# Patient Record
Sex: FEMALE | Race: WHITE | NOT HISPANIC OR LATINO | Employment: FULL TIME | URBAN - METROPOLITAN AREA
[De-identification: names, ages, dates, MRNs, and addresses within clinical notes are randomized per-mention and may not be internally consistent; named-entity substitution may affect disease eponyms.]

---

## 2017-01-02 ENCOUNTER — OFFICE VISIT (OUTPATIENT)
Dept: PSYCHOLOGY | Facility: CLINIC | Age: 37
End: 2017-01-02
Attending: NURSE PRACTITIONER
Payer: COMMERCIAL

## 2017-01-02 DIAGNOSIS — F41.1 GAD (GENERALIZED ANXIETY DISORDER): ICD-10-CM

## 2017-01-02 DIAGNOSIS — F33.1 MODERATE EPISODE OF RECURRENT MAJOR DEPRESSIVE DISORDER (H): Primary | ICD-10-CM

## 2017-01-02 PROCEDURE — 90791 PSYCH DIAGNOSTIC EVALUATION: CPT | Performed by: SOCIAL WORKER

## 2017-01-02 ASSESSMENT — ANXIETY QUESTIONNAIRES
5. BEING SO RESTLESS THAT IT IS HARD TO SIT STILL: SEVERAL DAYS
6. BECOMING EASILY ANNOYED OR IRRITABLE: NEARLY EVERY DAY
GAD7 TOTAL SCORE: 16
3. WORRYING TOO MUCH ABOUT DIFFERENT THINGS: NEARLY EVERY DAY
2. NOT BEING ABLE TO STOP OR CONTROL WORRYING: NEARLY EVERY DAY
1. FEELING NERVOUS, ANXIOUS, OR ON EDGE: NEARLY EVERY DAY
7. FEELING AFRAID AS IF SOMETHING AWFUL MIGHT HAPPEN: NOT AT ALL

## 2017-01-02 ASSESSMENT — PATIENT HEALTH QUESTIONNAIRE - PHQ9: 5. POOR APPETITE OR OVEREATING: NEARLY EVERY DAY

## 2017-01-02 NOTE — Clinical Note
PINKY Reye[address] Dr[address]I met with Ms. Waller and completed a Diagnostic Eval and scheduled her for follow up sessions. Thanks Calvin Ramon, [facility]

## 2017-01-02 NOTE — PROGRESS NOTES
Adult Intake Structured Interview  Standard Diagnostic Assessment      CLIENT'S NAME: Heath Waller  MRN:   [id number]  :   1980  ACCT. NUMBER: [id number]  DATE OF SERVICE: 17      Identifying Information:  Client is a 36 year old, ,  female. Client was referred for counseling by Alex Interiano MD at [facility]. Client is currently employed full time. Client attended the session alone.       Client's Statement of Presenting Concern:  Client reports the reason for seeking therapy at this time for CBT to help with helping with pain control.  Client stated that her symptoms have resulted in the following functional impairments: management of the household and or completion of tasks, relationship(s), social interactions and work / vocational responsibilities      History of Presenting Concern:  Client reports that these problem(s) began 15 yrs ago with pain.  Client has attempted to resolve these concerns in the past through creams, TINS unit, antidepressants, other medications for pain, Pt. Client reports that other professional(s) are not involved in providing support / services.     Social History:  Client reported she grew up in [place], [place]. They were the first born of 2 children.  Client's brother is 35. This is an intact family and parents remain . Client reported that her childhood was happy. Client described her current relationships with family of origin as .    Client reported a history of a committed relationship with her marriage. Client has been  for 17 years and together for 21 yrs.  Client reported having 3 children.  Client's children's ages 18, 15, and 8. Client identified few stable and meaningful social connections. Client reported that she has not been involved  with the legal system.  Client's highest education level was associate degree / vocational certificate. Client did identify the following learning problems: attention and concentration. There are no ethnic, cultural or Episcopal factors that may be relevant for therapy. Client identified her preferred language to be English. Client reported she does not need the assistance of an  or other support involved in therapy. Modifications will not be used to assist communication in therapy. Client did serve in the  10 yrs in the [employer].     Client reports family history includes CANCER in her paternal grandfather; CEREBROVASCULAR DISEASE in her father and maternal grandmother; Hypertension in her father; Thyroid Disease in her brother.    Mental Health History:  Client reported the following biological family members or relatives with mental health issues: Father experienced Depression, Mother experienced Depression, client's 2 Children are experiencing ADHD, Maternal Grandmother experienced Schizophrenia and Maternal  Uncle experienced Depression and committed suicide when client's mother was 10 yrs old and her uncle was college age.  Mother also had a brother she never knew die from an accidental hanging and her father had a brother who  in a car accident.   Client has not been previously diagnosed with a mental health diagnosis.  Client has not received mental health services in the past.  Hospitalizations: None.  Client is not currently receiving any mental health services.  Client is participating in PT with the Pain Clinic.      Chemical Health History:  Client reported the following biological family members or relatives with chemical health issues: Father reportedly used alcohol , Uncle reportedly uses alcohol . Client has not received chemical dependency treatment in the past. Client is not currently receiving any chemical dependency treatment. Client reports no problems as a result  of their drinking / drug use.      Client Reports:  Client reports using alcohol 2 times per week and has 2 beers at a time. Client first started drinking at age 14.  Client reports using tobacco 1 1/2 packs per day. Client started using tobacco at age 14.  Client noted she is attempting to cut down on her smoking.  Client reports using marijuana 3 times per week and smokes 2 hits at a time. Client started using marijuana 2 yrs ago.  Client reports using caffeine 6 times per day and drinks 1 at a time. Client started using caffeine at age no response..  Client denies using street drugs.  Client denies the non-medical use of prescription or over the counter drugs.    CAGE: None of the patient's responses to the CAGE screening were positive / Negative CAGE score   Based on the negative Cage-Aid score and clinical interview there  are not indications of drug or alcohol abuse.    Discussed the general effects of drugs and alcohol on health and well-being. Therapist gave client printed information about the effects of chemical use on her health and well being.      Significant Losses / Trauma / Abuse / Neglect Issues:  There are no indications or report of: significant losses, trauma, abuse or neglect.  Client described how she lost her closest cousin to a drug related death when she was 14.  Client also noted she lost 3 of her grandparents at ages her 10, 12, and 28.  Client noted her paternal grandmother is currently living.    Issues of possible neglect are not present.      Medical Issues:  Client has had a physical exam to rule out medical causes for current symptoms. Date of last physical exam was within the past year. Client was encouraged to follow up with PCP if symptoms were to develop. The client has a [facility] Provider, who is named in the [facility], Dr. Interiano.. The client reports not having a psychiatrist. Client reports the following current medical concerns: chronic pain syndrome,  ADD, Fibromyalgia, TMJ,and Myofascial pain.. The client reports the presence of chronic or episodic pain in the form of lower back, shoulders, and neck. . The pain level is severe and has a frequency of 6.. There are significant nutritional concern with her increase wt  of 40#.     Client reports current meds as:   Current Outpatient Prescriptions   Medication Sig     desipramine (NORPRAMIN) 10 MG tablet Take 2 tablets (20 mg) by mouth At Bedtime     pregabalin (LYRICA) 150 MG capsule Take 1 capsule (150 mg) by mouth 2 times daily Patient  Will contact you when ready to fill     amphetamine-dextroamphetamine (ADDERALL XR) 30 MG per 24 hr capsule Take 1 capsule (30 mg) by mouth daily     [START ON 1/20/2017] amphetamine-dextroamphetamine (ADDERALL XR) 30 MG per 24 hr capsule Take 1 capsule (30 mg) by mouth daily     [START ON 2/20/2017] amphetamine-dextroamphetamine (ADDERALL XR) 30 MG per 24 hr capsule Take 1 capsule (30 mg) by mouth daily     spironolactone (ALDACTONE) 50 MG tablet Take 1 tablet (50 mg) by mouth daily     pseudoePHEDrine (SUDAFED 12 HOUR) 120 MG 12 hr tablet Take 1 tablet (120 mg) by mouth 2 times daily     diazepam (VALIUM) 5 MG tablet Take 1 tablet (5 mg) by mouth every 12 hours as needed for anxiety, sleep, muscle spasms or pain     lisinopril (PRINIVIL/ZESTRIL) 10 MG tablet Take 1 tablet (10 mg) by mouth daily     order for DME Equipment being ordered: TENS with electrodes and pads     pregabalin (LYRICA) 75 MG capsule Take 1 capsule (75 mg) by mouth 3 times daily     B Complex TABS      ibuprofen (ADVIL,MOTRIN) 200 MG tablet      DULoxetine (CYMBALTA) 60 MG capsule Take 120 mg by mouth every morning     omeprazole (PRILOSEC) 20 MG capsule Take 20 mg by mouth     nicotine (NICODERM CQ) 21 MG/24HR patch 2h hr Place 1 patch onto the skin every 24 hours     No current facility-administered medications for this visit.       Client Allergies:  Allergies   Allergen Reactions     Codeine      Sulfa  Drugs      Rash       Toradol [Ketorolac] Itching and Rash       Medical History:  Past Medical History   Diagnosis Date     Myofacial muscle pain      ADD (attention deficit disorder)      Depressive disorder      Thyroid disease      Gastro-oesophageal reflux disease      Noninfectious ileitis      IBS with constipation     Other chronic pain      myofacial pain syndrome     Sleep apnea      wears CPAP at night         Medication Adherence:  Client reports taking prescribed medications as prescribed.    Client was provided recommendation to follow-up with prescribing physician.    Mental Status Assessment:  Appearance:   Appropriate   Eye Contact:   Good   Psychomotor Behavior: Normal   Attitude:   Cooperative   Orientation:   All  Speech   Rate / Production: Normal    Volume:  Normal   Mood:    Normal  Affect:    Appropriate   Thought Content:  Clear   Thought Form:  Coherent  Logical   Insight:    Good       Review of Symptoms:  Depression: Sleep Interest Guilt Energy Concentration Appetite Worthless Ruminations Irritability  Maribel:  No symptoms  Psychosis: No symptoms  Anxiety: Worries Nervousness Describe: as pain increases client's anxiety increases.   Panic:  Shortness of Breath Tingling  Post Traumatic Stress Disorder: No symptoms  Obsessive Compulsive Disorder: No symptoms  Eating Disorder: No symptoms  Oppositional Defiant Disorder: No symptoms  ADD / ADHD: Attention Task Completion Distractiblity Forgetful  Conduct Disorder: No symptoms        Safety Issues and Plan for Safety and Risk Management:  Client denies a history of suicidal ideation, suicide attempts, self-injurious behavior, homicidal ideation, homicidal behavior and and other safety concerns  Client denies current fears or concerns for personal safety.  Client denies current or recent suicidal ideation or behaviors.  Client denies current or recent homicidal ideation or behaviors.  Client denies current or recent self injurious behavior or  ideation.  Client denies other safety concerns.  Client reports there are firearms in the house. The firearms are secured in a locked space.  A safety and risk management plan has not been developed at this time, however client was given the after-hours number / 911 should there be a change in any of these risk factors.    Client's Strengths and Limitations:  Client identified the following strengths or resources that will help her succeed in counseling: commitment to health and well being, becca / spirituality, family support and intelligence. Client identified the following supports: family. Things that may interfere with the clients success in counseling include:work, medical appointments and financial part. .        Diagnostic Criteria:  A. Excessive anxiety and worry about a number of events or activities (such as work or school performance).   B. The person finds it difficult to control the worry.  C. Select 3 or more symptoms (required for diagnosis). Only one item is required in children.   - Restlessness or feeling keyed up or on edge.    - Being easily fatigued.    - Difficulty concentrating or mind going blank.    - Irritability.    - Muscle tension.    - Sleep disturbance (difficulty falling or staying asleep, or restless unsatisfying sleep).   D. The focus of the anxiety and worry is not confined to features of an Axis I disorder.  E. The anxiety, worry, or physical symptoms cause clinically significant distress or impairment in social, occupational, or other important areas of functioning.   F. The disturbance is not due to the direct physiological effects of a substance (e.g., a drug of abuse, a medication) or a general medical condition (e.g., hyperthyroidism) and does not occur exclusively during a Mood Disorder, a Psychotic Disorder, or a Pervasive Developmental Disorder.  CRITERIA (A-C) REPRESENT A MAJOR DEPRESSIVE EPISODE - SELECT THESE CRITERIA  A) Recurrent episode(s) - symptoms have been  present during the same 2-week period and represent a change from previous functioning 5 or more symptoms (required for diagnosis)   - Depressed mood. Note: In children and adolescents, can be irritable mood.     - Diminished interest or pleasure in all, or almost all, activities.    - Significant weight gainincrease in appetite.    - Decreased sleep.    - Psychomotor activity agitation.    - Fatigue or loss of energy.    - Feelings of worthlessness or inappropriate guilt.    - Diminished ability to think or concentrate, or indecisiveness.   B) The symptoms cause clinically significant distress or impairment in social, occupational, or other important areas of functioning  C) The episode is not attributable to the physiological effects of a substance or to another medical condition  D) The occurence of major depressive episode is not better explained by other thought / psychotic disorders  E) There has never been a manic episode or hypomanic episode      Functional Status:  Client's symptoms have caused reduced functional status in the following areas: Activities of Daily Living - difficulty completing tasks due to pain levels  Occupational / Vocational - challenges due to pain  Social / Relational - no energy and isolating from social situations.      DSM5 Diagnoses: (Sustained by DSM5 Criteria Listed Above)  Diagnoses: 296.32 Major Depressive Disorder, Recurrent Episode, Moderate _ and With anxious distress  300.02 (F41.1) Generalized Anxiety Disorder  Psychosocial & Contextual Factors: pain triggers client's anxiety and client's depression stems from feeling hopeless about her resolving her pain issues.  WHODAS 2.0 (12 item)            This questionnaire asks about difficulties due to health conditions. Health conditions  include  disease or illnesses, other health problems that may be short or long lasting,  injuries, mental health or emotional problems, and problems with alcohol or drugs.                      Think back over the past 30 days and answer these questions, thinking about how much  difficulty you had doing the following activities. For each question, please [place] only  one response.    S1 Standing for long periods such as 30 minutes? Severe =       4   S2 Taking care of household responsibilities? Severe =       4   S3 Learning a new task, for example, learning how to get to a new place? Moderate =   3   S4 How much of a problem do you have joining community activities (for example, festivals, Adventism or other activities) in the same way as anyone else can? Severe =       4   S5 How much have you been emotionally affected by your health problems? Severe =       4     In the past 30 days, how much difficulty did you have in:   S6 Concentrating on doing something for ten minutes? Moderate =   3   S7 Walking a long distance such as a kilometer (or equivalent)? Severe =       4   S8 Washing your whole body? Mild =           2   S9 Getting dressed? Mild =           2   S10 Dealing with people you do not know? None =         1   S11 Maintaining a friendship? None =         1   S12 Your day to day work? Severe =       4     H1 Overall, in the past 30 days, how many days were these difficulties present? Record number of days 30   H2 In the past 30 days, for how many days were you totally unable to carry out your usual activities or work because of any health condition? Record number of days  10   H3 In the past 30 days, not counting the days that you were totally unable, for how many days did you cut back or reduce your usual activities or work because of any health condition? Record number of days  20     Attendance Agreement:  Client has signed Attendance Agreement:Yes      Preliminary Treatment Plan:  The client reports no currently identified Adventism, ethnic or cultural issues relevant to therapy.     services are not indicated.    Modifications to assist communication are not indicated.    The  concerns identified by the client will be addressed in therapy.    Initial Treatment will focus on: Depressed Mood - difficulty falling asleep and staying asleeep, feeling down, little interest in doing things, low energy, trouble concentating, and overeating.   Anxiety - feeling anxious and nervous, worrying, trouble relaxing, and irritablity.  .    As a preliminary treatment goal, client will develop more effective coping skills to manage depressive symptoms, will develop healthy cognitive patterns and beliefs and will increase ability to function adaptively and will experience a reduction in anxiety, will develop more effective coping skills to manage anxiety symptoms and will develop healthy cognitive patterns and beliefs.    The focus of initial interventions will be to alleviate anxiety, alleviate depressed mood, provide psychoeduction regarding pain management, teach CBT skills, teach relaxation strategies and teach sleep hygiene.    Collaboration with other professionals is not indicated at this time.  Client is active in tx with Pain Management and participating in PT.  Client noted she has a counseling appointment scheduled in Feb with Pain Management and may end up transferring her counseling to that therapist.  We discussed that client will not need to see 2 mental health therapists at the same time.       Referral to another professional/service is not indicated at this time..    A Release of Information is not needed at this time.    Report to child / adult protection services was NA.    Client will have access to their [facility]' medical record.    VALDO Calderon  January 2, 2017

## 2017-01-03 ASSESSMENT — ANXIETY QUESTIONNAIRES: GAD7 TOTAL SCORE: 16

## 2017-01-03 ASSESSMENT — PATIENT HEALTH QUESTIONNAIRE - PHQ9: SUM OF ALL RESPONSES TO PHQ QUESTIONS 1-9: 18

## 2017-01-09 ENCOUNTER — MYC MEDICAL ADVICE (OUTPATIENT)
Dept: PALLIATIVE MEDICINE | Facility: CLINIC | Age: 37
End: 2017-01-09

## 2017-01-10 NOTE — TELEPHONE ENCOUNTER
"Pt LM at 1513:    Pt states that was returning a call. States that her pain is out of control. States that she is doing \"everything\" and can't even function. Not sure what to do. Please call back. [phone].  ----  Will route to nurse [place].     SANDEE LemusN, RN-BC  Patient Care Supervisor/Care Coordinator  [facility]    "

## 2017-01-10 NOTE — TELEPHONE ENCOUNTER
I have earlier opening this week.  She can come in tomorrow or Thurs if this will help and we can do TPI's. Entered my chart message to patient.   Shazia Larkin CNP    [facility]

## 2017-01-10 NOTE — TELEPHONE ENCOUNTER
My chart message from 1/9/17. I called Heath and left a message. I encouraged her to do some self care and incorporate some flare management between now and her appointment on Friday. I left her the nurse number incase she wanted a call back.   I am having an increase in pain again, especially in my low back and neck. I am trying to use everything I can to get it under control, but it is not decreasing at all. It hurts to sit, stand, lay down, everything. I know that I have an appointment scheduled for Friday, but I am in so much pain right now that I'm struggling.    SANDEE WilloughbyN, RN  Care Coordinator  [facility]

## 2017-01-11 ENCOUNTER — OFFICE VISIT (OUTPATIENT)
Dept: PALLIATIVE MEDICINE | Facility: CLINIC | Age: 37
End: 2017-01-11
Payer: COMMERCIAL

## 2017-01-11 ENCOUNTER — RADIANT APPOINTMENT (OUTPATIENT)
Dept: GENERAL RADIOLOGY | Facility: CLINIC | Age: 37
End: 2017-01-11
Attending: NURSE PRACTITIONER
Payer: COMMERCIAL

## 2017-01-11 VITALS
OXYGEN SATURATION: 99 % | SYSTOLIC BLOOD PRESSURE: 122 MMHG | WEIGHT: 180 LBS | DIASTOLIC BLOOD PRESSURE: 81 MMHG | HEART RATE: 76 BPM | BODY MASS INDEX: 26.57 KG/M2

## 2017-01-11 DIAGNOSIS — M26.609 TMJ (TEMPOROMANDIBULAR JOINT SYNDROME): ICD-10-CM

## 2017-01-11 DIAGNOSIS — M79.10 MYALGIA: ICD-10-CM

## 2017-01-11 DIAGNOSIS — F51.04 PSYCHOPHYSIOLOGICAL INSOMNIA: ICD-10-CM

## 2017-01-11 DIAGNOSIS — M79.18 MYOFASCIAL PAIN: ICD-10-CM

## 2017-01-11 DIAGNOSIS — M54.2 CERVICALGIA: ICD-10-CM

## 2017-01-11 DIAGNOSIS — M79.7 FIBROMYALGIA: ICD-10-CM

## 2017-01-11 DIAGNOSIS — M50.30 DDD (DEGENERATIVE DISC DISEASE), CERVICAL: ICD-10-CM

## 2017-01-11 DIAGNOSIS — G89.4 CHRONIC PAIN SYNDROME: Primary | ICD-10-CM

## 2017-01-11 PROCEDURE — 72040 X-RAY EXAM NECK SPINE 2-3 VW: CPT

## 2017-01-11 PROCEDURE — 99214 OFFICE O/P EST MOD 30 MIN: CPT | Mod: 25 | Performed by: NURSE PRACTITIONER

## 2017-01-11 PROCEDURE — 20553 NJX 1/MLT TRIGGER POINTS 3/>: CPT | Performed by: NURSE PRACTITIONER

## 2017-01-11 RX ORDER — DESIPRAMINE HYDROCHLORIDE 10 MG/1
30 TABLET ORAL AT BEDTIME
Qty: 60 TABLET | Refills: 1 | Status: SHIPPED | OUTPATIENT
Start: 2017-01-11 | End: 2017-09-29

## 2017-01-11 RX ORDER — PREGABALIN 150 MG/1
150 CAPSULE ORAL 3 TIMES DAILY
Qty: 60 CAPSULE | Refills: 1 | Status: SHIPPED | OUTPATIENT
Start: 2017-01-11 | End: 2019-09-25

## 2017-01-11 ASSESSMENT — PAIN SCALES - GENERAL: PAINLEVEL: EXTREME PAIN (9)

## 2017-01-11 NOTE — PROGRESS NOTES
"Chief Complaint   Patient presents with     Pain     follow up       Initial There were no vitals taken for this visit. Estimated body mass index is 26.57 kg/(m^2) as calculated from the following:    Height as of 12/20/16: 1.753 m (5' 9\").    Weight as of 12/20/16: 81.647 kg (180 lb).  BP completed using cuff size: hugo French [facility]        "

## 2017-01-11 NOTE — TELEPHONE ENCOUNTER
Sent to patient 1/11/17 at 0943:    Kodi Joe,     Thanks for getting back to me so quickly. I have you scheduled for 3 p.m today and cancelled your Friday appointment. See you later today,    Danielle Virgen, MSN, RN-BC  Care Coordinator  [facility] [place]

## 2017-01-11 NOTE — TELEPHONE ENCOUNTER
Sent to patient 1/11/17 at 0820:    Good morning Heath,    Here is what Shazia would have open the remainder of this week - this is current at the time I am sending it:    Today, Wednesday 1/11/17: 2:30 or 3 p.m.    Thursday, 1/12/17: 10:30, 11:00, 1 p.m., 1:30, 2:30    Friday, 1/13/17: 8 a.m., 8:30, 9:30    I hope one of these appointments will be a fit for you so we can help you out. Message me back as soon as possible and I can check to see what's still available. I will be watching for your return message.    Thank you,    Danielle Virgen, MSN, RN-BC  Care Coordinator  [facility] [place]

## 2017-01-11 NOTE — PATIENT INSTRUCTIONS
Increase Lyrica to 150 mg 3 x per day Rx given to you today   Increase Norpramin to 30 mg at bedtime. RX sent to pharmacy    Recommend that you limit the Valium 5 mg  to 2 x per day of 5 mg or 2.5 mg 3-4 x per day> Not more than 10 mg as prescribed by Dr. Interiano     I will check to see if we can get you into see MIKE POE, PHD., sooner for the initial pain behavioral health. If this is not possible I appreciate your willingness to see a provider at [facility] for this    As your are taking valium, I am not able to prescribe stronger pain killers Percocet, even temporarily.    Cervical ( neck ) x-rays today. I will contact your is my chart with results and plan of care.    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 I am willing to write letter of medical necessity for you.       Follow up:   Shazia Larkin CNP  1/27/17  LEX HARKINS 1/13/17    [facility] Injection instructions  You had trigger point injections/to both sides of neck upper back and left upper buttocks  Meds used:  Lidocaine/bupivicaine   Nurse line:  [phone]  Appointment line:  [phone]      Go to the emergency room if you develop any shortness of breath    Monitor the injection sites for signs and symptoms of infection-fever, chills, redness, swelling, warmth, or drainage to areas.    Okay to use ice to the areas.    Do not apply heat to sites for at least 12 hours.    You may have soreness at injection sites for up to 24 hours.    If you are able to use anti-inflammatory medications or Tylenol for pain control if necessary, you can take these as directed.  After hours doctor line:  [phone]      Nurse Triage line:  [phone]   Call this number with any questions or concerns. You may leave a detailed message anytime. Calls are typically returned Monday through Friday between 8 AM and 4:30 PM. We usually get back to you within 2 business days  depending on the issue/request.       Medication refills:    For non-narcotic medications, call your pharmacy directly to request a refill. The pharmacy will contact the [facility] for authorization. Please allow 3-4 days for these refills to be processed.     For narcotic refills, call the nurse triage line or send a [employer] message. Please contact us 7-10 days before your refill is due. The message MUST include the name of the specific medication(s) requested and how you would like to receive the prescription(s). The options are as follows:    Pain Clinic staff can mail the prescription to your pharmacy. Please tell us the name of the pharmacy.    You may pick the prescription up at the Pain Clinic (tell us the location) or during a clinic visit with your pain provider    Pain Clinic staff can deliver the prescription to the [facility] pharmacy in the clinic building. Please tell us the location.      Scheduling number: [phone].  Call this number to schedule or change appointments.    We believe regular attendance is key to your success in our program.    Any time you are unable to keep your appointment we ask that you call us at least 24 hours in advance to let us know. This will allow us to offer the appointment time to another patient.

## 2017-01-11 NOTE — MR AVS SNAPSHOT
After Visit Summary   1/11/2017    Heath Waller    MRN: [id number]           Patient Information     Date Of Birth          1980        Visit Information        Provider Department      1/11/2017 3:00 PM Shazia Larkin APRN CNP [facility]        Today's Diagnoses     Chronic pain syndrome    -  1     Fibromyalgia         TMJ (temporomandibular joint syndrome)         Cervicalgia         DDD (degenerative disc disease), cervical         Psychophysiological insomnia           Care Instructions    Increase Lyrica to 150 mg 3 x per day Rx given to you today   Increase Norpramin to 30 mg at bedtime. RX sent to pharmacy    Recommend that you limit the Valium 5 mg  to 2 x per day of 5 mg or 2.5 mg 3-4 x per day> Not more than 10 mg as prescribed by Dr. Interiano     I will check to see if we can get you into see MIKE POE, PHD., sooner for the initial pain behavioral health. If this is not possible I appreciate your willingness to see a provider at [facility] for this    As your are taking valium, I am not able to prescribe stronger pain killers Percocet, even temporarily.    Cervical ( neck ) x-rays today. I will contact your is my chart with results and plan of care.    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 I am willing to write letter of medical necessity for you.       Follow up:   Shazia Larkin CNP  1/27/17  LEX HARKINS 1/13/17    [facility] Injection instructions  You had trigger point injections/to both sides of neck upper back and left upper buttocks  Meds used:  Lidocaine/bupivicaine   Nurse line:  [phone]  Appointment line:  [phone]      Go to the emergency room if you develop any shortness of breath    Monitor the injection sites for signs and symptoms of infection-fever, chills, redness, swelling, warmth, or drainage to areas.    Okay to use ice to the  areas.    Do not apply heat to sites for at least 12 hours.    You may have soreness at injection sites for up to 24 hours.    If you are able to use anti-inflammatory medications or Tylenol for pain control if necessary, you can take these as directed.  After hours doctor line:  [phone]      Nurse Triage line:  [phone]   Call this number with any questions or concerns. You may leave a detailed message anytime. Calls are typically returned Monday through Friday between 8 AM and 4:30 PM. We usually get back to you within 2 business days depending on the issue/request.       Medication refills:    For non-narcotic medications, call your pharmacy directly to request a refill. The pharmacy will contact the [facility] for authorization. Please allow 3-4 days for these refills to be processed.     For narcotic refills, call the nurse triage line or send a [employer] message. Please contact us 7-10 days before your refill is due. The message MUST include the name of the specific medication(s) requested and how you would like to receive the prescription(s). The options are as follows:    Pain Clinic staff can mail the prescription to your pharmacy. Please tell us the name of the pharmacy.    You may pick the prescription up at the Pain Clinic (tell us the location) or during a clinic visit with your pain provider    Pain Clinic staff can deliver the prescription to the [facility] pharmacy in the clinic building. Please tell us the location.      Scheduling number: [phone].  Call this number to schedule or change appointments.    We believe regular attendance is key to your success in our program.    Any time you are unable to keep your appointment we ask that you call us at least 24 hours in advance to let us know. This will allow us to offer the appointment time to another patient.             Follow-ups after your visit        Your next 10 appointments already scheduled     Jan 12, 2017  6:00 PM    [facility]   [facility] ([facility])    [address] [facility] [address] [phone]            Jan 13, 2017  8:00 AM   Return Visit with Angela Anders PT   [facility] ([facility])    [address] [place] [address]   [phone]            Jan 27, 2017  8:00 AM   Return Visit with Angela Anders PT   [facility] ([facility])    [address] [place] [address]   [phone]            Jan 27, 2017  9:00 AM   Return Visit with GREGORIO Kwon CNP   [facility] ([facility])    [address] [place] [address]   [phone]            Feb 03, 2017  8:00 AM   Return Visit with Angela Anders PT   [facility] ([facility])    [address] [place] [address]   [phone]            Feb 07, 2017  9:00 AM   New Visit with Radha Keller, PhD [facility] ([facility])    [address] [place] [address]   [phone]            Feb 17, 2017  8:00 AM   Return Visit with Angela Anders PT   [facility] ([facility])    [address] [place] [address]   [phone]              Who to contact     If you have questions or need follow up information about today's clinic visit or your schedule please contact [facility] directly at [phone].  Normal or non-critical lab and imaging results will be communicated to you by MyChart, letter or phone within 4 business days after the clinic has received the results. If you do not hear from us within 7 days, please contact the clinic through MyChart or phone. If you have a  critical or abnormal lab result, we will notify you by phone as soon as possible.  Submit refill requests through [employer] or call your pharmacy and they will forward the refill request to us. Please allow 3 business days for your refill to be completed.          Additional Information About Your Visit        [employer] Information     [employer] gives you secure access to your electronic health record. If you see a primary care provider, you can also send messages to your care team and make appointments. If you have questions, please call your primary care clinic.  If you do not have a primary care provider, please call [phone] and they will assist you.        Care EveryWhere ID     This is your Care EveryWhere ID. This could be used by other organizations to access your [facility] medical records  [id number]        Your Vitals Were     Pulse Pulse Oximetry                76 99%           Blood Pressure from Last 3 Encounters:   01/11/17 122/81   12/20/16 152/92   12/20/16 122/70    Weight from Last 3 Encounters:   01/11/17 81.647 kg (180 lb)   12/20/16 81.647 kg (180 lb)   12/20/16 81.647 kg (180 lb)                 Today's Medication Changes          These changes are accurate as of: 1/11/17  4:40 PM.  If you have any questions, ask your nurse or doctor.               These medicines have changed or have updated prescriptions.        Dose/Directions    desipramine 10 MG tablet   Commonly known as:  NORPRAMIN   This may have changed:  how much to take   Used for:  Chronic pain syndrome, Fibromyalgia, Psychophysiological insomnia   Changed by:  Shazia Larkin APRN CNP        Dose:  30 mg   Take 3 tablets (30 mg) by mouth At Bedtime   Quantity:  60 tablet   Refills:  1       pregabalin 150 MG capsule   Commonly known as:  LYRICA   This may have changed:    - when to take this  - Another medication with the same name was removed. Continue taking this medication, and follow the directions you see  here.   Used for:  Chronic pain syndrome, Fibromyalgia, TMJ (temporomandibular joint syndrome)   Changed by:  Shazia Larkin APRN CNP        Dose:  150 mg   Take 1 capsule (150 mg) by mouth 3 times daily Patient  Will contact you when ready to fill   Quantity:  60 capsule   Refills:  1            Where to get your medicines      These medications were sent to [facility]/pharmacy #[id number] - [place], [place] - [address]  [address]  [address] [facility] [address]     Phone:  [phone]    - desipramine 10 MG tablet      Some of these will need a paper prescription and others can be bought over the counter.  Ask your nurse if you have questions.     Bring a paper prescription for each of these medications    - pregabalin 150 MG capsule             Primary Care Provider Office Phone # Fax #    [facility] [phone] [address] [facility] [address]        Thank you!     Thank you for choosing [facility]  for your care. Our goal is always to provide you with excellent care. Hearing back from our patients is one way we can continue to improve our services. Please take a few minutes to complete the written survey that you may receive in the mail after your visit with us. Thank you!             Your Updated Medication List - Protect others around you: Learn how to safely use, store and throw away your medicines at www.disposemymeds.org.          This list is accurate as of: 1/11/17  4:40 PM.  Always use your most recent med list.                   Brand Name Dispense Instructions for use    * amphetamine-dextroamphetamine 30 MG per 24 hr capsule    ADDERALL XR    30 capsule    Take 1 capsule (30 mg) by mouth daily       * amphetamine-dextroamphetamine 30 MG per 24 hr capsule   Start taking on:  1/20/2017    ADDERALL XR    30 capsule    Take 1 capsule (30 mg) by mouth daily       * amphetamine-dextroamphetamine 30 MG per 24 hr capsule   Start  taking on:  2/20/2017    ADDERALL XR    30 capsule    Take 1 capsule (30 mg) by mouth daily       B Complex Tabs          desipramine 10 MG tablet    NORPRAMIN    60 tablet    Take 3 tablets (30 mg) by mouth At Bedtime       diazepam 5 MG tablet    VALIUM    30 tablet    Take 1 tablet (5 mg) by mouth every 12 hours as needed for anxiety, sleep, muscle spasms or pain       DULoxetine 60 MG EC capsule    CYMBALTA     Take 120 mg by mouth every morning       ibuprofen 200 MG tablet    ADVIL/MOTRIN         lisinopril 10 MG tablet    PRINIVIL/ZESTRIL    30 tablet    Take 1 tablet (10 mg) by mouth daily       nicotine 21 MG/24HR 24 hr patch    NICODERM CQ    30 patch    Place 1 patch onto the skin every 24 hours       omeprazole 20 MG CR capsule    priLOSEC     Take 20 mg by mouth       order for DME     1 each    Equipment being ordered: TENS with electrodes and pads       pregabalin 150 MG capsule    LYRICA    60 capsule    Take 1 capsule (150 mg) by mouth 3 times daily Patient  Will contact you when ready to fill       pseudoePHEDrine 120 MG 12 hr tablet    SUDAFED 12 HOUR    90 tablet    Take 1 tablet (120 mg) by mouth 2 times daily       spironolactone 50 MG tablet    ALDACTONE    90 tablet    Take 1 tablet (50 mg) by mouth daily       * Notice:  This list has 3 medication(s) that are the same as other medications prescribed for you. Read the directions carefully, and ask your doctor or other care provider to review them with you.

## 2017-01-11 NOTE — TELEPHONE ENCOUNTER
Received from patient 1/10/17 at 1840:    What time do you have open? I need help now but I work in [place] which is not easy to make work. I start therapy at work tomorrow afternoon for TMJ.

## 2017-01-12 ENCOUNTER — TELEPHONE (OUTPATIENT)
Dept: PALLIATIVE MEDICINE | Facility: CLINIC | Age: 37
End: 2017-01-12

## 2017-01-12 ENCOUNTER — OFFICE VISIT (OUTPATIENT)
Dept: PSYCHOLOGY | Facility: CLINIC | Age: 37
End: 2017-01-12
Payer: COMMERCIAL

## 2017-01-12 DIAGNOSIS — F33.9 MAJOR DEPRESSION, RECURRENT (H): Primary | ICD-10-CM

## 2017-01-12 DIAGNOSIS — M79.18 MYOFASCIAL PAIN SYNDROME, CERVICAL: ICD-10-CM

## 2017-01-12 DIAGNOSIS — G89.4 CHRONIC PAIN SYNDROME: Primary | ICD-10-CM

## 2017-01-12 DIAGNOSIS — F41.1 GAD (GENERALIZED ANXIETY DISORDER): ICD-10-CM

## 2017-01-12 PROCEDURE — 90834 PSYTX W PT 45 MINUTES: CPT | Performed by: SOCIAL WORKER

## 2017-01-12 NOTE — TELEPHONE ENCOUNTER
I reviewed the patient's cervical x-rays and sent a my chart to patient.  Please contact the patient to see if she would like to try tizanidine ( Zanaflex)  ( failed Flexeril, Skelaxin and Robaxin). Is she interested in a medrol dose pack.  I am not quite sure what else to offer her at this point.   She has an appointment  Tomorrow with LEX HARKINS. I would like consent to talk with her mental health therapist.    Shazia Larkin, CNP    [facility]

## 2017-01-12 NOTE — Clinical Note
Jenna Mccray, I paged you with my direct # [phone].  Please call so we can coordinate and discuss strategies for me to use to help Heath cope with her pain. Thank you VALDO Abel

## 2017-01-13 ENCOUNTER — OFFICE VISIT (OUTPATIENT)
Dept: PALLIATIVE MEDICINE | Facility: CLINIC | Age: 37
End: 2017-01-13
Payer: COMMERCIAL

## 2017-01-13 ENCOUNTER — MYC MEDICAL ADVICE (OUTPATIENT)
Dept: PALLIATIVE MEDICINE | Facility: CLINIC | Age: 37
End: 2017-01-13

## 2017-01-13 ENCOUNTER — HOSPITAL ENCOUNTER (EMERGENCY)
Facility: CLINIC | Age: 37
Discharge: HOME | End: 2017-01-13
Attending: EMERGENCY MEDICINE | Admitting: EMERGENCY MEDICINE
Payer: COMMERCIAL

## 2017-01-13 VITALS
SYSTOLIC BLOOD PRESSURE: 140 MMHG | HEART RATE: 103 BPM | OXYGEN SATURATION: 98 % | TEMPERATURE: 97.9 F | RESPIRATION RATE: 16 BRPM | DIASTOLIC BLOOD PRESSURE: 113 MMHG

## 2017-01-13 DIAGNOSIS — M62.838 CERVICAL PARASPINAL MUSCLE SPASM: ICD-10-CM

## 2017-01-13 DIAGNOSIS — M79.7 FIBROMYALGIA: ICD-10-CM

## 2017-01-13 DIAGNOSIS — G89.4 CHRONIC PAIN SYNDROME: Primary | ICD-10-CM

## 2017-01-13 DIAGNOSIS — M62.830 SPASM OF BACK MUSCLES: ICD-10-CM

## 2017-01-13 DIAGNOSIS — G89.4 CHRONIC PAIN SYNDROME: ICD-10-CM

## 2017-01-13 PROCEDURE — 97530 THERAPEUTIC ACTIVITIES: CPT | Mod: GP | Performed by: PHYSICAL THERAPIST

## 2017-01-13 PROCEDURE — 99283 EMERGENCY DEPT VISIT LOW MDM: CPT

## 2017-01-13 PROCEDURE — 96152 HC HEALTH AND BEHAVIOR INTERVENTION, INDIVIDUAL, EACH 15 MINUTES: CPT | Performed by: PSYCHOLOGIST

## 2017-01-13 PROCEDURE — 25000132 ZZH RX MED GY IP 250 OP 250 PS 637: Performed by: EMERGENCY MEDICINE

## 2017-01-13 RX ORDER — METHYLPREDNISOLONE 4 MG
TABLET, DOSE PACK ORAL
Qty: 21 TABLET | Refills: 0 | Status: SHIPPED | OUTPATIENT
Start: 2017-01-13 | End: 2017-01-24

## 2017-01-13 RX ORDER — TIZANIDINE 2 MG/1
4-6 TABLET ORAL 3 TIMES DAILY
Qty: 90 TABLET | Refills: 1 | Status: SHIPPED | OUTPATIENT
Start: 2017-01-13 | End: 2017-09-29

## 2017-01-13 RX ORDER — OXYCODONE AND ACETAMINOPHEN 5; 325 MG/1; MG/1
2 TABLET ORAL ONCE
Status: COMPLETED | OUTPATIENT
Start: 2017-01-13 | End: 2017-01-13

## 2017-01-13 RX ADMIN — OXYCODONE HYDROCHLORIDE AND ACETAMINOPHEN 2 TABLET: 5; 325 TABLET ORAL at 12:14

## 2017-01-13 ASSESSMENT — ENCOUNTER SYMPTOMS
BACK PAIN: 1
FEVER: 0
CHILLS: 0
NUMBNESS: 0
WEAKNESS: 0

## 2017-01-13 NOTE — ED NOTES
Pt has chronic lower back pain. Increased in the last week. Pain mostly in the right lower back down hip and leg. Also from the shoulders up through the head. Pt has taken ibuprofen and tylenol with no relief.

## 2017-01-13 NOTE — PROGRESS NOTES
"                            Health and Behavioral Intervention    SUBJECTIVE:       Health and Behavioral Invention: Ms. Waller was seen by physical therapy today and was described as hopeless and angry with her pain center provider and had thoughts about seeing a .  Ms. Waller was put on my schedule as she was \"in crisis\" and there was concern about suicidal ideation due to her stating that she was \"hopelessness\" and was \"very tearful\".      Ms. Waller reported that she had a 20+ year history of chronic pain that was managed well with flares 3-4 times a year. She felt that a flare started 4-5 months ago that she cannot control.  She was frustrated and felt that she was being labeled as a \"drug seeker\".  She stated that she was in severe pain and came into our clinic on Wednesday hoping for medication.  She was so anxious related to her pain that she took 4 valium pills across the day (2 a day was prescribed by her primary care provider).   Instead, trigger point injections were conducted at this visit.  She stated that she sat in her car in the parking lot and had a panic attack, afterward because she felt that no one was helping her. She reported severe pain and couldn't go to work today, and could barely talk.      She stated that previously she coped with pain by using the following: \"too much ibuprofen and tylenol\", a tens unit, ice, heat, and stretching.   She was interested in percocet for this flare.  She stated that she was told by her provider that she \"can't get anything stronger, because she was on valium\".   She reported severe life impairment, feeling like a burden to her family, and afraid she will lose her job as she was taking time off.  She also stated that she was using marijuana to try to manage her pain, which was only serving to help with anxiety.    Emotionally, she reported panic attacks, crying, not being able to eat, she can't sleep, was in severe pain, and hopelessness.  She " "denied suicidal ideation and stated that she would never do that and that this was not an option as a family member had done this in the past.  She felt that her depression and anxiety was solely related to severe pain.  She had just started seeing a therapist, Calvin Padron, but did not have a psychiatrist.      Ms. Waller stated that she had already discussed her concerns with Shazia Reyna CNP but was not satisfied.  She felt that her pain was \"different\" and was concerned that something was wrong.  Thus, she was told that if her teddy was severe and she felt it was different, she had the option of going to the Emergency Room.  She was focused on percocet for short term flare management and stated that she was open to long term coping skills strategies but wanted a medication now for her flare.  We discussed that she could talk again with her provider, have a second opinion at another pain center, or see a psychiatrist to change her anxiety medications to something more long term if her pain care provider felt that this would benefit her care or provide other pain care options.  From a psychological standpoint, we discussed psychological and relaxation strategies for her pain, but she was not currently interested.      OBJECTIVE:    Session #:  1 Length of Visit: 45 minutes      Mental Status: Affect was tearful and angry.  She was alert and grossly oriented.  Her speech was normal in rate and prosody.  She did not evidence a disorder or form or flow of thought.  She was dressed and groomed appropriately.  She reported severe pain, frustration, anxiety, and depression.  Suicidal ideation was denied.  A full psychological evaluation was unable to be preformed today due to her level of distress.      ASSESSMENT:            Chronic pain    PLAN: Patient chose to go to the ER to evaluate her pain as she felt that this pain felt \"different\" and she was concerned that \"something was wrong\".    1. This " provider is leaving the clinic and unable to follow her care.  There is currently not a replacement pain psychologist  2. She was encouraged to continue to see Calvin Padron  3. Consider psychiatric medication management for depression and anxiety, to maximize long acting medication vs short acting  4. Concerns included her history of chemical coping, focus on percocet, and current marijuana use.      Radha Keller, Ph.D.,   Licensed Clinical Psychologist  [facility]  [phone]

## 2017-01-13 NOTE — TELEPHONE ENCOUNTER
Sent to patient via [employer] 1/13/17 at 1122:    Good morning Shazia Joe sent a few follow up notes she wanted me to relay to you:    1. Last visit on 1/11/17 we increased the Lyrica as well as the Norpramin.  I also added steroid to the trigger point injections. These increases may take a few days to note a difference.     2. Did you have a chance to talk to your employer to see if you can reduce your driving?    3. I will place a call to your mental health provider and see if I can provide some assistance.      4. You will also need to contact the  at [phone] to change your appointment with Radha Keller, PhD since she leaving our practice at the end of January. If you are not able to see Radha before the end of the month, I strongly encourage you to schedule at [place] for a one time consult with either Jude Diallo, PhD., or Reuben Padron. This will help me to structure our visits with some components of behavioral health.         Shazia Larkin, CNP     [facility]     Will keep encounter open for further documentation by provider for return call for mental health. Routing to Shazia.    Danielle Virgen, MSN, RN-BC  Care Coordinator  [facility]

## 2017-01-13 NOTE — PROGRESS NOTES
"PAIN PHYSICAL THERAPY PROGRESS NOTE  Patient Name: Heath Waller      YOB: 1980     Medical Record Number: [id number]  Diagnosis:    Chronic pain syndrome  Fibromyalgia    Visit: 4/13    Subjective: Patient reports she is having severe \"pain everywhere.\"  Worst areas of pain include left side neck, upper back and lower back. She is unable to work today due to pain. Rates pain level 9/10, fatigue level 9/10 and stress level 10/10.  She does not feel she is getting adequate support from the Pain Clinic to help her \"control\" the pain.  Patient reports feeling exhausted, not able to eat or sleep, worried about her family and job and overall feeling \"hopeless.\"  Ongoing severe pain escalates feelings anxiety and panic.  She is also concerned about being seen as a \"drug seeker\" especially when having pain flares.  States she would like to learn mind-body techniques, but not able to learn these things when her pain is elevated.      Self Care  HEP: next  Walking/Aerobic Activity: next  Posture: next  Breathing/Relaxation: issued CD, instructed to begin 3 x 3 breathing during breaks at work  Heat/Ice: issued rice sock  Mini Breaks: issued handout  Pacing: issued handout      Objective Findings:  OBSERVATION: Pt appears very distressed, tearful and crying throughout session. Reviewed concept of the function of the sympathetic nervous system and how it is impacted by persistent input/pain, as well as the importance of self-care techniques useful in calming the sympathetic nervous system.  Pt verbalizes understanding, but does not feel benefit from this approach when pain is severe.  Pt verbalizes difficulty with learning new strategies when pain is not \"controlled.\"  Instructed patient in supine kinesthetic body scan, breathing awareness and gentle shoulder glides coordinated with breath to released left upper trap and levator muscle tension.  Pt reported no change in pain, fatigue or distress level at end " of session.  Recommended patient meet with Radha Keller, PhD LP for evaluation of feelings of hopelessness and pain coping strategies.  Pt is in agreement and will meet with Dr. Keller today.    Treatment Interventions:  Therapeutic Activity:  For 45 minutes including instruction in body awareness, breathing and shoulder glides.  __________________________________________________________________  Instruction on home program: none    Assessment:  Ongoing Functional Limitations Include:  Patient did not tolerate/responde well to treatment    Recommendations: follow up with Radha Keller, PhD LP    Intensity Level: 2 (1=low intensity; 5=high intensity)  Demonstrates/Verbalizes Technique: 2 (1= poor technique-difficulty performing exercises,significant cues required; 5= good technique-performs exercises without cues)  Body Awareness: 1 (1=low awareness; 5=high awareness)  Posture/Stability: 1 (1= poor posture, stability; 5= good posture, stability)  Motivational Level: Cooperative, Distracted, anxious, Distracted, in pain and Seems uninterested  Response to Teaching: cooperative and distracted  Factors that affect learning: None    _______________________________________________________________________  Plan of Care  Continue PT to support reactivation and integration of self regulation pain management skills;  Continue with prescribed plan of care - progress as tolerated.  Focus next session will be on: self cares, cardio     Present:  NA     Total Visit Time:  45 minutes    Therapist: Angela Anders PT             Date: 1/13/2017

## 2017-01-13 NOTE — TELEPHONE ENCOUNTER
I authorized tiZANidine (ZANAFLEX) 2 MG tablet take 2-3 tablet(s) tid prn and   methylPREDNISolone (MEDROL DOSEPAK) 4 MG tablet take per package directions.      Last visit on 1/11 we increased the Lyrica as well as the Norpramin.    I also added steroid to the trigger point injections.   These increases may take a few days to note a difference. Did she tale to her employer to see if she can reduce her driving.   I will place a call to her Mental health provider and see if I can provide some assistance.    The patient also need to contact the  to change her appointment with MIKE POE, PHD.,. If she is not able to see MIKE PEO, PHD., before the end of the month ( provider leaving ) I strongly encourage Heath to schedule at [place] for a one time consult with either Jude Diallo, PhD., or Reuben Padron. This will help me to structure our visits with some components of behavioral health.    Shazia Larkin, CNP    [facility]

## 2017-01-13 NOTE — TELEPHONE ENCOUNTER
Call placed to Calvin Ramon LCSW; mental health provider for this patient.  I left message to have her contact me on my pager .  CC to this provider.   Shazia Larkin CNP    [facility]

## 2017-01-13 NOTE — ED AVS SNAPSHOT
[facility] Emergency Department    [address]    [facility] [address]    Phone:  [phone]    Fax:  [phone]                                       Heath Waller   MRN: [id number]    Department:  [facility] Emergency Department   Date of Visit:  1/13/2017           Patient Information     Date Of Birth          1980        Your diagnoses for this visit were:     Chronic pain syndrome     Spasm of back muscles     Cervical paraspinal muscle spasm        You were seen by John Richter MD.      Follow-up Information     Follow up with Clinic, [facility].    Contact information:    [phone]          Schedule an appointment as soon as possible for a visit with Radha Keller, PhD LP.    Specialty:  Psychology    Why:  early next week    Contact information:     [facility]  [address] [address] [phone]          Follow up with [facility] Emergency Department.    Specialty:  EMERGENCY MEDICINE    Why:  for any changes in your pain or new symptoms including fever, nausea/vomiting, new numbness/tingling or weakness.     Contact information:    [address]  [facility] [address] [phone]      Discharge References/Attachments     NECK SPASM, NO TRAUMA (ENGLISH)    BACK SPASM, NO TRAUMA (ENGLISH)      Future Appointments        Provider Department Dept Phone Center    1/19/2017 6:00 PM Calvin Ramon [facility] [phone] [facility]    1/27/2017 8:00 AM Angela Anders PT [facility] [phone]  PAIN [facility]    1/27/2017 9:00 AM GREGORIO Kwon CNP [facility] [phone]  PAIN [facility]    2/2/2017 6:00 PM Calvin Ramon [facility] [phone] [facility]    2/3/2017 8:00 AM Angela Anders PT [facility] [phone]   PAIN [facility]    2/7/2017 9:00 AM Radha Keller, PhD LP [facility] [phone]  PAIN [facility]    2/17/2017 8:00 AM Angela Anders, PT [facility] [phone] [facility]      24 Hour Appointment Hotline       To make an appointment at any [facility], call [phone] ([phone]). If you don't have a family doctor or clinic, we will help you find one. [facility] are conveniently located to serve the needs of you and your family.             Review of your medicines      Our records show that you are taking the medicines listed below. If these are incorrect, please call your family doctor or clinic.        Dose / Directions Last dose taken    * amphetamine-dextroamphetamine 30 MG per 24 hr capsule   Commonly known as:  ADDERALL XR   Dose:  30 mg   Quantity:  30 capsule        Take 1 capsule (30 mg) by mouth daily   Refills:  0        * amphetamine-dextroamphetamine 30 MG per 24 hr capsule   Commonly known as:  ADDERALL XR   Dose:  30 mg   Quantity:  30 capsule   Start taking on:  1/20/2017        Take 1 capsule (30 mg) by mouth daily   Refills:  0        * amphetamine-dextroamphetamine 30 MG per 24 hr capsule   Commonly known as:  ADDERALL XR   Dose:  30 mg   Quantity:  30 capsule   Start taking on:  2/20/2017        Take 1 capsule (30 mg) by mouth daily   Refills:  0        B Complex Tabs        Refills:  0        desipramine 10 MG tablet   Commonly known as:  NORPRAMIN   Dose:  30 mg   Quantity:  60 tablet        Take 3 tablets (30 mg) by mouth At Bedtime   Refills:  1        diazepam 5 MG tablet   Commonly known as:  VALIUM   Dose:  5 mg   Quantity:  30 tablet        Take 1 tablet (5 mg) by mouth every 12 hours as needed for anxiety, sleep, muscle spasms or pain   Refills:  1        DULoxetine 60 MG EC capsule   Commonly known as:  CYMBALTA   Dose:  120 mg        Take 120 mg by mouth every morning   Refills:  0        ibuprofen 200 MG tablet   Commonly known as:   ADVIL/MOTRIN        Refills:  0        lisinopril 10 MG tablet   Commonly known as:  PRINIVIL/ZESTRIL   Dose:  10 mg   Quantity:  30 tablet        Take 1 tablet (10 mg) by mouth daily   Refills:  1        methylPREDNISolone 4 MG tablet   Commonly known as:  MEDROL DOSEPAK   Quantity:  21 tablet        Follow package instructions   Refills:  0        omeprazole 20 MG CR capsule   Commonly known as:  priLOSEC   Dose:  20 mg        Take 20 mg by mouth   Refills:  0        order for DME   Quantity:  1 each        Equipment being ordered: TENS with electrodes and pads   Refills:  0        pregabalin 150 MG capsule   Commonly known as:  LYRICA   Dose:  150 mg   Quantity:  60 capsule        Take 1 capsule (150 mg) by mouth 3 times daily Patient  Will contact you when ready to fill   Refills:  1        pseudoePHEDrine 120 MG 12 hr tablet   Commonly known as:  SUDAFED 12 HOUR   Dose:  120 mg   Quantity:  90 tablet        Take 1 tablet (120 mg) by mouth 2 times daily   Refills:  1        spironolactone 50 MG tablet   Commonly known as:  ALDACTONE   Dose:  50 mg   Quantity:  90 tablet        Take 1 tablet (50 mg) by mouth daily   Refills:  1        tiZANidine 2 MG tablet   Commonly known as:  ZANAFLEX   Dose:  4-6 mg   Quantity:  90 tablet        Take 2-3 tablets (4-6 mg) by mouth 3 times daily   Refills:  1        TYLENOL PO   Dose:  1000 mg        Take 1,000 mg by mouth every 4 hours as needed for mild pain or fever   Refills:  0        * Notice:  This list has 3 medication(s) that are the same as other medications prescribed for you. Read the directions carefully, and ask your doctor or other care provider to review them with you.            Orders Needing Specimen Collection     None      Pending Results     No orders found from 1/12/2017 to 1/14/2017.            Pending Culture Results     No orders found from 1/12/2017 to 1/14/2017.             Test Results from your hospital stay            Clinical Quality Measure:  Blood Pressure Screening     Your blood pressure was checked while you were in the emergency department today. The last reading we obtained was  BP: (!) 140/113 mmHg . Please read the guidelines below about what these numbers mean and what you should do about them.  If your systolic blood pressure (the top number) is less than 120 and your diastolic blood pressure (the bottom number) is less than 80, then your blood pressure is normal. There is nothing more that you need to do about it.  If your systolic blood pressure (the top number) is 120-139 or your diastolic blood pressure (the bottom number) is 80-89, your blood pressure may be higher than it should be. You should have your blood pressure rechecked within a year by a primary care provider.  If your systolic blood pressure (the top number) is 140 or greater or your diastolic blood pressure (the bottom number) is 90 or greater, you may have high blood pressure. High blood pressure is treatable, but if left untreated over time it can put you at risk for heart attack, stroke, or kidney failure. You should have your blood pressure rechecked by a primary care provider within the next 4 weeks.  If your provider in the emergency department today gave you specific instructions to follow-up with your doctor or provider even sooner than that, you should follow that instruction and not wait for up to 4 weeks for your follow-up visit.        Thank you for choosing [facility]       Thank you for choosing [facility] for your care. Our goal is always to provide you with excellent care. Hearing back from our patients is one way we can continue to improve our services. Please take a few minutes to complete the written survey that you may receive in the mail after you visit with us. Thank you!        [facility] Information     [facility] gives you secure access to your electronic health record. If you see a primary care provider, you can also send messages to your care team and make  appointments. If you have questions, please call your primary care clinic.  If you do not have a primary care provider, please call [phone] and they will assist you.        Care EveryWhere ID     This is your Care EveryWhere ID. This could be used by other organizations to access your [facility] medical records  [id number]        After Visit Summary       This is your record. Keep this with you and show to your community pharmacist(s) and doctor(s) at your next visit.

## 2017-01-13 NOTE — Clinical Note
FYI- patient in severe pain, anxiety and depression. Chose to go to the ER.  Denied suicidal ideation but reported hopelessness and frustration

## 2017-01-13 NOTE — ED PROVIDER NOTES
History     Chief Complaint:  Back Pain      HPI   Heath Waller is a 36 year old female who presents with back pain. The patient states she has a 20 year history of lower back pain. She explains that she began to have a flare of her typical low back pain a week ago, so she was seen in the clinic 2 days ago, had trigger point injections, and had physical therapy scheduled for today. She states she was not given pain medication as she is on valium. The patient explains that nothing seems to be helping her pain, and she does not know what to do. She states that she cannot go to work in this much pain. She denies any numbness or weakness, fevers or chills.     Allergies:  Codeine   Sulfa Drugs, rash   Toradol, itching and rash       Medications:    methylPREDNISolone (MEDROL DOSEPAK) 4 MG tablet  pregabalin (LYRICA) 150 MG capsule  desipramine (NORPRAMIN) 10 MG tablet  amphetamine-dextroamphetamine (ADDERALL XR) 30 MG per 24 hr capsule  spironolactone (ALDACTONE) 50 MG tablet  diazepam (VALIUM) 5 MG tablet  lisinopril (PRINIVIL/ZESTRIL) 10 MG tablet  DULoxetine (CYMBALTA) 60 MG capsule  omeprazole (PRILOSEC) 20 MG capsule  tiZANidine (ZANAFLEX) 2 MG tablet     Past Medical History:    ADD  Anxiety   Depression   HTN  Thyroid Disease  GERD  Chronic Low Back Pain   TMJ   Fibromyalgia   Cervical intraepithelial neoplasia grade III with severe dysplasia    Past Surgical History:    Breast Augmentation X3   Appendectomy   LEEPS X2    Cholecystectomy   Thyroid nodule surgery   Right knee arthroscopy   Right foot surgery   Tubal ligation     Family History:    Father: HTN, cerebrovascular disease   Brother: Thyroid Disease      Social History:  Tobacco: Current every day smoker   Alcohol: Positive, daily   Marital Status:   [2]     Review of Systems   Constitutional: Negative for fever and chills.   Musculoskeletal: Positive for back pain.   Neurological: Negative for weakness and numbness.   All other systems  reviewed and are negative.      Physical Exam   First Vitals:  BP: 141/85 mmHg  Pulse: 77  Temp: 97.9  F (36.6  C)  Resp: 16  SpO2: 99 %      Physical Exam  Nursing note and vitals reviewed.  Constitutional: Tearful. Pt appears uncomfortable.   HENT:   Mouth/Throat: Moist mucous membranes.   Eyes: EOMI, nonicteric sclera  Cardiovascular: Normal rate, regular rhythm, no murmurs, rubs, or gallops  Pulmonary/Chest: Effort normal and breath sounds normal. No respiratory distress. No wheezes. No rales.   Abdominal: Soft. Nontender, nondistended, no guarding or rigidity. BS present.   Musculoskeletal: Normal range of motion. Pt has tenderness to palpation of multiple muscle spasms, most notably in bilateral neck and bilateral paraspinal muscles. Negative SLR bilaterally. Normal sensation BLE. Normal 2+ patellar reflexes bilaterally.   Neurological: Alert. Moves all extremities spontaneously.   Skin: Skin is warm and dry. No rash noted.   Psychiatric: Depressed mood and affect. No suicidal ideation.      Emergency Department Course     Interventions:  1214 Percocet 5-325mg PO X2    Emergency Department Course:  Nursing notes and vitals reviewed.  I performed an exam of the patient as documented above.     1220 I rechecked the patient.     1241 The patient left the ED prior to obtaining her discharge packet.     Findings and plan explained to the Patient. Patient discharged home with instructions regarding supportive care, medications, and reasons to return. The importance of close follow-up was reviewed.      Impression & Plan      Medical Decision Making:  Heath Waller is a 36 year old female who presents for evaluation of chronic back pain. Chart reviewed and patient has been seeing pain management who haven't been willing to prescribe narcotics. Pt is here for narcotics today. I explained our pain policy that states that we will not prescribe pain medication for chronic pain. I gave pt one dose of percocet. There  are no signs at this time of any emergent process precipitating this worsened pain. No physical exam evidence of spinal cord impingement. Explained to pt that she needs to follow-up with her pain physicians and/or her PCP. Pt ended up eloping before she was presented with d/c paperwork. She was in stable condition at time of dc.     Diagnosis:    ICD-10-CM    1. Chronic pain syndrome G89.4    2. Spasm of back muscles M62.830    3. Cervical paraspinal muscle spasm M62.838        Disposition:  Discharged home.     ICherelle, am serving as a scribe on 1/13/2017 at 11:20 AM to personally document services performed by Dr. Richter based on my observations and the provider's statements to me.     Cherelle Mcdonald  1/13/2017   [facility] EMERGENCY DEPARTMENT        John Richter MD  01/17/17 1559

## 2017-01-13 NOTE — ED NOTES
Pt with 20 year history of chronic pain. Pt began to have a flair of her pain 1 week ago. Pt went to the pain clinic this past Wednesday. Pt then had physical therapy today. Pt reports pain is different today.  Pt states she has been using all of her normal treatments for pain nothing is helping today. Pt with neck pain headache and low back pain.

## 2017-01-13 NOTE — ED NOTES
Pt to discharge home. Upon going in to give discharge instructions, pt had left. Mailing discharge instructions.

## 2017-01-13 NOTE — PROGRESS NOTES
Progress Note    Client Name: Heath Waller  Date: 1/12/2017         Service Type: Individual      Session Start Time: 6:05 Session End Time: 6:55      Session Length: 45   Session #: 2     Attendees: Client    Treatment Plan Last Reviewed: 1/12/2017  PHQ-9 / TINA-7 : 25/17     DATA      Progress Since Last Session (Related to Symptoms / Goals / Homework):   Symptoms: Worsening    Homework: client's pain levels have been at a 9 and she has had difficulty completing homework.      Episode of Care Goals: No improvement - PREPARATION (Decided to change - considering how); Intervened by negotiating a change plan and determining options / strategies for behavior change, identifying triggers, exploring social supports, and working towards setting a date to begin behavior change     Current / Ongoing Stressors and Concerns:     Client was tearful and upset over her pain and not being able to get any relief from the pain.  We discussed strategies and client noted she has lived with pain for a long time and has been able to use breathing and calming techniques in the past but her current pain is much greater.  She noted her pain is at a 9 and she has not been able to sleep or get rest.  Client noted she is worried about her future and how she will manage if the pain continues.  We discussed breathing approaches and visualizations along with resources for these techniques.  Writer encouraged client to continue with the pain clinic.        Treatment Objective(s) Addressed in This Session:   identify 3 strategies for managing pain       Intervention:   relaxation and breathing techniques        ASSESSMENT: Current Emotional / Mental Status (status of significant symptoms):   Risk status (Self / Other harm or suicidal ideation)   Client denies current fears or concerns for personal safety.   Client denies current or recent suicidal ideation or behaviors.   Client denies current or  recent homicidal ideation or behaviors.   Client denies current or recent self injurious behavior or ideation.   Client denies other safety concerns.   A safety and risk management plan has not been developed at this time, however client was given the after-hours number / 911 should there be a change in any of these risk factors.     Appearance:   Appropriate -clearly in pain and was tearful   Eye Contact:   Good    Psychomotor Behavior: Agitated  Restless  uncomfortable    Attitude:   Cooperative    Orientation:   All   Speech    Rate / Production: Normal     Volume:  Normal    Mood:    Anxious  Sad    Affect:    Appropriate    Thought Content:  Clear    Thought Form:  Coherent  Logical    Insight:    Good      Medication Review:   No changes to current psychiatric medication(s)     Medication Compliance:   Yes     Changes in Health Issues:   Yes: Pain, is at a 9 level and none of her past approaches are helping at this time.     Chemical Use Review:   Substance Use: Chemical use reviewed, no active concerns identified      Tobacco Use: No change in amount of tobacco use since last session.  Patient declined discussion at this time     Collateral Reports Completed:   Routed note to Shazia Larkin CNP     PLAN: (Client Tasks / Therapist Tasks / Other)  Writer will discuss treatment with Pain Clinic and provider, Shazia Larkin CNP to coordinate care.  Writer introduced breathing techniques and visualization.  Client was in too much pain to contemplate goals and plans.           Calvin Ramon, [facility]                                                         ________________________________________________________________________    Treatment Plan    Client's Name: Heath Waller  YOB: 1980    Date:1/12/2017    DSM-V Diagnoses: 296.32 Major Depressive Disorder, Recurrent Episode, Moderate _ and With anxious distress or 300.02 (F41.1) Generalized Anxiety Disorder  Chronic pain  syndrome.  Psychosocial / Contextual Factors: Client's pain has been changing and she is not able to get relief to be able to sleep and cope with her pain.   WHODAS: 40    Referral / Collaboration:  collaborate with Pain Clinic.    Anticipated number of session or this episode of care: 12      MeasurableTreatment Goal(s) related to diagnosis / functional impairment(s)  Goal 1: Client will identify core mood trigger and lean several skills to address these.    I will know I've met my goal when   my pain is manageable.    Objective #A (Client Action)    Client will identify 3 strategies for managing pain.  Status: New - Date: 1/12/2017     Intervention(s)  Therapist will assign homework relaxation and breathing skills..    Objective #B  Client will Decrease frequency and intensity of feeling down, depressed, hopeless.  Status: New - Date: 1/12/2017     Intervention(s)  Therapist will teach CBT skills.    Objective #C  Client will identify 2 fears / thoughts that contribute to feeling anxious.  Status: New - Date: 1/12/2017     Intervention(s)  Therapist will teach ways of reassuring self  .        Client has reviewed and agreed to the above plan.      Calvin Ramon, [facility]  January 12, 2017

## 2017-01-13 NOTE — TELEPHONE ENCOUNTER
Heath is here at the clinic now seeing Angela so was able to speak with her in person.     1. She would like to try Tizanidine  2. She would like to try the Medrol dose pack  3. Consent to communicate signed for mental health provider, Calvin Ramon.    Pharmacy confirmed at [facility].    Shazia Reyna informed of conversation with Heath, routing to her for medication ordering.     Danielle Virgen, MSN, RN-BC  Care Coordinator  [facility]

## 2017-01-13 NOTE — ED AVS SNAPSHOT
[facility] Emergency Department    [address]    [facility] [address]    Phone:  [phone]    Fax:  [phone]                                       Heath Waller   MRN: [id number]    Department:  [facility] Emergency Department   Date of Visit:  1/13/2017           After Visit Summary Signature Page     I have received my discharge instructions, and my questions have been answered. I have discussed any challenges I see with this plan with the nurse or doctor.    ..........................................................................................................................................  Patient/Patient Representative Signature      ..........................................................................................................................................  Patient Representative Print Name and Relationship to Patient    ..................................................               ................................................  Date                                            Time    ..........................................................................................................................................  Reviewed by Signature/Title    ...................................................              ..............................................  Date                                                            Time

## 2017-01-15 NOTE — PROGRESS NOTES
[facility]  INTERVAL VISIT    This a  follow up examination  for Heath Waller is a 36 year old female,  Primary Care provider:  Alex Interiano MD    Today's visit: 01/11/2017  Last visit: 12/20/2017    Chief Complaint   Patient presents with     Pain     follow up   Increase pain neck, low back,  Hard to do self care when hurt so much.   Feels that pain is different, concerned something is wrong.  Contacted clinic two days ago and rescheduled sooner appointment.   Feels like she can't much more.  Seeing Calvin Ramon for independent counseling.  Also TMJ therapist.   Still smoking, sleep problems  Tearful, distraught   Seeing Physical Therapy, behavioral health schedule February.       MEDICAL DECISION MAKING:   Heath Waller is a 36 year old female, who presents in follow up with chronic pain of the neck, upper back and low back.  Her pain is Myofascial with deconditioning, inactivity contributing. It is likely she also has  left hip bursitis.  She would benefit from a multidisciplinary program, specifically coping strategies to adjust to pain challenges and Physical Therapy to address deconditioning, neuromuscular re education. The patient is medication reliant, feeling only percocet at this point will help.    Opioids are not indicated in the treatment plan for chronic myofascial pain. Furthermore, with concurrent use of Valium, unintended respiratory suppression is significantly increase, leading to arrest. The patient was advised to stay with in the prescribed amount of Valium. Given that she admitted to over use of Valium, opioids will not be prescribe even for short term use.    She would likely benefit from mental health care to improve depression and anxiety management and was encouraged to reschedule to a sooner appointment with pain behavioral health as well as continue with her independent mental health provider.    ASSESSMENT:  1. Chronic Pain Disorder  Medication reliant    1. Chronic Myofascial Pain upper back and neck    2. TMD  3. Mild disc bulge C5-6    4. Low back pain    1. Mechanical features >> gluteal deconditioning, possible trochanteric bursitis, left    2. Tobacco Dependence    3. Depression with Anxiety features    4. 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 ROSA with CPAP compliance     PLAN:  Medications:  Increase Lyrica 150 mg tid  Increase Norpramin 30  Mg, will adjust Cymbalta next visit  Limit Valium to prescribed amount.  No opioids    Imaging:  Cervical Xray 2-3 view. Communicate results via [employer]    Patient to check with employer to address limits with driving.  I am willing to write restriction letter.     Follow up   ~ 2 weeks with Shazia Larkin, JOCELYNN HARKINS 1/13/17  Reschedule with MIKE POE, PHD., to sooner appointment. If not possible, patient willing to see provider at [facility] for one time consult to assist in guiding treatment plan.  Mental health with Calvin Ramon LCSW.    PAIN HISTORY:  Patient reports a 15 years history of chronic pain in the mandible, progressing over the right upper back and neck.  She also reports low back pain.  She report initially pain associated with  with Temporal mandibular disorder and progressed with worsening over 2-3 months.  She has been on     Ativan and Oxycodone chronically. She has been out of Ativan for about 1 week and Oxycodone for about one month  Pain Description: Aching, Sharp, Stabbing and Throbbing back  ( upper) right hip and knee foot  Pain ranges in intensity from 6/10 on the zero to ten numerical rating scale  Quality of the pain is : constant exhausting, miserable    Aggravating factors include  Stress, tension.    Relieving factors include  TENS, relaxation, heat, ice Ibuprofen, Amitriptyline, lidocaine stretching     PAST MEDICATION TRAILS:  (TR= THERAPUTIC RESULT, NH =NOT HELPFUL, SE=SIDE EFFECTS, AR=ALLERGIC REACTION)  OPIOIDS:  T# 3 ( rash)  Hydrocodone,Percocet  (TR), Tramadol ( NH)  ANALGESIC: Tylenol (NH)  NSAID'S: Ibuprofen, Toradol  rash) and Naprosyn  MUSCLE RELAXER'S:Flexeril ( fatigue), Skelaxin( NH), Robaxin ( NH)  ANTIMIGRAINE: None    ANTIDEPRESSANTS: Bupropion, nortriptyline ( Pamelor), sertraline (Zoloft) , trazodone ( Desyrel)  HYPNOTICS/ANTIANXIETY: Ativan    ANTICONVULSANTS: Gabapentin, Lyrica    TOPICALS: Lidocaine patch, icy hot, Bengay    PAST TREATMENT TRIALS:             HELPFUL        NOT HELPFUL             WORSEN  PAIN CLINIC: [facility] ( 2015) could not participate in comprehensive care they offered    PHYSICAL THERAPY Yes                                        X  ( 2011)  TENS:Yes                                                       X  EXERCISE: Yes  COUNSELING: No  RELAXATION THERAPY No  ACUPUNCTURE: Yes                                                X  CHIROPRACTOR: Yes                                              X  SPINE INJECTIONS: YES                 X TPI, Botox ( prolong neck relaxation and difficulty keeping head up  SPINAL CORDS STIM: No  SPINAL CORD PUMP: No  OPIOIDS: Yes                                    X  OTHER: N/A    INTERVAL HISTORY:  Pain Description: throbbing, stabbing  Location:  All over neck, mid and low back   Pain rating 9/10, average 7/10, Range  6-9/10  Quality:  Unbearable, exhausting, miserable   Aggravating factors: driving, walking, sitting, standing, bending  Relieving factors:  Nothing     Current Pain Medications:  Lyrica 300 mg /day. Norpramin 20 mg, Ibuprofen, Cymbalta 120 mg, Valium 5 mg bid/qid   Medication side effects:  none    Progress in pain program: compliant,  Impact on function: moderate Working Yes finds commute to [place] difficult  Quality of life: poor  Self care: yes, exercise yes,relaxation trying, body awareness no.    INVESTIGATIONAL:  Xray Cervical 5/6/11  2 views were obtained. Straightening of the cervical spine with loss  of the normal lordotic curvature may be due to  positioning or to  muscle spasm. There is normal alignment of the vertebral bodies.  Disk spaces are normal. No evidence of fracture. Facet joints and  prevertebral soft tissue are normal.    IMPRESSION: Normal 2 view study of the cervical spine.    MRI C 06/22/2012  C 2- 3: No neuroforaminal or spinal canal narrowing.     C3-4: No neuroforaminal or spinal canal narrowing.     C4-5: No neuroforaminal or spinal canal narrowing.     C5-6: Mild disk bulge with no significant neuroforaminal or spinal   canal narrowing.     C6-7: No neuroforaminal or spinal canal narrowing.     C7-T1: No neuroforaminal or spinal canal narrowing.       PMHX:  Past Medical History   Diagnosis Date     Myofacial muscle pain      ADD (attention deficit disorder)      Depressive disorder      Thyroid disease      Gastro-oesophageal reflux disease      Noninfectious ileitis      IBS with constipation     Other chronic pain      myofacial pain syndrome     Sleep apnea      wears CPAP at night     Degenerative disc disease, cervical      PAST SURGICAL HISTORY  Past Surgical History   Procedure Laterality Date     Breast surgery  03/2006     augmentation, revisions 2011 and 2015     Appendectomy  2000     Gyn surgery   2009 and 2014     LEEPS x 2      Laparoscopic cholecystectomy  5/15/2014     Procedure: LAPAROSCOPIC CHOLECYSTECTOMY;  Surgeon: Kd Arthur MD;  Location: [facility] OR     Thyroid surgery  2001     nodule      Arthroscopy knee Right      teenager     Foot surgery Right      semoid bone removed from Fx     Tubal ligation  10/09/2008       CURRENT MEDICATIONS:   Current Outpatient Prescriptions   Medication     pregabalin (LYRICA) 150 MG capsule     desipramine (NORPRAMIN) 10 MG tablet     amphetamine-dextroamphetamine (ADDERALL XR) 30 MG per 24 hr capsule     spironolactone (ALDACTONE) 50 MG tablet     pseudoePHEDrine (SUDAFED 12 HOUR) 120 MG 12 hr tablet     diazepam (VALIUM) 5 MG tablet     lisinopril (PRINIVIL/ZESTRIL) 10 MG  tablet     B Complex TABS     ibuprofen (ADVIL,MOTRIN) 200 MG tablet     DULoxetine (CYMBALTA) 60 MG capsule     omeprazole (PRILOSEC) 20 MG capsule     tiZANidine (ZANAFLEX) 2 MG tablet     methylPREDNISolone (MEDROL DOSEPAK) 4 MG tablet     Acetaminophen (TYLENOL PO)     [START ON 1/20/2017] amphetamine-dextroamphetamine (ADDERALL XR) 30 MG per 24 hr capsule     [START ON 2/20/2017] amphetamine-dextroamphetamine (ADDERALL XR) 30 MG per 24 hr capsule     order for DME     No current facility-administered medications for this visit.     ROS: twelve systems review negative and included in interval except for: nausea, vomiting, fatigue, headache, palpitations, high blood pressure  Since last visit: changes in mood: Yes, suicide thoughts No  Any changes in your medical condition, illness, injury or hospitalizations: Yes increase pain.  Sleep: Restorative: No Current     PHYSICAL EXAM:   Constitutional:Blood pressure 122/81, pulse 76, weight 81.647 kg (180 lb), SpO2 99 %, not currently breastfeeding., Body mass index is 26.57 kg/(m^2).   Psyche:  Fully oriented, Behavioral Observations: Eye contact  poor. Mood  and spirits are anxious, distress.  Musculoskeletal exam:  Gait:  Steady reciprocating,  ROM within normal limits, diffuse wide spread tender points especially bilateral splenius capitus, trapezius,subscapularis, left lateral gluteal at posterior hip insertion, left hip bursa,   No trigger points or jump sign, PERRY x 4,  Neuro exam:  Alert, Speech clear fluent and appropriate.   Skin/Vascular/ Autonomic:  warm, dry and intact    OTHER: Opioid risk for ongoing opioid management for non malignant chronic pain   DIRE Score for ongoing opioid management is calculated as follows:    Diagnosis = 1    Intractability = 2    Risk: Psych = 2  Chem Hlth = 2  Reliability = 2  Social = 2    Efficacy = 2    Total DIRE Score = 13 (14 or higher predicts good candidate for ongoing opioid management; 13 or lower predicts poor  candidate for opioid management)   Brian Greenberg 2006,The Journal of  Pain.,The DIRE Score: Predicting Outcomes of Opioid Prescribing for Chronic Pain Vol.7,No.9, pp 671-681.  MNPMP : Reviewed and as expected without evidence of abuse or misuse? Yes  URINE DRUG SCREEN  No, DATE: ---. OPIOID AGREEMENT: No DATE:  OPOID TOLERANCE: low to none, Morphine  EQ:  none  Analgesia poor, Adverse effects none  Activity poor  Adherence fair     Opioid management will continue with not ordered    PROCEDURES  Pre-Procedure:  01/14/2017  Patient's Name and Date of Birth verified:  YES  Verified By:  [facility]  (initials)  Diagnosis:     Myalgia  Myofascial pain  Interval History:  See above    Complications and/or Adverse Effects of Last Treatment: #3 no Side effects     Site Marking and Verification:  Site selection (based on symptoms and condition:  YES  Verified by: [facility] (initials)  Patient identification verified by provider: YES  Verified by: [facility] (initials)  Pause for the Cause:  YES  Verified by: [facility]  (initials)   Procedure Note:  Discussion of the procedure, risks, benefits and alternatives was held.  Informed consent was given by patient:  YES.    Type of Procedure Performed: Trigger Points, bilateral subscapularis, left trapezius, left gluteus medius   Procedure Description:     Note:  Position sitting   Cleansed bilaterally over splenius capitus, sub scapularis x 2, and par lumbar x 2  with ChloraPrep 2% with 70 % alcohol  Trigger points identified and marked   Needle type 25G 1.5 inch needle used to inject  Medication: 0.25% bupivacaine 5 cc/  1% Lidocaine 5 cc/ DepoMedrol 1 ml/40 mg  Volume: 1-2 cc   Each site 11 cc Total volume injected without complication.   Pre pain score: 7, Post procedure pain score 5/10   Shazia Larkin R.N.,B.C., C.A.N.P.      Post-Procedure:  Complication/Adverse Effects:  None     Management:  See AVS    Signature:  [facility]            Time  spent: 40 minutes 100 % face to face including  25 minutes counseling and coordinating care for the above identified medical problems. 10 minutes procedural care.    Signed: ERIK Dow., C.N.P.,   [facility]

## 2017-01-16 ENCOUNTER — TELEPHONE (OUTPATIENT)
Dept: PSYCHOLOGY | Facility: CLINIC | Age: 37
End: 2017-01-16

## 2017-01-16 NOTE — TELEPHONE ENCOUNTER
I see that she did go to the ED and I reviewed her notes from that visit.  Please contact the patient to see how she is doing.  I have not been able to connect with her mental healthprovider, Calvin Ramon. She did respond to my phone message and I see the patient had an appointment with her too on Friday.  I will contact Calvin and discuss her case when I am back in the office on Tuesday.   Shazia Larkin, CNP    [facility]

## 2017-01-17 ENCOUNTER — MYC MEDICAL ADVICE (OUTPATIENT)
Dept: PALLIATIVE MEDICINE | Facility: CLINIC | Age: 37
End: 2017-01-17

## 2017-01-17 NOTE — TELEPHONE ENCOUNTER
I reviewed the patient's my chart entry this is my response.  It would be helpful to get the formal testing sent to us. I am sorry you are struggling so. I still feel your pain is largely myofascial pain. I am wondering if you started the Medrol dose pack and if this is helping at all?  Many time when people reach such a low point coupled with increased anxiety, depression and insomnia the pain displays it self as you describe ( feeling hopeless, debilitating ).   No one here has said you are drug seeking, but your use of marijuana and alcohol as well as stating Percocet is the only thing that helps, indicates you are moving intact hat direction.  It sounds like you are feeling desperate, and I know your are trying relaxation, mindfulness strategies.   You have had chronic pain for a long time and it takes time for our plan to work.   I am also concerned that you have increased your marijuana use to daily and also have increased your alcohol intake. These two things can play a large part in ramping up your anxiety and increasing insomnia. They only seem to help temporarily, as they wear off sleep problems and anxiety increase.   I am recommending that you consider seeing a psychiatrist for mood and sleep management and wonder if there is any one closer to your area that you would consider seeing?  I did talk with Calvin Ramon today and seems like you have a good relationship with her. I talked with her about pain management behavioral strategies. I also urge you to follow up with MIKE POE, PHD., as her assessment and plan will help me help you better.   Hopefully, the medication changes we made last visit are having a positive effect. I look forward to seeing you on 1/27/17.   Shazia Larkin, CNP   [facility]

## 2017-01-17 NOTE — TELEPHONE ENCOUNTER
My chart below was sent previously. Copied from closed TE.     Good morning Shazia Joe sent a few follow up notes she wanted me to relay to you:    1. Last visit on 1/11/17 we increased the Lyrica as well as the Norpramin. I also added steroid to the trigger point injections. These increases may take a few days to note a difference.     2. Did you have a chance to talk to your employer to see if you can reduce your driving?    3. I will place a call to your mental health provider and see if I can provide some assistance.     4. You will also need to contact the  at [phone] to change your appointment with Radha Keller, PhD since she leaving our practice at the end of January. If you are not able to see Radha before the end of the month, I strongly encourage you to schedule at [facility] for a one time consult with either Jude Diallo, PhD., or Reuben Padron. This will help me to structure our visits with some components of behavioral health.       Shazia Larkin, CNP   [facility]

## 2017-01-17 NOTE — TELEPHONE ENCOUNTER
Copied this documentation and copied into another open TE. closing    Dasha Camarena  BSN-RN Care Coordinator  [facility]

## 2017-01-17 NOTE — TELEPHONE ENCOUNTER
Pt LM at 0943: states that she is returning a call. States that she has a meeting from 1130 - 1230 but can call her cell phone before or after that time. States that she didn't recognize the number.     SANDEE LemusN, RN-BC  Patient Care Supervisor/Care Coordinator  [facility] [place]

## 2017-01-17 NOTE — TELEPHONE ENCOUNTER
Called patient. LM to call triage with good time to reach her at to get updates.     Dasha Camarena  BSN-RN Care Coordinator  [facility]

## 2017-01-17 NOTE — TELEPHONE ENCOUNTER
I talked with patient' mental health provider. Confirmed she has increased Marijuana use to daily. Discussed concerns for psychiatry for medication management of anxiety.  Also would like to know if patient  Was dx with ADHD and if testing was done. I also am concerned about patient  Medication reliance and if there is a need for CD evaluation.  Encouraged to have patient  Schedule initial consultation with pain behavioral health as provider she is scheduled with will not be here after 1/31/17.  Also reviewed medication changes made since last visit (Lyrica and Norpramin  Increased., added medrol dose pack).  Suggested to provider to use the same principles for anxiety management for pain management ( mediation, deep breathing, mindfulness).  Also relayed concern for daily marijuana use as this too can account for increase in anxiety and insomnia. Asked this provider to encourage to keep scheduled follow up with this writer  Sending flare book to this provider interoffice mail  Shazia Larkin CNP    [facility]

## 2017-01-17 NOTE — TELEPHONE ENCOUNTER
Pt LM at 0943: states that she is returning a call. States that she has a meeting from 1130 - 1230 but can call her cell phone before or after that time.     SANDEE LemusN, RN-BC  Patient Care Supervisor/Care Coordinator  [facility] [place]

## 2017-01-17 NOTE — TELEPHONE ENCOUNTER
Called patient. Left message that I would Mychart her. Routing to provider as FYI that patient contact has been made. Will wait for patient to respond.     Mychart below sent to patient.     Jenna Blacki,    I had left you a voicemail earlier today.  Shazia Larkin had asked that you follow up with any updates you may have had as you recently were in the Emergency Room. We hope that things are doing better for you at this point.  Shazia was wondering if you had ever had ever been diagnosed with ADHD and if testing was done to confirm this? It sounds like she was also able to connect with your psychiatrist today.  You have a follow up appointment at our clinic on 01/27/17 at 9:00 with Shazia Larkin.  It would be beneficial for you to reschedule your appointment with Dr. Radha Keller that you currently have on 01/31/17 as soon as possible.  She will not be available this day for your appointment.  Our Pain PhD appointments tend to scheduled out fast as it is an integral and highly beneficial part of our clinic. Please call [phone] to reschedule this appointment with Dr. Diallo at our [place] location as soon as possible. Thank you.     Dahsa Camarena  BSN-RN Care Coordinator  [facility]

## 2017-01-17 NOTE — TELEPHONE ENCOUNTER
Janis Larsen, RN at 1/17/2017 11:07 AM      Status: Signed         Expand All Collapse All    Pt LM at 0943: states that she is returning a call. States that she has a meeting from 1530 - 1650 but can call her cell phone before or after that time. States that she didn't recognize the number.     SANDRA Lemus, RN-BC  Patient Care Supervisor/Care Coordinator  [facility] [place]

## 2017-01-18 NOTE — TELEPHONE ENCOUNTER
Jenna Mccray,  I wanted to clarify that my notes indicate that Heath reported she has used MJ 3 times a week and not daily.  She noted she will take two hits when she uses.  I will talk with her further on this concern when I see her tomorrow.  Thanks  Calvin

## 2017-01-18 NOTE — TELEPHONE ENCOUNTER
[employer] review shows Heath has read both messages sent 1/17/17. No response as yet.    Danielle Virgen, MSN, RN-BC  Care Coordinator  [facility] [place]

## 2017-01-18 NOTE — PROGRESS NOTES
Jenna Mccray,  Thanks for working with me on a care coordination plan with Heath.  I will look for the Pain Clinic's Flare Up book and wiill encourage Heath to look into scheduling with a psychiatrist and to continue with the Pain Clinic.  I appreciate your time and input.  Thanks  Calvin

## 2017-01-18 NOTE — TELEPHONE ENCOUNTER
Spoke with Pain  Management Provider Shazia Larkin at [facility] for coordination of care.  We discussed encouraging client to have a Psychiatric Evaluation to manage client psychiatric medications and to continue to work on specific anxiety reducing strategies.

## 2017-01-18 NOTE — TELEPHONE ENCOUNTER
Ok,  I appreciate the clarification. I did not enter her substance use  into my notes.  Shazia Larkin CNP    [facility]

## 2017-01-18 NOTE — TELEPHONE ENCOUNTER
Sent by provider to Heath 1/17/17 at 1745, also [employer] review shows Heath has read both messages below.     Heath,   I would be helpful to get the formal testing sent to us.  I am sorry you are struggling so.  I still feel your pain is largely myofascial pain.  I am wondering if you started the Medrol dose pack and if this is helping at all?   Many time when people reach such a low point coupled with increased anxiety, depression and insomnia the pain displays it self as you describe ( feeling hopeless, debilitating ).     No one here has said you are drug seeking, but your use of marijuana and alcohol as well as stating Percocet is the only thing that helps, indicates you are moving intact hat direction.   It sounds like you are feeling desperate, and I know your are trying relaxation, mindfulness strategies.   You have had chronic pain for a long time and it takes time for our plan to work.     I am also concerned that you have increased your marijuana use to daily and also have increased your alcohol intake. These two things can play a large part in ramping up your anxiety and increasing insomnia.  They only seem to help temporarily, as they wear off sleep problems and anxiety increase.     I am recommending that you consider seeing a psychiatrist for mood and sleep management and wonder if there is any one closer to your area that you would consider seeing?   I did talk with Calvin Ramon today and seems like you have a good relationship with her. I talked with her about pain management behavioral strategies.  I also urge you to follow up with MIKE POE, PHD., as her assessment and plan will help me help you better.   Hopefully, the medication changes we made last visit are having a positive effect.  I look forward to seeing you on 1/27/17.   Shazia Larkin CNP   [facility]

## 2017-01-19 ENCOUNTER — OFFICE VISIT (OUTPATIENT)
Dept: PSYCHOLOGY | Facility: CLINIC | Age: 37
End: 2017-01-19
Payer: COMMERCIAL

## 2017-01-19 DIAGNOSIS — F41.1 GAD (GENERALIZED ANXIETY DISORDER): ICD-10-CM

## 2017-01-19 DIAGNOSIS — F33.9 MAJOR DEPRESSION, RECURRENT (H): Primary | ICD-10-CM

## 2017-01-19 PROCEDURE — 90834 PSYTX W PT 45 MINUTES: CPT | Performed by: SOCIAL WORKER

## 2017-01-19 NOTE — TELEPHONE ENCOUNTER
My chart below was sent to provider form patient. Routing to provider for review.     Heath Waller    To   Dasha Camarena RN    Sent   1/18/2017  8:17 AM         I am fine with the testing being sent you, but I'm not sure what that will help with and how it is connected to my pain.    I just took the last dose of the steroid pack as well as started the Zanaflex taking 3 tabs TID.  It's hard to tell how much it has helped because I am still in severe pain, sometimes in my usual trigger points but always in my neck, upper middle and low back which still goes into my right hip/buttock.  I am really feeling my upper middle back right now, guessing that there has been some benefit with the injections and med changes.   I have never said that Percocet was the only thing that works for me.  You asked me what has helped before and I said Percocet, merely answering your question.  To clarify that, when I am flared that bad and in that much pain, trigger point injections were somewhat helpful but the only help in certain points, leaving me in pain in many other areas, which is why percocet helps the other areas.  Previously trigger point injections was all the I really needed, but like I said before, something is different and keeping me in severe pain (which is why I started going to a pain clinic).  I have only smoked marijuana once and had a total of 3 beers in the past week because all I can do is go right inside and either lay on the couch with my TENS and ice/heat or go right to bed with my TENS and ice/heat.  Not getting help with my immediate severe pain is making me desperate, so I am trying to get relief where I can.  If my pain was tolerable, I would not need the marijuana, and would prefer   not to smoke it anyway because I don't like how it makes me feel except for the calmer effect.   I knew coming into this that it would take a long time to get my pain consistently under control, but I never would  have thought that I would have to continue to suffer as I work through the process.  With the way things are I am not physically, emotionally, or cognitively able to learn and work on most of the vital parts of the program.  Not to mention the continued impact on my family, the difficulty going to and being successful at work, lost wages for not being able to go to work because of pain (Friday), taking time off for so many appointments that cost me $20 every time, the lost sleep, and what I feel is emotional trauma.  That is why I have lost hope and am afraid to even ask for help because I feel like my pain now doesn't matter and I'm expected to just live with it.

## 2017-01-19 NOTE — Clinical Note
Kodi Mccray, I'm forwarding my recent visit which explains that Heath was tested and dx for ADHD in her early 30's at [facility].  She is planning to keep her appointment with you on next Fri.   Calvin

## 2017-01-19 NOTE — TELEPHONE ENCOUNTER
Copied this documentation into already open TE to avoid duplicate documentation, closing this encounter    Dasha Camarena  BSN-RN Care Coordinator  [facility]

## 2017-01-19 NOTE — TELEPHONE ENCOUNTER
My chart below was sent from patient to provider. Closing separate encounter it created.     Heath Waller    To   Dasha Camarena RN    Sent   1/18/2017  8:17 AM         I am fine with the testing being sent you, but I'm not sure what that will help with and how it is connected to my pain.    I just took the last dose of the steroid pack as well as started the Zanaflex taking 3 tabs TID.  It's hard to tell how much it has helped because I am still in severe pain, sometimes in my usual trigger points but always in my neck, upper middle and low back which still goes into my right hip/buttock.  I am really feeling my upper middle back right now, guessing that there has been some benefit with the injections and med changes.   I have never said that Percocet was the only thing that works for me.  You asked me what has helped before and I said Percocet, merely answering your question.  To clarify that, when I am flared that bad and in that much pain, trigger point injections were somewhat helpful but the only help in certain points, leaving me in pain in many other areas, which is why percocet helps the other areas.  Previously trigger point injections was all the I really needed, but like I said before, something is different and keeping me in severe pain (which is why I started going to a pain clinic).  I have only smoked marijuana once and had a total of 3 beers in the past week because all I can do is go right inside and either lay on the couch with my TENS and ice/heat or go right to bed with my TENS and ice/heat.  Not getting help with my immediate severe pain is making me desperate, so I am trying to get relief where I can.  If my pain was tolerable, I would not need the marijuana, and would prefer   not to smoke it anyway because I don't like how it makes me feel except for the calmer effect.   I knew coming into this that it would take a long time to get my pain consistently under control, but I never  would have thought that I would have to continue to suffer as I work through the process.  With the way things are I am not physically, emotionally, or cognitively able to learn and work on most of the vital parts of the program.  Not to mention the continued impact on my family, the difficulty going to and being successful at work, lost wages for not being able to go to work because of pain (Friday), taking time off for so many appointments that cost me $20 every time, the lost sleep, and what I feel is emotional trauma.  That is why I have lost hope and am afraid to even ask for help because I feel like my pain now doesn't matter and I'm expected to just live with it.

## 2017-01-20 NOTE — TELEPHONE ENCOUNTER
My chart to patient    I am sorry you are still struggling with pain and suffering emotionally. I agree it is hard to move forward with work, family and personal care when you are in such pain and many times it is to the point where it is completely overwhelming. Are you desperate to the point that you would hurt yourself or someone else?  If so, you need to get help for yourself ( 911 or the emergency room) now.     Every one experiences pain and expression differently.  The stress on your mental health and sleep disruption  also plays a large role in magnifiying your pain experience. I hear you when you say your pain is different and this too can occur in the context you rare presently experiencing. I wish I had a magic bullet for you, but I do not.  I am wanting access to your testing because it helps me make treatment decision in regards to your medication. Stimulants used to manage ADHD are just that and could play a role ( while helping you pay attention) ramp you up more.  Have you considered being re assessed with the Adderall?  There are other medications for ADHD that may be better suited for you with your underlying anxiety.  If you are interested I can send a referral to have this looked at.   I do appreciate your honesty and correcting me about your marijuana and alcohol use. However, for now I would refrain from alcohol and marijuana use.  It may make you feel calm temporarily, but both have a rebound effect.  Alcohol tends to be wakeful when it wears off.  Marijuana tends to make anxiety harder to treat and increases sleep dysregulation.   I am not sure how I can help you restore you hope and that there is a light at the end of the tunnel.    You can respond back to this message as I will leave it open for a few days. I look forward to seeing you at your upcoming appointment on 1/27/17.  Shazia Larkin, CNP    [facility]    ===View-only below this line===

## 2017-01-20 NOTE — PROGRESS NOTES
Progress Note    Client Name: Heath Waller  Date: 1/19/2017         Service Type: Individual      Session Start Time: 6:05 Session End Time: 6:55      Session Length: 45   Session #: 2     Attendees: Client    Treatment Plan Last Reviewed: 1/19/2017  PHQ-9 / TINA-7 : 25/17     DATA      Progress Since Last Session (Related to Symptoms / Goals / Homework):   Symptoms: Worsening    Homework: client's pain levels have been at between an 8-9 and she has had difficulty completing homework.      Episode of Care Goals: No improvement - PREPARATION (Decided to change - considering how); Intervened by negotiating a change plan and determining options / strategies for behavior change, identifying triggers, exploring social supports, and working towards setting a date to begin behavior change     Current / Ongoing Stressors and Concerns:     Client discussed that she was dx with ADD at [facility] in her 30's and has tried several medications and found that Adderall XR has helped her the most for her ADHD.  We discussed seeing a psychiatrist to review her medication but client feels this is not necessary at this time.  Client noted she has smoked MJ once over the past week and a half and only uses it to help her pain at night to attempt to sleep. Client reported she is only sleeping 3 hrs a night due to pain.  Client was tearful about how she is unable to participate with her family at night due to the pain.  Client discussed how she goes to sleep at 7:30-8 but is unable to fall asleep or stay asleep.   Client noted she did not use MJ prior to this flare up and doesn't like using it.  Client discussed how she felt embarrassed, angry, and sad with how she was treated at the ER last Friday.  Client noted she was fearful that she would be treated as drug seeking and would not be heard. She believes this flare up is very different than her past pain and that this has lasted since July  while pain has been increasing. We discussed strategies and client noted she has lived with pain for 20 yrs and has been able to use breathing and calming techniques in the past but her current pain is much greater.  Client noted she is worried about her future and how she will manage if the pain continues. Client discussed that she is not wanting to change her contract since her current contract has been flexible with all of her doctors appointments.  Client also noted she finds her current commute to be less time consuming than if she were to drive directly in rush hour traffic.  Client explained that she can commute at this time on a road that doesn't have the commuters traveling on it.    We discussed breathing approaches and visualizations along with resources for these techniques.  Writer encouraged client to continue with the pain clinic and also noted that client can choose to transfer to another pain clinic.        Treatment Objective(s) Addressed in This Session:   identify 3 strategies for managing pain     Intervention:   relaxation and breathing techniques        ASSESSMENT: Current Emotional / Mental Status (status of significant symptoms):   Risk status (Self / Other harm or suicidal ideation)   Client denies current fears or concerns for personal safety.   Client denies current or recent suicidal ideation or behaviors.   Client denies current or recent homicidal ideation or behaviors.   Client denies current or recent self injurious behavior or ideation.   Client denies other safety concerns.   A safety and risk management plan has not been developed at this time, however client was given the after-hours number / 911 should there be a change in any of these risk factors.     Appearance:   Appropriate -clearly in pain and was tearful   Eye Contact:   Good    Psychomotor Behavior: Agitated  Restless  uncomfortable  had to get up to walk around and stretch during our session.   Attitude:   Cooperative     Orientation:   All   Speech    Rate / Production: Normal     Volume:  Normal    Mood:    Anxious  Sad    Affect:    Appropriate    Thought Content:  Clear    Thought Form:  Coherent  Logical    Insight:    Good      Medication Review:   No changes to current psychiatric medication(s)     Medication Compliance:   Yes     Changes in Health Issues:   Yes: Pain, is at a 9 level and none of her past approaches are helping at this time.     Chemical Use Review:   Substance Use: Chemical use reviewed, no active concerns identified      Tobacco Use: No change in amount of tobacco use since last session.  Patient declined discussion at this time     Collateral Reports Completed:   Routed note to Shazia Larkin CNP --Writer informed client that I would forward my note to Shazia Larkin.    PLAN: (Client Tasks / Therapist Tasks / Other)  Writer will with Pain Clinic and provider, Shazia Larkin CNP to coordinate care.  Writer will introduce CBT skills and continue to work with client on breathing techniques and visualization.      Calvin Ramon, [facility]                                                      ________________________________________________________________________    Treatment Plan    Client's Name: Heath Waller  YOB: 1980    Date:1/12/2017    DSM-V Diagnoses: 296.33 Major Depressive Disorder, Recurrent Episode, Severe _ and With anxious distress or 300.02 (F41.1) Generalized Anxiety Disorder  Chronic pain syndrome.  Psychosocial / Contextual Factors: Client's pain has been changing and she is not able to get relief to be able to sleep and cope with her pain.   WHODAS: 40    Referral / Collaboration:  collaborate with Pain Clinic.    Anticipated number of session or this episode of care: 12      MeasurableTreatment Goal(s) related to diagnosis / functional impairment(s)  Goal 1: Client will identify core mood trigger and lean several skills to address these.    I  will know I've met my goal when   my pain is manageable.    Objective #A (Client Action)    Client will identify 3 strategies for managing pain.  Status: Continued - Date(s):1/19/2017     Intervention(s)  Therapist will assign homework relaxation and breathing skills..    Objective #B  Client will Decrease frequency and intensity of feeling down, depressed, hopeless.  Status: Continued - Date(s):1/19/2017     Intervention(s)  Therapist will teach CBT skills.    Objective #C  Client will identify 2 fears / thoughts that contribute to feeling anxious.  Status: Continued - Date(s): and New - Date: 1/19/2017     Intervention(s)  Therapist will teach ways of reassuring self  .        Client has reviewed and agreed to the above plan.      Calvin Ramon, [facility]  January 19, 2017

## 2017-01-20 NOTE — PROGRESS NOTES
Note reviewed. Thank you for participating in this aspect of the patient 's care . Just sent her a my chart.   Shazia Larkin CNP    [facility]

## 2017-01-23 NOTE — TELEPHONE ENCOUNTER
"Called pt. States that she is not doing well. She is going to work but is not being very effective. Having a \"very, very hard time.\" Has to get up and walk frequently and not able to focus. \"Really suffering.\" The medrol dose pack didn't help at all. This is the 2nd time she has tried that and it hasn't helped. Let her know that we appreciated the clarification of the details related to medication use and that we will have Shazia review it when she returns to clinic.     Pt states that her low back pain is severe; feels like her right leg is dragging; not all the time but almost tripped at one point. No new numbness or tingling. Pt states that her fibromyalgia is flaring as well and the usual treatments are not helping to calm things down. Pt states that she doesn't know what to do anymore.      Reviewed schedule and was able to move pt's appointment up to Wednesday 1/25 at 0830. Affirmed that she is doing the right things by continuing to use the TENS machine along with ice/heat, taking lyrica TID and zanaflex routinely.  Let her know that she is a strong person and that she will persevere.  Let her know that she and Shazia can discuss further details at the upcoming visit and determine what's next.      Will route to Shazia for review.     Janis Larsen, SANDEEN, RN-BC  Patient Care Supervisor/Care Coordinator  [facility]    "

## 2017-01-24 ENCOUNTER — TELEPHONE (OUTPATIENT)
Dept: FAMILY MEDICINE | Facility: CLINIC | Age: 37
End: 2017-01-24

## 2017-01-24 ENCOUNTER — RADIANT APPOINTMENT (OUTPATIENT)
Dept: GENERAL RADIOLOGY | Facility: CLINIC | Age: 37
End: 2017-01-24
Attending: FAMILY MEDICINE
Payer: COMMERCIAL

## 2017-01-24 ENCOUNTER — MYC MEDICAL ADVICE (OUTPATIENT)
Dept: PALLIATIVE MEDICINE | Facility: CLINIC | Age: 37
End: 2017-01-24

## 2017-01-24 ENCOUNTER — OFFICE VISIT (OUTPATIENT)
Dept: FAMILY MEDICINE | Facility: CLINIC | Age: 37
End: 2017-01-24
Payer: COMMERCIAL

## 2017-01-24 VITALS
SYSTOLIC BLOOD PRESSURE: 125 MMHG | RESPIRATION RATE: 16 BRPM | WEIGHT: 179.9 LBS | TEMPERATURE: 98.3 F | DIASTOLIC BLOOD PRESSURE: 80 MMHG | HEIGHT: 69 IN | HEART RATE: 96 BPM | BODY MASS INDEX: 26.64 KG/M2

## 2017-01-24 DIAGNOSIS — M25.571 ACUTE BILATERAL ANKLE PAIN: ICD-10-CM

## 2017-01-24 DIAGNOSIS — M25.562 ACUTE PAIN OF LEFT KNEE: ICD-10-CM

## 2017-01-24 DIAGNOSIS — M25.572 ACUTE BILATERAL ANKLE PAIN: ICD-10-CM

## 2017-01-24 DIAGNOSIS — R51.9 ACUTE NONINTRACTABLE HEADACHE, UNSPECIFIED HEADACHE TYPE: ICD-10-CM
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
## 2017-01-24 DIAGNOSIS — M54.2 NECK PAIN: ICD-10-CM

## 2017-01-24 DIAGNOSIS — S09.90XA HEAD INJURY, INITIAL ENCOUNTER: ICD-10-CM

## 2017-01-24 DIAGNOSIS — R29.898 RIGHT LEG WEAKNESS: ICD-10-CM

## 2017-01-24 DIAGNOSIS — I10 BENIGN ESSENTIAL HYPERTENSION: ICD-10-CM

## 2017-01-24 PROCEDURE — 73610 X-RAY EXAM OF ANKLE: CPT | Mod: LT

## 2017-01-24 PROCEDURE — 99215 OFFICE O/P EST HI 40 MIN: CPT | Performed by: FAMILY MEDICINE

## 2017-01-24 PROCEDURE — 70250 X-RAY EXAM OF SKULL: CPT

## 2017-01-24 RX ORDER — LISINOPRIL 10 MG/1
10 TABLET ORAL DAILY
Qty: 90 TABLET | Refills: 1 | Status: SHIPPED | OUTPATIENT
Start: 2017-01-24 | End: 2017-09-26

## 2017-01-24 RX ORDER — OXYCODONE AND ACETAMINOPHEN 5; 325 MG/1; MG/1
1-2 TABLET ORAL EVERY 8 HOURS PRN
Qty: 18 TABLET | Refills: 0 | Status: SHIPPED | OUTPATIENT
Start: 2017-01-24 | End: 2017-09-29

## 2017-01-24 ASSESSMENT — ENCOUNTER SYMPTOMS
BACK PAIN: 1
NECK PAIN: 1
CARDIOVASCULAR NEGATIVE: 1
FOCAL WEAKNESS: 0
DOUBLE VISION: 0
CONSTIPATION: 0
BLURRED VISION: 0
CONSTITUTIONAL NEGATIVE: 1
FALLS: 1
RESPIRATORY NEGATIVE: 1
HEADACHES: 1
DIARRHEA: 0
SENSORY CHANGE: 0

## 2017-01-24 NOTE — TELEPHONE ENCOUNTER
Noted and reviewed. Patient has another call in cue. Will closer this encounter.  Shazia Larkin, CNP    [facility]

## 2017-01-24 NOTE — TELEPHONE ENCOUNTER
Call returned. [employer] message relayed.    She is already at work, states the drive wasn't too bad but is having a hard time at work.  Pain is making it difficult to talk.  Pain level 7-8/10, states she has already taken tylenol 3000mg and ibuprofen 2400mg today and is aware that is the 24 hour max.  She did buy knee brace which helps a little. The left knee and ankle are swollen.     She was advised to be evaluated by PCP or ED since the pain is not managed and she has already taken the daily max of medication.  She will be leaving work soon.   She will keep us updated.   Brenda Severson RN, BA

## 2017-01-24 NOTE — TELEPHONE ENCOUNTER
Has appointment scheduled tomorrow. Closing encounter   Shazia Larkin, CNP    [facility]

## 2017-01-24 NOTE — MR AVS SNAPSHOT
After Visit Summary   1/24/2017    Heath Waller    MRN: [id number]           Patient Information     Date Of Birth          1980        Visit Information        Provider Department      1/24/2017 11:15 AM Alex Interiano MD [facility]        Today's Diagnoses     Fall, initial encounter    -  1     Acute bilateral ankle pain         Acute pain of left knee         Neck pain         Acute nonintractable headache, unspecified headache type         Head injury, initial encounter         Right leg weakness            Follow-ups after your visit        Follow-up notes from your care team     Return in about 2 weeks (around 2/7/2017).      Your next 10 appointments already scheduled     Jan 25, 2017  8:30 AM   Return Visit with GREGORIO Kwon CNP   [facility] ([facility])    [address] [place] [address]   [phone]            Jan 27, 2017  8:00 AM   Return Visit with Angela Anders PT   [facility] ([facility])    [address] [place] [address]   [phone]            Feb 02, 2017  6:00 PM   Return Visit with Calvin Ramon, [facility] ([facility])    [address] [place] [id number] [phone]            Feb 03, 2017  8:00 AM   Return Visit with Angela Anders PT   [facility] ([facility])    [address] [place] [id number] [phone]            Feb 07, 2017  9:00 AM   New Visit with Radha Keller, PhD [facility] ([facility]    [address] [place] [address]   [phone]            Feb 16, 2017  6:00 PM   Return Visit with Calvin Ramon [facility] ([facility]  "[place]) [address] [facility] [address] [phone]            Feb 17, 2017  8:00 AM   Return Visit with Angela Anders PT   [facility] ([facility])    [address] [address]  [address]  [facility] [address]   [phone]              Future tests that were ordered for you today     Open Future Orders        Priority Expected Expires Ordered    MR Lumbar Spine w/o Contrast Routine  1/24/2018 1/24/2017            Who to contact     If you have questions or need follow up information about today's clinic visit or your schedule please contact [facility] directly at [phone].  Normal or non-critical lab and imaging results will be communicated to you by [employer], letter or phone within 4 business days after the clinic has received the results. If you do not hear from us within 7 days, please contact the clinic through [employer] or phone. If you have a critical or abnormal lab result, we will notify you by phone as soon as possible.  Submit refill requests through [employer] or call your pharmacy and they will forward the refill request to us. Please allow 3 business days for your refill to be completed.          Additional Information About Your Visit        [employer] Information     [employer] gives you secure access to your electronic health record. If you see a primary care provider, you can also send messages to your care team and make appointments. If you have questions, please call your primary care clinic.  If you do not have a primary care provider, please call [phone] and they will assist you.        Care EveryWhere ID     This is your Care EveryWhere ID. This could be used by other organizations to access your [place] medical records  [id number]        Your Vitals Were     Pulse Temperature Respirations Height BMI (Body Mass Index) Last Period    96 98.3  F (36.8  C) (Oral) 16 5' 9\" (1.753 m) 26.55 kg/m2 01/05/2017 (Exact Date)    "    Blood Pressure from Last 3 Encounters:   01/24/17 125/80   01/13/17 140/113   01/11/17 122/81    Weight from Last 3 Encounters:   01/24/17 179 lb 14.4 oz (81.602 kg)   01/11/17 180 lb (81.647 kg)   12/20/16 180 lb (81.647 kg)              We Performed the Following     DEPRESSION ACTION PLAN (DAP)          Today's Medication Changes          These changes are accurate as of: 1/24/17 12:40 PM.  If you have any questions, ask your nurse or doctor.               Start taking these medicines.        Dose/Directions    oxyCODONE-acetaminophen 5-325 MG per tablet   Commonly known as:  PERCOCET   Used for:  Fall, initial encounter, Acute bilateral ankle pain, Acute pain of left knee, Neck pain, Acute nonintractable headache, unspecified headache type   Started by:  Alex Interiano MD        Dose:  1-2 tablet   Take 1-2 tablets by mouth every 8 hours as needed for pain maximum 6 tablet(s) per day   Quantity:  18 tablet   Refills:  0         These medicines have changed or have updated prescriptions.        Dose/Directions    * order for DME   This may have changed:  Another medication with the same name was added. Make sure you understand how and when to take each.   Used for:  Myofascial pain   Changed by:  Shazia Larkin APRN CNP        Equipment being ordered: TENS with electrodes and pads   Quantity:  1 each   Refills:  0       * order for DME   This may have changed:  You were already taking a medication with the same name, and this prescription was added. Make sure you understand how and when to take each.   Used for:  Acute bilateral ankle pain   Changed by:  Alex Interiano MD        Equipment being ordered: air stirrup brace   Quantity:  1 Device   Refills:  0       * Notice:  This list has 2 medication(s) that are the same as other medications prescribed for you. Read the directions carefully, and ask your doctor or other care provider to review them with you.         Where to  get your medicines      Some of these will need a paper prescription and others can be bought over the counter.  Ask your nurse if you have questions.     Bring a paper prescription for each of these medications    - order for DME  - oxyCODONE-acetaminophen 5-325 MG per tablet             Primary Care Provider Office Phone #    Mark Hart [facility] [phone]       No address on file        Thank you!     Thank you for choosing [facility]  for your care. Our goal is always to provide you with excellent care. Hearing back from our patients is one way we can continue to improve our services. Please take a few minutes to complete the written survey that you may receive in the mail after your visit with us. Thank you!             Your Updated Medication List - Protect others around you: Learn how to safely use, store and throw away your medicines at www.disposemymeds.org.          This list is accurate as of: 1/24/17 12:40 PM.  Always use your most recent med list.                   Brand Name Dispense Instructions for use    * amphetamine-dextroamphetamine 30 MG per 24 hr capsule    ADDERALL XR    30 capsule    Take 1 capsule (30 mg) by mouth daily       * amphetamine-dextroamphetamine 30 MG per 24 hr capsule   Start taking on:  2/20/2017    ADDERALL XR    30 capsule    Take 1 capsule (30 mg) by mouth daily       B Complex Tabs          desipramine 10 MG tablet    NORPRAMIN    60 tablet    Take 3 tablets (30 mg) by mouth At Bedtime       diazepam 5 MG tablet    VALIUM    30 tablet    Take 1 tablet (5 mg) by mouth every 12 hours as needed for anxiety, sleep, muscle spasms or pain       DULoxetine 60 MG EC capsule    CYMBALTA     Take 120 mg by mouth every morning       ibuprofen 200 MG tablet    ADVIL/MOTRIN         lisinopril 10 MG tablet    PRINIVIL/ZESTRIL    30 tablet    Take 1 tablet (10 mg) by mouth daily       omeprazole 20 MG CR capsule    priLOSEC     Take 20 mg by mouth       * order  for DME     1 each    Equipment being ordered: TENS with electrodes and pads       * order for DME     1 Device    Equipment being ordered: air stirrup brace       oxyCODONE-acetaminophen 5-325 MG per tablet    PERCOCET    18 tablet    Take 1-2 tablets by mouth every 8 hours as needed for pain maximum 6 tablet(s) per day       pregabalin 150 MG capsule    LYRICA    60 capsule    Take 1 capsule (150 mg) by mouth 3 times daily Patient  Will contact you when ready to fill       pseudoePHEDrine 120 MG 12 hr tablet    SUDAFED 12 HOUR    90 tablet    Take 1 tablet (120 mg) by mouth 2 times daily       spironolactone 50 MG tablet    ALDACTONE    90 tablet    Take 1 tablet (50 mg) by mouth daily       tiZANidine 2 MG tablet    ZANAFLEX    90 tablet    Take 2-3 tablets (4-6 mg) by mouth 3 times daily       TYLENOL PO      Take 1,000 mg by mouth every 4 hours as needed for mild pain or fever       * Notice:  This list has 4 medication(s) that are the same as other medications prescribed for you. Read the directions carefully, and ask your doctor or other care provider to review them with you.

## 2017-01-24 NOTE — PROGRESS NOTES
HPI    SUBJECTIVE:                                                    Heath Waller is a 36 year old female who presents to clinic today for the following health issues:      Musculoskeletal problem/pain      Duration: Last night    Description  Location: twisted both ankles, left knee,lower back and right hip, neck is stiff and headache as well.    Intensity:  8/10    Accompanying signs and symptoms: numbness and swelling    History  Previous similar problem: no   Previous evaluation:  none    Precipitating or alleviating factors:  Trauma or overuse: YES  Aggravating factors include: standing, walking and climbing stairs    Therapies tried and outcome: rest/inactivity, ice, support wrap, acetaminophen and tens unit on back.. Nothing worked      Unsure what happened - fainted, fell to garage floor.  Has been on contact with Pain Clinic this am.  Feels that she's twisted both ankles, L>>R (r better with brace).  Also L knee and hip pain.  Difficulty turing head due to neck pain.  Feels she did strike her head, has HA and L frontal/temporal tenderness.  No photophobia, n/v, difficulty concentrating.  Reports that people at work thought he speech was slow.    Also notes some tendency for R leg to suddenly get week.  Has stumbled a couple of times.  Does feel persistently weak over the last few weeks.  No No incident that she associates with change.      Review of Systems   Constitutional: Negative.    Eyes: Negative for blurred vision and double vision.   Respiratory: Negative.    Cardiovascular: Negative.    Gastrointestinal: Negative for diarrhea and constipation.   Genitourinary: Negative for urgency.        No yrinary retention   Musculoskeletal: Positive for back pain, joint pain, falls and neck pain.   Neurological: Positive for headaches. Negative for sensory change and focal weakness.         Physical Exam   Constitutional: She is oriented to person, place, and time and well-developed, well-nourished, and in  no distress.   HENT:   Head: Head is with contusion. Head is without raccoon's eyes.       Right Ear: No hemotympanum.   Left Ear: No hemotympanum.   Eyes: Conjunctivae and EOM are normal.   Cardiovascular: Normal rate, regular rhythm and normal heart sounds.    Pulmonary/Chest: Effort normal and breath sounds normal.   Musculoskeletal: She exhibits no edema.        Left knee: She exhibits swelling, effusion and ecchymosis. She exhibits no deformity and normal patellar mobility. Tenderness found. Lateral joint line tenderness noted.        Right ankle: She exhibits swelling. Tenderness. AITFL and CF ligament tenderness found.        Left ankle: She exhibits swelling. Tenderness. Lateral malleolus and proximal fibula tenderness found. No medial malleolus, no AITFL, no posterior TFL and no head of 5th metatarsal tenderness found.   Neurological: She is alert and oriented to person, place, and time. She has intact cranial nerves. She displays facial symmetry and normal stance. She exhibits normal muscle tone.   Skin: Skin is warm and dry.   Vitals reviewed.      (W19.XXXA) Fall, initial encounter  (primary encounter diagnosis)  Comment:   Plan:     (M25.571,  M25.572) Acute bilateral ankle pain  Comment: normal ankle XR, will arrange for air stirrup  Plan: XR Ankle Left G/E 3 Views, order for DME            (M25.562) Acute pain of left knee  Comment: contusion, bruised  Plan:     (M54.2) Neck pain  Comment:   Plan: pain control    (R51) Acute nonintractable headache, unspecified headache type  Comment:   Plan:     (S09.90XA) Head injury, initial encounter  Comment: no sign of skull fracture or depression  Plan: XR Skull 1/3 Views            (M62.81) Right leg weakness  Comment: will eval for radicular impairment  Plan: MR Lumbar Spine w/o Contrast              RTC in 2w    Alex Interiano MD

## 2017-01-24 NOTE — TELEPHONE ENCOUNTER
The patient can try ice, NSAID's and rest.  She should not go to work today. She can see if her PCP will see her today to be evaluated.  If this does not help then she will need to go to the ED.  I will see her at the scheduled appointment time.   Shazia Larkin, CNP    [facility]

## 2017-01-24 NOTE — TELEPHONE ENCOUNTER
Last night I was standing in my garage when my right leg/hip gave out, and the next thing I knew I woke up laying on the garage floor. I twisted both ankles, but the left is worse than the right, I hurt my left knee, and i hit my head on the left side. My lower back and right hip also hurt more. I have a big bump on the top of my left forehead, but the weird thing is is that it is squishy on the top half and solid on the bottom half. I have a headache and my eyes seem a little blurry, but otherwise I don't think I have any neurological effects from hitting my head. I was able to drive to [place] this morning. I stopped at [facility] this morning and bout 2 ankle braces and a knee brace for my left knee, but it still really hurts to walk. I now can lean on my left side when my right side hurts as well as getting up and moving as much as I need to because of the pain in my low back. I'm really hesitant to go the the ER, especially with what happened last time. Thoughts

## 2017-01-24 NOTE — NURSING NOTE
"Chief Complaint   Patient presents with     Fall       Initial /80 mmHg  Pulse 96  Temp(Src) 98.3  F (36.8  C) (Oral)  Resp 16  Ht 5' 9\" (1.753 m)  Wt 179 lb 14.4 oz (81.602 kg)  BMI 26.55 kg/m2  LMP 01/05/2017 (Exact Date) Estimated body mass index is 26.55 kg/(m^2) as calculated from the following:    Height as of this encounter: 5' 9\" (1.753 m).    Weight as of this encounter: 179 lb 14.4 oz (81.602 kg).  BP completed using cuff size: regular    Agatha Penaloza SMA    "

## 2017-01-25 ENCOUNTER — OFFICE VISIT (OUTPATIENT)
Dept: PALLIATIVE MEDICINE | Facility: CLINIC | Age: 37
End: 2017-01-25
Payer: COMMERCIAL

## 2017-01-25 VITALS
HEART RATE: 63 BPM | OXYGEN SATURATION: 98 % | SYSTOLIC BLOOD PRESSURE: 108 MMHG | WEIGHT: 180 LBS | DIASTOLIC BLOOD PRESSURE: 60 MMHG | BODY MASS INDEX: 26.57 KG/M2

## 2017-01-25 DIAGNOSIS — F32.A DEPRESSIVE DISORDER: ICD-10-CM

## 2017-01-25 DIAGNOSIS — M26.609 TMJ (TEMPOROMANDIBULAR JOINT SYNDROME): ICD-10-CM

## 2017-01-25 DIAGNOSIS — M79.7 FIBROMYALGIA: ICD-10-CM

## 2017-01-25 DIAGNOSIS — F41.0 ANXIETY ATTACK: ICD-10-CM

## 2017-01-25 DIAGNOSIS — F98.8 ATTENTION DEFICIT DISORDER: ICD-10-CM
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
## 2017-01-25 DIAGNOSIS — G89.4 CHRONIC PAIN SYNDROME: Primary | ICD-10-CM

## 2017-01-25 PROCEDURE — 99214 OFFICE O/P EST MOD 30 MIN: CPT | Performed by: NURSE PRACTITIONER

## 2017-01-25 ASSESSMENT — PAIN SCALES - GENERAL: PAINLEVEL: MODERATE PAIN (5)

## 2017-01-25 NOTE — PROGRESS NOTES
"           [facility]  INTERVAL VISIT    This a  follow up examination  for Heath Waller is a 36 year old female.  Primary Care provider:  Alex Interiano MD    Today's visit: 01/26/17  Last visit: 12/20/2017    Chief Complaint   Patient presents with     Pain   advised to come in for interval visit.  Multiple calls and my chart messages  Pain in low back>> right leg weakness with fall, hit head. Did see PCP upon advise in my chart after contacting our clinic.   Wearing Left leg soft brace    MEDICAL DECISION MAKING:   Heath Waller is a 36 year old female, who presents in follow up with chronic pain of the neck, upper back and low back. The patient has had frequent my chart messages significantly distressed unable to focus on pain program and self care.  Her chronic pain is Myofascial with deconditioning, inactivity contributing. The patient has had a series of trigger point injections, left hip bursa injection. Her pain has progressed to more wide spread, diffuse supporting fibromyalgia. Heath has  been seen by Physical Therapy and pain PhD. She is not progressing toward goals, if fact has deteriorated with increase absence from work and less engagement with personal and family life.  At this point, I have nothing more to offer this patient .  I discussed at length other pain treatment options. She states she is going to look into the [facility].  I suggested that she consider this if it is a comprehensive program without emphasis on opioids.  I do feel however, a more intensive program like the 3 day or 2 week pain program at  [facility] would be better. I also provided resource: You are not your Pain\" and encouraged patient to either purchase this book or get it from the Library.   I am discharging the patient from pain services.  I did tell her that in the future, if she was at a better place with her pain then she is welcome to contact the clinic and we can then discuss her readiness to " actively participate.  Opioids are not indicated in the treatment plan for chronic myofascial pain or fibromyalgia. Furthermore, with concurrent use of Valium, unintended respiratory suppression is significantly increase, leading to arrest. The patient was advised recently to stay with in the prescribed amount of Valium.   She would likely benefit from mental health care to improve depression and anxiety management.  I encouraged her to reconsider a psychiatry visit to address ADHD therapy with cross over to a non stimulant medication.  In closing, I also asked the patient to consider taking medical leave so she has the time and energy to work on her pain recovery.    The patient is in agreement with the chronic pain plan we discussed. She will explore these options and was appreciative of our concern and other treatment recommendations.   She did voice concern with her back and right leg following a fall  She states she was examined by her Primary care provider yesterday.  She was advised to follow up with this[provider for this concern.     ASSESSMENT:  1. Chronic Pain Disorder  Medication reliant/ focused  1. Chronic Myofascial Pain upper back and neck    2. TMD  3. Mild disc bulge C5-6    4. Low back pain    1. Mechanical features >> gluteal deconditioning, possible trochanteric bursitis, left    2. Tobacco Dependence    3. Depression with Anxiety features significant limitation in pain recovery     4. 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 ROSA with CPAP compliance   6.  Fall with syncope increase low back, right leg pain weakness bilateral ankle pain     PLAN:  Medications:  No medication changes  Limit Valium to prescribed amount.  No opioids for chronic pain     Imaging: none    Mental health with Calvin Ramon LCSW,   psychiatry to address ADHD therapy with cross over to a non stimulant medication. This referral through PCP if patient  Agreeable    Discharge from Pain Clinic See above in  Medical Decision making for recommendations.  Follow up with PCP 2 weeks per his recommendations post fall.         PAIN HISTORY:  Patient reports a 15 years history of chronic pain in the mandible, progressing over the right upper back and neck.  She also reports low back pain.  She report initially pain associated with  with Temporal mandibular disorder and progressed with worsening over 2-3 months.  She has been on     Ativan and Oxycodone chronically. She has been out of Ativan for about 1 week and Oxycodone for about one month  Pain Description: Aching, Sharp, Stabbing and Throbbing back  ( upper) right hip and knee foot  Pain ranges in intensity from 6/10 on the zero to ten numerical rating scale  Quality of the pain is : constant exhausting, miserable    Aggravating factors include  Stress, tension.    Relieving factors include  TENS, relaxation, heat, ice Ibuprofen, Amitriptyline, lidocaine stretching     PAST MEDICATION TRAILS:  (TR= THERAPUTIC RESULT, NH =NOT HELPFUL, SE=SIDE EFFECTS, AR=ALLERGIC REACTION)  OPIOIDS:  T# 3 ( rash) Hydrocodone,Percocet  (TR), Tramadol ( NH)  ANALGESIC: Tylenol (NH)  NSAID'S: Ibuprofen, Toradol  rash) and Naprosyn  MUSCLE RELAXER'S:Flexeril ( fatigue), Skelaxin( NH), Robaxin ( NH)  ANTIMIGRAINE: None    ANTIDEPRESSANTS: Bupropion, nortriptyline ( Pamelor), sertraline (Zoloft) , trazodone ( Desyrel)  HYPNOTICS/ANTIANXIETY: Ativan    ANTICONVULSANTS: Gabapentin, Lyrica    TOPICALS: Lidocaine patch, icy hot, Bengay    PAST TREATMENT TRIALS:             HELPFUL        NOT HELPFUL             WORSEN  PAIN CLINIC: [facility] ( 2015) could not participate in comprehensive care they offered    PHYSICAL THERAPY Yes                                        X  ( 2011)  TENS:Yes                                                       X  EXERCISE: Yes  COUNSELING: No  RELAXATION THERAPY No  ACUPUNCTURE: Yes                                                X  CHIROPRACTOR:  Yes                                              X  SPINE INJECTIONS: YES                 X TPI, Botox ( prolong neck relaxation and difficulty keeping head up  SPINAL CORDS STIM: No  SPINAL CORD PUMP: No  OPIOIDS: Yes                                    X  OTHER: N/A    INTERVAL HISTORY:  Pain Description: throbbing, stabbing, shooting, tender  Location:  All over neck, mid and low back   Pain rating 5/10, average 8/10, Range  5-9/10  Quality:  Unbearable, exhausting, miserable, unbearable   Aggravating factors: driving, walking, sitting, standing, bending  Relieving factors:  Nothing     Current Pain Medications:  Lyrica 150 max tid . Norpramin 30 mg, Ibuprofen, Cymbalta 120 mg/day Tizanidine  4-6 mg tid, Valium 5 mg bid/qid, Percocet ( limited supply ) through PCP for acute pain  Medication side effects:  none    Progress in pain program: compliant,  Impact on function: moderate Working Yes, taking time away from work due to pain  Quality of life: poor  Self care: yes, exercise no,relaxation trying, body awareness no.    INVESTIGATIONAL:  01/11/17 cervical x-rays no interval changes  Xray Cervical 5/6/11  2 views were obtained. Straightening of the cervical spine with loss  of the normal lordotic curvature may be due to positioning or to  muscle spasm. There is normal alignment of the vertebral bodies.  Disk spaces are normal. No evidence of fracture. Facet joints and  prevertebral soft tissue are normal.    IMPRESSION: Normal 2 view study of the cervical spine.    MRI C 06/22/2012  C 2- 3: No neuroforaminal or spinal canal narrowing.     C3-4: No neuroforaminal or spinal canal narrowing.     C4-5: No neuroforaminal or spinal canal narrowing.     C5-6: Mild disk bulge with no significant neuroforaminal or spinal   canal narrowing.     C6-7: No neuroforaminal or spinal canal narrowing.     C7-T1: No neuroforaminal or spinal canal narrowing.       PMHX:  Past Medical History   Diagnosis Date     Myofacial muscle  pain      ADD (attention deficit disorder)      Depressive disorder      Thyroid disease      Gastro-oesophageal reflux disease      Noninfectious ileitis      IBS with constipation     Other chronic pain      myofacial pain syndrome     Sleep apnea      wears CPAP at night     Degenerative disc disease, cervical      PAST SURGICAL HISTORY  Past Surgical History   Procedure Laterality Date     Breast surgery  03/2006     augmentation, revisions 2011 and 2015     Appendectomy  2000     Gyn surgery   2009 and 2014     LEEPS x 2      Laparoscopic cholecystectomy  5/15/2014     Procedure: LAPAROSCOPIC CHOLECYSTECTOMY;  Surgeon: Kd Arthur MD;  Location: [facility] OR     Thyroid surgery  2001     nodule      Arthroscopy knee Right      teenager     Foot surgery Right      semoid bone removed from Fx     Tubal ligation  10/09/2008       CURRENT MEDICATIONS:   Current Outpatient Prescriptions   Medication     order for DME     oxyCODONE-acetaminophen (PERCOCET) 5-325 MG per tablet     lisinopril (PRINIVIL/ZESTRIL) 10 MG tablet     tiZANidine (ZANAFLEX) 2 MG tablet     Acetaminophen (TYLENOL PO)     pregabalin (LYRICA) 150 MG capsule     amphetamine-dextroamphetamine (ADDERALL XR) 30 MG per 24 hr capsule     [START ON 2/20/2017] amphetamine-dextroamphetamine (ADDERALL XR) 30 MG per 24 hr capsule     spironolactone (ALDACTONE) 50 MG tablet     pseudoePHEDrine (SUDAFED 12 HOUR) 120 MG 12 hr tablet     diazepam (VALIUM) 5 MG tablet     order for DME     B Complex TABS     ibuprofen (ADVIL,MOTRIN) 200 MG tablet     DULoxetine (CYMBALTA) 60 MG capsule     omeprazole (PRILOSEC) 20 MG capsule     desipramine (NORPRAMIN) 10 MG tablet     No current facility-administered medications for this visit.     ROS: twelve systems review negative and included in interval except for: fatigue, fatigue, headache, high blood pressure  Since last visit: changes in mood: Yes, suicide thoughts No  Any changes in your medical condition,  illness, injury or hospitalizations: Yes increase pain, fall  Sleep: Restorative: No     PHYSICAL EXAM:   Constitutional:Blood pressure 108/60, pulse 63, weight 81.647 kg (180 lb), last menstrual period 01/05/2017, SpO2 98 %, not currently breastfeeding., Body mass index is 26.57 kg/(m^2).   Psyche:  Fully oriented, Behavioral Observations: Eye contact poor. Mood  and spirits are anxious, but calmer.  Musculoskeletal exam:  Gait:  Steady reciprocating,  ROM within normal limits PERRY x 4,  Neuro exam:  Alert, Speech clear fluent and appropriate.   Skin/Vascular/ Autonomic:  warm, dry and intact    OTHER: Opioid risk for ongoing opioid management for non malignant chronic pain   DIRE Score for ongoing opioid management is calculated as follows:    Diagnosis = 1    Intractability = 2    Risk: Psych = 2  Chem Hlth = 2  Reliability = 2  Social = 2    Efficacy = 2    Total DIRE Score = 13 (14 or higher predicts good candidate for ongoing opioid management; 13 or lower predicts poor candidate for opioid management)   Brian Greenberg 2006,The Journal of  Pain.,The DIRE Score: Predicting Outcomes of Opioid Prescribing for Chronic Pain Vol.7,No.9, pp 671-681.  MNPMP : Reviewed and as expected without evidence of abuse or misuse? Yes  URINE DRUG SCREEN  No, DATE: ---. OPIOID AGREEMENT: No DATE:---  OPOID TOLERANCE: low to none, Morphine  EQ:  none  Analgesia poor, Adverse effects none  Activity poor  Adherence poor      Opioid management will continue with not ordered, indicated for current chronic pain condition    PROCEDURES: none        Time spent: 25 minutes 100 % face to face including  20 minutes counseling and coordinating care for the above identified medical problems.   Signed: NGOZI Dow, C.N.P.,   [facility]

## 2017-01-25 NOTE — PATIENT INSTRUCTIONS
Nurse Triage line:  [phone]   Call this number with any questions or concerns. You may leave a detailed message anytime. Calls are typically returned Monday through Friday between 8 AM and 4:30 PM. We usually get back to you within 2 business days depending on the issue/request.       Medication refills:    For non-narcotic medications, call your pharmacy directly to request a refill. The pharmacy will contact the [facility] for authorization. Please allow 3-4 days for these refills to be processed.     For narcotic refills, call the nurse triage line or send a [facility] message. Please contact us 7-10 days before your refill is due. The message MUST include the name of the specific medication(s) requested and how you would like to receive the prescription(s). The options are as follows:    Pain Clinic staff can mail the prescription to your pharmacy. Please tell us the name of the pharmacy.    You may pick the prescription up at the Pain Clinic (tell us the location) or during a clinic visit with your pain provider    Pain Clinic staff can deliver the prescription to the [facility] pharmacy in the clinic building. Please tell us the location.      Scheduling number: [phone].  Call this number to schedule or change appointments.    We believe regular attendance is key to your success in our program.    Any time you are unable to keep your appointment we ask that you call us at least 24 hours in advance to let us know. This will allow us to offer the appointment time to another patient.

## 2017-02-01 ENCOUNTER — HOSPITAL ENCOUNTER (OUTPATIENT)
Dept: MRI IMAGING | Facility: CLINIC | Age: 37
Discharge: HOME | End: 2017-02-01
Attending: FAMILY MEDICINE | Admitting: FAMILY MEDICINE
Payer: COMMERCIAL

## 2017-02-01 DIAGNOSIS — R29.898 RIGHT LEG WEAKNESS: ICD-10-CM

## 2017-02-01 PROCEDURE — 72148 MRI LUMBAR SPINE W/O DYE: CPT

## 2017-02-07 ENCOUNTER — TRANSFERRED RECORDS (OUTPATIENT)
Dept: HEALTH INFORMATION MANAGEMENT | Facility: CLINIC | Age: 37
End: 2017-02-07

## 2017-02-16 ENCOUNTER — MYC MEDICAL ADVICE (OUTPATIENT)
Dept: FAMILY MEDICINE | Facility: CLINIC | Age: 37
End: 2017-02-16

## 2017-02-16 ENCOUNTER — HOSPITAL ENCOUNTER (EMERGENCY)
Facility: CLINIC | Age: 37
Discharge: HOME | End: 2017-02-16
Attending: NURSE PRACTITIONER | Admitting: NURSE PRACTITIONER
Payer: COMMERCIAL

## 2017-02-16 VITALS
RESPIRATION RATE: 18 BRPM | BODY MASS INDEX: 26.58 KG/M2 | TEMPERATURE: 97.8 F | HEART RATE: 94 BPM | WEIGHT: 180 LBS | OXYGEN SATURATION: 99 % | DIASTOLIC BLOOD PRESSURE: 94 MMHG | SYSTOLIC BLOOD PRESSURE: 150 MMHG

## 2017-02-16 DIAGNOSIS — M54.6 BILATERAL THORACIC BACK PAIN, UNSPECIFIED CHRONICITY: ICD-10-CM

## 2017-02-16 DIAGNOSIS — M62.830 BACK MUSCLE SPASM: ICD-10-CM

## 2017-02-16 DIAGNOSIS — G89.29 OTHER CHRONIC PAIN: ICD-10-CM

## 2017-02-16 PROCEDURE — 25000125 ZZHC RX 250: Performed by: EMERGENCY MEDICINE

## 2017-02-16 PROCEDURE — 25000132 ZZH RX MED GY IP 250 OP 250 PS 637: Performed by: NURSE PRACTITIONER

## 2017-02-16 PROCEDURE — 93005 ELECTROCARDIOGRAM TRACING: CPT

## 2017-02-16 PROCEDURE — 99284 EMERGENCY DEPT VISIT MOD MDM: CPT

## 2017-02-16 RX ORDER — HYDROXYZINE PAMOATE 50 MG/1
50 CAPSULE ORAL 3 TIMES DAILY PRN
Qty: 18 CAPSULE | Refills: 0 | Status: SHIPPED | OUTPATIENT
Start: 2017-02-16 | End: 2017-02-19

## 2017-02-16 RX ORDER — ONDANSETRON 4 MG/1
4 TABLET, ORALLY DISINTEGRATING ORAL ONCE
Status: COMPLETED | OUTPATIENT
Start: 2017-02-16 | End: 2017-02-16

## 2017-02-16 RX ORDER — METHOCARBAMOL 500 MG/1
1000 TABLET, FILM COATED ORAL ONCE
Status: COMPLETED | OUTPATIENT
Start: 2017-02-16 | End: 2017-02-16

## 2017-02-16 RX ORDER — METHOCARBAMOL 500 MG/1
TABLET, FILM COATED ORAL
Qty: 24 TABLET | Refills: 0 | Status: SHIPPED | OUTPATIENT
Start: 2017-02-16 | End: 2017-09-29

## 2017-02-16 RX ORDER — LIDOCAINE 50 MG/G
2 PATCH TOPICAL ONCE
Status: COMPLETED | OUTPATIENT
Start: 2017-02-16 | End: 2017-02-16

## 2017-02-16 RX ADMIN — LIDOCAINE 2 PATCH: 50 PATCH CUTANEOUS at 18:42

## 2017-02-16 RX ADMIN — METHOCARBAMOL 1000 MG: 500 TABLET ORAL at 18:41

## 2017-02-16 RX ADMIN — ONDANSETRON 4 MG: 4 TABLET, ORALLY DISINTEGRATING ORAL at 16:50

## 2017-02-16 ASSESSMENT — ENCOUNTER SYMPTOMS
BACK PAIN: 1
NECK PAIN: 1
NUMBNESS: 1

## 2017-02-16 NOTE — ED NOTES
Pt here with upper back and neck pain that has been ongoing for months but reports an increase today. Pt reports having fibromyalgia but states this is different and way worse. Pt also reports palpitations due to pain that started this am as well.

## 2017-02-16 NOTE — ED AVS SNAPSHOT
[facility] Emergency Department    [address]    [facility] [address]    Phone:  [phone]    Fax:  [phone]                                       Heath Waller   MRN: [id number]    Department:  [facility] Emergency Department   Date of Visit:  2/16/2017           Patient Information     Date Of Birth          1980        Your diagnoses for this visit were:     Bilateral thoracic back pain, unspecified chronicity     Other chronic pain     Back muscle spasm        You were seen by Jayson Mcqueen, GREGORIO CNP.      Follow-up Information     Follow up with [place], [facility].    Why:  as scheduled or sooner if need further pain management    Contact information:    [phone]          Follow up with your pain specialist.    Why:  your will need to get further pain management from them        Follow up with [facility] Emergency Department.    Specialty:  EMERGENCY MEDICINE    Why:  If symptoms worsen    Contact information:    [address]  [facility] [id number] [phone]        Discharge Instructions         Chronic Pain  Pain serves an important role. It lets you know something is wrong that needs your attention. When the body heals, pain normally goes away.  When pain lasts longer than six months, it is called  chronic  pain. This is pain that is present even after the body has healed. Chronic pain can cause mood problems and get in the way of your relationships and your daily life.  A number of conditions can cause chronic pain. Some of the more common include:    Previous surgery    An old injury    Infection    Diseases such as diabetes    Nerve damage    Back injury    Arthritis    Migraine or other headaches    Fibromyalgia    Cancer  Depression and stress can make chronic pain symptoms worse. In some cases, a cause for the pain cannot be found.   Treatment  Treatment can greatly reduce pain. In many  cases, pain can become less severe, occur less often, and interfere less with your daily life. Chronic pain is often treated with a combination of medicines, therapies, and lifestyle changes. You will work closely with your healthcare provider to find a treatment plan that works best for you.    Ask your healthcare provider for a referral to a pain management specialty center. These can provide the most recent and proven pain management strategies, along with emotional support and comprehensive services.    Several different types of medicines may be prescribed for chronic pain. Work with your healthcare provider to develop a medicine plan that helps manage your pain.    Physical therapy can be very effective in helping reduce certain types of chronic pain.    Occupational therapy teaches you how to do routine tasks of daily living in ways that lessen your discomfort.    Psychological therapy can help you cope better with stress and pain.    Other therapies such as meditation, yoga, biofeedback, massage, and acupuncture can also help manage chronic pain.    Changing certain habits can help reduce chronic pain. They include:    Eating healthy    Developing an exercise routine    Getting enough sleep at night    Stopping smoking and limiting alcohol use    Losing excess weight  Follow-up care  Follow up with your healthcare provider as advised. Let your healthcare provider know if your current treatment plan is working or if changes are needed.  Resources  For more information, contact:    [place] [employer] Society www.achenet.org    American Chronic Pain Association www.theacpa.org 115-080-2833    7255-5647 [employer]. [address] [place] [address], [place] [address]. All rights reserved. This information is not intended as a substitute for professional medical care. Always follow your healthcare professional's instructions.          Shoulder Squeeze Exercise      To start, sit in a chair with  your feet flat on the floor. Shift your weight slightly forward to avoid rounding your back. Relax. Keep your ears, shoulders, and hips aligned:    Raise your arms to shoulder height, elbows bent and palms forward.    Move your arms back, squeezing your shoulder blades together.    Hold for 10 seconds. Return to starting position.     Repeat 5 times.   For your safety, check with your healthcare provider before starting an exercise program.     0978-3970 [employer]. [address] [place] [address]. All rights reserved. This information is not intended as a substitute for professional medical care. Always follow your healthcare professional's instructions.          Neck Exercises: Arm Lift            To start, lie on your back, knees bent and feet flat on the floor. Keep your ears, shoulders, and hips aligned, but don t press your lower back to the floor. Rest your hands on your pelvis. Breathe deeply and relax. Tighten the abdominal muscles to keep the back from arching.    Raise one arm overhead, then lower it. As you lower that arm, raise the other arm.    Continue to move both arms in slow, smooth arcs. Keep your arms straight and your head and neck relaxed.    Repeat 10 times with each arm.  For your safety, check with your healthcare provider before starting an exercise program.     6016-1262 [employer]. [address] [place] [address]. All rights reserved. This information is not intended as a substitute for professional medical care. Always follow your healthcare professional's instructions.          Neck Exercises: Active Neck Rotation    To start, lie on your back, knees bent and feet flat on the floor. Keep your ears, shoulders, and hips aligned, but don t press your lower back to the floor. Rest your hands on your pelvis. Breathe deeply and relax.      Use your neck muscles to turn your head to one side until you feel a stretch in the muscles.    Hold  for 5 seconds. Then turn to the other side.    Repeat 5 times on each side.  Note: Keep your shoulders on the floor. Don t lift or tuck your chin as you turn your head.    4473-5528 [employer]. [address] [place] [address], [place] [address]. All rights reserved. This information is not intended as a substitute for professional medical care. Always follow your healthcare professional's instructions.          Managing Fibromyalgia  Fibromyalgia is a chronic illness that causes pain in specific points on the body, stiffness, and fatigue. But it doesn t have to keep you from doing what you enjoy. You can feel better. You and your health care provider can work together to develop a plan.  Take medications as directed  You may be given medications to help reduce pain and improve sleep.  Several medications are approved to treat fibromyalgia. Two were originally designed to treat depression. They are duloxetine, and milnacipran. A third, called pregaballin, was developed to treat nerve pain. Other medications include pain relievers such as acetaminophen or stronger narcotics. These may be prescribed short term.  NSAIDs (non-steroidal anti-inflammatory drugs) include aspirin, ibuprofen and naproxen are used to relieve pain.  Get exercise    Gentle exercise can help lessen your pain. Try these tips:    Choose activities that are gentle on your joints, such as walking, biking, and swimming or other water exercises.    Don t push yourself too hard. Build up your strength and endurance slowly, over time.    Stick to it. For the most relief, exercise should become part of your daily life.  Get a good night s rest  To help you get more sleep, try the tips below:    Sleep only in a bed, not on a couch or chair.    Don t watch TV, read, or work in bed.    Go to bed and get up at the same time each day.    Try to avoid naps.    Avoid alcohol, caffeine, and tobacco for at least 3 hours before going to bed.    Avoid fluids  in the evening to avoid having to get up to urinate.  Other things you can do  No one knows what causes fibromyalgia. But stress, poor eating habits, and extra weight can make it worse. These tips may help you feel better:    Eat a balanced diet with plenty of fruits and vegetables, whole grains, lean protein, and low or no-fat dairy products.    Maintain a healthy weight.    Learn ways to reduce or manage the stress in your life.    Ask your health care provider for resources to help you make changes. Or check out the resources below.  Resources    Arthritis Foundation  www.arthritis.org    National Fibromyalgia Association  www.fmaware.org    American Fibromyalgia Syndrome Association  www.afsafund.org    [phone] [employer]. [address] [place] [address], [place] [address]. All rights reserved. This information is not intended as a substitute for professional medical care. Always follow your healthcare professional's instructions.          24 Hour Appointment Hotline       To make an appointment at any [facility], call [phone] ([phone]). If you don't have a family doctor or clinic, we will help you find one. [facility] clinics are conveniently located to serve the needs of you and your family.             Review of your medicines      START taking        Dose / Directions Last dose taken    hydrOXYzine 50 MG capsule   Commonly known as:  VISTARIL   Dose:  50 mg   Quantity:  18 capsule        Take 1 capsule (50 mg) by mouth 3 times daily as needed for other (Pain, spasming, anxiety)   Refills:  0        methocarbamol 500 MG tablet   Commonly known as:  ROBAXIN   Quantity:  24 tablet        1-2 tabs q TID PRN for muscle spasm/pain   Refills:  0          Our records show that you are taking the medicines listed below. If these are incorrect, please call your family doctor or clinic.        Dose / Directions Last dose taken    * amphetamine-dextroamphetamine 30 MG per 24 hr capsule    Commonly known as:  ADDERALL XR   Dose:  30 mg   Quantity:  30 capsule        Take 1 capsule (30 mg) by mouth daily   Refills:  0        * amphetamine-dextroamphetamine 30 MG per 24 hr capsule   Commonly known as:  ADDERALL XR   Dose:  30 mg   Quantity:  30 capsule   Start taking on:  2/20/2017        Take 1 capsule (30 mg) by mouth daily   Refills:  0        B Complex Tabs        Refills:  0        desipramine 10 MG tablet   Commonly known as:  NORPRAMIN   Dose:  30 mg   Quantity:  60 tablet        Take 3 tablets (30 mg) by mouth At Bedtime   Refills:  1        diazepam 5 MG tablet   Commonly known as:  VALIUM   Dose:  5 mg   Quantity:  30 tablet        Take 1 tablet (5 mg) by mouth every 12 hours as needed for anxiety, sleep, muscle spasms or pain   Refills:  1        DULoxetine 60 MG EC capsule   Commonly known as:  CYMBALTA   Dose:  120 mg        Take 120 mg by mouth every morning   Refills:  0        ibuprofen 200 MG tablet   Commonly known as:  ADVIL/MOTRIN        Refills:  0        lisinopril 10 MG tablet   Commonly known as:  PRINIVIL/ZESTRIL   Dose:  10 mg   Quantity:  90 tablet        Take 1 tablet (10 mg) by mouth daily   Refills:  1        omeprazole 20 MG CR capsule   Commonly known as:  priLOSEC   Dose:  20 mg        Take 20 mg by mouth   Refills:  0        * order for DME   Quantity:  1 each        Equipment being ordered: TENS with electrodes and pads   Refills:  0        * order for DME   Quantity:  1 Device        Equipment being ordered: air stirrup brace   Refills:  0        oxyCODONE-acetaminophen 5-325 MG per tablet   Commonly known as:  PERCOCET   Dose:  1-2 tablet   Quantity:  18 tablet        Take 1-2 tablets by mouth every 8 hours as needed for pain maximum 6 tablet(s) per day   Refills:  0        pregabalin 150 MG capsule   Commonly known as:  LYRICA   Dose:  150 mg   Quantity:  60 capsule        Take 1 capsule (150 mg) by mouth 3 times daily Patient  Will contact you when ready to fill    Refills:  1        pseudoePHEDrine 120 MG 12 hr tablet   Commonly known as:  SUDAFED 12 HOUR   Dose:  120 mg   Quantity:  90 tablet        Take 1 tablet (120 mg) by mouth 2 times daily   Refills:  1        spironolactone 50 MG tablet   Commonly known as:  ALDACTONE   Dose:  50 mg   Quantity:  90 tablet        Take 1 tablet (50 mg) by mouth daily   Refills:  1        tiZANidine 2 MG tablet   Commonly known as:  ZANAFLEX   Dose:  4-6 mg   Quantity:  90 tablet        Take 2-3 tablets (4-6 mg) by mouth 3 times daily   Refills:  1        TYLENOL PO   Dose:  1000 mg        Take 1,000 mg by mouth every 4 hours as needed for mild pain or fever   Refills:  0        * Notice:  This list has 4 medication(s) that are the same as other medications prescribed for you. Read the directions carefully, and ask your doctor or other care provider to review them with you.            Prescriptions were sent or printed at these locations (2 Prescriptions)                   Other Prescriptions                Printed at Department/Unit printer (2 of 2)         hydrOXYzine (VISTARIL) 50 MG capsule               methocarbamol (ROBAXIN) 500 MG tablet                Procedures and tests performed during your visit     EKG 12 lead      Orders Needing Specimen Collection     None      Pending Results     No orders found from 2/14/2017 to 2/17/2017.            Pending Culture Results     No orders found from 2/14/2017 to 2/17/2017.             Test Results from your hospital stay            Clinical Quality Measure: Blood Pressure Screening     Your blood pressure was checked while you were in the emergency department today. The last reading we obtained was  BP: (!) 150/94 . Please read the guidelines below about what these numbers mean and what you should do about them.  If your systolic blood pressure (the top number) is less than 120 and your diastolic blood pressure (the bottom number) is less than 80, then your blood pressure is normal.  There is nothing more that you need to do about it.  If your systolic blood pressure (the top number) is 120-139 or your diastolic blood pressure (the bottom number) is 80-89, your blood pressure may be higher than it should be. You should have your blood pressure rechecked within a year by a primary care provider.  If your systolic blood pressure (the top number) is 140 or greater or your diastolic blood pressure (the bottom number) is 90 or greater, you may have high blood pressure. High blood pressure is treatable, but if left untreated over time it can put you at risk for heart attack, stroke, or kidney failure. You should have your blood pressure rechecked by a primary care provider within the next 4 weeks.  If your provider in the emergency department today gave you specific instructions to follow-up with your doctor or provider even sooner than that, you should follow that instruction and not wait for up to 4 weeks for your follow-up visit.        Thank you for choosing [facility]       Thank you for choosing [facility] for your care. Our goal is always to provide you with excellent care. Hearing back from our patients is one way we can continue to improve our services. Please take a few minutes to complete the written survey that you may receive in the mail after you visit with us. Thank you!        [employer] Information     [employer] gives you secure access to your electronic health record. If you see a primary care provider, you can also send messages to your care team and make appointments. If you have questions, please call your primary care clinic.  If you do not have a primary care provider, please call [phone] and they will assist you.        Care EveryWhere ID     This is your Care EveryWhere ID. This could be used by other organizations to access your [facility] medical records  [id number]        After Visit Summary       This is your record. Keep this with you and show to your community pharmacist(s)  and doctor(s) at your next visit.

## 2017-02-16 NOTE — ED AVS SNAPSHOT
[facility] Emergency Department    [address]    [facility] [address]    Phone:  [phone]    Fax:  [phone]                                       Heath Waller   MRN: [id number]    Department:  [facility] Emergency Department   Date of Visit:  2/16/2017           After Visit Summary Signature Page     I have received my discharge instructions, and my questions have been answered. I have discussed any challenges I see with this plan with the nurse or doctor.    ..........................................................................................................................................  Patient/Patient Representative Signature      ..........................................................................................................................................  Patient Representative Print Name and Relationship to Patient    ..................................................               ................................................  Date                                            Time    ..........................................................................................................................................  Reviewed by Signature/Title    ...................................................              ..............................................  Date                                                            Time

## 2017-02-17 LAB — INTERPRETATION ECG - MUSE: NORMAL

## 2017-02-17 NOTE — ED PROVIDER NOTES
History     Chief Complaint:  Back pain and neck pain    HPI   Heath Waller is a 36 year old female with chronic pain who presents with back pain and neck pain. For the past 2 weeks, the patient has been experiencing neck pain and lower back pain that she states is different from her fibromyalgia pain. She is also experiencing numbness in her fourth and fifth fingers of her left hand. She does not recall what she was doing when the pain began. The patient states that she has been taking 800 mg of ibuprofen and 1000 mg of tylenol for the pain. She has also been icing, using heat, and stretching. The patient has been unable to see her PCP and cannot get into the pain clinic until the 27th. Two weeks ago, the patient underwent MRI imaging and received a steroid injection in her lower back. Upon chart review, the patient was recently discharged from her pain clinic because she was not cooperating with treatment.     Allergies:  Codeine  Sulfa drugs  Toradol    Medications:    Percocet  Lisinopril  Zanaflex  Tylenol  Lyrica  Norpramin  Adderall  Aldactone  Sudafed  Valium  Advil  Cymbalta  Omeprazole     Past Medical History:    ADD  Degenerative disc disease, cervical  Depressive disorder  GERD  Myofacial muscle pain  Noninfectious ileitis  Chronic pain  Thyroid disease  Hypertension  Fibromyalgia    Past Surgical History:    Breast surgery  Appendectomy  LEEPS x2  Laparoscopic cholecystectomy  Thyroid surgery  Arthroscopy knee  Foot surgery  Tubal ligation    Family History:    Thyroid disease - brother  Cerebrovascular disease - father  Hypertension - father    Social History:  Marital Status:   Presents to the ED alone  Tobacco Use: 1.50 packs/day  Alcohol Use: positive  PCP: [facility]     Review of Systems   Musculoskeletal: Positive for back pain and neck pain.   Neurological: Positive for numbness.   All other systems reviewed and are negative.    Physical Exam   First Vitals:  BP:  142/81  Pulse: 100  Temp: 97.8  F (36.6  C)  Resp: 20  Weight: 81.6 kg (180 lb)  SpO2: 99 %    Physical Exam  Nursing notes reviewed. Vitals reviewed.  General: Alert.  Moderate discomfort . Well kept.  HENT: Normal voice.   Neck: non-tender. Full ROM, no bony deformity, step-off, or crepitus. Mild cervical paraspinal muscle spasm.  Eyes: Sclera and conjunctiva normal  Pulmonary: Normal respiratory effort. No cough.    Musculoskeletal: Normal gross range of motion of all 4 extremities. No spinal tenderness, deformity, step-off, or crepitus. Mild bilateral upper thoracic spasm.   Neurological: Alert. Normal speech. Responds appropriately. Normal sensation and strength distally.  Skin: Warm and dry. Normal appearance of visualized exposed skin.  Psych: Affect normal. Normal personal interaction. Good eye contact.    Emergency Department Course   ECG:  @ 165  Indication: neck pain and back pain  Vent. Rate 89 bpm. NE interval 108 ms. QRS duration 86 ms. QT/QTc 370/450 ms. P-R-T axis 30 70 67.   Sinus rhythm with short NE. Otherwise normal ECG.    Read @ 1701.    Interventions:  () Zofran, 4 mg, IV injection  () Lidoderm 5%, 2 patches, transdermal  () Robaxin, 1,000 mg, PO    Emergency Department Course:  The patient arrived in triage where vitals were measured and recorded.   The patient was then escorted back to the emergency department.   The patient's medical records were reviewed.  Nursing notes and vitals were reviewed.  I performed an exam of the patient as documented above. The patient is in agreement with my plan of care.   EKG was done, interpretation as above.  () I rechecked the patient.  Findings and plan explained to the patient. Patient discharged home with instructions regarding supportive care, medications, and reasons to return. The importance of close follow-up was reviewed. The patient was prescribed methocarbamol and vistaril.    Impression & Plan    Medical Decision Makin  year old female who presented for evaluation of upper back and neck discomfort. She has a long standing history of chronic pain and fibromyalgia. She came in today stating that she had increasing pain and was unable to manage it at home. her examination is consistent with musculoskeletal source of her discomfort which is consistent with her chronic pain. I have reviewed her charts and approximately 2 weeks ago she was discharged from her pain clinic as she was not actively partaking in her care. As such and with recommendations of her provider, she is not eligible for opiate management at this time. She is given the above treatment with minimal relief. We discussed strengthening and stretching exercises as well as I gave her a prescription for methocarbamol and vistaril to help her symptoms. She will continue with ice, heat, and/or ibuprofen. She is advised that she needs to follow up with either primary care or pain specialist for further pain management. She will return to the ER if she develops numbness, weakness, tingling, fevers, or for other concerns. There was no new trauma so imaging was not indicated.    Diagnosis:    ICD-10-CM    1. Bilateral thoracic back pain, unspecified chronicity M54.6    2. Other chronic pain G89.29    3. Back muscle spasm M62.830        Disposition:  Discharge to home     Discharge Medications:  Discharge Medication List as of 2/16/2017  7:49 PM      START taking these medications    Details   hydrOXYzine (VISTARIL) 50 MG capsule Take 1 capsule (50 mg) by mouth 3 times daily as needed for other (Pain, spasming, anxiety), Disp-18 capsule, R-0, Local Print      methocarbamol (ROBAXIN) 500 MG tablet 1-2 tabs q TID PRN for muscle spasm/pain, Disp-24 tablet, R-0, Local Print             Gerardo LOOMIS, am serving as a scribe on 2/16/2017 at 6:25 PM to personally document services performed by GREGORIO Fish, CNP based on my observations and the provider's statements to me.         Jayson Mcqueen, APRN CNP  02/16/17 2023

## 2017-02-17 NOTE — DISCHARGE INSTRUCTIONS
Chronic Pain  Pain serves an important role. It lets you know something is wrong that needs your attention. When the body heals, pain normally goes away.  When pain lasts longer than six months, it is called  chronic  pain. This is pain that is present even after the body has healed. Chronic pain can cause mood problems and get in the way of your relationships and your daily life.  A number of conditions can cause chronic pain. Some of the more common include:    Previous surgery    An old injury    Infection    Diseases such as diabetes    Nerve damage    Back injury    Arthritis    Migraine or other headaches    Fibromyalgia    Cancer  Depression and stress can make chronic pain symptoms worse. In some cases, a cause for the pain cannot be found.   Treatment  Treatment can greatly reduce pain. In many cases, pain can become less severe, occur less often, and interfere less with your daily life. Chronic pain is often treated with a combination of medicines, therapies, and lifestyle changes. You will work closely with your healthcare provider to find a treatment plan that works best for you.    Ask your healthcare provider for a referral to a pain management specialty center. These can provide the most recent and proven pain management strategies, along with emotional support and comprehensive services.    Several different types of medicines may be prescribed for chronic pain. Work with your healthcare provider to develop a medicine plan that helps manage your pain.    Physical therapy can be very effective in helping reduce certain types of chronic pain.    Occupational therapy teaches you how to do routine tasks of daily living in ways that lessen your discomfort.    Psychological therapy can help you cope better with stress and pain.    Other therapies such as meditation, yoga, biofeedback, massage, and acupuncture can also help manage chronic pain.    Changing certain habits can help reduce chronic pain. They  include:    Eating healthy    Developing an exercise routine    Getting enough sleep at night    Stopping smoking and limiting alcohol use    Losing excess weight  Follow-up care  Follow up with your healthcare provider as advised. Let your healthcare provider know if your current treatment plan is working or if changes are needed.  Resources  For more information, contact:    American [place] for Headache Society www.achenet.org    American Chronic Pain Association www.theacpa.org 266-882-2571    2976-6905 [employer]. [address] [place] [address]. All rights reserved. This information is not intended as a substitute for professional medical care. Always follow your healthcare professional's instructions.          Shoulder Squeeze Exercise      To start, sit in a chair with your feet flat on the floor. Shift your weight slightly forward to avoid rounding your back. Relax. Keep your ears, shoulders, and hips aligned:    Raise your arms to shoulder height, elbows bent and palms forward.    Move your arms back, squeezing your shoulder blades together.    Hold for 10 seconds. Return to starting position.     Repeat 5 times.   For your safety, check with your healthcare provider before starting an exercise program.     4869-5081 [employer]. [address] [place] [address]. All rights reserved. This information is not intended as a substitute for professional medical care. Always follow your healthcare professional's instructions.          Neck Exercises: Arm Lift            To start, lie on your back, knees bent and feet flat on the floor. Keep your ears, shoulders, and hips aligned, but don t press your lower back to the floor. Rest your hands on your pelvis. Breathe deeply and relax. Tighten the abdominal muscles to keep the back from arching.    Raise one arm overhead, then lower it. As you lower that arm, raise the other arm.    Continue to move both arms in slow,  smooth arcs. Keep your arms straight and your head and neck relaxed.    Repeat 10 times with each arm.  For your safety, check with your healthcare provider before starting an exercise program.     7388-3535 [employer]. [address] [place] [address]. All rights reserved. This information is not intended as a substitute for professional medical care. Always follow your healthcare professional's instructions.          Neck Exercises: Active Neck Rotation    To start, lie on your back, knees bent and feet flat on the floor. Keep your ears, shoulders, and hips aligned, but don t press your lower back to the floor. Rest your hands on your pelvis. Breathe deeply and relax.      Use your neck muscles to turn your head to one side until you feel a stretch in the muscles.    Hold for 5 seconds. Then turn to the other side.    Repeat 5 times on each side.  Note: Keep your shoulders on the floor. Don t lift or tuck your chin as you turn your head.    9267-6000 [employer]. [address] [place] [address]. All rights reserved. This information is not intended as a substitute for professional medical care. Always follow your healthcare professional's instructions.          Managing Fibromyalgia  Fibromyalgia is a chronic illness that causes pain in specific points on the body, stiffness, and fatigue. But it doesn t have to keep you from doing what you enjoy. You can feel better. You and your health care provider can work together to develop a plan.  Take medications as directed  You may be given medications to help reduce pain and improve sleep.  Several medications are approved to treat fibromyalgia. Two were originally designed to treat depression. They are duloxetine, and milnacipran. A third, called pregaballin, was developed to treat nerve pain. Other medications include pain relievers such as acetaminophen or stronger narcotics. These may be prescribed short term.  NSAIDs  (non-steroidal anti-inflammatory drugs) include aspirin, ibuprofen and naproxen are used to relieve pain.  Get exercise    Gentle exercise can help lessen your pain. Try these tips:    Choose activities that are gentle on your joints, such as walking, biking, and swimming or other water exercises.    Don t push yourself too hard. Build up your strength and endurance slowly, over time.    Stick to it. For the most relief, exercise should become part of your daily life.  Get a good night s rest  To help you get more sleep, try the tips below:    Sleep only in a bed, not on a couch or chair.    Don t watch TV, read, or work in bed.    Go to bed and get up at the same time each day.    Try to avoid naps.    Avoid alcohol, caffeine, and tobacco for at least 3 hours before going to bed.    Avoid fluids in the evening to avoid having to get up to urinate.  Other things you can do  No one knows what causes fibromyalgia. But stress, poor eating habits, and extra weight can make it worse. These tips may help you feel better:    Eat a balanced diet with plenty of fruits and vegetables, whole grains, lean protein, and low or no-fat dairy products.    Maintain a healthy weight.    Learn ways to reduce or manage the stress in your life.    Ask your health care provider for resources to help you make changes. Or check out the resources below.  Resources    Arthritis Foundation  www.arthritis.org    National Fibromyalgia Association  www.fmaware.org    American Fibromyalgia Syndrome Association  www.afsafund.org    4415-8550 [employer]. [address] [place] [address], [place] [address]. All rights reserved. This information is not intended as a substitute for professional medical care. Always follow your healthcare professional's instructions.

## 2017-03-27 ENCOUNTER — FCC EXTENDED DOCUMENTATION (OUTPATIENT)
Dept: PSYCHOLOGY | Facility: CLINIC | Age: 37
End: 2017-03-27

## 2017-03-27 NOTE — LETTER
3/27/2017      Heath Waller  [address] [place] [id number]        Greeting    Your last date of service at [facility] was Jan. 19, 2017.    Since I have not heard from you regarding your desire to continue services with me through [facility], you will be discharged.  In the future, should you desire to seek services again through our clinic, please do not hesitate to call us.      Sincerely,    VALDO Abel

## 2017-03-27 NOTE — PROGRESS NOTES
Discharge Summary  Multiple Sessions    Client Name: Heath Waller MRN#: [id number] YOB: 1980      Intake / Discharge Date: 1/2/2017--3/27/2017      DSM5 Diagnoses: (Sustained by DSM5 Criteria Listed Above)  Diagnoses: 296.32 Major Depressive Disorder, Recurrent Episode, Moderate _ and With anxious distress  300.02 (F41.1) Generalized Anxiety Disorder  Psychosocial & Contextual Factors: pain triggers client's anxiety and client's depression stems from feeling hopeless about her resolving her pain issues.  WHODAS 2.0 (12 item) Score: 40          Presenting Concern:  Client has been able to cope with her pain for a number of years and over the past months she has been having more difficulty in coping and managing the pain.       Reason for Discharge:  Client did not return      Disposition at Time of Last Encounter:   Comments:   Client was struggling with back pain that was increasing and it was preventing her from daily tasks and enjoying her family.     Risk Management:   Client denies a history of suicidal ideation, suicide attempts, self-injurious behavior, homicidal ideation, homicidal behavior and and other safety concerns  A safety and risk management plan has not been developed at this time, however client was given the after-hours number / 911 should there be a change in any of these risk factors.      Referred To:  Client noted on her last visit with writer that she was seeking treatment for pain management and was considering transferring to a different program.  Writer sent letter to client about discharge and to encourage her to return if she would like in the future.        Calvin Ramon, [facility]   3/27/2017

## 2017-09-26 DIAGNOSIS — I10 BENIGN ESSENTIAL HYPERTENSION: ICD-10-CM

## 2017-09-26 NOTE — TELEPHONE ENCOUNTER
Routing refill request to provider for review/approval because:  Labs out of range:  K+, BP not at goal  Labs not current:  BMP.    Please advise.    Patient has scheduled a follow up on Friday.    Jocelyn OLIVERA RN, BSN, PHN  [facility] RN

## 2017-09-26 NOTE — TELEPHONE ENCOUNTER
lisinopril (PRINIVIL/ZESTRIL) 10 MG tablet      Last Written Prescription Date: 1/24/17  Last Fill Quantity: 90, # refills: 1  Last Office Visit with G, [facility] or [facility] prescribing provider: 1/24/2017         Potassium   Date Value Ref Range Status   06/30/2016 3.1 (L) 3.4 - 5.3 mmol/L Final     Creatinine   Date Value Ref Range Status   06/30/2016 0.78 0.52 - 1.04 mg/dL Final     BP Readings from Last 3 Encounters:   02/16/17 (!) 150/94   01/25/17 108/60   01/24/17 125/80         DEREK Green  September 26, 2017  10:34 AM

## 2017-09-27 RX ORDER — LISINOPRIL 10 MG/1
TABLET ORAL
Qty: 90 TABLET | Refills: 1 | Status: SHIPPED | OUTPATIENT
Start: 2017-09-27 | End: 2018-02-07

## 2017-09-29 ENCOUNTER — OFFICE VISIT (OUTPATIENT)
Dept: FAMILY MEDICINE | Facility: CLINIC | Age: 37
End: 2017-09-29
Payer: COMMERCIAL

## 2017-09-29 VITALS
TEMPERATURE: 98.2 F | RESPIRATION RATE: 20 BRPM | WEIGHT: 191.8 LBS | SYSTOLIC BLOOD PRESSURE: 88 MMHG | OXYGEN SATURATION: 98 % | DIASTOLIC BLOOD PRESSURE: 66 MMHG | BODY MASS INDEX: 28.32 KG/M2 | HEART RATE: 70 BPM

## 2017-09-29 DIAGNOSIS — G47.33 OSA (OBSTRUCTIVE SLEEP APNEA): ICD-10-CM

## 2017-09-29 DIAGNOSIS — I10 BENIGN ESSENTIAL HYPERTENSION: ICD-10-CM

## 2017-09-29 DIAGNOSIS — Z23 NEED FOR PROPHYLACTIC VACCINATION AND INOCULATION AGAINST INFLUENZA: ICD-10-CM

## 2017-09-29 DIAGNOSIS — F98.8 ATTENTION DEFICIT DISORDER (ADD) WITHOUT HYPERACTIVITY: Primary | ICD-10-CM

## 2017-09-29 DIAGNOSIS — J30.0 CHRONIC VASOMOTOR RHINITIS: ICD-10-CM

## 2017-09-29 LAB
ANION GAP SERPL CALCULATED.3IONS-SCNC: 5 MMOL/L (ref 3–14)
BUN SERPL-MCNC: 11 MG/DL (ref 7–30)
CALCIUM SERPL-MCNC: 8.8 MG/DL (ref 8.5–10.1)
CHLORIDE SERPL-SCNC: 108 MMOL/L (ref 94–109)
CO2 SERPL-SCNC: 29 MMOL/L (ref 20–32)
CREAT SERPL-MCNC: 0.7 MG/DL (ref 0.52–1.04)
GFR SERPL CREATININE-BSD FRML MDRD: >90 ML/MIN/1.7M2
GLUCOSE SERPL-MCNC: 78 MG/DL (ref 70–99)
POTASSIUM SERPL-SCNC: 4.2 MMOL/L (ref 3.4–5.3)
SODIUM SERPL-SCNC: 142 MMOL/L (ref 133–144)

## 2017-09-29 PROCEDURE — 99214 OFFICE O/P EST MOD 30 MIN: CPT | Mod: 25 | Performed by: FAMILY MEDICINE

## 2017-09-29 PROCEDURE — 90471 IMMUNIZATION ADMIN: CPT | Performed by: FAMILY MEDICINE

## 2017-09-29 PROCEDURE — 90686 IIV4 VACC NO PRSV 0.5 ML IM: CPT | Performed by: FAMILY MEDICINE

## 2017-09-29 PROCEDURE — 80048 BASIC METABOLIC PNL TOTAL CA: CPT | Performed by: FAMILY MEDICINE

## 2017-09-29 PROCEDURE — 36415 COLL VENOUS BLD VENIPUNCTURE: CPT | Performed by: FAMILY MEDICINE

## 2017-09-29 RX ORDER — BACLOFEN 10 MG/1
TABLET ORAL
Refills: 1 | COMMUNITY
Start: 2017-08-21 | End: 2021-02-24

## 2017-09-29 RX ORDER — PSEUDOEPHEDRINE HCL 120 MG/1
120 TABLET, FILM COATED, EXTENDED RELEASE ORAL 2 TIMES DAILY
Qty: 90 TABLET | Refills: 1 | Status: SHIPPED | OUTPATIENT
Start: 2017-09-29 | End: 2018-02-07

## 2017-09-29 RX ORDER — HYDROCODONE BITARTRATE AND ACETAMINOPHEN 10; 325 MG/1; MG/1
1 TABLET ORAL EVERY 6 HOURS PRN
COMMUNITY
End: 2018-11-29

## 2017-09-29 RX ORDER — MORPHINE SULFATE 15 MG/1
15 TABLET ORAL EVERY 4 HOURS PRN
COMMUNITY
End: 2017-11-17

## 2017-09-29 RX ORDER — DEXTROAMPHETAMINE SACCHARATE, AMPHETAMINE ASPARTATE MONOHYDRATE, DEXTROAMPHETAMINE SULFATE AND AMPHETAMINE SULFATE 7.5; 7.5; 7.5; 7.5 MG/1; MG/1; MG/1; MG/1
30 CAPSULE, EXTENDED RELEASE ORAL DAILY
Qty: 30 CAPSULE | Refills: 0 | Status: SHIPPED | OUTPATIENT
Start: 2017-09-29 | End: 2017-11-17

## 2017-09-29 ASSESSMENT — ENCOUNTER SYMPTOMS
DEPRESSION: 1
INSOMNIA: 0
HALLUCINATIONS: 0
CARDIOVASCULAR NEGATIVE: 1
BACK PAIN: 1
RESPIRATORY NEGATIVE: 1

## 2017-09-29 ASSESSMENT — ANXIETY QUESTIONNAIRES
5. BEING SO RESTLESS THAT IT IS HARD TO SIT STILL: SEVERAL DAYS
2. NOT BEING ABLE TO STOP OR CONTROL WORRYING: MORE THAN HALF THE DAYS
6. BECOMING EASILY ANNOYED OR IRRITABLE: MORE THAN HALF THE DAYS
GAD7 TOTAL SCORE: 13
2. NOT BEING ABLE TO STOP OR CONTROL WORRYING: MORE THAN HALF THE DAYS
3. WORRYING TOO MUCH ABOUT DIFFERENT THINGS: NEARLY EVERY DAY
1. FEELING NERVOUS, ANXIOUS, OR ON EDGE: MORE THAN HALF THE DAYS
3. WORRYING TOO MUCH ABOUT DIFFERENT THINGS: NEARLY EVERY DAY
IF YOU CHECKED OFF ANY PROBLEMS ON THIS QUESTIONNAIRE, HOW DIFFICULT HAVE THESE PROBLEMS MADE IT FOR YOU TO DO YOUR WORK, TAKE CARE OF THINGS AT HOME, OR GET ALONG WITH OTHER PEOPLE: VERY DIFFICULT
1. FEELING NERVOUS, ANXIOUS, OR ON EDGE: MORE THAN HALF THE DAYS
7. FEELING AFRAID AS IF SOMETHING AWFUL MIGHT HAPPEN: NOT AT ALL
7. FEELING AFRAID AS IF SOMETHING AWFUL MIGHT HAPPEN: NOT AT ALL
GAD7 TOTAL SCORE: 13
IF YOU CHECKED OFF ANY PROBLEMS ON THIS QUESTIONNAIRE, HOW DIFFICULT HAVE THESE PROBLEMS MADE IT FOR YOU TO DO YOUR WORK, TAKE CARE OF THINGS AT HOME, OR GET ALONG WITH OTHER PEOPLE: VERY DIFFICULT
6. BECOMING EASILY ANNOYED OR IRRITABLE: MORE THAN HALF THE DAYS
5. BEING SO RESTLESS THAT IT IS HARD TO SIT STILL: SEVERAL DAYS

## 2017-09-29 ASSESSMENT — PATIENT HEALTH QUESTIONNAIRE - PHQ9
SUM OF ALL RESPONSES TO PHQ QUESTIONS 1-9: 15
5. POOR APPETITE OR OVEREATING: NEARLY EVERY DAY
5. POOR APPETITE OR OVEREATING: NEARLY EVERY DAY

## 2017-09-29 NOTE — MR AVS SNAPSHOT
After Visit Summary   9/29/2017    Heath Waller    MRN: [id number]           Patient Information     Date Of Birth          1980        Visit Information        Provider Department      9/29/2017 7:00 AM Alex Interiano MD [facility]        Today's Diagnoses     Attention deficit disorder (ADD) without hyperactivity    -  1    Need for prophylactic vaccination and inoculation against influenza        ROSA (obstructive sleep apnea)        Chronic vasomotor rhinitis        Benign essential hypertension           Follow-ups after your visit        Additional Services     SLEEP EVALUATION & MANAGEMENT REFERRAL - ADULT       Please be aware that coverage of these services is subject to the terms and limitations of your health insurance plan.  Call member services at your health plan with any benefit or coverage questions.      Please bring the following to your appointment:    >>   List of current medications   >>   This referral request   >>   Any documents/labs given to you for this referral    [facility] [phone] (Age 18 and up)                  Follow-up notes from your care team     Return in about 1 month (around 10/29/2017).      Future tests that were ordered for you today     Open Future Orders        Priority Expected Expires Ordered    SLEEP EVALUATION & MANAGEMENT REFERRAL - ADULT Routine  9/29/2018 9/29/2017            Who to contact     If you have questions or need follow up information about today's clinic visit or your schedule please contact [facility] directly at [phone].  Normal or non-critical lab and imaging results will be communicated to you by MyChart, letter or phone within 4 business days after the clinic has received the results. If you do not hear from us within 7 days, please contact the clinic through MyChart or phone. If you have a critical or abnormal lab result, we will notify you by  phone as soon as possible.  Submit refill requests through [employer] or call your pharmacy and they will forward the refill request to us. Please allow 3 business days for your refill to be completed.          Additional Information About Your Visit        [employer] Information     [employer] gives you secure access to your electronic health record. If you see a primary care provider, you can also send messages to your care team and make appointments. If you have questions, please call your primary care clinic.  If you do not have a primary care provider, please call [phone] and they will assist you.        Care EveryWhere ID     This is your Care EveryWhere ID. This could be used by other organizations to access your [facility] medical records  [id number]        Your Vitals Were     Pulse Temperature Respirations Last Period Pulse Oximetry Breastfeeding?    70 98.2  F (36.8  C) (Oral) 20 09/28/2017 98% No    BMI (Body Mass Index)                   28.32 kg/m2            Blood Pressure from Last 3 Encounters:   09/29/17 (!) 88/66   02/16/17 (!) 150/94   01/25/17 108/60    Weight from Last 3 Encounters:   09/29/17 191 lb 12.8 oz (87 kg)   02/16/17 180 lb (81.6 kg)   01/25/17 180 lb (81.6 kg)              We Performed the Following     Basic metabolic panel     FLU VAC, SPLIT VIRUS IM > 3 YO (QUADRIVALENT) [89354]     Vaccine Administration, Initial [20725]          Where to get your medicines      Some of these will need a paper prescription and others can be bought over the counter.  Ask your nurse if you have questions.     Bring a paper prescription for each of these medications     amphetamine-dextroamphetamine 30 MG per 24 hr capsule    pseudoePHEDrine 120 MG 12 hr tablet          Primary Care Provider Office Phone # Fax #    [facility] [phone] [address]  [facility] [address]        Equal Access to Services     [facility] [place]: darrin Alexander  brenda cotterjassilillian bustilloharinder anderson ah. So [facility] [phone].    ATENCIÓN: Si justina reynoso, tiene a jean disposición servicios gratuitos de asistencia lingüística. Alexandra al [phone].    We comply with applicable federal civil rights laws and [place] laws. We do not discriminate on the basis of race, color, national origin, age, disability sex, sexual orientation or gender identity.            Thank you!     Thank you for choosing [facility]  for your care. Our goal is always to provide you with excellent care. Hearing back from our patients is one way we can continue to improve our services. Please take a few minutes to complete the written survey that you may receive in the mail after your visit with us. Thank you!             Your Updated Medication List - Protect others around you: Learn how to safely use, store and throw away your medicines at www.disposemymeds.org.          This list is accurate as of: 9/29/17  7:45 AM.  Always use your most recent med list.                   Brand Name Dispense Instructions for use Diagnosis    amphetamine-dextroamphetamine 30 MG per 24 hr capsule    ADDERALL XR    30 capsule    Take 1 capsule (30 mg) by mouth daily    Attention deficit disorder (ADD) without hyperactivity       B Complex Tabs           baclofen 10 MG tablet    LIORESAL     TAKE 1 TABLET BY MOUTH 3 TIMES A DAY AS NEEDED FOR MUSCLE SPASM        cholecalciferol 00712 UNITS capsule    vitamin D3     Take 1 capsule by mouth daily        DULoxetine 60 MG EC capsule    CYMBALTA     Take 120 mg by mouth every morning        HYDROcodone-acetaminophen  MG per tablet    NORCO     Take 1 tablet by mouth every 6 hours as needed for moderate to severe pain        ibuprofen 200 MG tablet    ADVIL/MOTRIN          lisinopril 10 MG tablet    PRINIVIL/ZESTRIL    90 tablet    TAKE 1 TABLET (10 MG) BY MOUTH DAILY    Benign essential hypertension       magnesium  500 MG Tabs      Take 1,000 mg by mouth        morphine 15 MG IR tablet    MSIR     Take 15 mg by mouth every 4 hours as needed for moderate to severe pain        omeprazole 20 MG CR capsule    priLOSEC     Take 20 mg by mouth        pregabalin 150 MG capsule    LYRICA    60 capsule    Take 1 capsule (150 mg) by mouth 3 times daily Patient  Will contact you when ready to fill    Chronic pain syndrome, Fibromyalgia, TMJ (temporomandibular joint syndrome)       pseudoePHEDrine 120 MG 12 hr tablet    SUDAFED 12 HOUR    90 tablet    Take 1 tablet (120 mg) by mouth 2 times daily    Chronic vasomotor rhinitis       spironolactone 50 MG tablet    ALDACTONE    90 tablet    Take 1 tablet (50 mg) by mouth daily    Acne vulgaris       TYLENOL PO      Take 1,000 mg by mouth every 4 hours as needed for mild pain or fever

## 2017-09-29 NOTE — PROGRESS NOTES
HPI    SUBJECTIVE:   Heath Waller is a 36 year old female who presents to clinic today for the following health issues:    Hypertension Follow-up      Outpatient blood pressures are not being checked.    Low Salt Diet: no added salt    Doesn't really check blood pressures, running uncharacteristically low today.  Feels fine.  No CP, palpitations, edema.      Amount of exercise or physical activity: None    Problems taking medications regularly: No    Medication side effects: none  Diet: low salt      Medication Followup of Adderall    Taking Medication as prescribed: yes    Side Effects:  None    Medication Helping Symptoms:  yes     Tried stopping Adderall, didn't refill after last prescription Jan '17.  Is really struggling with focus, completing tasks.  Still struggles with depression, but feels it's improving.  Struggles with sleep.  Last appointment with sleep in 2011.  Is at [facility] for pain care, they're working on modifying pain regimen.      Review of Systems   Respiratory: Negative.    Cardiovascular: Negative.    Musculoskeletal: Positive for back pain.   Psychiatric/Behavioral: Positive for depression. Negative for hallucinations and suicidal ideas. The patient does not have insomnia.          Physical Exam   Constitutional: She is oriented to person, place, and time and well-developed, well-nourished, and in no distress.   Eyes: Conjunctivae and EOM are normal.   Neck: No thyromegaly present.   Cardiovascular: Normal rate, regular rhythm and normal heart sounds.    Pulmonary/Chest: Effort normal and breath sounds normal.   Musculoskeletal: She exhibits no edema.   Neurological: She is alert and oriented to person, place, and time.   Skin: Skin is warm and dry.   Psychiatric: Mood and affect normal.   Vitals reviewed.    (F98.8) Attention deficit disorder (ADD) without hyperactivity  (primary encounter diagnosis)  Comment: will do a 1m restart given long holiday and other pysch meds,  f/u in 1m  Plan: amphetamine-dextroamphetamine (ADDERALL XR) 30         MG per 24 hr capsule            (Z23) Need for prophylactic vaccination and inoculation against influenza  Comment:   Plan: FLU VAC, SPLIT VIRUS IM > 3 YO (QUADRIVALENT)         [67624], Vaccine Administration, Initial         [94411]            (G47.33) ORSA (obstructive sleep apnea)  Comment: follow up - has been 6-7 yars  Plan: SLEEP EVALUATION & MANAGEMENT REFERRAL - ADULT            (J30.0) Chronic vasomotor rhinitis  Comment: refill  Plan: pseudoePHEDrine (SUDAFED 12 HOUR) 120 MG 12 hr         tablet            (I10) Benign essential hypertension  Comment: recently had meds refilled.  To monitor BP, f/u if readings continue to be low  Plan: Basic metabolic panel              RTC in 1m    Alex Interiano MD          Injectable Influenza Immunization Documentation    1.  Is the person to be vaccinated sick today?   No    2. Does the person to be vaccinated have an allergy to a component   of the vaccine?   No    3. Has the person to be vaccinated ever had a serious reaction   to influenza vaccine in the past?   No    4. Has the person to be vaccinated ever had Guillain-Barré syndrome?   No    Form completed by Shila Barnes CMA ([facility])

## 2017-09-29 NOTE — NURSING NOTE
"Chief Complaint   Patient presents with     Hypertension     Recheck Medication     Adderall     Flu Shot       Initial BP (!) 88/66  Pulse 70  Temp 98.2  F (36.8  C) (Oral)  Resp 20  Wt 191 lb 12.8 oz (87 kg)  LMP 09/28/2017  SpO2 98%  Breastfeeding? No  BMI 28.32 kg/m2 Estimated body mass index is 28.32 kg/(m^2) as calculated from the following:    Height as of 1/24/17: 5' 9\" (1.753 m).    Weight as of this encounter: 191 lb 12.8 oz (87 kg).  Medication Reconciliation: complete   Shila Barnes CMA ([facility])      "

## 2017-09-29 NOTE — PROGRESS NOTES
"  SUBJECTIVE:   Heath Waller is a 36 year old female who presents to clinic today for the following health issues:  {Provider please address medication reconciliation discrepancies--rooming staff please delete if no med/rec issues}    {Superlists:824346}    {additional problems for provider to add:306627}    Problem list and histories reviewed & adjusted, as indicated.  Additional history: {NONE - AS DOCUMENTED:840843::\"as documented\"}    {HIST REVIEW/ LINKS 2:936167}    Reviewed and updated as needed this visit by clinical staff       Reviewed and updated as needed this visit by Provider         {PROVIDER CHARTING PREFERENCE:296747}  "

## 2017-09-30 ASSESSMENT — ANXIETY QUESTIONNAIRES: GAD7 TOTAL SCORE: 13

## 2017-11-06 ENCOUNTER — OFFICE VISIT (OUTPATIENT)
Dept: SLEEP MEDICINE | Facility: CLINIC | Age: 37
End: 2017-11-06
Payer: COMMERCIAL

## 2017-11-06 VITALS
HEIGHT: 69 IN | SYSTOLIC BLOOD PRESSURE: 118 MMHG | HEART RATE: 70 BPM | BODY MASS INDEX: 28.44 KG/M2 | WEIGHT: 192 LBS | DIASTOLIC BLOOD PRESSURE: 72 MMHG | RESPIRATION RATE: 12 BRPM | OXYGEN SATURATION: 97 %

## 2017-11-06 DIAGNOSIS — G47.33 OSA (OBSTRUCTIVE SLEEP APNEA): ICD-10-CM

## 2017-11-06 DIAGNOSIS — E66.3 OVERWEIGHT: Primary | ICD-10-CM

## 2017-11-06 DIAGNOSIS — Z72.821 POOR SLEEP HYGIENE: ICD-10-CM

## 2017-11-06 PROCEDURE — 99243 OFF/OP CNSLTJ NEW/EST LOW 30: CPT | Performed by: INTERNAL MEDICINE

## 2017-11-06 NOTE — PROGRESS NOTES
[facility]  Outpatient Sleep Medicine Consultation  November 6, 2017      Name: Heath Waller MRN# [id number]   Age: 37 year old YOB: 1980     Date of Consultation: November 6, 2017  Consultation is requested by: Alex Interiano MD  [address] [address]  Primary care provider: [facility] clinic: [facility]       Reason for Sleep Consult:     Heath Waller is a 37 year old female for re-evaluation of sleep apnea and establish care.         Assessment and Plan:     Summary Sleep Diagnoses/Recommendations:    1. Sleep Disturbance:  1. Obstructive Sleep Apnea:  - Full compliance of PAP use.  - Clinical response: poor due to high residual central apnea caused by auto-CPAP and exacerbated by narcotic. Will change fixed CPAP pressure of 11 cmH2O. If no improvement, will order PAP titration.   She can't stop narcotics due to neck pain at this time.  - Supplies and mask ordered.  - Recommend using CPAP every night for at least 7-8 hours each night  - Daytime naps are not advised, but use CPAP if taking naps  - Follow up in sleep clinic in 2 months    2. Overweight:  Counseled regarding weight loss through diet modification and increased physical activity. Patient was given instuctions of weight loss and advised to follow up her PCP for further weight loss interventions.     3.  Poor sleep hygiene:   - We instructed patient to develop regular sleep-wake schedule and regular sleep habits including regular bedtime and wake time to strengthen circadian rhythm, to sleep as much as needed to feel refreshed, not to spend more time in bed than needed. Bedroom should be dark, cool and quiet.  - We also asked patient to avoid napping during the day, forcing yourself to sleep, taking problems to bed, strenuous activity just before bedtime, use of caffeine, alcohol or tobacco just before bedtime, reading, eating or watching TV in  bed.  Good sleep hygiene and sleep-wake instructions were given.    Orders Placed This Encounter   Procedures     Comprehensive DME       Summary Counseling:  See instructions    Counseling included a comprehensive review of diagnostic and therapeutic strategies as well as risks of inadequate therapy.  Educational materials provided in instructions.    All questions were answered.  The patient indicates understanding of the above issues and agrees with the plan set forth.           History of Present Illness:     Heath Waller is a 37 year old female with history of ROSA, hypertension, depression, ADD, DJD cervical spine who presents to the [facility] in [place] with complains of sleep disturbance. I was asked to see Ms. Waller regarding sleep apnea by Dr. Frausto.  Patient was diagnosed with sleep apnea in 2011 and was prescribed CPAP. She did not use CPAP initially but started [id number], and she uses sporadically.  Patient thinks that the CPAP machine settings are not effective for her anymore and wants evaluated. Patient complains continued fatigue, excessive daytime sleepiness and difficulty sleeping with CPAP for the last 5 years. Patient had snoring, witnessed apnea, waking up gasping air prior to CPAP use. She is not snoring with CPAP machine. She has morning headache, acid reflux and restless legs at night, twitches, not disturbing her sleep. She gained 50 pounds since ROSA diagnosed, and CPAP pressure was not changed and she did follow up sleep doctors at [facility] at [facility] since diagnosis. She has back pain and she takes Norco, Lyrica and Baclofen. Her depression is controlled.    Please see below for sleep ROS details.    PREVIOUS IN- LAB or HOME SLEEP STUDIES:  Date: 10/4/2012  TYPE: PSG  AHI: 6/hr (RDI 18/hr)  Central apnea Index: 0/hr  BMI: 24  Intervention: CPAP  Sleep Architecture:  Lowest O2 saturation: 86%  PLM's: 0/hr    CPAP COMPLIANCE:  Dates:  8/8 /2017-  11/ 5/2017          78 % >4hour use     Days used: 73/90  Average daily use: 7.14 hrs  Leak 95% percentile: 23.2 l/min  Residual AHI: 9.8 /hr (AMERICO 5.7/hr, OAI3/hr, HI 0.5/hr) max was 49.5/hr in which 45/hr was central  Pressure:  5-15 cmH2O  95% percentile pressure: 11.3 cmH2O (Max 12.9)    Mask: nasal mask  Chin strap: No  Leak: Yes  Humidity: Yes    Difficulties with therapy:    [-] Difficulty tolerating the pressure  [-] Epistaxis:   [x] Nasal congestion,sometimes, uses sudafed  [-] Dry mouth:  [-] Mouth breathing:   [-] Pain/skin breakdown:     Improvements noted with CPAP:   [-] waking up more refreshed  [-] sleeping better with less arousals  [-] nocturia improved   [-] improved energy level during the day      SLEEP-WAKE SCHEDULE:     Heath Waller     -Describes themself as neither a morning or night person;      -ON WEEKDAYS, goes to sleep at 10:00 PM during the week; awakens  6:30 AM with an alarm; falls asleep in 20 minutes; denies difficulty falling asleep.     -ON WEEKENDS, goes to sleep at 11:00 PM and wakes up at 10:00 AM without an alarm; falls asleep in 20 minutes.       -Awakens 3-4 times a night for 10 minutes before falling back to sleep; awakens to go to the bathroom and uncertain reasons.      -Total sleep time: 6-10 hours per night.    -Naps 2 times/days per week for 120-180 minutes, feels refreshed after naps; takes some inadvertant naps.       BEDTIME ACTIVITIES AND SHIFT WORK:    Heath Waller    -does watch TV in bed and eat in bed and does not use electronics in bed and read in bed.     -does not do shift work.  She works day shifts.      Occupation: nurse consultant     SCAL[employer]       SLEEP APNEA: Stopbang score: 5       INSOMNIA:  Insomnia severity score: 171       SLEEPINESS: Knox sleepiness scale (ESS):  15   Drowsy driving/near accidents: No          PHQ9: 9    SLEEP COMPLAINTS:   Snoring- 7 days/week  Witness apnea: Yes  Gasping/Choking: Yes  Excessive daytime sleep: Yes  Toss/turn:  Yes  Excessive tiredness/fatigue:  Yes  Morning headaches: Yes  Dry mouth/throat: Yes  Dyspnea: Yes  Coexisting Lung disease: No    Coexisting Heart disease: No    Does patient have a bed partner: Yes  Has bed partner been sleeping separately because of snoring:  No            RLS Screen: When you try to relax in the evening or sleep at  night, do you ever have unpleasant, restless feelings in your  legs that can be relieved by walking or movement? Yes, mild    Periodic limb movement: No    Narcolepsy:       denies sudden urges of sleep attacks     denies cataplexy     denies sleep paralysis      denies hallucinations     Sleep Behaviors:     denies leg symptoms/movements     denies motor restlessness     denies night terrors     denies bruxism     denies automatic behaviors    Other subjective complaints:     denies anxiety or rumination      denies pain and discomfort at  night     denies waking up with heart pounding or racing     Yes GERD/heartburn         Parasomnia:   NREM - denies recurrent persistent confusional arousal, night eating, sleep walking or sleep terrors   REM  - denies dream enactment; injuries     Safety: None             Medications:     Current Outpatient Prescriptions   Medication Sig     DULoxetine (CYMBALTA) 60 MG EC capsule Take 2 capsules (120 mg) by mouth every morning     cholecalciferol (VITAMIN D3) 69120 UNITS capsule Take 1 capsule by mouth daily     baclofen (LIORESAL) 10 MG tablet TAKE 1 TABLET BY MOUTH 3 TIMES A DAY AS NEEDED FOR MUSCLE SPASM     magnesium 500 MG TABS Take 1,000 mg by mouth     morphine (MSIR) 15 MG IR tablet Take 15 mg by mouth every 4 hours as needed for moderate to severe pain     HYDROcodone-acetaminophen (NORCO)  MG per tablet Take 1 tablet by mouth every 6 hours as needed for moderate to severe pain     amphetamine-dextroamphetamine (ADDERALL XR) 30 MG per 24 hr capsule Take 1 capsule (30 mg) by mouth daily     pseudoePHEDrine (SUDAFED 12 HOUR) 120 MG  12 hr tablet Take 1 tablet (120 mg) by mouth 2 times daily     lisinopril (PRINIVIL/ZESTRIL) 10 MG tablet TAKE 1 TABLET (10 MG) BY MOUTH DAILY     Acetaminophen (TYLENOL PO) Take 1,000 mg by mouth every 4 hours as needed for mild pain or fever     pregabalin (LYRICA) 150 MG capsule Take 1 capsule (150 mg) by mouth 3 times daily Patient  Will contact you when ready to fill     spironolactone (ALDACTONE) 50 MG tablet Take 1 tablet (50 mg) by mouth daily     B Complex TABS      ibuprofen (ADVIL,MOTRIN) 200 MG tablet      omeprazole (PRILOSEC) 20 MG capsule Take 20 mg by mouth     No current facility-administered medications for this visit.         Medication that can affect sleep: Adderall, Cymbalta, Baclofen, Lyrica, Norco    Allergies   Allergen Reactions     Codeine      Sulfa Drugs      Rash       Toradol [Ketorolac] Itching and Rash            Past Medical History:     Does not need 02 supplement at night     Past Medical History:   Diagnosis Date     ADD (attention deficit disorder)      Degenerative disc disease, cervical      Depressive disorder      Gastro-oesophageal reflux disease      Myofacial muscle pain      Noninfectious ileitis     IBS with constipation     Other chronic pain     myofacial pain syndrome     Sleep apnea     wears CPAP at night     Thyroid disease                Past Surgical History:    Yes previous upper airway surgery tonsillectomy    Past Surgical History:   Procedure Laterality Date     APPENDECTOMY  2000     ARTHROSCOPY KNEE Right     teenager     BREAST SURGERY  03/2006    augmentation, revisions 2011 and 2015     FOOT SURGERY Right     semoid bone removed from Fx     GYN SURGERY   2009 and 2014    LEEPS x 2      LAPAROSCOPIC CHOLECYSTECTOMY  5/15/2014    Procedure: LAPAROSCOPIC CHOLECYSTECTOMY;  Surgeon: Kd Arthur MD;  Location: [facility] OR     THYROID SURGERY  2001    nodule      TUBAL LIGATION  10/09/2008            Social History:     Social History   Substance Use  "Topics     Smoking status: Current Every Day Smoker     Packs/day: 0.50     Years: 20.00     Types: Cigarettes     Smokeless tobacco: Never Used      Comment: e-cig     Alcohol use 0.0 oz/week     0 Standard drinks or equivalent per week      Comment: rarely         Chemical History:     Tobacco: smoker, cutting down     Uses 2-3 sodas/day, 1 energy drinks/day. Last caffeine intake is usually before 5 pm    EtOH: Yes  Recreational Drugs: No    Psych Hx:   Depression and ADD    Current dangers to self or others: None           Family History:     Family History   Problem Relation Age of Onset     CEREBROVASCULAR DISEASE Maternal Grandmother      CANCER Paternal Grandfather      Thyroid Disease Brother      CEREBROVASCULAR DISEASE Father      2016     Hypertension Father         Sleep Family Hx:        RLS- No  ROSA - uncles and aunts  Insomnia - No  Parasomnia - No         Review of Systems:     A complete 10 point review of systems was negative other than HPI or as commented below:   Patient denies chest pain,  wheezing, abdominal pain, n&v, fever, chills, dysuria, leg pain or swelling. Patient is also denies ear pain, sore throat, postnasal drip,  dry cough.  Patient has weight gain, night sweat, sinus pain, running nose, rapid heart rate, swelling of legs, dyspnea with activity, muscle and jpint pain.    Heath Waller has gained 50 pounds in the last 6-7  year.            Physical Examination:   /72  Pulse 70  Resp 12  Ht 1.753 m (5' 9\")  Wt 87.1 kg (192 lb)  SpO2 97%  BMI 28.35 kg/m2     Neck Circumference: 36 cm   Constitutional: . Awake, alert, cooperative, in no apparent distress  Mood: euthymic; affect congruent with full range and intensity.  Attention/Concentration:  Normal   Eyes: Pupils round and reactive. No icterus.  ENT: Mallampati Class: IV.   Tonsillar Stage: 0  surgically removed  Clear nasal passages. Enlarged inferior turbinates. No deviated septum.  Oropharynx: No high arched " palate. No pharyngeal erythema or exudates. No lateral narrowing  Tongue: No relative macroglossia   Dentition: Good.  Dentures: None  Neck: Supple, no thyroid enlargement.   Cardiovascular: Regular S1 and S2, no gallops or murmurs.   Pulmonary:  Chest symmetric, lungs clear bilaterally and no crackles, wheezes or rales.  Abdomen: Soft, obese, non tender.  Extremities:  No pedal edema.  Muscle/joint: Strength and tone normal   Skin:  No rash or significant lesions.   Neurologic: Alert, oriented x3, no focal neurological deficit.           Data: All pertinent previous laboratory data reviewed     No results found for: PH, PHARTERIAL, PO2, [id number], SAT, PCO2, HCO3, BASEEXCESS, PACO, BEB  Lab Results   Component Value Date    TSH 1.53 06/30/2016     Lab Results   Component Value Date    GLC 78 09/29/2017    GLC 92 06/30/2016     Lab Results   Component Value Date    HGB 14.3 06/30/2016    HGB 13.8 06/06/2016     Lab Results   Component Value Date    BUN 11 09/29/2017    BUN 10 06/30/2016    CR 0.70 09/29/2017    CR 0.78 06/30/2016     Lab Results   Component Value Date    CO2 29 09/29/2017    CO2 29 06/30/2016     No results found for: JANES      Echocardiography: No    Chest x-ray: 6/30/2016 5:53 PM     FINDINGS: The heart is negative.  The lungs are clear. The pulmonary  vasculature is normal.  The bones and soft tissues are unremarkable.       IMPRESSION: No active infiltrate.           PFT: No        Copy to: Clinic, [facility]  Copy to: Alex Mejia MD 11/6/2017   [facility]  [facility] [address], [place] [address]   [phone] [facility]    Total time spent with patient: 52 minutes with this patient today in which 25 minutes was spent in counseling/coordination of care and going over planned testing and recommendations.

## 2017-11-06 NOTE — NURSING NOTE
"Chief Complaint   Patient presents with     Consult     Uses cpap most of the time but patient thinks settings are not right.  Leaking issues.       Initial /72  Pulse 70  Resp 12  Ht 1.753 m (5' 9\")  Wt 87.1 kg (192 lb)  SpO2 97%  BMI 28.35 kg/m2 Estimated body mass index is 28.35 kg/(m^2) as calculated from the following:    Height as of this encounter: 1.753 m (5' 9\").    Weight as of this encounter: 87.1 kg (192 lb).  Medication Reconciliation: complete         Neck 36cm  14.25in  Ess 15    Niurka Crews LPN/JAIME  "

## 2017-11-06 NOTE — MR AVS SNAPSHOT
After Visit Summary   11/6/2017    Heath Waller    MRN: [id number]           Patient Information     Date Of Birth          1980        Visit Information        Provider Department      11/6/2017 1:00 PM Christian Mejia MD [facility]        Today's Diagnoses     ROSA (obstructive sleep apnea)          Care Instructions    MY TREATMENT INFORMATION FOR SLEEP APNEA-  Heath Waller    MY CONTACT NUMBERS ARE:  [phone]  DOCTOR : Christian Mejia  SLEEP CENTER :  [facility]  CPAP EQUIPMENT     Your sleep apnea is not controlled with auto-CPAP due to central sleep apnea caused by auto-CPAP and narcotics. Will change to fixed CPAP pressure of 11 cmH2O. Will  Order new supplies and mask  Continue use of CPAP:  - Recommend using CPAP every night for at least 7-8 hours each night  - Daytime naps are not advised, but use CPAP if taking naps.    Objective measure goal  Compliance  Goal >70% (preferrably 100%)  Leak   Goal < 10% (less than 24 L/min)  AHI  Goal < 5 events per hour   Usage  Goal 7-8 hours.      Patient was advised not to drive if drowsy or sleepy.      Follow up in 2 months.    Good Sleep hygiene tips (American Academy of Sleep Medicine):  Maintain a regular sleep-wake routine    Go to bed at the same time. Wake up at the same time. Ideally, your schedule will remain the same (+/- 20 minutes) every night of the week.  Avoid naps if possible    Naps decrease the  Sleep Debt  that is so necessary for easy sleep onset.    Each of us needs a certain amount of sleep per 24-hour period. We need that amount, and we don t need more than that.    When we take naps, it decreases the amount of sleep that we need the next night - which may cause sleep fragmentation and difficulty initiating sleep, and may lead to insomnia.  Don t stay in bed awake for more than 15-20 minutes (Stimulus control)    If you find your mind racing, or worrying about not being able to sleep  during the middle of the night, get out of bed, and sit in a chair in the dark. Do your mind racing in the chair until you are sleepy, then return to bed. No TV, or phone/Ipad, or computer or internet or eat during these periods! That will just stimulate you more than desired.    If this happens several times during the night, that is OK. Just maintain your regular wake time, and try to avoid naps.  Don t watch TV, phone, computer, video games or read in bed or eat in bed.    When you watch TV or read in bed or eat in bed, you associate the bed with wakefulness.    The bed is reserved for two things - sleep and hanky panky.  Drink caffeinated drinks with caution    The effects of caffeine may last for several hours after ingestion. Caffeine can fragment sleep, and cause difficulty initiating sleep. If you drink caffeine, use it only before noon.    Remember that soda and tea contain caffeine as well.  Avoid inappropriate substances that interfere with sleep    Cigarettes, alcohol, and over-the-counter medications may cause fragmented sleep.  Exercise regularly    Exercise promotes continuous sleep.    Rigorous exercise (close to bedtime cause) circulates endorphins into the body which may cause difficulty initiating sleep.   Have a quiet, comfortable bedroom    Set your bedroom thermostat at a comfortable temperature. Generally, a little cooler is better than a little warmer.    Turn off the TV and other extraneous noise that may disrupt sleep. Background  white noise  like a fan is OK.    If your pets awaken you, keep them outside the bedroom.    Your bedroom should be dark. Turn off bright lights.    Have a comfortable mattress.  If you are a  clock watcher  at night, hide the clock.      Have a comfortable pre-bedtime routine    A warm bath, shower    Meditation, or quiet time    Wind-down 20 minutes prior of bedtime.        Your BMI is Body mass index is 28.35 kg/(m^2).  Weight management is a personal decision.   If you are interested in exploring weight loss strategies, the following discussion covers the approaches that may be successful. Body mass index (BMI) is one way to tell whether you are at a healthy weight, overweight, or obese. It measures your weight in relation to your height.  A BMI of 18.5 to 24.9 is in the healthy range. A person with a BMI of 25 to 29.9 is considered overweight, and someone with a BMI of 30 or greater is considered obese. More than two-thirds of American adults are considered overweight or obese.  Being overweight or obese increases the risk for further weight gain. Excess weight may lead to heart disease and diabetes.  Creating and following plans for healthy eating and physical activity may help you improve your health.  Weight control is part of healthy lifestyle and includes exercise, emotional health, and healthy eating habits. Careful eating habits lifelong are the mainstay of weight control. Though there are significant health benefits from weight loss, long-term weight loss with diet alone may be very difficult to achieve- studies show long-term success with dietary management in less than 10% of people. Attaining a healthy weight may be especially difficult to achieve in those with severe obesity. In some cases, medications, devices and surgical management might be considered.  What can you do?  If you are overweight or obese and are interested in methods for weight loss, you should discuss this with your provider.     Consider reducing daily calorie intake by 500 calories.     Keep a food journal.     Avoiding skipping meals, consider cutting portions instead.    Diet combined with exercise helps maintain muscle while optimizing fat loss. Strength training is particularly important for building and maintaining muscle mass. Exercise helps reduce stress, increase energy, and improves fitness. Increasing exercise without diet control, however, may not burn enough calories to loose  weight.       Start walking three days a week 10-20 minutes at a time    Work towards walking thirty minutes five days a week     Eventually, increase the speed of your walking for 1-2 minutes at time    In addition, we recommend that you review healthy lifestyles and methods for weight loss available through the National Institutes of Health patient information sites:  http://win.niddk.nih.gov/publications/index.htm    And look into health and wellness programs that may be available through your health insurance provider, employer, local community center, or brian club.    Weight management plan: Patient was referred to their PCP to discuss a diet and exercise plan.     Your blood pressure was checked while you were in clinic today.  Please read the guidelines below about what these numbers mean and what you should do about them.  Your systolic blood pressure is the top number.  This is the pressure when the heart is pumping.  Your diastolic blood pressure is the bottom number.  This is the pressure in between beats.  If your systolic blood pressure is less than 120 and your diastolic blood pressure is less than 80, then your blood pressure is normal. There is nothing more that you need to do about it  If your systolic blood pressure is 120-139 or your diastolic blood pressure is 80-89, your blood pressure may be higher than it should be.  You should have your blood pressure re-checked within a year by a primary care provider.  If your systolic blood pressure is 140 or greater or your diastolic blood pressure is 90 or greater, you may have high blood pressure.  High blood pressure is treatable, but if left untreated over time it can put you at risk for heart attack, stroke, or kidney failure.  You should have your blood pressure re-checked by a primary care provider within the next four weeks.    .                                                                                                                 "Follow-ups after your visit        Who to contact     If you have questions or need follow up information about today's clinic visit or your schedule please contact [facility] directly at [phone].  Normal or non-critical lab and imaging results will be communicated to you by MyChart, letter or phone within 4 business days after the clinic has received the results. If you do not hear from us within 7 days, please contact the clinic through MyChart or phone. If you have a critical or abnormal lab result, we will notify you by phone as soon as possible.  Submit refill requests through [employer] or call your pharmacy and they will forward the refill request to us. Please allow 3 business days for your refill to be completed.          Additional Information About Your Visit        [email or web address] for [employer] Information     [employer] gives you secure access to your electronic health record. If you see a primary care provider, you can also send messages to your care team and make appointments. If you have questions, please call your primary care clinic.  If you do not have a primary care provider, please call [phone] and they will assist you.        Care EveryWhere ID     This is your Care EveryWhere ID. This could be used by other organizations to access your [place] medical records  [id number]        Your Vitals Were     Pulse Respirations Height Pulse Oximetry BMI (Body Mass Index)       70 12 1.753 m (5' 9\") 97% 28.35 kg/m2        Blood Pressure from Last 3 Encounters:   11/06/17 118/72   09/29/17 (!) 88/66   02/16/17 (!) 150/94    Weight from Last 3 Encounters:   11/06/17 87.1 kg (192 lb)   09/29/17 87 kg (191 lb 12.8 oz)   02/16/17 81.6 kg (180 lb)              We Performed the Following     Comprehensive DME     SLEEP EVALUATION & MANAGEMENT REFERRAL - ADULT        Primary Care Provider Office Phone # Fax #    [facility] [phone]       [id number]  [place] " [place] [address]        Equal Access to Services     [facility] KUN : Hadii rafaela serrato franciscoadiel Tashaali, waeulogioda luqjosephineha, qaqinglalo ellsworthjassilillian luna, harinder frederick. So [facility] [phone].    ATENCIÓN: Si habla español, tiene a jean disposición servicios gratuitos de asistencia lingüística. Parkerame al [phone].    We comply with applicable federal civil rights laws and [place] laws. We do not discriminate on the basis of race, color, national origin, age, disability, sex, sexual orientation, or gender identity.            Thank you!     Thank you for choosing [facility]  for your care. Our goal is always to provide you with excellent care. Hearing back from our patients is one way we can continue to improve our services. Please take a few minutes to complete the written survey that you may receive in the mail after your visit with us. Thank you!             Your Updated Medication List - Protect others around you: Learn how to safely use, store and throw away your medicines at www.disposemymeds.org.          This list is accurate as of: 11/6/17  2:05 PM.  Always use your most recent med list.                   Brand Name Dispense Instructions for use Diagnosis    amphetamine-dextroamphetamine 30 MG per 24 hr capsule    ADDERALL XR    30 capsule    Take 1 capsule (30 mg) by mouth daily    Attention deficit disorder (ADD) without hyperactivity       B Complex Tabs           baclofen 10 MG tablet    LIORESAL     TAKE 1 TABLET BY MOUTH 3 TIMES A DAY AS NEEDED FOR MUSCLE SPASM        cholecalciferol 57149 UNITS capsule    vitamin D3     Take 1 capsule by mouth daily        DULoxetine 60 MG EC capsule    CYMBALTA    180 capsule    Take 2 capsules (120 mg) by mouth every morning    Depressive disorder, Chronic pain syndrome, Fibromyalgia       HYDROcodone-acetaminophen  MG per tablet    NORCO     Take 1 tablet by mouth every 6 hours as needed for moderate to severe pain         ibuprofen 200 MG tablet    ADVIL/MOTRIN          lisinopril 10 MG tablet    PRINIVIL/ZESTRIL    90 tablet    TAKE 1 TABLET (10 MG) BY MOUTH DAILY    Benign essential hypertension       magnesium 500 MG Tabs      Take 1,000 mg by mouth        morphine 15 MG IR tablet    MSIR     Take 15 mg by mouth every 4 hours as needed for moderate to severe pain        omeprazole 20 MG CR capsule    priLOSEC     Take 20 mg by mouth        pregabalin 150 MG capsule    LYRICA    60 capsule    Take 1 capsule (150 mg) by mouth 3 times daily Patient  Will contact you when ready to fill    Chronic pain syndrome, Fibromyalgia, TMJ (temporomandibular joint syndrome)       pseudoePHEDrine 120 MG 12 hr tablet    SUDAFED 12 HOUR    90 tablet    Take 1 tablet (120 mg) by mouth 2 times daily    Chronic vasomotor rhinitis       spironolactone 50 MG tablet    ALDACTONE    90 tablet    Take 1 tablet (50 mg) by mouth daily    Acne vulgaris       TYLENOL PO      Take 1,000 mg by mouth every 4 hours as needed for mild pain or fever

## 2017-11-06 NOTE — PATIENT INSTRUCTIONS
MY TREATMENT INFORMATION FOR SLEEP APNEA-  Heath Waller    MY CONTACT NUMBERS ARE:  [phone]  DOCTOR : Christian DOTY [facility]  SLEEP CENTER :  [facility]  CPAP EQUIPMENT     Your sleep apnea is not controlled with auto-CPAP due to central sleep apnea caused by auto-CPAP and narcotics. Will change to fixed CPAP pressure of 11 cmH2O. Will  Order new supplies and mask  Continue use of CPAP:  - Recommend using CPAP every night for at least 7-8 hours each night  - Daytime naps are not advised, but use CPAP if taking naps.    Objective measure goal  Compliance  Goal >70% (preferrably 100%)  Leak   Goal < 10% (less than 24 L/min)  AHI  Goal < 5 events per hour   Usage  Goal 7-8 hours.      Patient was advised not to drive if drowsy or sleepy.      Follow up in 2 months.    Good Sleep hygiene tips (American Academy of Sleep Medicine):  Maintain a regular sleep-wake routine    Go to bed at the same time. Wake up at the same time. Ideally, your schedule will remain the same (+/- 20 minutes) every night of the week.  Avoid naps if possible    Naps decrease the  Sleep Debt  that is so necessary for easy sleep onset.    Each of us needs a certain amount of sleep per 24-hour period. We need that amount, and we don t need more than that.    When we take naps, it decreases the amount of sleep that we need the next night   which may cause sleep fragmentation and difficulty initiating sleep, and may lead to insomnia.  Don t stay in bed awake for more than 15-20 minutes (Stimulus control)    If you find your mind racing, or worrying about not being able to sleep during the middle of the night, get out of bed, and sit in a chair in the dark. Do your mind racing in the chair until you are sleepy, then return to bed. No TV, or phone/Ipad, or computer or internet or eat during these periods! That will just stimulate you more than desired.    If this happens several times during the night, that is OK. Just maintain your regular wake  time, and try to avoid naps.  Don t watch TV, phone, computer, video games or read in bed or eat in bed.    When you watch TV or read in bed or eat in bed, you associate the bed with wakefulness.    The bed is reserved for two things   sleep and bay prater.  Drink caffeinated drinks with caution    The effects of caffeine may last for several hours after ingestion. Caffeine can fragment sleep, and cause difficulty initiating sleep. If you drink caffeine, use it only before noon.    Remember that soda and tea contain caffeine as well.  Avoid inappropriate substances that interfere with sleep    Cigarettes, alcohol, and over-the-counter medications may cause fragmented sleep.  Exercise regularly    Exercise promotes continuous sleep.    Rigorous exercise (close to bedtime cause) circulates endorphins into the body which may cause difficulty initiating sleep.   Have a quiet, comfortable bedroom    Set your bedroom thermostat at a comfortable temperature. Generally, a little cooler is better than a little warmer.    Turn off the TV and other extraneous noise that may disrupt sleep. Background  white noise  like a fan is OK.    If your pets awaken you, keep them outside the bedroom.    Your bedroom should be dark. Turn off bright lights.    Have a comfortable mattress.  If you are a  clock watcher  at night, hide the clock.      Have a comfortable pre-bedtime routine    A warm bath, shower    Meditation, or quiet time    Wind-down 20 minutes prior of bedtime.        Your BMI is Body mass index is 28.35 kg/(m^2).  Weight management is a personal decision.  If you are interested in exploring weight loss strategies, the following discussion covers the approaches that may be successful. Body mass index (BMI) is one way to tell whether you are at a healthy weight, overweight, or obese. It measures your weight in relation to your height.  A BMI of 18.5 to 24.9 is in the healthy range. A person with a BMI of 25 to 29.9 is  considered overweight, and someone with a BMI of 30 or greater is considered obese. More than two-thirds of American adults are considered overweight or obese.  Being overweight or obese increases the risk for further weight gain. Excess weight may lead to heart disease and diabetes.  Creating and following plans for healthy eating and physical activity may help you improve your health.  Weight control is part of healthy lifestyle and includes exercise, emotional health, and healthy eating habits. Careful eating habits lifelong are the mainstay of weight control. Though there are significant health benefits from weight loss, long-term weight loss with diet alone may be very difficult to achieve- studies show long-term success with dietary management in less than 10% of people. Attaining a healthy weight may be especially difficult to achieve in those with severe obesity. In some cases, medications, devices and surgical management might be considered.  What can you do?  If you are overweight or obese and are interested in methods for weight loss, you should discuss this with your provider.     Consider reducing daily calorie intake by 500 calories.     Keep a food journal.     Avoiding skipping meals, consider cutting portions instead.    Diet combined with exercise helps maintain muscle while optimizing fat loss. Strength training is particularly important for building and maintaining muscle mass. Exercise helps reduce stress, increase energy, and improves fitness. Increasing exercise without diet control, however, may not burn enough calories to loose weight.       Start walking three days a week 10-20 minutes at a time    Work towards walking thirty minutes five days a week     Eventually, increase the speed of your walking for 1-2 minutes at time    In addition, we recommend that you review healthy lifestyles and methods for weight loss available through the National Institutes of Health patient information  sites:  http://win.niddk.nih.gov/publications/index.htm    And look into health and wellness programs that may be available through your health insurance provider, employer, local community center, or brian club.    Weight management plan: Patient was referred to their PCP to discuss a diet and exercise plan.     Your blood pressure was checked while you were in clinic today.  Please read the guidelines below about what these numbers mean and what you should do about them.  Your systolic blood pressure is the top number.  This is the pressure when the heart is pumping.  Your diastolic blood pressure is the bottom number.  This is the pressure in between beats.  If your systolic blood pressure is less than 120 and your diastolic blood pressure is less than 80, then your blood pressure is normal. There is nothing more that you need to do about it  If your systolic blood pressure is 120-139 or your diastolic blood pressure is 80-89, your blood pressure may be higher than it should be.  You should have your blood pressure re-checked within a year by a primary care provider.  If your systolic blood pressure is 140 or greater or your diastolic blood pressure is 90 or greater, you may have high blood pressure.  High blood pressure is treatable, but if left untreated over time it can put you at risk for heart attack, stroke, or kidney failure.  You should have your blood pressure re-checked by a primary care provider within the next four weeks.    .

## 2017-11-17 ENCOUNTER — OFFICE VISIT (OUTPATIENT)
Dept: FAMILY MEDICINE | Facility: CLINIC | Age: 37
End: 2017-11-17
Payer: COMMERCIAL

## 2017-11-17 VITALS
HEART RATE: 70 BPM | BODY MASS INDEX: 28.66 KG/M2 | WEIGHT: 194.1 LBS | RESPIRATION RATE: 20 BRPM | SYSTOLIC BLOOD PRESSURE: 106 MMHG | TEMPERATURE: 98 F | DIASTOLIC BLOOD PRESSURE: 72 MMHG

## 2017-11-17 DIAGNOSIS — F98.8 ATTENTION DEFICIT DISORDER (ADD) WITHOUT HYPERACTIVITY: ICD-10-CM

## 2017-11-17 DIAGNOSIS — F33.1 MODERATE EPISODE OF RECURRENT MAJOR DEPRESSIVE DISORDER (H): Primary | ICD-10-CM

## 2017-11-17 PROCEDURE — 99214 OFFICE O/P EST MOD 30 MIN: CPT | Performed by: FAMILY MEDICINE

## 2017-11-17 RX ORDER — DEXTROAMPHETAMINE SACCHARATE, AMPHETAMINE ASPARTATE MONOHYDRATE, DEXTROAMPHETAMINE SULFATE AND AMPHETAMINE SULFATE 7.5; 7.5; 7.5; 7.5 MG/1; MG/1; MG/1; MG/1
30 CAPSULE, EXTENDED RELEASE ORAL DAILY
Qty: 30 CAPSULE | Refills: 0 | Status: SHIPPED | OUTPATIENT
Start: 2017-12-17 | End: 2018-01-16

## 2017-11-17 RX ORDER — MORPHINE SULFATE 15 MG/1
TABLET, FILM COATED, EXTENDED RELEASE ORAL
Refills: 0 | COMMUNITY
Start: 2017-11-09 | End: 2018-11-29

## 2017-11-17 RX ORDER — TRAZODONE HYDROCHLORIDE 50 MG/1
50 TABLET, FILM COATED ORAL
Qty: 30 TABLET | Refills: 1 | Status: SHIPPED | OUTPATIENT
Start: 2017-11-17 | End: 2017-12-15

## 2017-11-17 RX ORDER — DEXTROAMPHETAMINE SACCHARATE, AMPHETAMINE ASPARTATE MONOHYDRATE, DEXTROAMPHETAMINE SULFATE AND AMPHETAMINE SULFATE 7.5; 7.5; 7.5; 7.5 MG/1; MG/1; MG/1; MG/1
30 CAPSULE, EXTENDED RELEASE ORAL DAILY
Qty: 30 CAPSULE | Refills: 0 | Status: SHIPPED | OUTPATIENT
Start: 2017-11-17 | End: 2017-12-17

## 2017-11-17 ASSESSMENT — ENCOUNTER SYMPTOMS
ABDOMINAL PAIN: 0
INSOMNIA: 1
PALPITATIONS: 0
DEPRESSION: 1
NECK PAIN: 1
NERVOUS/ANXIOUS: 1
BACK PAIN: 1
CONSTITUTIONAL NEGATIVE: 1

## 2017-11-17 ASSESSMENT — ANXIETY QUESTIONNAIRES
7. FEELING AFRAID AS IF SOMETHING AWFUL MIGHT HAPPEN: NOT AT ALL
5. BEING SO RESTLESS THAT IT IS HARD TO SIT STILL: SEVERAL DAYS
GAD7 TOTAL SCORE: 11
2. NOT BEING ABLE TO STOP OR CONTROL WORRYING: MORE THAN HALF THE DAYS
6. BECOMING EASILY ANNOYED OR IRRITABLE: SEVERAL DAYS
IF YOU CHECKED OFF ANY PROBLEMS ON THIS QUESTIONNAIRE, HOW DIFFICULT HAVE THESE PROBLEMS MADE IT FOR YOU TO DO YOUR WORK, TAKE CARE OF THINGS AT HOME, OR GET ALONG WITH OTHER PEOPLE: SOMEWHAT DIFFICULT
3. WORRYING TOO MUCH ABOUT DIFFERENT THINGS: NEARLY EVERY DAY
1. FEELING NERVOUS, ANXIOUS, OR ON EDGE: MORE THAN HALF THE DAYS

## 2017-11-17 ASSESSMENT — PATIENT HEALTH QUESTIONNAIRE - PHQ9
SUM OF ALL RESPONSES TO PHQ QUESTIONS 1-9: 18
5. POOR APPETITE OR OVEREATING: MORE THAN HALF THE DAYS

## 2017-11-17 NOTE — MR AVS SNAPSHOT
After Visit Summary   11/17/2017    Heath Waller    MRN: [id number]           Patient Information     Date Of Birth          1980        Visit Information        Provider Department      11/17/2017 7:40 AM Alex Interiano MD [facility]        Today's Diagnoses     Moderate episode of recurrent major depressive disorder (H)    -  1    Attention deficit disorder (ADD) without hyperactivity           Follow-ups after your visit        Follow-up notes from your care team     Return in about 1 month (around 12/17/2017).      Your next 10 appointments already scheduled     Jan 08, 2018  5:00 PM CST   Return Sleep Patient with Christian Mejia MD   [facility] [place] ([facility] [place])    [address] [facility] [address] [place] [address] [phone]              Who to contact     If you have questions or need follow up information about today's clinic visit or your schedule please contact [facility] directly at [phone].  Normal or non-critical lab and imaging results will be communicated to you by Secondbrainhart, letter or phone within 4 business days after the clinic has received the results. If you do not hear from us within 7 days, please contact the clinic through Tower59t or phone. If you have a critical or abnormal lab result, we will notify you by phone as soon as possible.  Submit refill requests through [employer] or call your pharmacy and they will forward the refill request to us. Please allow 3 business days for your refill to be completed.          Additional Information About Your Visit        Secondbrainhart Information     [employer] gives you secure access to your electronic health record. If you see a primary care provider, you can also send messages to your care team and make appointments. If you have questions, please call your primary care clinic.  If you do not have a primary care provider, please call  [phone] and they will assist you.        Care EveryWhere ID     This is your Care EveryWhere ID. This could be used by other organizations to access your [place] medical records  [id number]        Your Vitals Were     Pulse Temperature Respirations Last Period Breastfeeding? BMI (Body Mass Index)    70 98  F (36.7  C) (Oral) 20 10/27/2017 No 28.66 kg/m2       Blood Pressure from Last 3 Encounters:   11/17/17 106/72   11/06/17 118/72   09/29/17 (!) 88/66    Weight from Last 3 Encounters:   11/17/17 194 lb 1.6 oz (88 kg)   11/06/17 192 lb (87.1 kg)   09/29/17 191 lb 12.8 oz (87 kg)              Today, you had the following     No orders found for display         Today's Medication Changes          These changes are accurate as of: 11/17/17  8:11 AM.  If you have any questions, ask your nurse or doctor.               Start taking these medicines.        Dose/Directions    traZODone 50 MG tablet   Commonly known as:  DESYREL   Used for:  Moderate episode of recurrent major depressive disorder (H)   Started by:  Alex Interiano MD        Dose:  50 mg   Take 1 tablet (50 mg) by mouth nightly as needed for sleep   Quantity:  30 tablet   Refills:  1         These medicines have changed or have updated prescriptions.        Dose/Directions    * amphetamine-dextroamphetamine 30 MG per 24 hr capsule   Commonly known as:  ADDERALL XR   This may have changed:  You were already taking a medication with the same name, and this prescription was added. Make sure you understand how and when to take each.   Used for:  Attention deficit disorder (ADD) without hyperactivity   Changed by:  Alex Interiano MD        Dose:  30 mg   Take 1 capsule (30 mg) by mouth daily   Quantity:  30 capsule   Refills:  0       * amphetamine-dextroamphetamine 30 MG per 24 hr capsule   Commonly known as:  ADDERALL XR   This may have changed:  Another medication with the same name was added. Make sure you understand how and when to  take each.   Used for:  Attention deficit disorder (ADD) without hyperactivity   Changed by:  Alex Interiano MD        Dose:  30 mg   Start taking on:  12/17/2017   Take 1 capsule (30 mg) by mouth daily   Quantity:  30 capsule   Refills:  0       morphine 15 MG 12 hr tablet   Commonly known as:  MS CONTIN   This may have changed:  Another medication with the same name was removed. Continue taking this medication, and follow the directions you see here.   Changed by:  Alex Interiano MD        Refills:  0       * Notice:  This list has 2 medication(s) that are the same as other medications prescribed for you. Read the directions carefully, and ask your doctor or other care provider to review them with you.         Where to get your medicines      These medications were sent to [facility] [address] [facility] [address]  [address] [facility] [address]     Phone:  [phone]     traZODone 50 MG tablet         Some of these will need a paper prescription and others can be bought over the counter.  Ask your nurse if you have questions.     Bring a paper prescription for each of these medications     amphetamine-dextroamphetamine 30 MG per 24 hr capsule    amphetamine-dextroamphetamine 30 MG per 24 hr capsule                Primary Care Provider Office Phone # Fax #    Alex Interiano [phone]  [facility] [address]        Equal Access to Services     NAVYA TRISTAN AH: Hadii rafaela ku hadasho Soomaali, waaxda luqadaha, qaybta kaalmada adeegyada, harinder joseph haybrian tim . So [facility] [phone].    ATENCIÓN: Si habla español, tiene a jean disposición servicios gratuitos de asistencia lingüística. Llame al [phone].    We comply with applicable federal civil rights laws and [place] laws. We do not discriminate on the basis of race, color, national origin, age, disability,  sex, sexual orientation, or gender identity.            Thank you!     Thank you for choosing [facility]  for your care. Our goal is always to provide you with excellent care. Hearing back from our patients is one way we can continue to improve our services. Please take a few minutes to complete the written survey that you may receive in the mail after your visit with us. Thank you!             Your Updated Medication List - Protect others around you: Learn how to safely use, store and throw away your medicines at www.disposemymeds.org.          This list is accurate as of: 11/17/17  8:11 AM.  Always use your most recent med list.                   Brand Name Dispense Instructions for use Diagnosis    * amphetamine-dextroamphetamine 30 MG per 24 hr capsule    ADDERALL XR    30 capsule    Take 1 capsule (30 mg) by mouth daily    Attention deficit disorder (ADD) without hyperactivity       * amphetamine-dextroamphetamine 30 MG per 24 hr capsule   Start taking on:  12/17/2017    ADDERALL XR    30 capsule    Take 1 capsule (30 mg) by mouth daily    Attention deficit disorder (ADD) without hyperactivity       B Complex Tabs           baclofen 10 MG tablet    LIORESAL     TAKE 1 TABLET BY MOUTH 3 TIMES A DAY AS NEEDED FOR MUSCLE SPASM        cholecalciferol 48701 UNITS capsule    vitamin D3     Take 1 capsule by mouth daily        DULoxetine 60 MG EC capsule    CYMBALTA    180 capsule    Take 2 capsules (120 mg) by mouth every morning    Depressive disorder, Chronic pain syndrome, Fibromyalgia       HYDROcodone-acetaminophen  MG per tablet    NORCO     Take 1 tablet by mouth every 6 hours as needed for moderate to severe pain        ibuprofen 200 MG tablet    ADVIL/MOTRIN          lisinopril 10 MG tablet    PRINIVIL/ZESTRIL    90 tablet    TAKE 1 TABLET (10 MG) BY MOUTH DAILY    Benign essential hypertension       magnesium 500 MG Tabs      Take 1,000 mg by mouth        morphine 15 MG 12 hr tablet     MS CONTIN          omeprazole 20 MG CR capsule    priLOSEC     Take 20 mg by mouth        pregabalin 150 MG capsule    LYRICA    60 capsule    Take 1 capsule (150 mg) by mouth 3 times daily Patient  Will contact you when ready to fill    Chronic pain syndrome, Fibromyalgia, TMJ (temporomandibular joint syndrome)       pseudoePHEDrine 120 MG 12 hr tablet    SUDAFED 12 HOUR    90 tablet    Take 1 tablet (120 mg) by mouth 2 times daily    Chronic vasomotor rhinitis       spironolactone 50 MG tablet    ALDACTONE    90 tablet    Take 1 tablet (50 mg) by mouth daily    Acne vulgaris       traZODone 50 MG tablet    DESYREL    30 tablet    Take 1 tablet (50 mg) by mouth nightly as needed for sleep    Moderate episode of recurrent major depressive disorder (H)       TYLENOL PO      Take 1,000 mg by mouth every 4 hours as needed for mild pain or fever        * Notice:  This list has 2 medication(s) that are the same as other medications prescribed for you. Read the directions carefully, and ask your doctor or other care provider to review them with you.

## 2017-11-17 NOTE — PROGRESS NOTES
HPI    SUBJECTIVE:   Heath Waller is a 37 year old female who presents to clinic today for the following health issues:    Medication Followup of Adderall    Taking Medication as prescribed: yes    Side Effects:  None    Medication Helping Symptoms:  yes       Was previously given 1m refill, given complex psych and pain history, hasn't been able to follow up before now.  Admits that mood is an issue, depressed.  Is taking Cymblata.  Dis have sleep f/u, adjustment of CPAP.  Does have trouble staying asleep, mids gets going, can't fall back asleep.  Has never tried anything more focused on sleep.  Denies thoughts of suicide or self harm.    No palpitations, HA, abd pain.  Will sometimes not take Adderall on the weekends, this is typically because she forgets.    Review of Systems   Constitutional: Negative.    Cardiovascular: Negative for palpitations.   Gastrointestinal: Negative for abdominal pain.   Musculoskeletal: Positive for back pain, joint pain and neck pain.   Psychiatric/Behavioral: Positive for depression. Negative for suicidal ideas. The patient is nervous/anxious and has insomnia.          Physical Exam   Constitutional: She is oriented to person, place, and time and well-developed, well-nourished, and in no distress.   Eyes: Conjunctivae and EOM are normal.   Neck: No thyromegaly present.   Cardiovascular: Normal rate, regular rhythm and normal heart sounds.    Pulmonary/Chest: Effort normal and breath sounds normal.   Musculoskeletal: She exhibits no edema.   Neurological: She is alert and oriented to person, place, and time.   Skin: Skin is warm and dry.   Psychiatric: Affect normal. She exhibits a depressed mood.   Vitals reviewed.    (F33.1) Moderate episode of recurrent major depressive disorder (H)  (primary encounter diagnosis)  Comment: Mood is an issue, is already on maximum dose of cymblata for pain/mood.  Will adjunct for sleep  Plan: traZODone (DESYREL) 50 MG tablet            (F98.8)  Attention deficit disorder (ADD) without hyperactivity  Comment: rounding out 3m from previously,   Plan: amphetamine-dextroamphetamine (ADDERALL XR) 30         MG per 24 hr capsule,         amphetamine-dextroamphetamine (ADDERALL XR) 30         MG per 24 hr capsule              RTC in 1m for f/u    Alex Interiano MD

## 2017-11-18 ASSESSMENT — ANXIETY QUESTIONNAIRES: GAD7 TOTAL SCORE: 11

## 2017-12-15 ENCOUNTER — OFFICE VISIT (OUTPATIENT)
Dept: FAMILY MEDICINE | Facility: CLINIC | Age: 37
End: 2017-12-15
Payer: COMMERCIAL

## 2017-12-15 VITALS
BODY MASS INDEX: 28.89 KG/M2 | DIASTOLIC BLOOD PRESSURE: 72 MMHG | WEIGHT: 195.6 LBS | SYSTOLIC BLOOD PRESSURE: 104 MMHG | RESPIRATION RATE: 12 BRPM | TEMPERATURE: 98.5 F | HEART RATE: 64 BPM

## 2017-12-15 DIAGNOSIS — F33.1 MODERATE EPISODE OF RECURRENT MAJOR DEPRESSIVE DISORDER (H): ICD-10-CM

## 2017-12-15 DIAGNOSIS — N92.6 MISSED MENSES: Primary | ICD-10-CM

## 2017-12-15 PROCEDURE — 99213 OFFICE O/P EST LOW 20 MIN: CPT | Performed by: FAMILY MEDICINE

## 2017-12-15 RX ORDER — OXYCODONE AND ACETAMINOPHEN 5; 325 MG/1; MG/1
TABLET ORAL
Refills: 0 | COMMUNITY
Start: 2017-12-06 | End: 2018-11-29

## 2017-12-15 RX ORDER — TRAZODONE HYDROCHLORIDE 50 MG/1
100 TABLET, FILM COATED ORAL
Qty: 180 TABLET | Refills: 1 | Status: SHIPPED | OUTPATIENT
Start: 2017-12-15 | End: 2019-09-25

## 2017-12-15 ASSESSMENT — ENCOUNTER SYMPTOMS
INSOMNIA: 0
CONSTITUTIONAL NEGATIVE: 1
DEPRESSION: 1
CARDIOVASCULAR NEGATIVE: 1
RESPIRATORY NEGATIVE: 1
NERVOUS/ANXIOUS: 1

## 2017-12-15 ASSESSMENT — ANXIETY QUESTIONNAIRES
6. BECOMING EASILY ANNOYED OR IRRITABLE: SEVERAL DAYS
5. BEING SO RESTLESS THAT IT IS HARD TO SIT STILL: SEVERAL DAYS
2. NOT BEING ABLE TO STOP OR CONTROL WORRYING: NOT AT ALL
1. FEELING NERVOUS, ANXIOUS, OR ON EDGE: SEVERAL DAYS
IF YOU CHECKED OFF ANY PROBLEMS ON THIS QUESTIONNAIRE, HOW DIFFICULT HAVE THESE PROBLEMS MADE IT FOR YOU TO DO YOUR WORK, TAKE CARE OF THINGS AT HOME, OR GET ALONG WITH OTHER PEOPLE: SOMEWHAT DIFFICULT
7. FEELING AFRAID AS IF SOMETHING AWFUL MIGHT HAPPEN: NOT AT ALL
3. WORRYING TOO MUCH ABOUT DIFFERENT THINGS: SEVERAL DAYS
GAD7 TOTAL SCORE: 6

## 2017-12-15 ASSESSMENT — PATIENT HEALTH QUESTIONNAIRE - PHQ9
SUM OF ALL RESPONSES TO PHQ QUESTIONS 1-9: 9
5. POOR APPETITE OR OVEREATING: MORE THAN HALF THE DAYS

## 2017-12-15 NOTE — MR AVS SNAPSHOT
After Visit Summary   12/15/2017    Heath Waller    MRN: [id number]           Patient Information     Date Of Birth          1980        Visit Information        Provider Department      12/15/2017 7:00 AM Alex Interiano MD [facility]        Today's Diagnoses     Missed menses    -  1    Moderate episode of recurrent major depressive disorder (H)           Follow-ups after your visit        Follow-up notes from your care team     Return in about 4 months (around 4/15/2018).      Your next 10 appointments already scheduled     Jan 08, 2018  5:00 PM CST   Return Sleep Patient with Christian Mejai MD   [facility] ([facility]    [address] [facility] [address]  [facility] [address] [phone]              Who to contact     If you have questions or need follow up information about today's clinic visit or your schedule please contact [facility] directly at [phone].  Normal or non-critical lab and imaging results will be communicated to you by Gigalocalhart, letter or phone within 4 business days after the clinic has received the results. If you do not hear from us within 7 days, please contact the clinic through Stylytt or phone. If you have a critical or abnormal lab result, we will notify you by phone as soon as possible.  Submit refill requests through [employer] or call your pharmacy and they will forward the refill request to us. Please allow 3 business days for your refill to be completed.          Additional Information About Your Visit        MyChart Information     [employer] gives you secure access to your electronic health record. If you see a primary care provider, you can also send messages to your care team and make appointments. If you have questions, please call your primary care clinic.  If you do not have a primary care provider, please call [phone] and they will assist you.         Care EveryWhere ID     This is your Care EveryWhere ID. This could be used by other organizations to access your [place] medical records  [id number]        Your Vitals Were     Pulse Temperature Respirations Last Period Breastfeeding? BMI (Body Mass Index)    64 98.5  F (36.9  C) (Oral) 12 10/27/2017 No 28.89 kg/m2       Blood Pressure from Last 3 Encounters:   12/15/17 104/72   11/17/17 106/72   11/06/17 118/72    Weight from Last 3 Encounters:   12/15/17 195 lb 9.6 oz (88.7 kg)   11/17/17 194 lb 1.6 oz (88 kg)   11/06/17 192 lb (87.1 kg)              Today, you had the following     No orders found for display         Today's Medication Changes          These changes are accurate as of: 12/15/17  7:35 AM.  If you have any questions, ask your nurse or doctor.               These medicines have changed or have updated prescriptions.        Dose/Directions    traZODone 50 MG tablet   Commonly known as:  DESYREL   This may have changed:  how much to take   Used for:  Moderate episode of recurrent major depressive disorder (H)   Changed by:  Alex Interiano MD        Dose:  100 mg   Take 2 tablets (100 mg) by mouth nightly as needed for sleep   Quantity:  180 tablet   Refills:  1            Where to get your medicines      These medications were sent to [facility] [id number] [place] [address] [address]  [address] , [facility] [address]     Phone:  [phone]     traZODone 50 MG tablet                Primary Care Provider Office Phone # Fax #    Alex Interiano [phone]  [facility] [address]        Equal Access to Services     CJ TRISTAN AH: Delaney Perkins, waeulogioda luqadaha, qaybta kaalmada alexyyada, harinder frederick. So [facility] [phone].    ATENCIÓN: Si habla español, tiene a jean disposición servicios gratuitos de asistencia lingüística. Llame al  [phone].    We comply with applicable federal civil rights laws and [place] laws. We do not discriminate on the basis of race, color, national origin, age, disability, sex, sexual orientation, or gender identity.            Thank you!     Thank you for choosing [facility]  for your care. Our goal is always to provide you with excellent care. Hearing back from our patients is one way we can continue to improve our services. Please take a few minutes to complete the written survey that you may receive in the mail after your visit with us. Thank you!             Your Updated Medication List - Protect others around you: Learn how to safely use, store and throw away your medicines at www.disposemymeds.org.          This list is accurate as of: 12/15/17  7:35 AM.  Always use your most recent med list.                   Brand Name Dispense Instructions for use Diagnosis    * amphetamine-dextroamphetamine 30 MG per 24 hr capsule    ADDERALL XR    30 capsule    Take 1 capsule (30 mg) by mouth daily    Attention deficit disorder (ADD) without hyperactivity       * amphetamine-dextroamphetamine 30 MG per 24 hr capsule   Start taking on:  12/17/2017    ADDERALL XR    30 capsule    Take 1 capsule (30 mg) by mouth daily    Attention deficit disorder (ADD) without hyperactivity       B Complex Tabs           baclofen 10 MG tablet    LIORESAL     TAKE 1 TABLET BY MOUTH 3 TIMES A DAY AS NEEDED FOR MUSCLE SPASM        cholecalciferol 32168 UNITS capsule    vitamin D3     Take 1 capsule by mouth daily        DULoxetine 60 MG EC capsule    CYMBALTA    180 capsule    Take 2 capsules (120 mg) by mouth every morning    Depressive disorder, Chronic pain syndrome, Fibromyalgia       HYDROcodone-acetaminophen  MG per tablet    NORCO     Take 1 tablet by mouth every 6 hours as needed for moderate to severe pain        ibuprofen 200 MG tablet    ADVIL/MOTRIN          lisinopril 10 MG tablet    PRINIVIL/ZESTRIL     90 tablet    TAKE 1 TABLET (10 MG) BY MOUTH DAILY    Benign essential hypertension       magnesium 500 MG Tabs      Take 1,000 mg by mouth        morphine 15 MG 12 hr tablet    MS CONTIN          omeprazole 20 MG CR capsule    priLOSEC     Take 20 mg by mouth        oxyCODONE-acetaminophen 5-325 MG per tablet    PERCOCET     TK 1 T PO  TID        pregabalin 150 MG capsule    LYRICA    60 capsule    Take 1 capsule (150 mg) by mouth 3 times daily Patient  Will contact you when ready to fill    Chronic pain syndrome, Fibromyalgia, TMJ (temporomandibular joint syndrome)       pseudoePHEDrine 120 MG 12 hr tablet    SUDAFED 12 HOUR    90 tablet    Take 1 tablet (120 mg) by mouth 2 times daily    Chronic vasomotor rhinitis       spironolactone 50 MG tablet    ALDACTONE    90 tablet    Take 1 tablet (50 mg) by mouth daily    Acne vulgaris       traZODone 50 MG tablet    DESYREL    180 tablet    Take 2 tablets (100 mg) by mouth nightly as needed for sleep    Moderate episode of recurrent major depressive disorder (H)       TYLENOL PO      Take 1,000 mg by mouth every 4 hours as needed for mild pain or fever        * Notice:  This list has 2 medication(s) that are the same as other medications prescribed for you. Read the directions carefully, and ask your doctor or other care provider to review them with you.

## 2017-12-15 NOTE — PROGRESS NOTES
"HPI    SUBJECTIVE:   Heath Waller is a 37 year old female who presents to clinic today for the following health issues:    Medication Followup of Trazodone    Taking Medication as prescribed: yes    Side Effects:  None    Medication Helping Symptoms:  yes     Feels it's helping - more often using 100mg nightly.  Waking less during the night, more easily falling asleep.  Does note difficulty getting going in the am, but this is how she's always been and doesn't feel it's worse now with Trazodone.  Does feel mood is \"better\" overall.    Expected period around 11/27/17.  Did feel a bit like it might happen 12/1/17, but never did start.  Tubal in 2008.    Review of Systems   Constitutional: Negative.    Respiratory: Negative.    Cardiovascular: Negative.    Psychiatric/Behavioral: Positive for depression and suicidal ideas. The patient is nervous/anxious. The patient does not have insomnia.          Physical Exam   Constitutional: She is oriented to person, place, and time and well-developed, well-nourished, and in no distress.   Neurological: She is alert and oriented to person, place, and time.   Skin: Skin is warm and dry.   Psychiatric: Mood and affect normal.   Vitals reviewed.    (N92.6) Missed menses  (primary encounter diagnosis)  Comment: advised many have3 have occasional \"irregularities\"  Plan: observe, f/u if no perido in 2-3m    (F33.1) Moderate episode of recurrent major depressive disorder (H)  Comment: filling at higher dose.  Plan: traZODone (DESYREL) 50 MG tablet              RTC in 4m    Alex Interiano MD      "

## 2017-12-15 NOTE — NURSING NOTE
"Chief Complaint   Patient presents with     Recheck Medication       Initial /72  Pulse 64  Temp 98.5  F (36.9  C) (Oral)  Resp 12  Wt 195 lb 9.6 oz (88.7 kg)  LMP 10/27/2017  Breastfeeding? No  BMI 28.89 kg/m2 Estimated body mass index is 28.89 kg/(m^2) as calculated from the following:    Height as of 11/6/17: 5' 9\" (1.753 m).    Weight as of this encounter: 195 lb 9.6 oz (88.7 kg).  Medication Reconciliation: complete   Shila Barnes CMA (AAMA)      "

## 2017-12-16 ASSESSMENT — ANXIETY QUESTIONNAIRES: GAD7 TOTAL SCORE: 6

## 2017-12-19 ENCOUNTER — DOCUMENTATION ONLY (OUTPATIENT)
Dept: SLEEP MEDICINE | Facility: CLINIC | Age: 37
End: 2017-12-19

## 2017-12-19 NOTE — PROGRESS NOTES
Patient has transferred cpap care to [facility].  Called patient on 12/8/17 to let her know transfer is complete and she will have to bring machine in if she would like us to change the pressures.

## 2018-01-08 ENCOUNTER — OFFICE VISIT (OUTPATIENT)
Dept: SLEEP MEDICINE | Facility: CLINIC | Age: 38
End: 2018-01-08
Payer: COMMERCIAL

## 2018-01-08 VITALS
HEIGHT: 69 IN | DIASTOLIC BLOOD PRESSURE: 74 MMHG | RESPIRATION RATE: 15 BRPM | WEIGHT: 195 LBS | BODY MASS INDEX: 28.88 KG/M2 | HEART RATE: 74 BPM | OXYGEN SATURATION: 96 % | SYSTOLIC BLOOD PRESSURE: 118 MMHG

## 2018-01-08 DIAGNOSIS — G47.39 COMPLEX SLEEP APNEA SYNDROME: Primary | ICD-10-CM

## 2018-01-08 PROCEDURE — 99214 OFFICE O/P EST MOD 30 MIN: CPT | Performed by: INTERNAL MEDICINE

## 2018-01-08 NOTE — NURSING NOTE
"Chief Complaint   Patient presents with     RECHECK     f/u cpap       Initial /74  Pulse 74  Resp 15  Ht 1.753 m (5' 9\")  Wt 88.5 kg (195 lb)  SpO2 96%  BMI 28.8 kg/m2 Estimated body mass index is 28.8 kg/(m^2) as calculated from the following:    Height as of this encounter: 1.753 m (5' 9\").    Weight as of this encounter: 88.5 kg (195 lb).  Medication Reconciliation: complete         Niurka Crews LPN/JAIME  "

## 2018-01-08 NOTE — PROGRESS NOTES
[facility]  Outpatient Sleep Medicine Follow-up  January 8, 2018      Name: Heath Waller MRN# [id number]   Age: 37 year old YOB: 1980   Date of visit: January 8, 2018  Primary care provider: Alex Interiano         Assessment and Plan:     1. Obstructive Sleep Apnea with complex sleep apnea opiates related:  - Full compliance of PAP use but has high residual AHI 19.8/hr due to central apnea narcotic related. Get ECHO for EF before titration. Scheduled PSG with ASV titration  - recommend weaning narcotics, but no possible to back pain, follow up PCP and pain   - Clinical response: fair  - Recommend using CPAP every night for at least 7-8 hours each night  - Daytime naps are not advised, but use CPAP if taking naps  - Patient was advised not to drive if drowsy or sleepy.  - Follow up in sleep clinic after sleep study.      Orders Placed This Encounter   Procedures     Comprehensive Sleep Study     Comprehensive DME     Echocardiogram Complete       Summary Counseling:  New sleep schedule recommendation: given    Check out [email or web address]    All questions were answered.  The patient indicates understanding of the above issues and agrees with the plan set forth.           Chief Complaint:    Routine Follow up            History of Present Illness:     Heath Waller is a 37 year old female with history of ROSA, hypertension, depression, ADD, DJD cervical spine who comes to [facility] for follow up. Please see H&P for his presenting sleep symptoms for additional details.  Patient was diagnosed with sleep apnea in 2011 and was prescribed CPAP. She did not use CPAP initially but started [id number], and she uses sporadically due to difficulty. She also was using Norco for back pain.   Last visit (her first visit) this clinic, she has high residual AHI 9.8/rh predominantly central apneas. Her auto-CPAP was fixed to CPAP pressure of 11 cmH2O.  Today,  she started using and had some benefit, she get sick for the last 3 weeks and stopped using the machine. She has some issue of the tubing/hose. She did not receive a supplies for now.    PREVIOUS SLEEP STUDIES:  Date: 10/4/2012  TYPE: PSG  AHI: 6/hr (RDI 18/hr)  Central apnea Index: 0/hr  BMI: 24  Intervention: CPAP  Sleep Architecture:  Lowest O2 saturation: 86%  PLM's: 0/hr    MSLT:  Mean sleep latency: 7.8 minutes  SOREMP: 0    CPAP Compliance:  Dates: 11/6 /2017- 1/8 /2017       75 % >4hour use   Days used: 50/64  Average daily use (days used): 7.34 hrs  Leak 95 percentile: 28.9L/min  Residual AHI: 19.8/hr (CA 15/hr, OA 3.4/hr)  Pressure:  11 cmH2O    Tatamy Sleepiness Scale score today: 14/24  Tatamy Sleepiness Scale score last visit: 15/24     PAP machine: ResMed    Interface:  Mask: nasal pillow mask  Chin strap: No  Leak: No  Humidity: Yes    Difficulties with therapy:    [-] Difficulty tolerating the pressure  [-] Epistaxis:   [-] Nasal congestion   [x] Dry mouth:  [x] Mouth breathing:   [-] Pain/skin breakdown:     Improvements noted with CPAP:   [+] waking up more refreshed  [+] sleeping better with less arousals  [+] nocturia improved   [-] improved energy level during the day    SLEEP-WAKE SCHEDULE: unchanged    Other sleep problems: None     Drowsy driving yes but no near incidents    Medications that affect sleep: Oxycodone, Morphine MS contin, Baclofen, Adderall            Medications:     Current Outpatient Prescriptions   Medication Sig     oxyCODONE-acetaminophen (PERCOCET) 5-325 MG per tablet TK 1 T PO  TID     traZODone (DESYREL) 50 MG tablet Take 2 tablets (100 mg) by mouth nightly as needed for sleep     morphine (MS CONTIN) 15 MG 12 hr tablet      amphetamine-dextroamphetamine (ADDERALL XR) 30 MG per 24 hr capsule Take 1 capsule (30 mg) by mouth daily     DULoxetine (CYMBALTA) 60 MG EC capsule Take 2 capsules (120 mg) by mouth every morning     cholecalciferol (VITAMIN D3) 40708 UNITS  capsule Take 1 capsule by mouth daily     baclofen (LIORESAL) 10 MG tablet TAKE 1 TABLET BY MOUTH 3 TIMES A DAY AS NEEDED FOR MUSCLE SPASM     magnesium 500 MG TABS Take 1,000 mg by mouth     HYDROcodone-acetaminophen (NORCO)  MG per tablet Take 1 tablet by mouth every 6 hours as needed for moderate to severe pain     pseudoePHEDrine (SUDAFED 12 HOUR) 120 MG 12 hr tablet Take 1 tablet (120 mg) by mouth 2 times daily     lisinopril (PRINIVIL/ZESTRIL) 10 MG tablet TAKE 1 TABLET (10 MG) BY MOUTH DAILY     Acetaminophen (TYLENOL PO) Take 1,000 mg by mouth every 4 hours as needed for mild pain or fever     pregabalin (LYRICA) 150 MG capsule Take 1 capsule (150 mg) by mouth 3 times daily Patient  Will contact you when ready to fill     spironolactone (ALDACTONE) 50 MG tablet Take 1 tablet (50 mg) by mouth daily     B Complex TABS      ibuprofen (ADVIL,MOTRIN) 200 MG tablet      omeprazole (PRILOSEC) 20 MG capsule Take 20 mg by mouth     No current facility-administered medications for this visit.         Allergies   Allergen Reactions     Codeine      Sulfa Drugs      Rash       Toradol [Ketorolac] Itching and Rash            Past Medical History:     Past Medical History:   Diagnosis Date     ADD (attention deficit disorder)      Degenerative disc disease, cervical      Depressive disorder      Gastro-oesophageal reflux disease      Myofacial muscle pain      Noninfectious ileitis     IBS with constipation     Other chronic pain     myofacial pain syndrome     Sleep apnea     wears CPAP at night     Thyroid disease              Past Surgical History:      Past Surgical History:   Procedure Laterality Date     APPENDECTOMY  2000     ARTHROSCOPY KNEE Right     teenager     BREAST SURGERY  03/2006    augmentation, revisions 2011 and 2015     FOOT SURGERY Right     semoid bone removed from Fx     GYN SURGERY   2009 and 2014    LEEPS x 2      LAPAROSCOPIC CHOLECYSTECTOMY  5/15/2014    Procedure: LAPAROSCOPIC  "CHOLECYSTECTOMY;  Surgeon: Kd Arthur MD;  Location: [facility] OR     THYROID SURGERY  2001    nodule      TUBAL LIGATION  10/09/2008       Social History   Substance Use Topics     Smoking status: Current Every Day Smoker     Packs/day: 0.00     Years: 20.00     Types: Cigarettes     Smokeless tobacco: Never Used      Comment: e-cig     Alcohol use 0.0 oz/week     0 Standard drinks or equivalent per week      Comment: rarely       Family History   Problem Relation Age of Onset     CEREBROVASCULAR DISEASE Maternal Grandmother      CANCER Paternal Grandfather      Thyroid Disease Brother      CEREBROVASCULAR DISEASE Father      2016     Hypertension Father             Physical Examination:   /74  Pulse 74  Resp 15  Ht 1.753 m (5' 9\")  Wt 88.5 kg (195 lb)  SpO2 96%  BMI 28.8 kg/m2            Data: All pertinent previous laboratory data reviewed     No results found for: PH, PHARTERIAL, PO2, [id number], SAT, PCO2, HCO3, BASEEXCESS, PACO, BEB  Lab Results   Component Value Date    TSH 1.53 06/30/2016     Lab Results   Component Value Date    GLC 78 09/29/2017    GLC 92 06/30/2016     Lab Results   Component Value Date    HGB 14.3 06/30/2016    HGB 13.8 06/06/2016     Lab Results   Component Value Date    BUN 11 09/29/2017    BUN 10 06/30/2016    CR 0.70 09/29/2017    CR 0.78 06/30/2016     No results found for: JANES      She will follow up with me after sleep study        Copy to: Alex Interiano MD 1/8/2018     [facility]  [id number] [place] [id number]       Twenty-five minutes spent with patient, all of which were spent face-to-face counseling, consulting, coordinating plan of care and going over PAP download/sleep study and chart review.          "

## 2018-01-08 NOTE — MR AVS SNAPSHOT
After Visit Summary   1/8/2018    Heath Waller    MRN: [id number]           Patient Information     Date Of Birth          1980        Visit Information        Provider Department      1/8/2018 5:00 PM Christian Mejia MD [facility]        Today's Diagnoses     Complex sleep apnea syndrome    -  1      Care Instructions          Your BMI is Body mass index is 28.8 kg/(m^2).  Weight management is a personal decision.  If you are interested in exploring weight loss strategies, the following discussion covers the approaches that may be successful. Body mass index (BMI) is one way to tell whether you are at a healthy weight, overweight, or obese. It measures your weight in relation to your height.  A BMI of 18.5 to 24.9 is in the healthy range. A person with a BMI of 25 to 29.9 is considered overweight, and someone with a BMI of 30 or greater is considered obese. More than two-thirds of American adults are considered overweight or obese.  Being overweight or obese increases the risk for further weight gain. Excess weight may lead to heart disease and diabetes.  Creating and following plans for healthy eating and physical activity may help you improve your health.  Weight control is part of healthy lifestyle and includes exercise, emotional health, and healthy eating habits. Careful eating habits lifelong are the mainstay of weight control. Though there are significant health benefits from weight loss, long-term weight loss with diet alone may be very difficult to achieve- studies show long-term success with dietary management in less than 10% of people. Attaining a healthy weight may be especially difficult to achieve in those with severe obesity. In some cases, medications, devices and surgical management might be considered.  What can you do?  If you are overweight or obese and are interested in methods for weight loss, you should discuss this with your provider.      Consider reducing daily calorie intake by 500 calories.     Keep a food journal.     Avoiding skipping meals, consider cutting portions instead.    Diet combined with exercise helps maintain muscle while optimizing fat loss. Strength training is particularly important for building and maintaining muscle mass. Exercise helps reduce stress, increase energy, and improves fitness. Increasing exercise without diet control, however, may not burn enough calories to loose weight.       Start walking three days a week 10-20 minutes at a time    Work towards walking thirty minutes five days a week     Eventually, increase the speed of your walking for 1-2 minutes at time    In addition, we recommend that you review healthy lifestyles and methods for weight loss available through the National Institutes of Health patient information sites:  http://win.niddk.nih.gov/publications/index.htm    And look into health and wellness programs that may be available through your health insurance provider, employer, local community center, or brian club.    Weight management plan: Patient was referred to their PCP to discuss a diet and exercise plan.     Your blood pressure was checked while you were in clinic today.  Please read the guidelines below about what these numbers mean and what you should do about them.  Your systolic blood pressure is the top number.  This is the pressure when the heart is pumping.  Your diastolic blood pressure is the bottom number.  This is the pressure in between beats.  If your systolic blood pressure is less than 120 and your diastolic blood pressure is less than 80, then your blood pressure is normal. There is nothing more that you need to do about it  If your systolic blood pressure is 120-139 or your diastolic blood pressure is 80-89, your blood pressure may be higher than it should be.  You should have your blood pressure re-checked within a year by a primary care provider.  If your systolic blood  pressure is 140 or greater or your diastolic blood pressure is 90 or greater, you may have high blood pressure.  High blood pressure is treatable, but if left untreated over time it can put you at risk for heart attack, stroke, or kidney failure.  You should have your blood pressure re-checked by a primary care provider within the next four weeks.              Follow-ups after your visit        Future tests that were ordered for you today     Open Future Orders        Priority Expected Expires Ordered    Echocardiogram Complete Routine  1/8/2019 1/8/2018    Comprehensive Sleep Study Routine  7/7/2018 1/8/2018            Who to contact     If you have questions or need follow up information about today's clinic visit or your schedule please contact [facility] directly at [phone].  Normal or non-critical lab and imaging results will be communicated to you by MyChart, letter or phone within 4 business days after the clinic has received the results. If you do not hear from us within 7 days, please contact the clinic through [employer] or phone. If you have a critical or abnormal lab result, we will notify you by phone as soon as possible.  Submit refill requests through [employer] or call your pharmacy and they will forward the refill request to us. Please allow 3 business days for your refill to be completed.          Additional Information About Your Visit        [employer] Information     [employer] gives you secure access to your electronic health record. If you see a primary care provider, you can also send messages to your care team and make appointments. If you have questions, please call your primary care clinic.  If you do not have a primary care provider, please call [phone] and they will assist you.        Care EveryWhere ID     This is your Care EveryWhere ID. This could be used by other organizations to access your [place] medical records  [id number]        Your Vitals Were      "Pulse Respirations Height Pulse Oximetry BMI (Body Mass Index)       74 15 1.753 m (5' 9\") 96% 28.8 kg/m2        Blood Pressure from Last 3 Encounters:   01/08/18 118/74   12/15/17 104/72   11/17/17 106/72    Weight from Last 3 Encounters:   01/08/18 88.5 kg (195 lb)   12/15/17 88.7 kg (195 lb 9.6 oz)   11/17/17 88 kg (194 lb 1.6 oz)              We Performed the Following     Comprehensive DME        Primary Care Provider Office Phone # Fax #    Alex Jude Interiano [phone]  [facility] [address]        Equal Access to Services     NAVYA TRISTAN : Hadii rafaela serrato hadasho Soomaali, waaxda luqadaha, qaybta kaalmada adeegyada, waxedith parekhin haybrian tim . So [facility] [phone].    ATENCIÓN: Si habla español, tiene a jean disposición servicios gratuitos de asistencia lingüística. [facility] [phone].    We comply with applicable federal civil rights laws and [place] laws. We do not discriminate on the basis of race, color, national origin, age, disability, sex, sexual orientation, or gender identity.            Thank you!     Thank you for choosing [facility]  for your care. Our goal is always to provide you with excellent care. Hearing back from our patients is one way we can continue to improve our services. Please take a few minutes to complete the written survey that you may receive in the mail after your visit with us. Thank you!             Your Updated Medication List - Protect others around you: Learn how to safely use, store and throw away your medicines at www.disposemymeds.org.          This list is accurate as of: 1/8/18  5:46 PM.  Always use your most recent med list.                   Brand Name Dispense Instructions for use Diagnosis    amphetamine-dextroamphetamine 30 MG per 24 hr capsule    ADDERALL XR    30 capsule    Take 1 capsule (30 mg) by mouth daily    Attention deficit disorder (ADD) without hyperactivity       B " Complex Tabs           baclofen 10 MG tablet    LIORESAL     TAKE 1 TABLET BY MOUTH 3 TIMES A DAY AS NEEDED FOR MUSCLE SPASM        cholecalciferol 83012 UNITS capsule    vitamin D3     Take 1 capsule by mouth daily        DULoxetine 60 MG EC capsule    CYMBALTA    180 capsule    Take 2 capsules (120 mg) by mouth every morning    Depressive disorder, Chronic pain syndrome, Fibromyalgia       HYDROcodone-acetaminophen  MG per tablet    NORCO     Take 1 tablet by mouth every 6 hours as needed for moderate to severe pain        ibuprofen 200 MG tablet    ADVIL/MOTRIN          lisinopril 10 MG tablet    PRINIVIL/ZESTRIL    90 tablet    TAKE 1 TABLET (10 MG) BY MOUTH DAILY    Benign essential hypertension       magnesium 500 MG Tabs      Take 1,000 mg by mouth        morphine 15 MG 12 hr tablet    MS CONTIN          omeprazole 20 MG CR capsule    priLOSEC     Take 20 mg by mouth        oxyCODONE-acetaminophen 5-325 MG per tablet    PERCOCET     TK 1 T PO  TID        pregabalin 150 MG capsule    LYRICA    60 capsule    Take 1 capsule (150 mg) by mouth 3 times daily Patient  Will contact you when ready to fill    Chronic pain syndrome, Fibromyalgia, TMJ (temporomandibular joint syndrome)       pseudoePHEDrine 120 MG 12 hr tablet    SUDAFED 12 HOUR    90 tablet    Take 1 tablet (120 mg) by mouth 2 times daily    Chronic vasomotor rhinitis       spironolactone 50 MG tablet    ALDACTONE    90 tablet    Take 1 tablet (50 mg) by mouth daily    Acne vulgaris       traZODone 50 MG tablet    DESYREL    180 tablet    Take 2 tablets (100 mg) by mouth nightly as needed for sleep    Moderate episode of recurrent major depressive disorder (H)       TYLENOL PO      Take 1,000 mg by mouth every 4 hours as needed for mild pain or fever

## 2018-01-08 NOTE — PATIENT INSTRUCTIONS
Your BMI is Body mass index is 28.8 kg/(m^2).  Weight management is a personal decision.  If you are interested in exploring weight loss strategies, the following discussion covers the approaches that may be successful. Body mass index (BMI) is one way to tell whether you are at a healthy weight, overweight, or obese. It measures your weight in relation to your height.  A BMI of 18.5 to 24.9 is in the healthy range. A person with a BMI of 25 to 29.9 is considered overweight, and someone with a BMI of 30 or greater is considered obese. More than two-thirds of American adults are considered overweight or obese.  Being overweight or obese increases the risk for further weight gain. Excess weight may lead to heart disease and diabetes.  Creating and following plans for healthy eating and physical activity may help you improve your health.  Weight control is part of healthy lifestyle and includes exercise, emotional health, and healthy eating habits. Careful eating habits lifelong are the mainstay of weight control. Though there are significant health benefits from weight loss, long-term weight loss with diet alone may be very difficult to achieve- studies show long-term success with dietary management in less than 10% of people. Attaining a healthy weight may be especially difficult to achieve in those with severe obesity. In some cases, medications, devices and surgical management might be considered.  What can you do?  If you are overweight or obese and are interested in methods for weight loss, you should discuss this with your provider.     Consider reducing daily calorie intake by 500 calories.     Keep a food journal.     Avoiding skipping meals, consider cutting portions instead.    Diet combined with exercise helps maintain muscle while optimizing fat loss. Strength training is particularly important for building and maintaining muscle mass. Exercise helps reduce stress, increase energy, and improves  fitness. Increasing exercise without diet control, however, may not burn enough calories to loose weight.       Start walking three days a week 10-20 minutes at a time    Work towards walking thirty minutes five days a week     Eventually, increase the speed of your walking for 1-2 minutes at time    In addition, we recommend that you review healthy lifestyles and methods for weight loss available through the National Institutes of Health patient information sites:  http://win.niddk.nih.gov/publications/index.htm    And look into health and wellness programs that may be available through your health insurance provider, employer, local community center, or brian club.    Weight management plan: Patient was referred to their PCP to discuss a diet and exercise plan.     Your blood pressure was checked while you were in clinic today.  Please read the guidelines below about what these numbers mean and what you should do about them.  Your systolic blood pressure is the top number.  This is the pressure when the heart is pumping.  Your diastolic blood pressure is the bottom number.  This is the pressure in between beats.  If your systolic blood pressure is less than 120 and your diastolic blood pressure is less than 80, then your blood pressure is normal. There is nothing more that you need to do about it  If your systolic blood pressure is 120-139 or your diastolic blood pressure is 80-89, your blood pressure may be higher than it should be.  You should have your blood pressure re-checked within a year by a primary care provider.  If your systolic blood pressure is 140 or greater or your diastolic blood pressure is 90 or greater, you may have high blood pressure.  High blood pressure is treatable, but if left untreated over time it can put you at risk for heart attack, stroke, or kidney failure.  You should have your blood pressure re-checked by a primary care provider within the next four weeks.

## 2018-01-19 ENCOUNTER — HOSPITAL ENCOUNTER (OUTPATIENT)
Dept: CARDIOLOGY | Facility: CLINIC | Age: 38
Discharge: HOME | End: 2018-01-19
Attending: INTERNAL MEDICINE | Admitting: INTERNAL MEDICINE
Payer: COMMERCIAL

## 2018-01-19 DIAGNOSIS — G47.39 COMPLEX SLEEP APNEA SYNDROME: ICD-10-CM

## 2018-01-19 PROCEDURE — 93306 TTE W/DOPPLER COMPLETE: CPT | Mod: 26 | Performed by: INTERNAL MEDICINE

## 2018-01-19 PROCEDURE — 93306 TTE W/DOPPLER COMPLETE: CPT

## 2018-01-23 ENCOUNTER — THERAPY VISIT (OUTPATIENT)
Dept: SLEEP MEDICINE | Facility: CLINIC | Age: 38
End: 2018-01-23
Payer: COMMERCIAL

## 2018-01-23 DIAGNOSIS — G47.39 COMPLEX SLEEP APNEA SYNDROME: ICD-10-CM

## 2018-01-23 PROCEDURE — 95811 POLYSOM 6/>YRS CPAP 4/> PARM: CPT | Performed by: INTERNAL MEDICINE

## 2018-01-23 NOTE — MR AVS SNAPSHOT
After Visit Summary   1/23/2018    Heath Waller    MRN: [id number]           Patient Information     Date Of Birth          1980        Visit Information        Provider Department      1/23/2018 8:00 PM BK BED 2 [facility]        Today's Diagnoses     Complex sleep apnea syndrome           Follow-ups after your visit        Your next 10 appointments already scheduled     Jan 30, 2018  4:30 PM CST   Return Sleep Patient with Christian Mejia MD   [facility]    [address] [address] [address] [phone]              Who to contact     If you have questions or need follow up information about today's clinic visit or your schedule please contact [facility] directly at [phone].  Normal or non-critical lab and imaging results will be communicated to you by MyChart, letter or phone within 4 business days after the clinic has received the results. If you do not hear from us within 7 days, please contact the clinic through MyChart or phone. If you have a critical or abnormal lab result, we will notify you by phone as soon as possible.  Submit refill requests through [employer] or call your pharmacy and they will forward the refill request to us. Please allow 3 business days for your refill to be completed.          Additional Information About Your Visit        MyChart Information     [employer] gives you secure access to your electronic health record. If you see a primary care provider, you can also send messages to your care team and make appointments. If you have questions, please call your primary care clinic.  If you do not have a primary care provider, please call [phone] and they will assist you.        Care EveryWhere ID     This is your Care EveryWhere ID. This could be used by other organizations to access your [place] medical records  [id number]         Blood  Pressure from Last 3 Encounters:   01/08/18 118/74   12/15/17 104/72   11/17/17 106/72    Weight from Last 3 Encounters:   01/08/18 88.5 kg (195 lb)   12/15/17 88.7 kg (195 lb 9.6 oz)   11/17/17 88 kg (194 lb 1.6 oz)              We Performed the Following     Comprehensive Sleep Study        Primary Care Provider Office Phone # Fax #    Alex Interiano [phone]       [id number]  [facility] [address]        Equal Access to Services     [facility]: Hadii aad ku hadasho Soomaali, waaxda luqadaha, qaybta kaalmada adeegyada, harinder tim . So [facility] [phone].    ATENCIÓN: Si habla español, tiene a jean disposición servicios gratuitos de asistencia lingüística. [facility] [phone].    We comply with applicable federal civil rights laws and [place] laws. We do not discriminate on the basis of race, color, national origin, age, disability, sex, sexual orientation, or gender identity.            Thank you!     Thank you for choosing [facility]  for your care. Our goal is always to provide you with excellent care. Hearing back from our patients is one way we can continue to improve our services. Please take a few minutes to complete the written survey that you may receive in the mail after your visit with us. Thank you!             Your Updated Medication List - Protect others around you: Learn how to safely use, store and throw away your medicines at www.disposemymeds.org.          This list is accurate as of 1/23/18 11:59 PM.  Always use your most recent med list.                   Brand Name Dispense Instructions for use Diagnosis    B Complex Tabs           baclofen 10 MG tablet    LIORESAL     TAKE 1 TABLET BY MOUTH 3 TIMES A DAY AS NEEDED FOR MUSCLE SPASM        cholecalciferol 99961 UNITS capsule    vitamin D3     Take 1 capsule by mouth daily        DULoxetine 60 MG EC capsule    CYMBALTA    180 capsule    Take 2 capsules (120 mg) by  mouth every morning    Depressive disorder, Chronic pain syndrome, Fibromyalgia       HYDROcodone-acetaminophen  MG per tablet    NORCO     Take 1 tablet by mouth every 6 hours as needed for moderate to severe pain        ibuprofen 200 MG tablet    ADVIL/MOTRIN          lisinopril 10 MG tablet    PRINIVIL/ZESTRIL    90 tablet    TAKE 1 TABLET (10 MG) BY MOUTH DAILY    Benign essential hypertension       magnesium 500 MG Tabs      Take 1,000 mg by mouth        morphine 15 MG 12 hr tablet    MS CONTIN          omeprazole 20 MG CR capsule    priLOSEC     Take 20 mg by mouth        oxyCODONE-acetaminophen 5-325 MG per tablet    PERCOCET     TK 1 T PO  TID        pregabalin 150 MG capsule    LYRICA    60 capsule    Take 1 capsule (150 mg) by mouth 3 times daily Patient  Will contact you when ready to fill    Chronic pain syndrome, Fibromyalgia, TMJ (temporomandibular joint syndrome)       pseudoePHEDrine 120 MG 12 hr tablet    SUDAFED 12 HOUR    90 tablet    Take 1 tablet (120 mg) by mouth 2 times daily    Chronic vasomotor rhinitis       spironolactone 50 MG tablet    ALDACTONE    90 tablet    Take 1 tablet (50 mg) by mouth daily    Acne vulgaris       traZODone 50 MG tablet    DESYREL    180 tablet    Take 2 tablets (100 mg) by mouth nightly as needed for sleep    Moderate episode of recurrent major depressive disorder (H)       TYLENOL PO      Take 1,000 mg by mouth every 4 hours as needed for mild pain or fever

## 2018-01-24 NOTE — PROCEDURES
"SLEEP STUDY INTERPRETATION  SPLIT-NIGHT STUDY      Patient: Heath Waller  YOB: 1980  Study Date: 1/23/2018  MRN: [id number]  Referring Provider: Alex Interiano MD  Ordering Provider: Christian Mejia MD    Indications for Polysomnography: The patient is a 37 y old Female who is 5' 9\" and weighs 195.0 lbs.  Her BMI is 28.9, [place] sleepiness scale is 15.0 and neck size is 36.0.  Relevant medical history includes ROSA/complex sleep apnea related to narcotics, hypertension, chronic pain syndrome on chronic narcotic therapy, depression, ADD and DJD cervical spine.  A diagnostic polysomnogram was performed to evaluate for Sleep Apnea, PLMS, CSA, hypoventilation and hypoxemia.  After 145.5 minutes of sleep time the patient exhibited sufficient respiratory events qualifying her for a CPAP trial which was then initiated.  Patient was diagnosed sleep apnea in 2012 and uses CPAP but has high residual AHI related to PAP emergent central apnea. Echocardiogram on 1/19/18 showed EF 60-65%.    Polysomnogram Data:  A full night polysomnogram recorded the standard physiologic parameters including EEG, EOG, EMG, ECG, nasal and oral airflow.  Respiratory parameters of chest and abdominal movements were recorded with respiratory inductance plethysmography.  Oxygen saturation was recorded by pulse oximetry.      Diagnostic PSG  Sleep Architecture: All stages of sleep were achieved except REM sleep during diagnostic portion of sleep study. There were increased sleep fragmentation and poor sleep efficiency.  The total recording time of the diagnostic portion of the study was 163.0 minutes.  The total sleep time was 145.5 minutes.  During the diagnostic portion of the study the sleep latency was decreased at 5.0 minutes without the use of a sleep aid.  REM latency was - minutes.  Arousal index was increased at 80.0 arousals per hour.  Sleep efficiency was mildly decreased at 89.3%.  Wake after sleep onset was 7.0 minutes.  "  The patient spent 33.3% of total sleep time in Stage N1, 42.6% in Stage N2, 24.1% in Stage N3 and 0.0% in REM.       Respiration: There was sleep disordered breathing, characterized predominantly by obstructive respiratory apneas and hypopneas with mild  sleep related hypoxemia/hypoventilation. There were central apnea events during N3 which is related to narcotic use    Events - During the diagnostic portion of the study, the polysomnogram revealed a presence of 55 obstructive, 13 central, and 11 mixed apneas resulting in an apnea index of 32.6 events per hour.  There were 68 hypopneas resulting in a hypopnea index of 28.0 events per hour.  The combined apnea/hypopnea index was 60.6 events per hour.  The REM AHI was not applicable as there was no REM sleep during diagnostic portion of the study.  The supine AHI was 60.6 events per hour.  The RERA index was 9.5 events per hour.  The RDI was 70.1 events per hour.     Snoring - was reported as mild to moderate.    Respiratory rate and pattern - was notable for normal respiratory rate and pattern.    Sustained Sleep Associated Hypoventilation - Transcutaneous carbon dioxide monitoring was not used; however significant hypoventilation was present with a maximum change of 12 mmHg.    Sleep Associated Hypoxemia - (Greater than 5 minutes O2 sat below 89%) was present.  Baseline oxygen saturation was 97.0%. Lowest oxygen saturation was 81.1%.  Time spent less than or equal to 88% was 5.8 minutes.  Time spent less than or equal to 89% was 7.8 minutes.  70.1 9.5 60.6     Treatment PSG  Sleep Architecture: There was some improvement of sleep architecture and normal sleep efficiency during PAP titration.  At 12:54:23 AM the patient was placed on CPAP treatment and was titrated at pressures ranging from 5/0/20 cmH2O up to 9/0/20 cmH2O.  The total recording time of the treatment portion of the study was 315.3 minutes.  The total sleep time was 284.5 minutes.  During the  treatment portion of the study the sleep latency was 3.5 minutes.  REM latency was 184.0 minutes.  Arousal index was increased at 54.8 arousals per hour.  Sleep efficiency was normal at 90.2%.  Wake after sleep onset was 26.0 minutes.  The patient spent 17.0% of total sleep time in Stage N1, 37.8% in Stage N2, 32.0% in Stage N3 and 13.2% in REM.       Respiration: The optimal ASV pressure was not achieved, but the ASV markedly reduced central apneas. Patient had mouth breathing and masks leak issues and was moaning during PAP titration against incoming pressures.  The optimal pressure was not achieved but the ASV with pressures of EPAP max 9 cmH2O/ PS min 0/ PS max 20 cmH2O had a residual AHI of 5.7 events per hour and with significant reduction of PAP emergent central apneas.  Time in REM supine on final pressure was 0 minutes.   This titration was considered: adequate (residual AHII with 75% decrease or above constraints without REM-supine sleep at final pressure).    Movement Activity: There were occasional periodic leg movements without significant associated arousals during PAP titration. There was no abnormal nocturnal sleep behavior.    Periodic Limb Movements  o During the diagnostic portion of the study, there were 0 PLMs recorded. The PLM index was 0 movements per hour.    o During the treatment portion of the study, there were 68 PLMs recorded. The PLM index was 14.3 movements per hour.  The PLM Arousal Index was 1.7 per hour.    REM EMG Activity - Excessive transient / sustained muscle activity was not present.    Nocturnal Behavior - Abnormal sleep related behaviors were not noted during NREM / REM sleep.     Bruxism - None apparent.    Cardiac Summary: There was normal sinus rhythm throughout the night with occasional premature atrial contractures.  During the diagnostic portion of the study, the average pulse rate was 63.3 bpm.  The minimum pulse rate was 54.0 bpm while the maximum pulse rate was 84.9  bpm.    During the treatment portion of the study, the average pulse rate was 66.6 bpm.  The minimum pulse rate was 54.6 bpm while the maximum pulse rate was 85.0 bpm.     Arrhythmias were not noted except few PAC s.  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    Sleep Hypoxemia/hypoventilation G47.36    Recommendations:    Treatment of ROSA with a trial of ASV at pressures EPAP min 5 cmH2O/ EPAP max 10 cmH2O/ PS min 0/ PS max 20 cmH2O and maximum pressure of 25 cmH2O.  Recommend clinical follow up with sleep management team, for coaching and review of effectiveness and measures.    Hypoxemia may very well resolve once respiratory events (obstruction/apneas) caused by sleep disordered breathing is addressed, but if it persists on an overnight nocturnal oximetry, we could consider supplemental O2 therapy.    Sleep hypoventilation is related to narcotics use causing depression of respiratory center, treat with PAP therapy and will need weaning of narcotics.    Mask refitting with full face mask or nasal mask with chin strap for mouth breathing.    Avoid sedating medications, including narcotics, and alcohol, as these may exacerbate sleep apnea and/or underlying respiratory disorders. Follow up pain management team to reduce narcotics in future and optimize management of chronic pain with other means of therapy.    Advise regarding the risks of drowsy driving.    Suggest optimizing sleep schedule and avoiding sleep deprivation.    Weight management (if BMI > 30).    Follow up primary care doctor as scheduled.        _____________________________________   electronically signed by: MAMI BEAN MD (1/24/18)     CC: Alex Interiano MD          Table of Oximetry Distribution    Range(%) Time in range (min) Time in range (%) Time in or below range (min) Time in or below range (%)   0.0 - 88.0 5.8 1.2% 5.8 1.2%   0.0 - 89.0 7.8  1.6% 7.8 1.6%

## 2018-01-24 NOTE — NURSING NOTE
Completed a split night PSG per provider order.    Preliminary AHI 50.  A final therapeutic PAP pressure was not achieved.    Supine REM was not seen on therapeutic pressure.    Patient reports feeling not refreshed in AM.

## 2018-01-30 ENCOUNTER — OFFICE VISIT (OUTPATIENT)
Dept: SLEEP MEDICINE | Facility: CLINIC | Age: 38
End: 2018-01-30
Payer: COMMERCIAL

## 2018-01-30 VITALS
HEART RATE: 62 BPM | BODY MASS INDEX: 28.88 KG/M2 | OXYGEN SATURATION: 97 % | DIASTOLIC BLOOD PRESSURE: 74 MMHG | HEIGHT: 69 IN | WEIGHT: 195 LBS | SYSTOLIC BLOOD PRESSURE: 111 MMHG | RESPIRATION RATE: 14 BRPM

## 2018-01-30 DIAGNOSIS — G47.34 SLEEP RELATED HYPOXIA: ICD-10-CM

## 2018-01-30 DIAGNOSIS — G47.33 OSA (OBSTRUCTIVE SLEEP APNEA): ICD-10-CM

## 2018-01-30 DIAGNOSIS — G47.39 COMPLEX SLEEP APNEA SYNDROME: Primary | ICD-10-CM

## 2018-01-30 PROCEDURE — 99213 OFFICE O/P EST LOW 20 MIN: CPT | Performed by: INTERNAL MEDICINE

## 2018-01-30 NOTE — PROGRESS NOTES
Sleep Study Follow-Up Visit:    Date on this visit: 1/30/2018    ASSESSMENT / PLAN:       Complex sleep apnea syndrome  ROSA (obstructive sleep apnea)  Sleep related hypoxia   Complex sleep apnea related to related to narcotic use    Discussed with patient the recent sleep study and treatment options, we recommend ASV machine with setting of EPAP max 9 cmH2O/ PS min 0/ PS max 20 cmH2O in addition to weight loss and avoiding sleeping supine position. Patient is recommended to improve his sleep-wake schedule and good sleep hygiene. Patient was advised to use PAP therapy for at least 7-8 hours and during naps (naps are not recommended).  Instructions given. Follow up 4-6 weeks after PAP use.  Hypoxemia may very well resolve once respiratory events (obstruction/apneas) caused by sleep disordered breathing is addressed, but if it persists on overnight oximetry could consider supplemental O2 therapy.  Follow up pain management team and PCP.      All questions were answered.  The patient indicates understanding of the above issues and agrees with the plan set forth.    Orders Placed This Encounter   Procedures     Comprehensive DME       She will follow up with me in about 2 month(s).       BRIEF SUMMARY:    Heath Waller is a 37 year old female with history of ROSA/complex sleep apnea related to narcotics, hypertension, depression, ADD, DJD cervical spine comes in today for follow-up of her sleep study done on 1/23/18 at the [facility] for result of sleep study. Please see H&P for his presenting sleep symptoms for additional details.  Patient was diagnosed with sleep apnea in 2011 and was prescribed CPAP. She did not use CPAP initially but started 0922-1211, and she uses sporadically due to difficulty. She also was using Norco for back pain.   Last visit (her first visit) this clinic, she has high residual AHI 9.8/rh predominantly central apneas. Her auto-CPAP was fixed to CPAP pressure of 11 cmH2O but  remained to have high residual central apnea related to narcotic use. So repeat spilt study was done.      PS18  Sleep latency 5 minutes without sleep aid.    REM not achieved.     Sleep efficiency 89.3%. Total sleep time 145 minutes.  Sleep architecture:  Stage 1, 33.3% (5%), stage 2, 42.6% (45-55%), stage 3, 24.1% (15-20%), stage REM, 0% (20-25%).  AHI was 60.6/hour.   CSAI was 9.9/hour.   RDI 70.1/hour.    REM AHI N/A.    Supine AHI 60.6/hour.    Lowest O2 saturation:81.1%  S/He spent 5.8 minutes below 88% SpO2.   Periodic Limb Movement Index 0/hour.       CPAP titration:    Sleep latency 3.5 minutes.    REM achieved. REM latency 184 minutes.    Sleep efficiency 90.2%. Total sleep time 315.3 minutes.   Sleep architecture:  Stage 1, 17% (5%), stage 2, 37.8% (45-55%), stage 3, 32% (15-20%), stage REM, 13.2% (20-25%).    Periodic Limb Movement Index 14.3/hour.   CPAP was titrated to ASV with pressures of EPAP max 9 cmH2O/ PS min 0/ PS max 20 cmH2O with reduction of apneas, hypopneas and desaturations.   Supine REM was not noted at this pressure.     PREVIOUS SLEEP STUDIES:  Date: 10/4/2012  TYPE: PSG  AHI: 6/hr (RDI 18/hr)  Central apnea Index: 0/hr  BMI: 24  Intervention: CPAP  Sleep Architecture:  Lowest O2 saturation: 86%  PLM's: 0/hr     MSLT:  Mean sleep latency: 7.8 minutes  SOREMP: 0    These findings were reviewed with the patient and copy of the sleep study result was given.    Past medical/surgical history, family history, social history, medications and allergies were reviewed.      Problem List:  Patient Active Problem List    Diagnosis Date Noted     Moderate episode of recurrent major depressive disorder (H) 2017     Priority: Medium     ROSA (obstructive sleep apnea) 2017     Priority: Medium     Benign essential hypertension 2016     Priority: Medium     Anxiety attack 2016     Priority: Medium     Chronic rhinitis 2016     Priority: Medium     Depressive disorder  11/14/2016     Priority: Medium     Gastroesophageal reflux disease 11/14/2016     Priority: Medium     History of thyroid disease 11/14/2016     Priority: Medium     Overview:   benign tumor, post partial thyroidectomy       Chronic pain 11/14/2016     Priority: Medium     Attention deficit disorder (ADD) without hyperactivity 11/14/2016     Priority: Medium     Acne vulgaris 11/14/2016     Priority: Medium     TMJ (temporomandibular joint syndrome) 11/14/2016     Priority: Medium     Fibromyalgia 11/14/2016     Priority: Medium     Cervical intraepithelial neoplasia grade III with severe dysplasia 12/03/2008     Priority: Medium     Overview:   LEEP (12/08) confirmed CIN3. Margins negative, negative ECC.               Medications:     Current Outpatient Prescriptions   Medication Sig     oxyCODONE-acetaminophen (PERCOCET) 5-325 MG per tablet TK 1 T PO  TID     traZODone (DESYREL) 50 MG tablet Take 2 tablets (100 mg) by mouth nightly as needed for sleep     morphine (MS CONTIN) 15 MG 12 hr tablet      DULoxetine (CYMBALTA) 60 MG EC capsule Take 2 capsules (120 mg) by mouth every morning     cholecalciferol (VITAMIN D3) 50168 UNITS capsule Take 1 capsule by mouth daily     baclofen (LIORESAL) 10 MG tablet TAKE 1 TABLET BY MOUTH 3 TIMES A DAY AS NEEDED FOR MUSCLE SPASM     magnesium 500 MG TABS Take 1,000 mg by mouth     HYDROcodone-acetaminophen (NORCO)  MG per tablet Take 1 tablet by mouth every 6 hours as needed for moderate to severe pain     pseudoePHEDrine (SUDAFED 12 HOUR) 120 MG 12 hr tablet Take 1 tablet (120 mg) by mouth 2 times daily     lisinopril (PRINIVIL/ZESTRIL) 10 MG tablet TAKE 1 TABLET (10 MG) BY MOUTH DAILY     Acetaminophen (TYLENOL PO) Take 1,000 mg by mouth every 4 hours as needed for mild pain or fever     pregabalin (LYRICA) 150 MG capsule Take 1 capsule (150 mg) by mouth 3 times daily Patient  Will contact you when ready to fill     spironolactone (ALDACTONE) 50 MG tablet Take 1 tablet  "(50 mg) by mouth daily     B Complex TABS      ibuprofen (ADVIL,MOTRIN) 200 MG tablet      omeprazole (PRILOSEC) 20 MG capsule Take 20 mg by mouth     No current facility-administered medications for this visit.           PHYSICAL EXAMINATION:  /74  Pulse 62  Resp 14  Ht 1.753 m (5' 9\")  Wt 88.5 kg (195 lb)  SpO2 97%  BMI 28.8 kg/m2          Data: All pertinent previous laboratory data reviewed     No results found for: PH, PHARTERIAL, PO2, VE4GMMPILIH, SAT, PCO2, HCO3, BASEEXCESS, PACO, BEB  Lab Results   Component Value Date    TSH 1.53 06/30/2016     Lab Results   Component Value Date    GLC 78 09/29/2017    GLC 92 06/30/2016     Lab Results   Component Value Date    HGB 14.3 06/30/2016    HGB 13.8 06/06/2016     Lab Results   Component Value Date    BUN 11 09/29/2017    BUN 10 06/30/2016    CR 0.70 09/29/2017    CR 0.78 06/30/2016     No results found for: JANES     Fifteen minutes spent with patient, all of which were spent face-to-face counseling, consulting, coordinating plan of care, going over sleep test results and chart review.          Christian Mejia MD 1/30/2018   [facility]  [facility] [address], [place] [address] [phone] Clinic      CC: No ref. provider found  Copy to: Alex Interiano    "

## 2018-01-30 NOTE — MR AVS SNAPSHOT
After Visit Summary   1/30/2018    Heath Waller    MRN: [id number]           Patient Information     Date Of Birth          1980        Visit Information        Provider Department      1/30/2018 4:30 PM Christian Mejia MD [facility]        Today's Diagnoses     Complex sleep apnea syndrome    -  1      Care Instructions    MY TREATMENT INFORMATION FOR SLEEP APNEA-  Heath Waller    MY CONTACT NUMBERS ARE:  [phone]  DOCTOR : Christian Mejia  SLEEP CENTER :  [facility]  CPAP EQUIPMENT     You have severe obstructive/complex sleep apnea with low oxygen, ASV is prescribed for you.   (AHI 0-5/hr normal; AHI 5-15 evens of hour is a mild sleep apnea; AHI 15-30/hr is moderate sleep apnea; AHI over 30/hr is severe sleep apnea)  CPAP therapy includes adaptation to a mask interface and a delivery of air pressure.    You will be provided with an ASV machine with heated humidity to limit nasal congestion. Adjust the heat level on humidifier to find a setting that prevents dry nose but does not cause condensation in the hose or mask. Use distilled water in the humidifier.    The ASV has a ramp function that starts the pressure lower than your prescribed pressure and gradually increases it over a number of minutes.  This may make it easier to fall asleep.                  Try to use the ASV every-night, all night, at least 7-8 hours each night.  Daytime naps are not advised, but use ASV if taking naps. Many insurances require that we prove you are using the ASV at least 4 hours on at least 70% of nights over a 30 day period. We have 90 days to meet those criteria.    Objective measure goal  Compliance  Goal >70% (preferrably 100%)  Leak   Goal < 10% (less than 24 L/min)  AHI  Goal < 5 events per hour   Usage  Goal 7-8 hours.         Patient was advised not to drive if drowsy or sleepy.                Discussed weight management and the impact of weight gain on sleep  "apnea.  Let me know if you snore or feel the pressure is too high.    You can get new supplies (mask, hose and filter) for your CPAP every 3-6 months, covered by insurance. You do not need to get supplies that often, but they are available if you would like them.  You may exchange the mask once within the first month if you feel the initial mask does not fit well.  Contact your medical equipment provider for equipment issues.  Please let me know if you have any return of snoring, daytime sleepiness or poor sleep quality. We will want to make sure your CPAP is adequately treating your apnea.  There is a website called CPAP.com that has accessories that may make CPAP use easier. Please visit it at your convenience.    Our phone number is [phone]    Follow-up 4-6 weeks after PAP usage.  Bring your CPAP machine with you to the follow up appointment.               Frequently asked questions:  1. What is Obstructive Sleep Apnea (ROSA)? ROSA is the most common type of sleep apnea. Apnea literally means, \"without breath.\" It is characterized by repetitive pauses in breathing, despite continued effort to breathe, and is usually associated with a reduction in blood oxygen saturation. Apneas can last 10 to over 60 seconds. It is caused by narrowing or collapse of the upper airway as muscles relax during sleep. Severity of sleep apnea is determined by frequency of breathing events and their effect on your sleep and oxygen levels determined during sleep testing.   2. What are the consequences of ROSA? Symptoms include: daytime sleepiness- possibly increasing the risk of falling asleep while driving, unrefreshing/restless sleep, snoring, insomnia, waking frequently to urinate, waking with heartburn or reflux, reduced concentration and memory, and morning headaches. Other health consequences may include development of high blood pressure and other cardiovascular disease in persons who are susceptible. Untreated ROSA  can " contribute to heart disease, stroke and diabetes.   3. What are the treatment options? In most situations, sleep apnea is a lifelong disease that must be managed with daily therapy. Medications are not effective for sleep apnea and surgery is generally not performed until other therapies have been tried. Therapy is usually tailored to the individual patient based on many factors including your wishes as well as severity of sleep apnea and severity of obesity. Continuous Positive Airway (CPAP) is the most reliable treatment. An oral device to hold your jaw forward is usually      IF I HAVE SLEEP APNEA.....  WHERE CAN I FIND MORE INFORMATION?    American Academy of Sleep Medicine Patient information on sleep disorders:  http://yoursleep.aasmnet.org    THINGS I SHOULD REMEMBER  In most situations, sleep apnea is a lifelong disease that must be managed with daily therapy. Untreated disease, when severe, may result in an increased risk for an array of problems from heart disease to mood changes, car accidents and shorter lifespan.    CPAP-  WHY AND HOW?                                    Continuous positive airway pressure, or CPAP, is the most effective treatment for obstructive sleep apnea. A decision to use CPAP is a major step forward in the pursuit of a healthier life. The successful use of CPAP will help you breathe easier, sleep better and live healthier. Using CPAP can be a positive experience if you keep these villegas points in mind:  1. Commitment  CPAP is not a quick fix for your problem. It involves a long-term commitment to improve your sleep and your health.    2. Communication  Stay in close communication with both your sleep doctor and your CPAP supplier. Ask lots of questions and seek help when you need it.    3. Consistency  Use CPAP all night, every night and for every nap. You will receive the maximum health benefits from CPAP when you use it every time that you sleep. This will also make it easier for  "your body to adjust to the treatment.    4. Correction  The first machine and mask that you try may not be the best ones for you. Work with your sleep doctor and your CPAP supplier to make corrections to your equipment selection. Ask about trying a different type of machine or mask if you have ongoing problems. Make sure that your mask is a good fit and learn to use your equipment properly.    5. Challenge  Tell a family member or close friend to ask you each morning if you used your CPAP the previous night. Have someone to challenge you to give it your best effort.    6. Connection   Your adjustment to CPAP will be easier if you are able to connect with others who use the same treatment. Ask your sleep doctor if there is a support group in your area for people who have sleep apnea, or look for one on the Internet.    7. Comfort   Increase your level of comfort by using a saline spray, decongestant or heated humidifier if CPAP irritates your nose, mouth or throat. Use your unit's \"ramp\" setting to slowly get used to the air pressure level. There may be soft pads you can buy that will fit over your mask straps. Look on www.CPAP.com for accessories such as these straps, a pillow contoured for side-sleeping with CPAP, longer hoses, hose covers to reduce condensation, or stands to keep the hose out of your way.                                                              8. Cleaning   Clean your mask, tubing and headgear on a regular basis. Put this time in your schedule so that you don't forget to do it. Check and replace the filters for your CPAP unit and humidifier.    9. Completion   Although you are never finished with CPAP therapy, you should reward yourself by celebrating the completion of your first month of treatment. Expect this first month to be your hardest period of adjustment. It will involve some trial and error as you find the machine, mask and pressure settings that are right for " you.    10. Continuation  After your first month of treatment, continue to make a daily commitment to use your CPAP all night, every night and for every nap.    CPAP-Tips to starting with success:  Begin using your CPAP for short periods of time during the day while you watch TV or read.    Use CPAP every night and for every nap. Using it less often reduces the health benefits and makes it harder for your body to get used to it.    Newer CPAP models are virtually silent; however, if you find the sound of your CPAP machine to be bothersome, place the unit under your bed to dampen the sound.     Make small adjustments to your mask, tubing, straps and headgear until you get the right fit. Tightening the mask may actually worsen the leak.  If it leaves significant marks on your face or irritates the bridge of your nose, it may not be the best mask for you.  Speak with the person who supplied the mask and consider trying other masks.    Use a saline nasal spray to ease mild nasal congestion. Neti-Pot or saline nasal rinses may also help. Nasal gel sprays can help reduce nasal dryness.  Biotene mouthwash can be helpful to protect your teeth if you experience frequent dry mouth.  Dry mouth may be a sign of air escaping out of your mouth or out of the mask in the case of a full face mask.  Speak with your provider if you expect that is the case.     Take a nasal decongestant to relieve more severe nasal or sinus congestion.  Do not use Afrin (oxymetazoline) nasal spray more than 3 days in a row.  Speak with your sleep doctor if your nasal congestion is chronic.    Use a heated humidifier that fits your CPAP model to enhance your breathing comfort. Adjust the heat setting up if you get a dry nose or throat, down if you get condensation in the hose or mask.  Position the CPAP lower than you so that any condensation in the hose drains back into the machine rather than towards the mask.    Try a system that uses nasal pillows  if traditional masks give you problems.    Clean your mask, tubing and headgear once a week. Make sure the equipment dries fully.    Regularly check and replace the filters for your CPAP unit and humidifier.    Work closely with your sleep doctor and your CPAP supplier to make sure that you have the machine, mask and air pressure setting that works best for you.      Your BMI is Body mass index is 28.8 kg/(m^2).  Weight management is a personal decision.  If you are interested in exploring weight loss strategies, the following discussion covers the approaches that may be successful. Body mass index (BMI) is one way to tell whether you are at a healthy weight, overweight, or obese. It measures your weight in relation to your height.  A BMI of 18.5 to 24.9 is in the healthy range. A person with a BMI of 25 to 29.9 is considered overweight, and someone with a BMI of 30 or greater is considered obese. More than two-thirds of American adults are considered overweight or obese.  Being overweight or obese increases the risk for further weight gain. Excess weight may lead to heart disease and diabetes.  Creating and following plans for healthy eating and physical activity may help you improve your health.  Weight control is part of healthy lifestyle and includes exercise, emotional health, and healthy eating habits. Careful eating habits lifelong are the mainstay of weight control. Though there are significant health benefits from weight loss, long-term weight loss with diet alone may be very difficult to achieve- studies show long-term success with dietary management in less than 10% of people. Attaining a healthy weight may be especially difficult to achieve in those with severe obesity. In some cases, medications, devices and surgical management might be considered.  What can you do?  If you are overweight or obese and are interested in methods for weight loss, you should discuss this with your provider.     Consider  reducing daily calorie intake by 500 calories.     Keep a food journal.     Avoiding skipping meals, consider cutting portions instead.    Diet combined with exercise helps maintain muscle while optimizing fat loss. Strength training is particularly important for building and maintaining muscle mass. Exercise helps reduce stress, increase energy, and improves fitness. Increasing exercise without diet control, however, may not burn enough calories to loose weight.       Start walking three days a week 10-20 minutes at a time    Work towards walking thirty minutes five days a week     Eventually, increase the speed of your walking for 1-2 minutes at time    In addition, we recommend that you review healthy lifestyles and methods for weight loss available through the National Institutes of Health patient information sites:  http://win.niddk.nih.gov/publications/index.htm    And look into health and wellness programs that may be available through your health insurance provider, employer, local community center, or brian club.    Weight management plan: Patient was referred to their PCP to discuss a diet and exercise plan.       Your blood pressure was checked while you were in clinic today.  Please read the guidelines below about what these numbers mean and what you should do about them.  Your systolic blood pressure is the top number.  This is the pressure when the heart is pumping.  Your diastolic blood pressure is the bottom number.  This is the pressure in between beats.  If your systolic blood pressure is less than 120 and your diastolic blood pressure is less than 80, then your blood pressure is normal. There is nothing more that you need to do about it  If your systolic blood pressure is 120-139 or your diastolic blood pressure is 80-89, your blood pressure may be higher than it should be.  You should have your blood pressure re-checked within a year by a primary care provider.  If your systolic blood pressure is  "140 or greater or your diastolic blood pressure is 90 or greater, you may have high blood pressure.  High blood pressure is treatable, but if left untreated over time it can put you at risk for heart attack, stroke, or kidney failure.  You should have your blood pressure re-checked by a primary care provider within the next four weeks.              Follow-ups after your visit        Who to contact     If you have questions or need follow up information about today's clinic visit or your schedule please contact [facility] directly at [phone].  Normal or non-critical lab and imaging results will be communicated to you by [employer], letter or phone within 4 business days after the clinic has received the results. If you do not hear from us within 7 days, please contact the clinic through [employer] or phone. If you have a critical or abnormal lab result, we will notify you by phone as soon as possible.  Submit refill requests through [employer] or call your pharmacy and they will forward the refill request to us. Please allow 3 business days for your refill to be completed.          Additional Information About Your Visit        [employer] Information     [employer] gives you secure access to your electronic health record. If you see a primary care provider, you can also send messages to your care team and make appointments. If you have questions, please call your primary care clinic.  If you do not have a primary care provider, please call [phone] and they will assist you.        Care EveryWhere ID     This is your Care EveryWhere ID. This could be used by other organizations to access your [facility] medical records  [id number]        Your Vitals Were     Pulse Respirations Height Pulse Oximetry BMI (Body Mass Index)       62 14 1.753 m (5' 9\") 97% 28.8 kg/m2        Blood Pressure from Last 3 Encounters:   01/30/18 111/74   01/08/18 118/74   12/15/17 104/72    Weight from Last 3 Encounters: "   01/30/18 88.5 kg (195 lb)   01/08/18 88.5 kg (195 lb)   12/15/17 88.7 kg (195 lb 9.6 oz)              We Performed the Following     Comprehensive DME        Primary Care Provider Office Phone # Fax #    Alex Jude Interiano [phone]       [id number]  [address]  [facility] [id number]        Equal Access to Services     NAVYA TRISTAN : Hadii aad ku hadasho Soomaali, waaxda luqadaha, qaybta kaalmada adeegyada, waxay idiin hayaan adeeg kharash la'aan ah. So [facility] [phone].    ATENCIÓN: Si justina reynoso, tiene a jean disposición servicios gratuitos de asistencia lingüística. Llame al [phone].    We comply with applicable federal civil rights laws and [place] laws. We do not discriminate on the basis of race, color, national origin, age, disability, sex, sexual orientation, or gender identity.            Thank you!     Thank you for choosing [facility]  for your care. Our goal is always to provide you with excellent care. Hearing back from our patients is one way we can continue to improve our services. Please take a few minutes to complete the written survey that you may receive in the mail after your visit with us. Thank you!             Your Updated Medication List - Protect others around you: Learn how to safely use, store and throw away your medicines at www.disposemymeds.org.          This list is accurate as of 1/30/18  4:49 PM.  Always use your most recent med list.                   Brand Name Dispense Instructions for use Diagnosis    B Complex Tabs           baclofen 10 MG tablet    LIORESAL     TAKE 1 TABLET BY MOUTH 3 TIMES A DAY AS NEEDED FOR MUSCLE SPASM        cholecalciferol 98275 UNITS capsule    vitamin D3     Take 1 capsule by mouth daily        DULoxetine 60 MG EC capsule    CYMBALTA    180 capsule    Take 2 capsules (120 mg) by mouth every morning    Depressive disorder, Chronic pain syndrome, Fibromyalgia       HYDROcodone-acetaminophen  MG  per tablet    NORCO     Take 1 tablet by mouth every 6 hours as needed for moderate to severe pain        ibuprofen 200 MG tablet    ADVIL/MOTRIN          lisinopril 10 MG tablet    PRINIVIL/ZESTRIL    90 tablet    TAKE 1 TABLET (10 MG) BY MOUTH DAILY    Benign essential hypertension       magnesium 500 MG Tabs      Take 1,000 mg by mouth        morphine 15 MG 12 hr tablet    MS CONTIN          omeprazole 20 MG CR capsule    priLOSEC     Take 20 mg by mouth        oxyCODONE-acetaminophen 5-325 MG per tablet    PERCOCET     TK 1 T PO  TID        pregabalin 150 MG capsule    LYRICA    60 capsule    Take 1 capsule (150 mg) by mouth 3 times daily Patient  Will contact you when ready to fill    Chronic pain syndrome, Fibromyalgia, TMJ (temporomandibular joint syndrome)       pseudoePHEDrine 120 MG 12 hr tablet    SUDAFED 12 HOUR    90 tablet    Take 1 tablet (120 mg) by mouth 2 times daily    Chronic vasomotor rhinitis       spironolactone 50 MG tablet    ALDACTONE    90 tablet    Take 1 tablet (50 mg) by mouth daily    Acne vulgaris       traZODone 50 MG tablet    DESYREL    180 tablet    Take 2 tablets (100 mg) by mouth nightly as needed for sleep    Moderate episode of recurrent major depressive disorder (H)       TYLENOL PO      Take 1,000 mg by mouth every 4 hours as needed for mild pain or fever

## 2018-01-30 NOTE — PATIENT INSTRUCTIONS
MY TREATMENT INFORMATION FOR SLEEP APNEA-  Heath Waller    MY CONTACT NUMBERS ARE:  [phone]  DOCTOR : Christian [facility]Y [facility]  SLEEP CENTER :  [facility]  CPAP EQUIPMENT     You have severe obstructive/complex sleep apnea with low oxygen, ASV is prescribed for you.   (AHI 0-5/hr normal; AHI 5-15 evens of hour is a mild sleep apnea; AHI 15-30/hr is moderate sleep apnea; AHI over 30/hr is severe sleep apnea)  CPAP therapy includes adaptation to a mask interface and a delivery of air pressure.    You will be provided with an ASV machine with heated humidity to limit nasal congestion. Adjust the heat level on humidifier to find a setting that prevents dry nose but does not cause condensation in the hose or mask. Use distilled water in the humidifier.    The ASV has a ramp function that starts the pressure lower than your prescribed pressure and gradually increases it over a number of minutes.  This may make it easier to fall asleep.                  Try to use the ASV every-night, all night, at least 7-8 hours each night.  Daytime naps are not advised, but use ASV if taking naps. Many insurances require that we prove you are using the ASV at least 4 hours on at least 70% of nights over a 30 day period. We have 90 days to meet those criteria.    Objective measure goal  Compliance  Goal >70% (preferrably 100%)  Leak   Goal < 10% (less than 24 L/min)  AHI  Goal < 5 events per hour   Usage  Goal 7-8 hours.         Patient was advised not to drive if drowsy or sleepy.                Discussed weight management and the impact of weight gain on sleep apnea.  Let me know if you snore or feel the pressure is too high.    You can get new supplies (mask, hose and filter) for your CPAP every 3-6 months, covered by insurance. You do not need to get supplies that often, but they are available if you would like them.  You may exchange the mask once within the first month if you feel the initial mask does not fit well.  Contact  "your medical equipment provider for equipment issues.  Please let me know if you have any return of snoring, daytime sleepiness or poor sleep quality. We will want to make sure your CPAP is adequately treating your apnea.  There is a website called CPAP.com that has accessories that may make CPAP use easier. Please visit it at your convenience.    Our phone number is [phone]    Follow-up 4-6 weeks after PAP usage.  Bring your CPAP machine with you to the follow up appointment.               Frequently asked questions:  1. What is Obstructive Sleep Apnea (ROSA)? ROSA is the most common type of sleep apnea. Apnea literally means, \"without breath.\" It is characterized by repetitive pauses in breathing, despite continued effort to breathe, and is usually associated with a reduction in blood oxygen saturation. Apneas can last 10 to over 60 seconds. It is caused by narrowing or collapse of the upper airway as muscles relax during sleep. Severity of sleep apnea is determined by frequency of breathing events and their effect on your sleep and oxygen levels determined during sleep testing.   2. What are the consequences of ROSA? Symptoms include: daytime sleepiness- possibly increasing the risk of falling asleep while driving, unrefreshing/restless sleep, snoring, insomnia, waking frequently to urinate, waking with heartburn or reflux, reduced concentration and memory, and morning headaches. Other health consequences may include development of high blood pressure and other cardiovascular disease in persons who are susceptible. Untreated ROSA  can contribute to heart disease, stroke and diabetes.   3. What are the treatment options? In most situations, sleep apnea is a lifelong disease that must be managed with daily therapy. Medications are not effective for sleep apnea and surgery is generally not performed until other therapies have been tried. Therapy is usually tailored to the individual patient based on many factors " including your wishes as well as severity of sleep apnea and severity of obesity. Continuous Positive Airway (CPAP) is the most reliable treatment. An oral device to hold your jaw forward is usually      IF I HAVE SLEEP APNEA.....  WHERE CAN I FIND MORE INFORMATION?    American Academy of Sleep Medicine Patient information on sleep disorders:  http://yoursleep.aasmnet.org    THINGS I SHOULD REMEMBER  In most situations, sleep apnea is a lifelong disease that must be managed with daily therapy. Untreated disease, when severe, may result in an increased risk for an array of problems from heart disease to mood changes, car accidents and shorter lifespan.    CPAP-  WHY AND HOW?                                    Continuous positive airway pressure, or CPAP, is the most effective treatment for obstructive sleep apnea. A decision to use CPAP is a major step forward in the pursuit of a healthier life. The successful use of CPAP will help you breathe easier, sleep better and live healthier. Using CPAP can be a positive experience if you keep these villegas points in mind:  1. Commitment  CPAP is not a quick fix for your problem. It involves a long-term commitment to improve your sleep and your health.    2. Communication  Stay in close communication with both your sleep doctor and your CPAP supplier. Ask lots of questions and seek help when you need it.    3. Consistency  Use CPAP all night, every night and for every nap. You will receive the maximum health benefits from CPAP when you use it every time that you sleep. This will also make it easier for your body to adjust to the treatment.    4. Correction  The first machine and mask that you try may not be the best ones for you. Work with your sleep doctor and your CPAP supplier to make corrections to your equipment selection. Ask about trying a different type of machine or mask if you have ongoing problems. Make sure that your mask is a good fit and learn to use your equipment  "properly.    5. Challenge  Tell a family member or close friend to ask you each morning if you used your CPAP the previous night. Have someone to challenge you to give it your best effort.    6. Connection   Your adjustment to CPAP will be easier if you are able to connect with others who use the same treatment. Ask your sleep doctor if there is a support group in your area for people who have sleep apnea, or look for one on the Internet.    7. Comfort   Increase your level of comfort by using a saline spray, decongestant or heated humidifier if CPAP irritates your nose, mouth or throat. Use your unit's \"ramp\" setting to slowly get used to the air pressure level. There may be soft pads you can buy that will fit over your mask straps. Look on www.CPAP.com for accessories such as these straps, a pillow contoured for side-sleeping with CPAP, longer hoses, hose covers to reduce condensation, or stands to keep the hose out of your way.                                                              8. Cleaning   Clean your mask, tubing and headgear on a regular basis. Put this time in your schedule so that you don't forget to do it. Check and replace the filters for your CPAP unit and humidifier.    9. Completion   Although you are never finished with CPAP therapy, you should reward yourself by celebrating the completion of your first month of treatment. Expect this first month to be your hardest period of adjustment. It will involve some trial and error as you find the machine, mask and pressure settings that are right for you.    10. Continuation  After your first month of treatment, continue to make a daily commitment to use your CPAP all night, every night and for every nap.    CPAP-Tips to starting with success:  Begin using your CPAP for short periods of time during the day while you watch TV or read.    Use CPAP every night and for every nap. Using it less often reduces the health benefits and makes it harder for " your body to get used to it.    Newer CPAP models are virtually silent; however, if you find the sound of your CPAP machine to be bothersome, place the unit under your bed to dampen the sound.     Make small adjustments to your mask, tubing, straps and headgear until you get the right fit. Tightening the mask may actually worsen the leak.  If it leaves significant marks on your face or irritates the bridge of your nose, it may not be the best mask for you.  Speak with the person who supplied the mask and consider trying other masks.    Use a saline nasal spray to ease mild nasal congestion. Neti-Pot or saline nasal rinses may also help. Nasal gel sprays can help reduce nasal dryness.  Biotene mouthwash can be helpful to protect your teeth if you experience frequent dry mouth.  Dry mouth may be a sign of air escaping out of your mouth or out of the mask in the case of a full face mask.  Speak with your provider if you expect that is the case.     Take a nasal decongestant to relieve more severe nasal or sinus congestion.  Do not use Afrin (oxymetazoline) nasal spray more than 3 days in a row.  Speak with your sleep doctor if your nasal congestion is chronic.    Use a heated humidifier that fits your CPAP model to enhance your breathing comfort. Adjust the heat setting up if you get a dry nose or throat, down if you get condensation in the hose or mask.  Position the CPAP lower than you so that any condensation in the hose drains back into the machine rather than towards the mask.    Try a system that uses nasal pillows if traditional masks give you problems.    Clean your mask, tubing and headgear once a week. Make sure the equipment dries fully.    Regularly check and replace the filters for your CPAP unit and humidifier.    Work closely with your sleep doctor and your CPAP supplier to make sure that you have the machine, mask and air pressure setting that works best for you.      Your BMI is Body mass index is 28.8  kg/(m^2).  Weight management is a personal decision.  If you are interested in exploring weight loss strategies, the following discussion covers the approaches that may be successful. Body mass index (BMI) is one way to tell whether you are at a healthy weight, overweight, or obese. It measures your weight in relation to your height.  A BMI of 18.5 to 24.9 is in the healthy range. A person with a BMI of 25 to 29.9 is considered overweight, and someone with a BMI of 30 or greater is considered obese. More than two-thirds of American adults are considered overweight or obese.  Being overweight or obese increases the risk for further weight gain. Excess weight may lead to heart disease and diabetes.  Creating and following plans for healthy eating and physical activity may help you improve your health.  Weight control is part of healthy lifestyle and includes exercise, emotional health, and healthy eating habits. Careful eating habits lifelong are the mainstay of weight control. Though there are significant health benefits from weight loss, long-term weight loss with diet alone may be very difficult to achieve- studies show long-term success with dietary management in less than 10% of people. Attaining a healthy weight may be especially difficult to achieve in those with severe obesity. In some cases, medications, devices and surgical management might be considered.  What can you do?  If you are overweight or obese and are interested in methods for weight loss, you should discuss this with your provider.     Consider reducing daily calorie intake by 500 calories.     Keep a food journal.     Avoiding skipping meals, consider cutting portions instead.    Diet combined with exercise helps maintain muscle while optimizing fat loss. Strength training is particularly important for building and maintaining muscle mass. Exercise helps reduce stress, increase energy, and improves fitness. Increasing exercise without diet  control, however, may not burn enough calories to loose weight.       Start walking three days a week 10-20 minutes at a time    Work towards walking thirty minutes five days a week     Eventually, increase the speed of your walking for 1-2 minutes at time    In addition, we recommend that you review healthy lifestyles and methods for weight loss available through the National Institutes of Health patient information sites:  http://win.niddk.nih.gov/publications/index.htm    And look into health and wellness programs that may be available through your health insurance provider, employer, local community center, or brian club.    Weight management plan: Patient was referred to their PCP to discuss a diet and exercise plan.       Your blood pressure was checked while you were in clinic today.  Please read the guidelines below about what these numbers mean and what you should do about them.  Your systolic blood pressure is the top number.  This is the pressure when the heart is pumping.  Your diastolic blood pressure is the bottom number.  This is the pressure in between beats.  If your systolic blood pressure is less than 120 and your diastolic blood pressure is less than 80, then your blood pressure is normal. There is nothing more that you need to do about it  If your systolic blood pressure is 120-139 or your diastolic blood pressure is 80-89, your blood pressure may be higher than it should be.  You should have your blood pressure re-checked within a year by a primary care provider.  If your systolic blood pressure is 140 or greater or your diastolic blood pressure is 90 or greater, you may have high blood pressure.  High blood pressure is treatable, but if left untreated over time it can put you at risk for heart attack, stroke, or kidney failure.  You should have your blood pressure re-checked by a primary care provider within the next four weeks.

## 2018-01-30 NOTE — NURSING NOTE
"Chief Complaint   Patient presents with     RECHECK     F/u Psg split with TCM  1/23       Initial /74  Pulse 62  Resp 14  Ht 1.753 m (5' 9\")  Wt 88.5 kg (195 lb)  SpO2 97%  BMI 28.8 kg/m2 Estimated body mass index is 28.8 kg/(m^2) as calculated from the following:    Height as of this encounter: 1.753 m (5' 9\").    Weight as of this encounter: 88.5 kg (195 lb).  Medication Reconciliation: complete         Niurka Crews LPN/JAIME  "

## 2018-02-01 ENCOUNTER — TELEPHONE (OUTPATIENT)
Dept: SLEEP MEDICINE | Facility: CLINIC | Age: 38
End: 2018-02-01

## 2018-02-01 NOTE — TELEPHONE ENCOUNTER
Left patient voicemail to update her that I'm working on getting her setup on the BiPAP ASV that Dr. Mejia ordered. Informed her that I need to call her insurance to verify her benefits for ASV, and as soon as I get that information, I'll call to schedule an appointment. Instructed patient to call me directly if she has any questions or concerns.

## 2018-02-02 ENCOUNTER — TELEPHONE (OUTPATIENT)
Dept: SLEEP MEDICINE | Facility: CLINIC | Age: 38
End: 2018-02-02

## 2018-02-02 NOTE — TELEPHONE ENCOUNTER
I called patient to schedule an appointment to get patient setup on ASV. Patient prefers [facility], and would like earliest or latest appointment. Scheduled patient for 8:30 AM on Monday at [facility] showroom.

## 2018-02-05 ENCOUNTER — DOCUMENTATION ONLY (OUTPATIENT)
Dept: SLEEP MEDICINE | Facility: CLINIC | Age: 38
End: 2018-02-05

## 2018-02-05 NOTE — PROGRESS NOTES
Patient was offered choice of vendor and chose [facility].  Patient Heath Waller was set up at [facility] on February 5, 2018. Patient received a Resmed AirCurve 10 ASV. Pressures were set at EPAP MIN 5 MAX 10. PS MIN 0 MAX 20. RATE MODE AUTO cm H2O.   Patient s ramp is 5 cm H2O for Off and FLEX/EPR is 2.  PATIENT DID NOT RECEIVE MASK , JUST RECEIVED P10 PILLOWS (MEDIUM) 1/9/18, heated tubing and heated humidifier.  Patient is not enrolled in the STM Program and does not need to meet compliance. Patient has a follow up on 3/27/18 with Dr. Mejia.    Laurel Levin

## 2018-02-07 ENCOUNTER — OFFICE VISIT (OUTPATIENT)
Dept: FAMILY MEDICINE | Facility: CLINIC | Age: 38
End: 2018-02-07
Payer: COMMERCIAL

## 2018-02-07 VITALS
HEIGHT: 69 IN | DIASTOLIC BLOOD PRESSURE: 60 MMHG | SYSTOLIC BLOOD PRESSURE: 96 MMHG | TEMPERATURE: 98 F | WEIGHT: 188.7 LBS | BODY MASS INDEX: 27.95 KG/M2 | HEART RATE: 80 BPM | RESPIRATION RATE: 12 BRPM

## 2018-02-07 DIAGNOSIS — I10 BENIGN ESSENTIAL HYPERTENSION: ICD-10-CM

## 2018-02-07 DIAGNOSIS — L70.0 ACNE VULGARIS: ICD-10-CM

## 2018-02-07 DIAGNOSIS — F98.8 ATTENTION DEFICIT DISORDER (ADD) WITHOUT HYPERACTIVITY: ICD-10-CM

## 2018-02-07 DIAGNOSIS — Z01.818 PREOP GENERAL PHYSICAL EXAM: Primary | ICD-10-CM

## 2018-02-07 DIAGNOSIS — J30.0 CHRONIC VASOMOTOR RHINITIS: ICD-10-CM

## 2018-02-07 DIAGNOSIS — G56.22 LESION OF LEFT ULNAR NERVE: ICD-10-CM

## 2018-02-07 PROCEDURE — 99214 OFFICE O/P EST MOD 30 MIN: CPT | Performed by: FAMILY MEDICINE

## 2018-02-07 RX ORDER — DEXTROAMPHETAMINE SACCHARATE, AMPHETAMINE ASPARTATE MONOHYDRATE, DEXTROAMPHETAMINE SULFATE AND AMPHETAMINE SULFATE 7.5; 7.5; 7.5; 7.5 MG/1; MG/1; MG/1; MG/1
30 CAPSULE, EXTENDED RELEASE ORAL DAILY
Qty: 30 CAPSULE | Refills: 0 | Status: SHIPPED | OUTPATIENT
Start: 2018-04-10 | End: 2018-05-10

## 2018-02-07 RX ORDER — DEXTROAMPHETAMINE SACCHARATE, AMPHETAMINE ASPARTATE MONOHYDRATE, DEXTROAMPHETAMINE SULFATE AND AMPHETAMINE SULFATE 7.5; 7.5; 7.5; 7.5 MG/1; MG/1; MG/1; MG/1
30 CAPSULE, EXTENDED RELEASE ORAL DAILY
Qty: 30 CAPSULE | Refills: 0 | Status: SHIPPED | OUTPATIENT
Start: 2018-02-07 | End: 2018-03-09

## 2018-02-07 RX ORDER — LISINOPRIL 10 MG/1
TABLET ORAL
Qty: 90 TABLET | Refills: 1 | Status: SHIPPED | OUTPATIENT
Start: 2018-02-07 | End: 2019-02-11

## 2018-02-07 RX ORDER — SPIRONOLACTONE 50 MG/1
50 TABLET, FILM COATED ORAL DAILY
Qty: 90 TABLET | Refills: 1 | Status: SHIPPED | OUTPATIENT
Start: 2018-02-07 | End: 2018-10-04

## 2018-02-07 RX ORDER — DEXTROAMPHETAMINE SACCHARATE, AMPHETAMINE ASPARTATE MONOHYDRATE, DEXTROAMPHETAMINE SULFATE AND AMPHETAMINE SULFATE 7.5; 7.5; 7.5; 7.5 MG/1; MG/1; MG/1; MG/1
30 CAPSULE, EXTENDED RELEASE ORAL DAILY
Qty: 30 CAPSULE | Refills: 0 | Status: SHIPPED | OUTPATIENT
Start: 2018-03-10 | End: 2018-04-09

## 2018-02-07 RX ORDER — PSEUDOEPHEDRINE HCL 120 MG/1
120 TABLET, FILM COATED, EXTENDED RELEASE ORAL 2 TIMES DAILY
Qty: 90 TABLET | Refills: 1 | Status: SHIPPED | OUTPATIENT
Start: 2018-02-07 | End: 2019-03-26

## 2018-02-07 RX ORDER — DEXTROAMPHETAMINE SACCHARATE, AMPHETAMINE ASPARTATE MONOHYDRATE, DEXTROAMPHETAMINE SULFATE AND AMPHETAMINE SULFATE 7.5; 7.5; 7.5; 7.5 MG/1; MG/1; MG/1; MG/1
30 CAPSULE, EXTENDED RELEASE ORAL DAILY
Qty: 30 CAPSULE | Refills: 0 | Status: CANCELLED | OUTPATIENT
Start: 2018-02-07

## 2018-02-07 NOTE — PROGRESS NOTES
[address], [address]  [facility] [address]  [phone]  Dept: [phone]    PRE-OP EVALUATION:  Today's date: 2018    Heath Waller (: 1980) presents for pre-operative evaluation assessment as requested by Dr. Escalante.  She requires evaluation and anesthesia risk assessment prior to undergoing surgery/procedure for treatment of Left elbow .  Proposed procedure: nerve entrapment of left elbow    Date of Surgery/ Procedure: 18  Time of Surgery/ Procedure: 12:45 pm  Hospital/Surgical Facility: [facility]  Fax number for surgical facility: [phone]  Primary Physician: Alex Interiano  Type of Anesthesia Anticipated: General    Patient has a Health Care Directive or Living Will:  NO    1. NO - Do you have a history of heart attack, stroke, stent, bypass or surgery on an artery in the head, neck, heart or legs?  2. NO - Do you ever have any pain or discomfort in your chest?  3. NO - Do you have a history of  Heart Failure?  4. NO - Are you troubled by shortness of breath when: walking on the level, up a slight hill or at night?  5. NO - Do you currently have a cold, bronchitis or other respiratory infection?  6. NO - Do you have a cough, shortness of breath or wheezing?  7. NO - Do you sometimes get pains in the calves of your legs when you walk?  8. NO - Do you or anyone in your family have previous history of blood clots?  9. NO - Do you or does anyone in your family have a serious bleeding problem such as prolonged bleeding following surgeries or cuts?  10. NO - Have you ever had problems with anemia or been told to take iron pills?  11. NO - Have you had any abnormal blood loss such as black, tarry or bloody stools, or abnormal vaginal bleeding?  12. NO - Have you ever had a blood transfusion?  13. NO - Have you or any of your relatives ever had problems with anesthesia?  14. NO - Do you have sleep apnea, excessive snoring or  daytime drowsiness?  15. NO - Do you have any prosthetic heart valves?  16. NO - Do you have prosthetic joints?  17. NO - Is there any chance that you may be pregnant?      HPI:                                                      Brief HPI related to upcoming procedure: L ulnar nerve entrapment syndrome, due to have release.  Feeling well, no acute concerns today.  Has had multiple surgeries without complication.      See problem list for active medical problems.  Problems all longstanding and stable, except as noted/documented.  See ROS for pertinent symptoms related to these conditions.                                                                                                  .    MEDICAL HISTORY:                                                      Patient Active Problem List    Diagnosis Date Noted     Moderate episode of recurrent major depressive disorder (H) 11/17/2017     Priority: Medium     ROSA (obstructive sleep apnea) 09/29/2017     Priority: Medium     Benign essential hypertension 12/27/2016     Priority: Medium     Anxiety attack 12/27/2016     Priority: Medium     Chronic rhinitis 12/20/2016     Priority: Medium     Depressive disorder 11/14/2016     Priority: Medium     Gastroesophageal reflux disease 11/14/2016     Priority: Medium     History of thyroid disease 11/14/2016     Priority: Medium     Overview:   benign tumor, post partial thyroidectomy       Chronic pain 11/14/2016     Priority: Medium     Attention deficit disorder (ADD) without hyperactivity 11/14/2016     Priority: Medium     Acne vulgaris 11/14/2016     Priority: Medium     TMJ (temporomandibular joint syndrome) 11/14/2016     Priority: Medium     Fibromyalgia 11/14/2016     Priority: Medium     Cervical intraepithelial neoplasia grade III with severe dysplasia 12/03/2008     Priority: Medium     Overview:   LEEP (12/08) confirmed CIN3. Margins negative, negative ECC.        Past Medical History:   Diagnosis Date     ADD  (attention deficit disorder)      Degenerative disc disease, cervical      Depressive disorder      Gastro-oesophageal reflux disease      Myofacial muscle pain      Noninfectious ileitis     IBS with constipation     Other chronic pain     myofacial pain syndrome     Sleep apnea     wears CPAP at night     Thyroid disease      Past Surgical History:   Procedure Laterality Date     APPENDECTOMY  2000     ARTHROSCOPY KNEE Right     teenager     BREAST SURGERY  03/2006    augmentation, revisions 2011 and 2015     FOOT SURGERY Right     semoid bone removed from Fx     GYN SURGERY   2009 and 2014    LEEPS x 2      LAPAROSCOPIC CHOLECYSTECTOMY  5/15/2014    Procedure: LAPAROSCOPIC CHOLECYSTECTOMY;  Surgeon: Kd Arthur MD;  Location: [facility] OR     THYROID SURGERY  2001    nodule      TUBAL LIGATION  10/09/2008     Current Outpatient Prescriptions   Medication Sig Dispense Refill     pseudoePHEDrine (SUDAFED 12 HOUR) 120 MG 12 hr tablet Take 1 tablet (120 mg) by mouth 2 times daily 90 tablet 1     lisinopril (PRINIVIL/ZESTRIL) 10 MG tablet TAKE 1 TABLET (10 MG) BY MOUTH DAILY 90 tablet 1     spironolactone (ALDACTONE) 50 MG tablet Take 1 tablet (50 mg) by mouth daily 90 tablet 1     amphetamine-dextroamphetamine (ADDERALL XR) 30 MG per 24 hr capsule Take 1 capsule (30 mg) by mouth daily 30 capsule 0     [START ON 3/10/2018] amphetamine-dextroamphetamine (ADDERALL XR) 30 MG per 24 hr capsule Take 1 capsule (30 mg) by mouth daily 30 capsule 0     [START ON 4/10/2018] amphetamine-dextroamphetamine (ADDERALL XR) 30 MG per 24 hr capsule Take 1 capsule (30 mg) by mouth daily 30 capsule 0     order for DME Equipment being ordered: RES MED AIRCURVE 10 ASV       oxyCODONE-acetaminophen (PERCOCET) 5-325 MG per tablet TK 1 T PO  TID  0     traZODone (DESYREL) 50 MG tablet Take 2 tablets (100 mg) by mouth nightly as needed for sleep 180 tablet 1     morphine (MS CONTIN) 15 MG 12 hr tablet   0     DULoxetine (CYMBALTA) 60 MG  EC capsule Take 2 capsules (120 mg) by mouth every morning 180 capsule 1     cholecalciferol (VITAMIN D3) 66815 UNITS capsule Take 1 capsule by mouth daily       baclofen (LIORESAL) 10 MG tablet TAKE 1 TABLET BY MOUTH 3 TIMES A DAY AS NEEDED FOR MUSCLE SPASM  1     magnesium 500 MG TABS Take 1,000 mg by mouth       HYDROcodone-acetaminophen (NORCO)  MG per tablet Take 1 tablet by mouth every 6 hours as needed for moderate to severe pain       Acetaminophen (TYLENOL PO) Take 1,000 mg by mouth every 4 hours as needed for mild pain or fever       pregabalin (LYRICA) 150 MG capsule Take 1 capsule (150 mg) by mouth 3 times daily Patient  Will contact you when ready to fill 60 capsule 1     B Complex TABS        ibuprofen (ADVIL,MOTRIN) 200 MG tablet        omeprazole (PRILOSEC) 20 MG capsule Take 20 mg by mouth       [DISCONTINUED] lisinopril (PRINIVIL/ZESTRIL) 10 MG tablet TAKE 1 TABLET (10 MG) BY MOUTH DAILY 90 tablet 1     [DISCONTINUED] spironolactone (ALDACTONE) 50 MG tablet Take 1 tablet (50 mg) by mouth daily 90 tablet 1     OTC products: None, except as noted above    Allergies   Allergen Reactions     Codeine      Sulfa Drugs      Rash       Toradol [Ketorolac] Itching and Rash      Latex Allergy: NO    Social History   Substance Use Topics     Smoking status: Current Every Day Smoker     Packs/day: 0.00     Years: 20.00     Types: Cigarettes     Smokeless tobacco: Never Used      Comment: e-cig     Alcohol use 0.0 oz/week     0 Standard drinks or equivalent per week      Comment: rarely     History   Drug Use No       REVIEW OF SYSTEMS:                                                    C: NEGATIVE for fever, chills, change in weight  E/M: NEGATIVE for ear, mouth and throat problems  R: NEGATIVE for significant cough or SOB  CV: NEGATIVE for chest pain, palpitations or peripheral edema    EXAM:                                                    BP 96/60 (BP Location: Right arm, Patient Position: Chair,  "Cuff Size: Adult Large)  Pulse 80  Temp 98  F (36.7  C) (Oral)  Resp 12  Ht 5' 9\" (1.753 m)  Wt 188 lb 11.2 oz (85.6 kg)  LMP 01/20/2018  Breastfeeding? No  BMI 27.87 kg/m2  GENERAL APPEARANCE: healthy, alert and no distress  HENT: ear canals and TM's normal and nose and mouth without ulcers or lesions  RESP: lungs clear to auscultation - no rales, rhonchi or wheezes  CV: regular rate and rhythm, normal S1 S2, no S3 or S4 and no murmur, click or rub   ABDOMEN: soft, nontender, no HSM or masses and bowel sounds normal  NEURO: Normal strength and tone, sensory exam grossly normal, mentation intact and speech normal    DIAGNOSTICS:                                                    EKG: Not indicated due to non-vascular surgery and low risk of event (age <65 and without cardiac risk factors)    Recent Labs   Lab Test  09/29/17   0742  06/30/16   1415  06/06/16   2035   HGB   --   14.3  13.8   PLT   --   277  282   INR   --    --   1.04   NA  142  136  139   POTASSIUM  4.2  3.1*  3.2*   CR  0.70  0.78  0.62        IMPRESSION:                                                    Reason for surgery/procedure: L ulnar nerve entrapment syndrome  Diagnosis/reason for consult: preoperative clearance    The proposed surgical procedure is considered LOW risk.    REVISED CARDIAC RISK INDEX  The patient has the following serious cardiovascular risks for perioperative complications such as (MI, PE, VFib and 3  AV Block):  No serious cardiac risks  INTERPRETATION: 0 risks: Class I (very low risk - 0.4% complication rate)    The patient has the following additional risks for perioperative complications:  No identified additional risks      ICD-10-CM    1. Preop general physical exam Z01.818    2. Chronic vasomotor rhinitis J30.0 pseudoePHEDrine (SUDAFED 12 HOUR) 120 MG 12 hr tablet   3. Benign essential hypertension I10 lisinopril (PRINIVIL/ZESTRIL) 10 MG tablet   4. Acne vulgaris L70.0 spironolactone (ALDACTONE) 50 MG tablet "   5. Attention deficit disorder (ADD) without hyperactivity F98.8 amphetamine-dextroamphetamine (ADDERALL XR) 30 MG per 24 hr capsule     amphetamine-dextroamphetamine (ADDERALL XR) 30 MG per 24 hr capsule     amphetamine-dextroamphetamine (ADDERALL XR) 30 MG per 24 hr capsule   6. Lesion of left ulnar nerve G56.22        RECOMMENDATIONS:                                                        --Patient is to take all scheduled medications on the day of surgery EXCEPT for modifications listed below.    APPROVAL GIVEN to proceed with proposed procedure, without further diagnostic evaluation       Signed Electronically by: Alex Interiano MD    Copy of this evaluation report is provided to requesting physician.    [facility] Preop Guidelines

## 2018-02-07 NOTE — LETTER
My Depression Action Plan  Name: Heath Waller   Date of Birth 1980  Date: 2/7/2018    My doctor: Alex Interiano   My clinic: [address], [address]  [facility] [address] [phone]          GREEN    ZONE   Good Control    What it looks like:     Things are going generally well. You have normal up s and down s. You may even feel depressed from time to time, but bad moods usually last less than a day.   What you need to do:  1. Continue to care for yourself (see self care plan)  2. Check your depression survival kit and update it as needed  3. Follow your physician s recommendations including any medication.  4. Do not stop taking medication unless you consult with your physician first.           YELLOW         ZONE Getting Worse    What it looks like:     Depression is starting to interfere with your life.     It may be hard to get out of bed; you may be starting to isolate yourself from others.    Symptoms of depression are starting to last most all day and this has happened for several days.     You may have suicidal thoughts but they are not constant.   What you need to do:     1. Call your care team, your response to treatment will improve if you keep your care team informed of your progress. Yellow periods are signs an adjustment may need to be made.     2. Continue your self-care, even if you have to fake it!    3. Talk to someone in your support network    4. Open up your depression survival kit           RED    ZONE Medical Alert - Get Help    What it looks like:     Depression is seriously interfering with your life.     You may experience these or other symptoms: You can t get out of bed most days, can t work or engage in other necessary activities, you have trouble taking care of basic hygiene, or basic responsibilities, thoughts of suicide or death that will not go away, self-injurious behavior.     What you need to do:  1. Call  your care team and request a same-day appointment. If they are not available (weekends or after hours) call your local crisis line, emergency room or 911.      Electronically signed by: Alex Interiano, February 7, 2018    Depression Self Care Plan / Survival Kit    Self-Care for Depression  Here s the deal. Your body and mind are really not as separate as most people think.  What you do and think affects how you feel and how you feel influences what you do and think. This means if you do things that people who feel good do, it will help you feel better.  Sometimes this is all it takes.  There is also a place for medication and therapy depending on how severe your depression is, so be sure to consult with your medical provider and/ or Behavioral Health Consultant if your symptoms are worsening or not improving.     In order to better manage my stress, I will:    Exercise  Get some form of exercise, every day. This will help reduce pain and release endorphins, the  feel good  chemicals in your brain. This is almost as good as taking antidepressants!  This is not the same as joining a gym and then never going! (they count on that by the way ) It can be as simple as just going for a walk or doing some gardening, anything that will get you moving.      Hygiene   Maintain good hygiene (Get out of bed in the morning, Make your bed, Brush your teeth, Take a shower, and Get dressed like you were going to work, even if you are unemployed).  If your clothes don't fit try to get ones that do.    Diet  I will strive to eat foods that are good for me, drink plenty of water, and avoid excessive sugar, caffeine, alcohol, and other mood-altering substances.  Some foods that are helpful in depression are: complex carbohydrates, B vitamins, flaxseed, fish or fish oil, fresh fruits and vegetables.    Psychotherapy  I agree to participate in Individual Therapy (if recommended).    Medication  If prescribed medications, I agree to  take them.  Missing doses can result in serious side effects.  I understand that drinking alcohol, or other illicit drug use, may cause potential side effects.  I will not stop my medication abruptly without first discussing it with my provider.    Staying Connected With Others  I will stay in touch with my friends, family members, and my primary care provider/team.    Use your imagination  Be creative.  We all have a creative side; it doesn t matter if it s oil painting, sand castles, or mud pies! This will also kick up the endorphins.    Witness Beauty  (AKA stop and smell the roses) Take a look outside, even in mid-winter. Notice colors, textures. Watch the squirrels and birds.     Service to others  Be of service to others.  There is always someone else in need.  By helping others we can  get out of ourselves  and remember the really important things.  This also provides opportunities for practicing all the other parts of the program.    Humor  Laugh and be silly!  Adjust your TV habits for less news and crime-drama and more comedy.    Control your stress  Try breathing deep, massage therapy, biofeedback, and meditation. Find time to relax each day.     My support system    Clinic Contact:  Phone number:    Contact 1:  Phone number:    Contact 2:  Phone number:    Buddhist/:  Phone number:    Therapist:  Phone number:    Local crisis center:    Phone number:    Other community support:  Phone number:

## 2018-02-07 NOTE — MR AVS SNAPSHOT
After Visit Summary   2/7/2018    Heath Waller    MRN: [id number]           Patient Information     Date Of Birth          1980        Visit Information        Provider Department      2/7/2018 9:00 AM Alxe Interiano MD [facility]        Today's Diagnoses     Preop general physical exam    -  1    Chronic vasomotor rhinitis        Benign essential hypertension        Acne vulgaris        Attention deficit disorder (ADD) without hyperactivity        Lesion of left ulnar nerve          Care Instructions      Before Your Surgery      Call your surgeon if there is any change in your health. This includes signs of a cold or flu (such as a sore throat, runny nose, cough, rash or fever).    Do not smoke, drink alcohol or take over the counter medicine (unless your surgeon or primary care doctor tells you to) for the 24 hours before and after surgery.    If you take prescribed drugs: Follow your doctor s orders about which medicines to take and which to stop until after surgery.    Eating and drinking prior to surgery: follow the instructions from your surgeon    Take a shower or bath the night before surgery. Use the soap your surgeon gave you to gently clean your skin. If you do not have soap from your surgeon, use your regular soap. Do not shave or scrub the surgery site.  Wear clean pajamas and have clean sheets on your bed.           Follow-ups after your visit        Follow-up notes from your care team     Return in about 3 months (around 5/7/2018).      Your next 10 appointments already scheduled     Mar 27, 2018  5:00 PM CDT   Return Sleep Patient with Christian Mejia MD   [facility] ([facility])    [address] [address] [address] [phone]              Who to contact     If you have questions or need follow up information about today's clinic visit or your schedule please contact  "[facility] directly at [phone].  Normal or non-critical lab and imaging results will be communicated to you by MyChart, letter or phone within 4 business days after the clinic has received the results. If you do not hear from us within 7 days, please contact the clinic through Taggshart or phone. If you have a critical or abnormal lab result, we will notify you by phone as soon as possible.  Submit refill requests through Clearbon or call your pharmacy and they will forward the refill request to us. Please allow 3 business days for your refill to be completed.          Additional Information About Your Visit        TaggsharPerdoo Information     Clearbon gives you secure access to your electronic health record. If you see a primary care provider, you can also send messages to your care team and make appointments. If you have questions, please call your primary care clinic.  If you do not have a primary care provider, please call [phone] and they will assist you.        Care EveryWhere ID     This is your Care EveryWhere ID. This could be used by other organizations to access your [facility] medical records  [id number]        Your Vitals Were     Pulse Temperature Respirations Height Last Period Breastfeeding?    80 98  F (36.7  C) (Oral) 12 5' 9\" (1.753 m) 01/20/2018 No    BMI (Body Mass Index)                   27.87 kg/m2            Blood Pressure from Last 3 Encounters:   02/07/18 96/60   01/30/18 111/74   01/08/18 118/74    Weight from Last 3 Encounters:   02/07/18 188 lb 11.2 oz (85.6 kg)   01/30/18 195 lb (88.5 kg)   01/08/18 195 lb (88.5 kg)              We Performed the Following     DEPRESSION ACTION PLAN (DAP)          Today's Medication Changes          These changes are accurate as of 2/7/18  9:43 AM.  If you have any questions, ask your nurse or doctor.               Start taking these medicines.        Dose/Directions    * amphetamine-dextroamphetamine 30 MG per 24 hr capsule   Commonly " known as:  ADDERALL XR   Used for:  Attention deficit disorder (ADD) without hyperactivity   Started by:  Alex Interiano MD        Dose:  30 mg   Take 1 capsule (30 mg) by mouth daily   Quantity:  30 capsule   Refills:  0       * amphetamine-dextroamphetamine 30 MG per 24 hr capsule   Commonly known as:  ADDERALL XR   Used for:  Attention deficit disorder (ADD) without hyperactivity   Started by:  Alex Interiano MD        Dose:  30 mg   Start taking on:  3/10/2018   Take 1 capsule (30 mg) by mouth daily   Quantity:  30 capsule   Refills:  0       * amphetamine-dextroamphetamine 30 MG per 24 hr capsule   Commonly known as:  ADDERALL XR   Used for:  Attention deficit disorder (ADD) without hyperactivity   Started by:  Alex Interiano MD        Dose:  30 mg   Start taking on:  4/10/2018   Take 1 capsule (30 mg) by mouth daily   Quantity:  30 capsule   Refills:  0       * Notice:  This list has 3 medication(s) that are the same as other medications prescribed for you. Read the directions carefully, and ask your doctor or other care provider to review them with you.      These medicines have changed or have updated prescriptions.        Dose/Directions    lisinopril 10 MG tablet   Commonly known as:  PRINIVIL/ZESTRIL   This may have changed:  See the new instructions.   Used for:  Benign essential hypertension   Changed by:  Alex Interiano MD        TAKE 1 TABLET (10 MG) BY MOUTH DAILY   Quantity:  90 tablet   Refills:  1            Where to get your medicines      These medications were sent to [facility] [id number] [place] [address] [id number] [place] [address] [address]  [address], [facility] [address]     Phone:  [phone]     lisinopril 10 MG tablet    spironolactone 50 MG tablet         Some of these will need a paper prescription and others can be bought over the counter.  Ask your nurse if you have questions.     Bring a paper prescription for  each of these medications     amphetamine-dextroamphetamine 30 MG per 24 hr capsule    amphetamine-dextroamphetamine 30 MG per 24 hr capsule    amphetamine-dextroamphetamine 30 MG per 24 hr capsule    pseudoePHEDrine 120 MG 12 hr tablet                Primary Care Provider Office Phone # Fax #    Alex Jude Interiano [phone] [address] [facility] [address]        Equal Access to Services     NAVYA TRISTAN : Hadii aad ku hadasho Soomaali, waaxda luqadaha, qaybta kaalmada adeegyada, waxay idiin hayaan adeeg kharash la'aan ah. So [facility] [phone].    ATENCIÓN: Si habla español, tiene a jean disposición servicios gratuitos de asistencia lingüística. [facility] [phone].    We comply with applicable federal civil rights laws and [place] laws. We do not discriminate on the basis of race, color, national origin, age, disability, sex, sexual orientation, or gender identity.            Thank you!     Thank you for choosing [facility]  for your care. Our goal is always to provide you with excellent care. Hearing back from our patients is one way we can continue to improve our services. Please take a few minutes to complete the written survey that you may receive in the mail after your visit with us. Thank you!             Your Updated Medication List - Protect others around you: Learn how to safely use, store and throw away your medicines at www.disposemymeds.org.          This list is accurate as of 2/7/18  9:43 AM.  Always use your most recent med list.                   Brand Name Dispense Instructions for use Diagnosis    * amphetamine-dextroamphetamine 30 MG per 24 hr capsule    ADDERALL XR    30 capsule    Take 1 capsule (30 mg) by mouth daily    Attention deficit disorder (ADD) without hyperactivity       * amphetamine-dextroamphetamine 30 MG per 24 hr capsule   Start taking on:  3/10/2018    ADDERALL XR    30 capsule    Take 1 capsule (30 mg) by mouth daily     Attention deficit disorder (ADD) without hyperactivity       * amphetamine-dextroamphetamine 30 MG per 24 hr capsule   Start taking on:  4/10/2018    ADDERALL XR    30 capsule    Take 1 capsule (30 mg) by mouth daily    Attention deficit disorder (ADD) without hyperactivity       B Complex Tabs           baclofen 10 MG tablet    LIORESAL     TAKE 1 TABLET BY MOUTH 3 TIMES A DAY AS NEEDED FOR MUSCLE SPASM        cholecalciferol 27950 UNITS capsule    vitamin D3     Take 1 capsule by mouth daily        DULoxetine 60 MG EC capsule    CYMBALTA    180 capsule    Take 2 capsules (120 mg) by mouth every morning    Depressive disorder, Chronic pain syndrome, Fibromyalgia       HYDROcodone-acetaminophen  MG per tablet    NORCO     Take 1 tablet by mouth every 6 hours as needed for moderate to severe pain        ibuprofen 200 MG tablet    ADVIL/MOTRIN          lisinopril 10 MG tablet    PRINIVIL/ZESTRIL    90 tablet    TAKE 1 TABLET (10 MG) BY MOUTH DAILY    Benign essential hypertension       magnesium 500 MG Tabs      Take 1,000 mg by mouth        morphine 15 MG 12 hr tablet    MS CONTIN          omeprazole 20 MG CR capsule    priLOSEC     Take 20 mg by mouth        order for DME      Equipment being ordered: RES MED AIRCURVE 10 ASV        oxyCODONE-acetaminophen 5-325 MG per tablet    PERCOCET     TK 1 T PO  TID        pregabalin 150 MG capsule    LYRICA    60 capsule    Take 1 capsule (150 mg) by mouth 3 times daily Patient  Will contact you when ready to fill    Chronic pain syndrome, Fibromyalgia, TMJ (temporomandibular joint syndrome)       pseudoePHEDrine 120 MG 12 hr tablet    SUDAFED 12 HOUR    90 tablet    Take 1 tablet (120 mg) by mouth 2 times daily    Chronic vasomotor rhinitis       spironolactone 50 MG tablet    ALDACTONE    90 tablet    Take 1 tablet (50 mg) by mouth daily    Acne vulgaris       traZODone 50 MG tablet    DESYREL    180 tablet    Take 2 tablets (100 mg) by mouth nightly as needed for  sleep    Moderate episode of recurrent major depressive disorder (H)       TYLENOL PO      Take 1,000 mg by mouth every 4 hours as needed for mild pain or fever        * Notice:  This list has 3 medication(s) that are the same as other medications prescribed for you. Read the directions carefully, and ask your doctor or other care provider to review them with you.

## 2018-02-08 ENCOUNTER — DOCUMENTATION ONLY (OUTPATIENT)
Dept: SLEEP MEDICINE | Facility: CLINIC | Age: 38
End: 2018-02-08

## 2018-02-08 NOTE — PROGRESS NOTES
3 DAY STM VISIT    Patient contacted for 3 day STM visit  Subjective measures:   Pt had surgery yesterday and is recovering.  No issues with device or mask.      Device type: ASV  PAP settings from order::   ASV ResMed    ResMed ASV Auto ()    EPAP Min 5    EPAP Max 10    PS Min 0    PS Max 20    Rate Mode Auto          Assessment: Nightly usage over four hours.  Action plan: Pt to have f/u 14 day STM visit.  Diagnostic AHI: 60.6

## 2018-02-20 ENCOUNTER — DOCUMENTATION ONLY (OUTPATIENT)
Dept: SLEEP MEDICINE | Facility: CLINIC | Age: 38
End: 2018-02-20

## 2018-02-20 NOTE — PROGRESS NOTES
14 DAY STM VISIT    Message left for patient to return call     Assessment: Pt meeting objective benchmarks.     Action plan: waiting for patient to return call and pt to have 30 day STM visit.    Device type: ASV  PAP settings: EPAP MIN 5 MAX 10. PS MIN 0 MAX 20. RATE MODE AUTO   Objective measures: 14 day rolling measures      Compliance  92 %      Leak  24.37 lpm  last  upload      AHI 7.28   last  upload      Average number of minutes 487    Diagnostic AHI: 60.6     Objective measure goal  Compliance   Goal >70%  Leak   Goal < 24 lpm  AHI  Goal < 5  Usage  Goal >240

## 2018-03-09 ENCOUNTER — DOCUMENTATION ONLY (OUTPATIENT)
Dept: SLEEP MEDICINE | Facility: CLINIC | Age: 38
End: 2018-03-09

## 2018-03-09 NOTE — PROGRESS NOTES
30 DAY [facility] VISIT    Message left for patient to return call     Assessment: Pt meeting objective benchmarks.     Action plan: waiting for patient to return call.   Patient has a follow up visit with Dr. Mejia on 4/4/2018.   Device type: ASV  PAP settings:   ASV ResMed    ResMed ASV Auto ()    EPAP Min 5    EPAP Max 10    PS Min 0    PS Max 20    Rate Mode Auto        Objective measures: 14 day rolling measures      Compliance  92 %      Leak  11.68 lpm  last  upload      AHI 1.92   last  upload      Average number of minutes 388    Diagnostic AHI: 60.6          Objective measure goal  Compliance   Goal >70%  Leak   Goal < 24 lpm  AHI  Goal < 5  Usage  Goal >240

## 2018-05-18 ENCOUNTER — ALLIED HEALTH/NURSE VISIT (OUTPATIENT)
Dept: NURSING | Facility: CLINIC | Age: 38
End: 2018-05-18
Payer: COMMERCIAL

## 2018-05-18 DIAGNOSIS — Z11.1 SCREENING EXAMINATION FOR PULMONARY TUBERCULOSIS: Primary | ICD-10-CM

## 2018-05-18 DIAGNOSIS — F98.8 ATTENTION DEFICIT DISORDER (ADD) WITHOUT HYPERACTIVITY: Primary | ICD-10-CM

## 2018-05-18 PROCEDURE — 99207 ZZC NO CHARGE NURSE ONLY: CPT

## 2018-05-18 PROCEDURE — 86580 TB INTRADERMAL TEST: CPT

## 2018-05-18 RX ORDER — DEXTROAMPHETAMINE SACCHARATE, AMPHETAMINE ASPARTATE MONOHYDRATE, DEXTROAMPHETAMINE SULFATE AND AMPHETAMINE SULFATE 7.5; 7.5; 7.5; 7.5 MG/1; MG/1; MG/1; MG/1
30 CAPSULE, EXTENDED RELEASE ORAL DAILY
Qty: 30 CAPSULE | Refills: 0 | Status: SHIPPED | OUTPATIENT
Start: 2018-07-19 | End: 2018-08-18

## 2018-05-18 RX ORDER — DEXTROAMPHETAMINE SACCHARATE, AMPHETAMINE ASPARTATE, DEXTROAMPHETAMINE SULFATE AND AMPHETAMINE SULFATE 7.5; 7.5; 7.5; 7.5 MG/1; MG/1; MG/1; MG/1
30 TABLET ORAL DAILY
Status: CANCELLED | OUTPATIENT
Start: 2018-05-18

## 2018-05-18 RX ORDER — DEXTROAMPHETAMINE SACCHARATE, AMPHETAMINE ASPARTATE MONOHYDRATE, DEXTROAMPHETAMINE SULFATE AND AMPHETAMINE SULFATE 7.5; 7.5; 7.5; 7.5 MG/1; MG/1; MG/1; MG/1
30 CAPSULE, EXTENDED RELEASE ORAL DAILY
Qty: 30 CAPSULE | Refills: 0 | Status: SHIPPED | OUTPATIENT
Start: 2018-05-18 | End: 2018-11-21

## 2018-05-18 RX ORDER — DEXTROAMPHETAMINE SACCHARATE, AMPHETAMINE ASPARTATE MONOHYDRATE, DEXTROAMPHETAMINE SULFATE AND AMPHETAMINE SULFATE 7.5; 7.5; 7.5; 7.5 MG/1; MG/1; MG/1; MG/1
30 CAPSULE, EXTENDED RELEASE ORAL DAILY
Qty: 30 CAPSULE | Refills: 0 | Status: SHIPPED | OUTPATIENT
Start: 2018-06-18 | End: 2018-07-18

## 2018-05-18 NOTE — MR AVS SNAPSHOT
After Visit Summary   5/18/2018    Heath Waller    MRN: [id number]           Patient Information     Date Of Birth          1980        Visit Information        Provider Department      5/18/2018 4:00 PM FM MA/LPN [facility]        Today's Diagnoses     Screening examination for pulmonary tuberculosis    -  1       Follow-ups after your visit        Who to contact     If you have questions or need follow up information about today's clinic visit or your schedule please contact [facility] directly at [phone].  Normal or non-critical lab and imaging results will be communicated to you by MyChart, letter or phone within 4 business days after the clinic has received the results. If you do not hear from us within 7 days, please contact the clinic through [employer] or phone. If you have a critical or abnormal lab result, we will notify you by phone as soon as possible.  Submit refill requests through Chef Dovunque or call your pharmacy and they will forward the refill request to us. Please allow 3 business days for your refill to be completed.          Additional Information About Your Visit        MyChart Information     Che[employer] gives you secure access to your electronic health record. If you see a primary care provider, you can also send messages to your care team and make appointments. If you have questions, please call your primary care clinic.  If you do not have a primary care provider, please call [phone] and they will assist you.        Care EveryWhere ID     This is your Care EveryWhere ID. This could be used by other organizations to access your [facility] medical records  [id number]         Blood Pressure from Last 3 Encounters:   02/07/18 96/60   01/30/18 111/74   01/08/18 118/74    Weight from Last 3 Encounters:   02/07/18 188 lb 11.2 oz (85.6 kg)   01/30/18 195 lb (88.5 kg)   01/08/18 195 lb (88.5 kg)              We Performed the Following     TB  INTRADERMAL TEST          Today's Medication Changes          These changes are accurate as of 5/18/18  4:48 PM.  If you have any questions, ask your nurse or doctor.               Start taking these medicines.        Dose/Directions    * amphetamine-dextroamphetamine 30 MG per 24 hr capsule   Commonly known as:  ADDERALL XR   Used for:  Attention deficit disorder (ADD) without hyperactivity   Started by:  Alex Interiano MD        Dose:  30 mg   Take 1 capsule (30 mg) by mouth daily   Quantity:  30 capsule   Refills:  0       * amphetamine-dextroamphetamine 30 MG per 24 hr capsule   Commonly known as:  ADDERALL XR   Used for:  Attention deficit disorder (ADD) without hyperactivity   Started by:  Alex Interiano MD        Dose:  30 mg   Start taking on:  6/18/2018   Take 1 capsule (30 mg) by mouth daily   Quantity:  30 capsule   Refills:  0       * amphetamine-dextroamphetamine 30 MG per 24 hr capsule   Commonly known as:  ADDERALL XR   Used for:  Attention deficit disorder (ADD) without hyperactivity   Started by:  Alex Interiano MD        Dose:  30 mg   Start taking on:  7/19/2018   Take 1 capsule (30 mg) by mouth daily   Quantity:  30 capsule   Refills:  0       * Notice:  This list has 3 medication(s) that are the same as other medications prescribed for you. Read the directions carefully, and ask your doctor or other care provider to review them with you.         Where to get your medicines      Some of these will need a paper prescription and others can be bought over the counter.  Ask your nurse if you have questions.     Bring a paper prescription for each of these medications     amphetamine-dextroamphetamine 30 MG per 24 hr capsule    amphetamine-dextroamphetamine 30 MG per 24 hr capsule    amphetamine-dextroamphetamine 30 MG per 24 hr capsule                Primary Care Provider Office Phone # Fax #    Alex Interiano [phone]       [address]  [facility] [address]        Equal Access to Services     CJ TRISTAN : Hadii rafaela serrato hadshawnadiel Soshubhamali, waaxda luqadaha, qaybta kaalmada cheryl, harinder frederick. So [facility] [phone].    ATENCIÓN: Si habla español, tiene a jean disposición servicios gratuitos de asistencia lingüística. Llame al [phone].    We comply with applicable federal civil rights laws and [place] laws. We do not discriminate on the basis of race, color, national origin, age, disability, sex, sexual orientation, or gender identity.            Thank you!     Thank you for choosing [facility]  for your care. Our goal is always to provide you with excellent care. Hearing back from our patients is one way we can continue to improve our services. Please take a few minutes to complete the written survey that you may receive in the mail after your visit with us. Thank you!             Your Updated Medication List - Protect others around you: Learn how to safely use, store and throw away your medicines at www.disposemymeds.org.          This list is accurate as of 5/18/18  4:48 PM.  Always use your most recent med list.                   Brand Name Dispense Instructions for use Diagnosis    * amphetamine-dextroamphetamine 30 MG per 24 hr capsule    ADDERALL XR    30 capsule    Take 1 capsule (30 mg) by mouth daily    Attention deficit disorder (ADD) without hyperactivity       * amphetamine-dextroamphetamine 30 MG per 24 hr capsule   Start taking on:  6/18/2018    ADDERALL XR    30 capsule    Take 1 capsule (30 mg) by mouth daily    Attention deficit disorder (ADD) without hyperactivity       * amphetamine-dextroamphetamine 30 MG per 24 hr capsule   Start taking on:  7/19/2018    ADDERALL XR    30 capsule    Take 1 capsule (30 mg) by mouth daily    Attention deficit disorder (ADD) without hyperactivity       B Complex Tabs           baclofen 10 MG tablet    LIORESAL     TAKE 1 TABLET BY MOUTH 3 TIMES  A DAY AS NEEDED FOR MUSCLE SPASM        cholecalciferol 33570 units capsule    vitamin D3     Take 1 capsule by mouth daily        DULoxetine 60 MG EC capsule    CYMBALTA    180 capsule    TAKE 2 CAPSULES BY MOUTH EVERY MORNING    Depressive disorder, Chronic pain syndrome, Fibromyalgia       HYDROcodone-acetaminophen  MG per tablet    NORCO     Take 1 tablet by mouth every 6 hours as needed for moderate to severe pain        ibuprofen 200 MG tablet    ADVIL/MOTRIN          lisinopril 10 MG tablet    PRINIVIL/ZESTRIL    90 tablet    TAKE 1 TABLET (10 MG) BY MOUTH DAILY    Benign essential hypertension       magnesium 500 MG Tabs      Take 1,000 mg by mouth        morphine 15 MG 12 hr tablet    MS CONTIN          omeprazole 20 MG CR capsule    priLOSEC     Take 20 mg by mouth        order for DME      Equipment being ordered: RES MED AIRCURVE 10 ASV        oxyCODONE-acetaminophen 5-325 MG per tablet    PERCOCET     TK 1 T PO  TID        pregabalin 150 MG capsule    LYRICA    60 capsule    Take 1 capsule (150 mg) by mouth 3 times daily Patient  Will contact you when ready to fill    Chronic pain syndrome, Fibromyalgia, TMJ (temporomandibular joint syndrome)       pseudoePHEDrine 120 MG 12 hr tablet    SUDAFED 12 HOUR    90 tablet    Take 1 tablet (120 mg) by mouth 2 times daily    Chronic vasomotor rhinitis       spironolactone 50 MG tablet    ALDACTONE    90 tablet    Take 1 tablet (50 mg) by mouth daily    Acne vulgaris       traZODone 50 MG tablet    DESYREL    180 tablet    Take 2 tablets (100 mg) by mouth nightly as needed for sleep    Moderate episode of recurrent major depressive disorder (H)       TYLENOL PO      Take 1,000 mg by mouth every 4 hours as needed for mild pain or fever        * Notice:  This list has 3 medication(s) that are the same as other medications prescribed for you. Read the directions carefully, and ask your doctor or other care provider to review them with you.

## 2018-05-18 NOTE — TELEPHONE ENCOUNTER
Controlled Substance Refill Request for amphetamine-dextroamphetamine (ADDERALL XR) 30 MG per 24 hr capsule  Problem List Complete:  No     PROVIDER TO CONSIDER COMPLETION OF PROBLEM LIST AND OVERVIEW/CONTROLLED SUBSTANCE AGREEMENT    Last Written Prescription Date:  01/20/17  Last Fill Quantity: 30,   # refills: 0    Last Office Visit with SAMANTHA primary care provider: 02/07/18    Future Office visit:   Next 5 appointments (look out 90 days)     May 18, 2018  4:00 PM CDT   Nurse Only with FM MA/LPN   [facility] ([facility])    [address] [place] [address], [facility] [address]  [facility] [address] [phone]                  Controlled substance agreement on file: No.     Processing:  Patient will  in clinic   checked in past 6 months?  No, route to RN

## 2018-05-18 NOTE — PROGRESS NOTES
The patient is asked the following questions today and these are her answers:    -Have you had a mantoux administered in the past 30 days?    No  -Have you had a previous positive Mantoux.  No  -Have you received BCG in the past.  No  -Have you had a live vaccine  (MMR, Varicella, OPV, Yellow Fever) in the last 6 weeks.  No  -Have you had and active  viral or bacterial infection in the past 6 weeks.  No  -Have you received corticosteroids or immunosuppressive agents in the past 6 weeks.  No  -Have you been diagnosed with HIV?  No  -Do you have a maglinancy?  No     Adeline Sky MA

## 2018-05-18 NOTE — TELEPHONE ENCOUNTER
checked 5/18/2018: copy placed in Dr. Interiano's in-basket.    (copied only 2018)    05/03/2018 MORPHINE SULF ER 15 MG TABLET 42.00   05/03/2018 OXYCODONE-ACETAMINOPHEN 5-325 42.00   04/19/2018 LYRICA 150 MG CAPSULE 15.00   04/19/2018 MORPHINE SULF ER 15 MG TABLET 42.00   04/19/2018 OXYCODONE-ACETAMINOPHEN 5-325 42.00   04/05/2018 MORPHINE SULF ER 15 MG TABLET 42.00   04/05/2018 OXYCODONE-ACETAMINOPHEN 5-325 42.00   03/29/2018 DEXTROAMP-AMPHET ER 30 MG CAP 30.00   03/22/2018 MORPHINE SULF ER 15 MG TABLET 42.00   03/22/2018 OXYCODONE-ACETAMINOPHEN 5-325 42.00  03/08/2018 LYRICA 150 MG CAPSULE 90.00   03/08/2018 MORPHINE SULF ER 15 MG TABLET 42.00   03/08/2018 OXYCODONE-ACETAMINOPHEN 5-325 42.00   02/26/2018 OXYCODONE-ACETAMINOPHEN 5-325 30.00  02/26/2018 MORPHINE SULF ER 15 MG TABLET 24.00  02/15/2018 MORPHINE SULF ER 15 MG TABLET 42.00   02/14/2018 OXYCODONE-ACETAMINOPHEN 5-325 42.00   02/07/2018 OXYCODONE HCL 5 MG TABLET 40.00  02/07/2018 WAL-PHED 12HR CAP 20 80.00   02/07/2018 DEXTROAMP-AMPHET ER 30 MG CAP 30.00   01/31/2018 OXYCODONE-ACETAMINOPHEN 5-325 42.00   01/31/2018 MORPHINE SULF ER 15 MG TABLET 42.00  01/17/2018 LYRICA 150 MG CAPSULE 90.00  01/17/2018 OXYCODONE-ACETAMINOPHEN 5-325 42.00   01/17/2018 MORPHINE SULF ER 15 MG TABLET 42.00  01/04/2018 OXYCODONE-ACETAMINOPHEN 5-325 42.00  01/04/2018 MORPHINE SULF ER 15 MG TABLET 42.00   12/20/2017 OXYCODONE-ACETAMINOPHEN 5-325 42.00     Marilyn Baltazar RN

## 2018-06-08 ENCOUNTER — OFFICE VISIT (OUTPATIENT)
Dept: FAMILY MEDICINE | Facility: CLINIC | Age: 38
End: 2018-06-08
Payer: COMMERCIAL

## 2018-06-08 VITALS
WEIGHT: 195 LBS | TEMPERATURE: 98.6 F | BODY MASS INDEX: 28.88 KG/M2 | HEIGHT: 69 IN | RESPIRATION RATE: 14 BRPM | SYSTOLIC BLOOD PRESSURE: 128 MMHG | DIASTOLIC BLOOD PRESSURE: 78 MMHG | HEART RATE: 72 BPM

## 2018-06-08 DIAGNOSIS — L98.9 SCALP LESION: Primary | ICD-10-CM

## 2018-06-08 PROCEDURE — 99213 OFFICE O/P EST LOW 20 MIN: CPT | Performed by: PHYSICIAN ASSISTANT

## 2018-06-08 NOTE — PATIENT INSTRUCTIONS
(L98.9) Scalp lesion  (primary encounter diagnosis)  Comment:   Plan: SKIN CARE REFERRAL

## 2018-06-08 NOTE — PROGRESS NOTES
SUBJECTIVE:   Heath Waller is a 37 year old female who presents to clinic today for the following health issues:    Pt is here for 2 lesions on her scalp, noticed them a couple of weeks ago and have gotten a lot larger over the course of last few weeks. Has not noticed any other symptoms.           Problem list and histories reviewed & adjusted, as indicated.  Additional history: as documented    Current Outpatient Prescriptions   Medication Sig Dispense Refill     Acetaminophen (TYLENOL PO) Take 1,000 mg by mouth every 4 hours as needed for mild pain or fever       amphetamine-dextroamphetamine (ADDERALL XR) 30 MG per 24 hr capsule Take 1 capsule (30 mg) by mouth daily 30 capsule 0     [START ON 6/18/2018] amphetamine-dextroamphetamine (ADDERALL XR) 30 MG per 24 hr capsule Take 1 capsule (30 mg) by mouth daily 30 capsule 0     [START ON 7/19/2018] amphetamine-dextroamphetamine (ADDERALL XR) 30 MG per 24 hr capsule Take 1 capsule (30 mg) by mouth daily 30 capsule 0     B Complex TABS        baclofen (LIORESAL) 10 MG tablet TAKE 1 TABLET BY MOUTH 3 TIMES A DAY AS NEEDED FOR MUSCLE SPASM  1     cholecalciferol (VITAMIN D3) 52714 UNITS capsule Take 1 capsule by mouth daily       DULoxetine (CYMBALTA) 60 MG EC capsule TAKE 2 CAPSULES BY MOUTH EVERY MORNING 180 capsule 1     HYDROcodone-acetaminophen (NORCO)  MG per tablet Take 1 tablet by mouth every 6 hours as needed for moderate to severe pain       ibuprofen (ADVIL,MOTRIN) 200 MG tablet        lisinopril (PRINIVIL/ZESTRIL) 10 MG tablet TAKE 1 TABLET (10 MG) BY MOUTH DAILY 90 tablet 1     magnesium 500 MG TABS Take 1,000 mg by mouth       morphine (MS CONTIN) 15 MG 12 hr tablet   0     omeprazole (PRILOSEC) 20 MG capsule Take 20 mg by mouth       order for DME Equipment being ordered: RES MED AIRCURVE 10 ASV       oxyCODONE-acetaminophen (PERCOCET) 5-325 MG per tablet TK 1 T PO  TID  0     pregabalin (LYRICA) 150 MG capsule Take 1 capsule (150 mg) by mouth  "3 times daily Patient  Will contact you when ready to fill 60 capsule 1     pseudoePHEDrine (SUDAFED 12 HOUR) 120 MG 12 hr tablet Take 1 tablet (120 mg) by mouth 2 times daily 90 tablet 1     spironolactone (ALDACTONE) 50 MG tablet Take 1 tablet (50 mg) by mouth daily 90 tablet 1     traZODone (DESYREL) 50 MG tablet Take 2 tablets (100 mg) by mouth nightly as needed for sleep 180 tablet 1     BP Readings from Last 3 Encounters:   06/08/18 128/78   02/07/18 96/60   01/30/18 111/74    Wt Readings from Last 3 Encounters:   06/08/18 195 lb (88.5 kg)   02/07/18 188 lb 11.2 oz (85.6 kg)   01/30/18 195 lb (88.5 kg)                    Reviewed and updated as needed this visit by clinical staff  Tobacco  Allergies  Med Hx  Surg Hx  Fam Hx  Soc Hx      Reviewed and updated as needed this visit by Provider         ROS:  Constitutional, HEENT, cardiovascular, pulmonary, gi and gu systems are negative, except as otherwise noted.    OBJECTIVE:                                                    /78 (BP Location: Right arm, Patient Position: Chair, Cuff Size: Adult Large)  Pulse 72  Temp 98.6  F (37  C) (Oral)  Resp 14  Ht 5' 9\" (1.753 m)  Wt 195 lb (88.5 kg)  LMP 05/19/2018 (Exact Date)  Breastfeeding? No  BMI 28.8 kg/m2  Body mass index is 28.8 kg/(m^2).  GENERAL APPEARANCE: healthy, alert and no distress  SKIN: posterior scalp 5 mm erythematous papule with crusting.    Rt side of scalp 1 cm fleshy papule.          ASSESSMENT/PLAN:                                                    1. Scalp lesion  ? Etiology.  Patient states she just noticed them. The posterior lesion is itchy.  Concerned it may be a basal cell    - SKIN CARE REFERRAL          Ramona Ann Aaseby-Aguilera, PA-C  [facility]  "

## 2018-06-08 NOTE — MR AVS SNAPSHOT
After Visit Summary   6/8/2018    Heath Waller    MRN: [id number]           Patient Information     Date Of Birth          1980        Visit Information        Provider Department      6/8/2018 4:00 PM Aaseby-Aguilera, Ramona Ann, PA-C [facility]        Today's Diagnoses     Scalp lesion    -  1      Care Instructions    (L98.9) Scalp lesion  (primary encounter diagnosis)  Comment:   Plan: SKIN CARE REFERRAL                      Follow-ups after your visit        Additional Services     SKIN CARE REFERRAL       Your provider has referred you to: [facility]: [facility] [place] [phone]  [email or web address]/[place]/     Please be aware that coverage of these services is subject to the terms and limitations of your health insurance plan.  Please check with your insurance prior to the appointment to ensure appropriate coverage for any services considered cosmetic in nature or not medically necessary.    Please bring the following with you to your appointment:    (1) Any X-Rays, CTs or MRIs which have been performed.  Contact the facility where they were done to arrange for  prior to your scheduled appointment.  Any new CT, MRI or other procedures ordered by your specialist must be performed at a [facility] facility or coordinated by your clinic's referral office.  (2) List of current medications  (3) This referral request   (4) Any documents/labs given to you for this referral                  Your next 10 appointments already scheduled     Jun 29, 2018  1:45 PM CDT   MyChart Dermatology General with Yanna Leyva PA-C   [facility] ([facility]    [address] [address] [phone]              Who to contact     If you have questions or need follow up information about today's clinic visit or your schedule please contact [facility]  "[facility] directly at [phone].  Normal or non-critical lab and imaging results will be communicated to you by MyChart, letter or phone within 4 business days after the clinic has received the results. If you do not hear from us within 7 days, please contact the clinic through [employer] or phone. If you have a critical or abnormal lab result, we will notify you by phone as soon as possible.  Submit refill requests through [employer] or call your pharmacy and they will forward the refill request to us. Please allow 3 business days for your refill to be completed.          Additional Information About Your Visit        [employer] Information     [employer] gives you secure access to your electronic health record. If you see a primary care provider, you can also send messages to your care team and make appointments. If you have questions, please call your primary care clinic.  If you do not have a primary care provider, please call [phone] and they will assist you.        Care EveryWhere ID     This is your Care EveryWhere ID. This could be used by other organizations to access your [place] medical records  [id number]        Your Vitals Were     Pulse Temperature Respirations Height Last Period Breastfeeding?    72 98.6  F (37  C) (Oral) 14 5' 9\" (1.753 m) 05/19/2018 (Exact Date) No    BMI (Body Mass Index)                   28.8 kg/m2            Blood Pressure from Last 3 Encounters:   06/08/18 128/78   02/07/18 96/60   01/30/18 111/74    Weight from Last 3 Encounters:   06/08/18 195 lb (88.5 kg)   02/07/18 188 lb 11.2 oz (85.6 kg)   01/30/18 195 lb (88.5 kg)              We Performed the Following     SKIN CARE REFERRAL        Primary Care Provider Office Phone # Fax #    Alex Interiano [phone]       [id number]  [address] [facility] [address]        Equal Access to Services     NAVYA TRISTAN AH: Hadii rafaela camarillo Sonahomy, waaxda luqadaha, qaybta kaalmalillian luna, harinder tracey " sheela venegasaan ah. So [facility] [phone].    ATENCIÓN: Si ishmaella angeles, tiene a jean disposición servicios gratuitos de asistencia lingüística. Alexandra al [phone].    We comply with applicable federal civil rights laws and [place] laws. We do not discriminate on the basis of race, color, national origin, age, disability, sex, sexual orientation, or gender identity.            Thank you!     Thank you for choosing [facility]  for your care. Our goal is always to provide you with excellent care. Hearing back from our patients is one way we can continue to improve our services. Please take a few minutes to complete the written survey that you may receive in the mail after your visit with us. Thank you!             Your Updated Medication List - Protect others around you: Learn how to safely use, store and throw away your medicines at www.disposemymeds.org.          This list is accurate as of 6/8/18  4:35 PM.  Always use your most recent med list.                   Brand Name Dispense Instructions for use Diagnosis    * amphetamine-dextroamphetamine 30 MG per 24 hr capsule    ADDERALL XR    30 capsule    Take 1 capsule (30 mg) by mouth daily    Attention deficit disorder (ADD) without hyperactivity       * amphetamine-dextroamphetamine 30 MG per 24 hr capsule   Start taking on:  6/18/2018    ADDERALL XR    30 capsule    Take 1 capsule (30 mg) by mouth daily    Attention deficit disorder (ADD) without hyperactivity       * amphetamine-dextroamphetamine 30 MG per 24 hr capsule   Start taking on:  7/19/2018    ADDERALL XR    30 capsule    Take 1 capsule (30 mg) by mouth daily    Attention deficit disorder (ADD) without hyperactivity       B Complex Tabs           baclofen 10 MG tablet    LIORESAL     TAKE 1 TABLET BY MOUTH 3 TIMES A DAY AS NEEDED FOR MUSCLE SPASM        cholecalciferol 22507 units capsule    vitamin D3     Take 1 capsule by mouth daily        DULoxetine 60 MG EC capsule    CYMBALTA     180 capsule    TAKE 2 CAPSULES BY MOUTH EVERY MORNING    Depressive disorder, Chronic pain syndrome, Fibromyalgia       HYDROcodone-acetaminophen  MG per tablet    NORCO     Take 1 tablet by mouth every 6 hours as needed for moderate to severe pain        ibuprofen 200 MG tablet    ADVIL/MOTRIN          lisinopril 10 MG tablet    PRINIVIL/ZESTRIL    90 tablet    TAKE 1 TABLET (10 MG) BY MOUTH DAILY    Benign essential hypertension       magnesium 500 MG Tabs      Take 1,000 mg by mouth        morphine 15 MG 12 hr tablet    MS CONTIN          omeprazole 20 MG CR capsule    priLOSEC     Take 20 mg by mouth        order for DME      Equipment being ordered: RES MED AIRCURVE 10 ASV        oxyCODONE-acetaminophen 5-325 MG per tablet    PERCOCET     TK 1 T PO  TID        pregabalin 150 MG capsule    LYRICA    60 capsule    Take 1 capsule (150 mg) by mouth 3 times daily Patient  Will contact you when ready to fill    Chronic pain syndrome, Fibromyalgia, TMJ (temporomandibular joint syndrome)       pseudoePHEDrine 120 MG 12 hr tablet    SUDAFED 12 HOUR    90 tablet    Take 1 tablet (120 mg) by mouth 2 times daily    Chronic vasomotor rhinitis       spironolactone 50 MG tablet    ALDACTONE    90 tablet    Take 1 tablet (50 mg) by mouth daily    Acne vulgaris       traZODone 50 MG tablet    DESYREL    180 tablet    Take 2 tablets (100 mg) by mouth nightly as needed for sleep    Moderate episode of recurrent major depressive disorder (H)       TYLENOL PO      Take 1,000 mg by mouth every 4 hours as needed for mild pain or fever        * Notice:  This list has 3 medication(s) that are the same as other medications prescribed for you. Read the directions carefully, and ask your doctor or other care provider to review them with you.

## 2018-06-29 ENCOUNTER — OFFICE VISIT (OUTPATIENT)
Dept: DERMATOLOGY | Facility: CLINIC | Age: 38
End: 2018-06-29
Payer: COMMERCIAL

## 2018-06-29 VITALS — OXYGEN SATURATION: 99 % | HEART RATE: 75 BPM

## 2018-06-29 DIAGNOSIS — L82.1 SEBORRHEIC KERATOSIS: ICD-10-CM

## 2018-06-29 DIAGNOSIS — L81.1 MELASMA: ICD-10-CM

## 2018-06-29 DIAGNOSIS — L81.4 LENTIGO: ICD-10-CM

## 2018-06-29 DIAGNOSIS — L40.0 PLAQUE PSORIASIS: ICD-10-CM

## 2018-06-29 DIAGNOSIS — D48.5 NEOPLASM OF UNCERTAIN BEHAVIOR OF SKIN: Primary | ICD-10-CM

## 2018-06-29 DIAGNOSIS — D22.9 NEVUS: ICD-10-CM

## 2018-06-29 DIAGNOSIS — D18.00 ANGIOMA: ICD-10-CM

## 2018-06-29 PROCEDURE — 88305 TISSUE EXAM BY PATHOLOGIST: CPT | Mod: TC | Performed by: PHYSICIAN ASSISTANT

## 2018-06-29 PROCEDURE — 99204 OFFICE O/P NEW MOD 45 MIN: CPT | Mod: 25 | Performed by: PHYSICIAN ASSISTANT

## 2018-06-29 PROCEDURE — 11101 HC BIOPSY SKIN/SUBQ/MUC MEM, EACH ADDTL LESION: CPT | Performed by: PHYSICIAN ASSISTANT

## 2018-06-29 PROCEDURE — 11100 HC BIOPSY SKIN/SUBQ/MUC MEM, SINGLE LESION: CPT | Performed by: PHYSICIAN ASSISTANT

## 2018-06-29 RX ORDER — HYDROQUINONE 40 MG/G
CREAM TOPICAL
Qty: 30 G | Refills: 5 | Status: SHIPPED | OUTPATIENT
Start: 2018-06-29 | End: 2019-09-25

## 2018-06-29 RX ORDER — TRIAMCINOLONE ACETONIDE 0.25 MG/G
OINTMENT TOPICAL
Qty: 80 G | Refills: 2 | Status: SHIPPED | OUTPATIENT
Start: 2018-06-29 | End: 2021-02-24

## 2018-06-29 NOTE — MR AVS SNAPSHOT
After Visit Summary   6/29/2018    Heath Waller    MRN: [id number]           Patient Information     Date Of Birth          1980        Visit Information        Provider Department      6/29/2018 1:45 PM Yanna Leyva PA-C [facility][1]        Today's Diagnoses     Neoplasm of uncertain behavior of skin    -  1    Plaque psoriasis        Lentigo        Angioma        Seborrheic keratosis        Nevus        Melasma          Care Instructions      Wound Care Instructions     FOR SUPERFICIAL WOUNDS     [facility] [phone]                 AFTER 24 HOURS YOU SHOULD REMOVE THE BANDAGE AND BEGIN DAILY DRESSING CHANGES AS FOLLOWS:     1) Remove Dressing.     2) Clean and dry the area with tap water using a Q-tip or sterile gauze pad.     3) Apply Vaseline, Aquaphor, Polysporin ointment or Bacitracin ointment over entire wound.  Do NOT use Neosporin ointment.     4) Cover the wound with a band-aid, or a sterile non-stick gauze pad and micropore paper tape      REPEAT THESE INSTRUCTIONS AT LEAST ONCE A DAY UNTIL THE WOUND HAS COMPLETELY HEALED.    It is an old wives tale that a wound heals better when it is exposed to air and allowed to dry out. The wound will heal faster with a better cosmetic result if it is kept moist with ointment and covered with a bandage.    **Do not let the wound dry out.**      Supplies Needed:      *Cotton tipped applicators (Q-tips)    *Polysporin Ointment or Bacitracin Ointment (NOT NEOSPORIN)    *Band-aids or non-stick gauze pads and micropore paper tape.      PATIENT INFORMATION:    During the healing process you will notice a number of changes. All wounds develop a small halo of redness surrounding the wound.  This means healing is occurring. Severe itching with extensive redness usually indicates sensitivity to the ointment or bandage tape used to dress the wound.  You should call our office if this develops.      Swelling   and/or discoloration around your surgical site is common, particularly when performed around the eye.    All wounds normally drain.  The larger the wound the more drainage there will be.  After 7-10 days, you will notice the wound beginning to shrink and new skin will begin to grow.  The wound is healed when you can see skin has formed over the entire area.  A healed wound has a healthy, shiny look to the surface and is red to dark pink in color to normalize.  Wounds may take approximately 4-6 weeks to heal.  Larger wounds may take 6-8 weeks.  After the wound is healed you may discontinue dressing changes.    You may experience a sensation of tightness as your wound heals. This is normal and will gradually subside.    Your healed wound may be sensitive to temperature changes. This sensitivity improves with time, but if you re having a lot of discomfort, try to avoid temperature extremes.    Patients frequently experience itching after their wound appears to have healed because of the continue healing under the skin.  Plain Vaseline will help relieve the itching.        POSSIBLE COMPLICATIONS    BLEEDIN. Leave the bandage in place.  2. Use tightly rolled up gauze or a cloth to apply direct pressure over the bandage for 30  minutes.  3. Reapply pressure for an additional 30 minutes if necessary  4. Use additional gauze and tape to maintain pressure once the bleeding has stopped.            Follow-ups after your visit        Who to contact     If you have questions or need follow up information about today's clinic visit or your schedule please contact [facility] directly at [phone].  Normal or non-critical lab and imaging results will be communicated to you by MyChart, letter or phone within 4 business days after the clinic has received the results. If you do not hear from us within 7 days, please contact the clinic through MyChart or phone. If you have a critical or abnormal lab  result, we will notify you by phone as soon as possible.  Submit refill requests through [employer] or call your pharmacy and they will forward the refill request to us. Please allow 3 business days for your refill to be completed.          Additional Information About Your Visit        [employer] Information     [employer] gives you secure access to your electronic health record. If you see a primary care provider, you can also send messages to your care team and make appointments. If you have questions, please call your primary care clinic.  If you do not have a primary care provider, please call [phone] and they will assist you.        Care EveryWhere ID     This is your Care EveryWhere ID. This could be used by other organizations to access your [place] medical records  [id number]        Your Vitals Were     Pulse Pulse Oximetry Breastfeeding?             75 99% No          Blood Pressure from Last 3 Encounters:   06/08/18 128/78   02/07/18 96/60   01/30/18 111/74    Weight from Last 3 Encounters:   06/08/18 88.5 kg (195 lb)   02/07/18 85.6 kg (188 lb 11.2 oz)   01/30/18 88.5 kg (195 lb)              We Performed the Following     BIOPSY SKIN/SUBQ/MUC MEM, EACH ADDTL LESION     BIOPSY SKIN/SUBQ/MUC MEM, SINGLE LESION     Dermatological path order and indications          Today's Medication Changes          These changes are accurate as of 6/29/18  2:04 PM.  If you have any questions, ask your nurse or doctor.               Start taking these medicines.        Dose/Directions    hydroquinone 4 % Crea   Used for:  Melasma   Started by:  Yanna Leyva PA-C        Apply to face BID x 4-6 months then PRN   Quantity:  30 g   Refills:  5       triamcinolone 0.025 % ointment   Commonly known as:  KENALOG   Used for:  Plaque psoriasis   Started by:  Yanna Leyva PA-C        Apply to AA BID x 1-2 weeks and can repeat PRN   Quantity:  80 g   Refills:  2            Where to get your medicines      These  medications were sent to [facility] - [place], [place] - [address]  [address], [facility] [address]     Phone:  [phone]     hydroquinone 4 % Crea    triamcinolone 0.025 % ointment                Primary Care Provider Office Phone # Fax #    Alex Jude Interiano [phone] [id number]  [facility] [address]        Equal Access to Services     [facility]: Hadii aad ku hadasho Soomaali, waaxda luqadaha, qaybta kaalmada adeegyada, waxay idiin hayaan adeeg kharash la'aan . So [facility] [phone].    ATENCIÓN: Si habla español, tiene a jean disposición servicios gratuitos de asistencia lingüística. [facility] [phone].    We comply with applicable federal civil rights laws and [place] laws. We do not discriminate on the basis of race, color, national origin, age, disability, sex, sexual orientation, or gender identity.            Thank you!     Thank you for choosing [facility]  for your care. Our goal is always to provide you with excellent care. Hearing back from our patients is one way we can continue to improve our services. Please take a few minutes to complete the written survey that you may receive in the mail after your visit with us. Thank you!             Your Updated Medication List - Protect others around you: Learn how to safely use, store and throw away your medicines at www.disposemymeds.org.          This list is accurate as of 6/29/18  2:04 PM.  Always use your most recent med list.                   Brand Name Dispense Instructions for use Diagnosis    * amphetamine-dextroamphetamine 30 MG per 24 hr capsule    ADDERALL XR    30 capsule    Take 1 capsule (30 mg) by mouth daily    Attention deficit disorder (ADD) without hyperactivity       * amphetamine-dextroamphetamine 30 MG per 24 hr capsule   Start taking on:  7/19/2018    ADDERALL XR    30 capsule    Take 1 capsule  (30 mg) by mouth daily    Attention deficit disorder (ADD) without hyperactivity       B Complex Tabs           baclofen 10 MG tablet    LIORESAL     TAKE 1 TABLET BY MOUTH 3 TIMES A DAY AS NEEDED FOR MUSCLE SPASM        cholecalciferol 57224 units capsule    vitamin D3     Take 1 capsule by mouth daily        DULoxetine 60 MG EC capsule    CYMBALTA    180 capsule    TAKE 2 CAPSULES BY MOUTH EVERY MORNING    Depressive disorder, Chronic pain syndrome, Fibromyalgia       HYDROcodone-acetaminophen  MG per tablet    NORCO     Take 1 tablet by mouth every 6 hours as needed for moderate to severe pain        hydroquinone 4 % Crea     30 g    Apply to face BID x 4-6 months then PRN    Melasma       ibuprofen 200 MG tablet    ADVIL/MOTRIN          lisinopril 10 MG tablet    PRINIVIL/ZESTRIL    90 tablet    TAKE 1 TABLET (10 MG) BY MOUTH DAILY    Benign essential hypertension       magnesium 500 MG Tabs      Take 1,000 mg by mouth        morphine 15 MG 12 hr tablet    MS CONTIN          omeprazole 20 MG CR capsule    priLOSEC     Take 20 mg by mouth        order for DME      Equipment being ordered: RES MED AIRCURVE 10 ASV        oxyCODONE-acetaminophen 5-325 MG per tablet    PERCOCET     TK 1 T PO  TID        pregabalin 150 MG capsule    LYRICA    60 capsule    Take 1 capsule (150 mg) by mouth 3 times daily Patient  Will contact you when ready to fill    Chronic pain syndrome, Fibromyalgia, TMJ (temporomandibular joint syndrome)       pseudoePHEDrine 120 MG 12 hr tablet    SUDAFED 12 HOUR    90 tablet    Take 1 tablet (120 mg) by mouth 2 times daily    Chronic vasomotor rhinitis       spironolactone 50 MG tablet    ALDACTONE    90 tablet    Take 1 tablet (50 mg) by mouth daily    Acne vulgaris       traZODone 50 MG tablet    DESYREL    180 tablet    Take 2 tablets (100 mg) by mouth nightly as needed for sleep    Moderate episode of recurrent major depressive disorder (H)       triamcinolone 0.025 % ointment     KENALOG    80 g    Apply to AA BID x 1-2 weeks and can repeat PRN    Plaque psoriasis       TYLENOL PO      Take 1,000 mg by mouth every 4 hours as needed for mild pain or fever        * Notice:  This list has 2 medication(s) that are the same as other medications prescribed for you. Read the directions carefully, and ask your doctor or other care provider to review them with you.

## 2018-06-29 NOTE — PATIENT INSTRUCTIONS
Wound Care Instructions     FOR SUPERFICIAL WOUNDS     [facility] [phone]                 AFTER 24 HOURS YOU SHOULD REMOVE THE BANDAGE AND BEGIN DAILY DRESSING CHANGES AS FOLLOWS:     1) Remove Dressing.     2) Clean and dry the area with tap water using a Q-tip or sterile gauze pad.     3) Apply Vaseline, Aquaphor, Polysporin ointment or Bacitracin ointment over entire wound.  Do NOT use Neosporin ointment.     4) Cover the wound with a band-aid, or a sterile non-stick gauze pad and micropore paper tape      REPEAT THESE INSTRUCTIONS AT LEAST ONCE A DAY UNTIL THE WOUND HAS COMPLETELY HEALED.    It is an old wives tale that a wound heals better when it is exposed to air and allowed to dry out. The wound will heal faster with a better cosmetic result if it is kept moist with ointment and covered with a bandage.    **Do not let the wound dry out.**      Supplies Needed:      *Cotton tipped applicators (Q-tips)    *Polysporin Ointment or Bacitracin Ointment (NOT NEOSPORIN)    *Band-aids or non-stick gauze pads and micropore paper tape.      PATIENT INFORMATION:    During the healing process you will notice a number of changes. All wounds develop a small halo of redness surrounding the wound.  This means healing is occurring. Severe itching with extensive redness usually indicates sensitivity to the ointment or bandage tape used to dress the wound.  You should call our office if this develops.      Swelling  and/or discoloration around your surgical site is common, particularly when performed around the eye.    All wounds normally drain.  The larger the wound the more drainage there will be.  After 7-10 days, you will notice the wound beginning to shrink and new skin will begin to grow.  The wound is healed when you can see skin has formed over the entire area.  A healed wound has a healthy, shiny look to the surface and is red to dark pink in color to normalize.  Wounds may take approximately 4-6  weeks to heal.  Larger wounds may take 6-8 weeks.  After the wound is healed you may discontinue dressing changes.    You may experience a sensation of tightness as your wound heals. This is normal and will gradually subside.    Your healed wound may be sensitive to temperature changes. This sensitivity improves with time, but if you re having a lot of discomfort, try to avoid temperature extremes.    Patients frequently experience itching after their wound appears to have healed because of the continue healing under the skin.  Plain Vaseline will help relieve the itching.        POSSIBLE COMPLICATIONS    BLEEDIN. Leave the bandage in place.  2. Use tightly rolled up gauze or a cloth to apply direct pressure over the bandage for 30  minutes.  3. Reapply pressure for an additional 30 minutes if necessary  4. Use additional gauze and tape to maintain pressure once the bleeding has stopped
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
## 2018-07-03 ENCOUNTER — TELEPHONE (OUTPATIENT)
Dept: DERMATOLOGY | Facility: CLINIC | Age: 38
End: 2018-07-03

## 2018-07-03 LAB — COPATH REPORT: NORMAL

## 2018-07-03 NOTE — TELEPHONE ENCOUNTER
Notes Recorded by Yanna Leyva PA-C on 7/3/2018 at 10:41 AM  Right calf severely atypical nevus pathologist recommending reexcision. Please schedule within the next 4 weeks.

## 2018-07-03 NOTE — TELEPHONE ENCOUNTER
Called and LM for patient to call back in regards to biopsy results.     Duek RN-BSN  [facility]  [phone]

## 2018-07-03 NOTE — LETTER
[facility]  [address]  [address]  [phone]    7/6/2018       Heath Waller  [address] [place] [id number]      Dear Heath:    You are scheduled for Mohs Surgery on: 7/19/2018 @7:00am.    Please check in at [facility], [address].     You don't need to arrive more than 5-10 minutes prior to your appointment time.     Be sure to eat a good breakfast and bathe and wash your hair prior to surgery.     If you are taking any anti-coagulants that are prescribed by your Doctor (such as Coumadin/Warfarin, Plavix, Aspirin, Ibuprofen), please continue taking them.     However, if you are taking anti-coagulants over the counter without a Doctor's order for a medical condition, please discontinue them 10 days prior to surgery.     Please wear loose comfortable clothing as it could possibly be 4-6 hours until your surgery is completed depending upon how many layers of tissue need to be removed.      Thank you,    JULIO Celeste MD

## 2018-07-05 NOTE — TELEPHONE ENCOUNTER
Called and spoke to patient. Educated patient on biopsy results-severely atypical nevus. Educated patient on the need for a Mohs procedure within 4 weeks, patient stated the next time she is back is 7/26- Dr. Celeste is out of the office that day. Informed patient that I would speak with provider in regards to scheduling conflict and get back to her. Patient voiced understanding.  Duke RN-BSN  [facility]  [phone]

## 2018-07-05 NOTE — TELEPHONE ENCOUNTER
Called and LM for patient to call back in regards to biopsy results.    Duke RN-BSN  [facility]  [phone]

## 2018-07-06 NOTE — TELEPHONE ENCOUNTER
Patient called back. Scheduled future Mohs appointment. Educated patient on Mohs procedure and letter sent. Patient voiced understanding.    SMILEY Wall-BSN  [facility]  [phone]

## 2018-07-06 NOTE — TELEPHONE ENCOUNTER
Called and LM for patient to call back to schedule Mohs.    Duke RN-BSN  [facility]  [phone]

## 2018-07-19 ENCOUNTER — OFFICE VISIT (OUTPATIENT)
Dept: DERMATOLOGY | Facility: CLINIC | Age: 38
End: 2018-07-19
Payer: COMMERCIAL

## 2018-07-19 VITALS — OXYGEN SATURATION: 97 % | SYSTOLIC BLOOD PRESSURE: 121 MMHG | HEART RATE: 94 BPM | DIASTOLIC BLOOD PRESSURE: 75 MMHG

## 2018-07-19 DIAGNOSIS — D48.5 NEOPLASM OF UNCERTAIN BEHAVIOR OF SKIN: Primary | ICD-10-CM

## 2018-07-19 DIAGNOSIS — D03.71 MELANOMA IN SITU OF RIGHT LOWER LEG (H): ICD-10-CM

## 2018-07-19 PROCEDURE — 17313 MOHS 1 STAGE T/A/L: CPT | Performed by: DERMATOLOGY

## 2018-07-19 NOTE — MR AVS SNAPSHOT
After Visit Summary   2018    Heath Waller    MRN: [id number]           Patient Information     Date Of Birth          1980        Visit Information        Provider Department      2018 7:00 AM Reuben Celeste MD [facility]        Care Instructions    Open Wound Care     for right calf    ? No strenuous activity for 48 hours    ? Take Tylenol as needed for discomfort.                                                .         ? Do not drink alcoholic beverages for 48 hours.    ? Keep the pressure bandage in place for 24 hours. If the bandage becomes blood tinged or loose, reinforce it with gauze and tape.        (Refer to the reverse side of this page for management of bleeding).    ? Remove bandage in 24 hours and begin wound care as follows:     1. Clean area with tap water using a Q tip or gauze pad, (shower / bathe normally)  2. Dry wound with Q tip or gauze pad  3. Apply Aquaphor, Vaseline, Polysporin or Bacitracin Ointment with a Q tip    Do NOT use Neosporin Ointment *  4. Cover the wound with a band-aid or nonstick gauze pad and paper tape.  5. Repeat wound care once a day until wound is completely healed.    It is an old wives tale that a wound heals better when it is exposed to air and allowed to dry out. The wound will heal faster with a better cosmetic result if it is kept moist with ointment and covered with a bandage.  Do not let the wound dry out.      Supplies Needed:                Qtips or gauze pads                Polysporin or Bacitracin Ointment                Bandaids or nonstick gauze pads and paper tape    Wound care kits and brown paper tape are available for purchase at   the pharmacy.    BLEEDIN. Use tightly rolled up gauze or cloth to apply direct pressure over the bandage for 20   minutes.  2. Reapply pressure for an additional 20 minutes if necessary  3. Call the office or go to the nearest emergency room if  pressure fails to stop the bleeding.  4. Use additional gauze and tape to maintain pressure once the bleeding has stopped.  5. Begin wound care 24 hours after surgery as directed.                  WOUND HEALING    1. One week after surgery a pink / red halo will form around the outside of the wound.   This is new skin.  2. The center of the wound will appear yellowish white and produce some drainage.  3. The pink halo will slowly migrate in toward the center of the wound until the wound is covered with new shiny pink skin.  4. There will be no more drainage when the wound is completely healed.  5. It will take six months to one year for the redness to fade.  6. The scar may be itchy, tight and sensitive to extreme temperatures for a year after the surgery.  7. Massaging the area several times a day for several minutes after the wound is completely healed will help the scar soften and normalize faster. Begin massage only after healing is complete.      In case of emergency call: Dr Celeste: [phone]     [facility]: [phone]    [facility]: [phone]              Follow-ups after your visit        Who to contact     If you have questions or need follow up information about today's clinic visit or your schedule please contact [facility] directly at [phone].  Normal or non-critical lab and imaging results will be communicated to you by MyChart, letter or phone within 4 business days after the clinic has received the results. If you do not hear from us within 7 days, please contact the clinic through MyChart or phone. If you have a critical or abnormal lab result, we will notify you by phone as soon as possible.  Submit refill requests through [employer] or call your pharmacy and they will forward the refill request to us. Please allow 3 business days for your refill to be completed.          Additional Information About Your Visit        [employer] original products Information      [employer] gives you secure access to your electronic health record. If you see a primary care provider, you can also send messages to your care team and make appointments. If you have questions, please call your primary care clinic.  If you do not have a primary care provider, please call [phone] and they will assist you.        Care EveryWhere ID     This is your Care EveryWhere ID. This could be used by other organizations to access your [facility] medical records  [id number]        Your Vitals Were     Pulse Pulse Oximetry                94 97%           Blood Pressure from Last 3 Encounters:   07/19/18 121/75   06/08/18 128/78   02/07/18 96/60    Weight from Last 3 Encounters:   06/08/18 88.5 kg (195 lb)   02/07/18 85.6 kg (188 lb 11.2 oz)   01/30/18 88.5 kg (195 lb)              Today, you had the following     No orders found for display       Primary Care Provider Office Phone # Fax #    Alex Jude Interiano [phone]       [id number]  [facility] [address]        Equal Access to Services     [facility]: Hadii aad ku hadasho Soomaali, waaxda luqadaha, qaybta kaalmada adeegyada, harinder tim . So [facility] [phone].    ATENCIÓN: Si habla español, tiene a jean disposición servicios gratuitos de asistencia lingüística. Alexandra al [phone].    We comply with applicable federal civil rights laws and [place] laws. We do not discriminate on the basis of race, color, national origin, age, disability, sex, sexual orientation, or gender identity.            Thank you!     Thank you for choosing [facility]  for your care. Our goal is always to provide you with excellent care. Hearing back from our patients is one way we can continue to improve our services. Please take a few minutes to complete the written survey that you may receive in the mail after your visit with us. Thank you!             Your Updated Medication List - Protect  others around you: Learn how to safely use, store and throw away your medicines at www.disposemymeds.org.          This list is accurate as of 7/19/18  9:14 AM.  Always use your most recent med list.                   Brand Name Dispense Instructions for use Diagnosis    amphetamine-dextroamphetamine 30 MG per 24 hr capsule    ADDERALL XR    30 capsule    Take 1 capsule (30 mg) by mouth daily    Attention deficit disorder (ADD) without hyperactivity       B Complex Tabs           baclofen 10 MG tablet    LIORESAL     TAKE 1 TABLET BY MOUTH 3 TIMES A DAY AS NEEDED FOR MUSCLE SPASM        cholecalciferol 77983 units capsule    vitamin D3     Take 1 capsule by mouth daily        DULoxetine 60 MG EC capsule    CYMBALTA    180 capsule    TAKE 2 CAPSULES BY MOUTH EVERY MORNING    Depressive disorder, Chronic pain syndrome, Fibromyalgia       HYDROcodone-acetaminophen  MG per tablet    NORCO     Take 1 tablet by mouth every 6 hours as needed for moderate to severe pain        hydroquinone 4 % Crea     30 g    Apply to face BID x 4-6 months then PRN    Melasma       ibuprofen 200 MG tablet    ADVIL/MOTRIN          lisinopril 10 MG tablet    PRINIVIL/ZESTRIL    90 tablet    TAKE 1 TABLET (10 MG) BY MOUTH DAILY    Benign essential hypertension       magnesium 500 MG Tabs      Take 1,000 mg by mouth        morphine 15 MG 12 hr tablet    MS CONTIN          omeprazole 20 MG CR capsule    priLOSEC     Take 20 mg by mouth        order for DME      Equipment being ordered: RES MED AIRCURVE 10 ASV        oxyCODONE-acetaminophen 5-325 MG per tablet    PERCOCET     TK 1 T PO  TID        pregabalin 150 MG capsule    LYRICA    60 capsule    Take 1 capsule (150 mg) by mouth 3 times daily Patient  Will contact you when ready to fill    Chronic pain syndrome, Fibromyalgia, TMJ (temporomandibular joint syndrome)       pseudoePHEDrine 120 MG 12 hr tablet    SUDAFED 12 HOUR    90 tablet    Take 1 tablet (120 mg) by mouth 2 times daily     Chronic vasomotor rhinitis       spironolactone 50 MG tablet    ALDACTONE    90 tablet    Take 1 tablet (50 mg) by mouth daily    Acne vulgaris       traZODone 50 MG tablet    DESYREL    180 tablet    Take 2 tablets (100 mg) by mouth nightly as needed for sleep    Moderate episode of recurrent major depressive disorder (H)       triamcinolone 0.025 % ointment    KENALOG    80 g    Apply to AA BID x 1-2 weeks and can repeat PRN    Plaque psoriasis       TYLENOL PO      Take 1,000 mg by mouth every 4 hours as needed for mild pain or fever

## 2018-07-19 NOTE — LETTER
"    7/19/2018         RE: Heath Waller  [address] [facility] [id number]        Dear Colleague,    Thank you for referring your patient, Heath Waller, to the [facility]. Please see a copy of my visit note below.    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   .  Patient states this has been present for a while.  Patient reports the following symptoms:  Changing color.  Patient reports the following previous treatments none.  Patient reports the following modifying factors none.  Associated symptoms: none.  Patient has no other skin complaints today.  Remainder of the HPI, Meds, PMH, Allergies, FH, and SH was reviewed in chart.      Past Medical History:   Diagnosis Date     ADD (attention deficit disorder)      Atypical mole 07/19/2018     Degenerative disc disease, cervical      Depressive disorder      Gastro-oesophageal reflux disease      Myofacial muscle pain      Noninfectious ileitis     IBS with constipation     Other chronic pain     myofacial pain syndrome     Sleep apnea     wears CPAP at night     Thyroid disease        Past Surgical History:   Procedure Laterality Date     APPENDECTOMY  2000     ARTHROSCOPY KNEE Right     teenager     BREAST SURGERY  03/2006    augmentation, revisions 2011 and 2015     FOOT SURGERY Right     semoid bone removed from Fx     GYN SURGERY   2009 and 2014    LEEPS x 2      LAPAROSCOPIC CHOLECYSTECTOMY  5/15/2014    Procedure: LAPAROSCOPIC CHOLECYSTECTOMY;  Surgeon: Kd Artuhr MD;  Location: [facility] OR     THYROID SURGERY  2001    nodule      TUBAL LIGATION  10/09/2008        Family History   Problem Relation Age of Onset     Cerebrovascular Disease Maternal Grandmother      Cancer Paternal Grandfather      Thyroid Disease Brother      Cerebrovascular Disease Father      2016     Hypertension Father      Skin Cancer Paternal Uncle  "       Social History     Social History     Marital status:      Spouse name: Xavier      Number of children: N/A     Years of education: 16     Occupational History     nurse      Social History Main Topics     Smoking status: Former Smoker     Packs/day: 0.00     Years: 20.00     Types: Cigarettes     Smokeless tobacco: Current User      Comment: e-cig     Alcohol use 0.0 oz/week     0 Standard drinks or equivalent per week      Comment: rarely     Drug use: No     Sexual activity: Yes     Partners: Male     Birth control/ protection: Female Surgical     Other Topics Concern      Service Yes      erna      Blood Transfusions No     Caffeine Concern No     energy drink 12 o. 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        Outpatient Encounter Prescriptions as of 7/19/2018   Medication Sig Dispense Refill     Acetaminophen (TYLENOL PO) Take 1,000 mg by mouth every 4 hours as needed for mild pain or fever       amphetamine-dextroamphetamine (ADDERALL XR) 30 MG per 24 hr capsule Take 1 capsule (30 mg) by mouth daily 30 capsule 0     B Complex TABS        baclofen (LIORESAL) 10 MG tablet TAKE 1 TABLET BY MOUTH 3 TIMES A DAY AS NEEDED FOR MUSCLE SPASM  1     cholecalciferol (VITAMIN D3) 56065 UNITS capsule Take 1 capsule by mouth daily       DULoxetine (CYMBALTA) 60 MG EC capsule TAKE 2 CAPSULES BY MOUTH EVERY MORNING 180 capsule 1     HYDROcodone-acetaminophen (NORCO)  MG per tablet Take 1 tablet by mouth every 6 hours as needed for moderate to severe pain       hydroquinone 4 % CREA Apply to face BID x 4-6  months then PRN 30 g 5     ibuprofen (ADVIL,MOTRIN) 200 MG tablet        lisinopril (PRINIVIL/ZESTRIL) 10 MG tablet TAKE 1 TABLET (10 MG) BY MOUTH DAILY 90 tablet 1     magnesium 500 MG TABS Take 1,000 mg by mouth       morphine (MS CONTIN) 15 MG 12 hr tablet   0     omeprazole (PRILOSEC) 20 MG capsule Take 20 mg by mouth       order for DME Equipment being ordered: RES MED AIRCURVE 10 ASV       oxyCODONE-acetaminophen (PERCOCET) 5-325 MG per tablet TK 1 T PO  TID  0     pregabalin (LYRICA) 150 MG capsule Take 1 capsule (150 mg) by mouth 3 times daily Patient  Will contact you when ready to fill 60 capsule 1     pseudoePHEDrine (SUDAFED 12 HOUR) 120 MG 12 hr tablet Take 1 tablet (120 mg) by mouth 2 times daily 90 tablet 1     spironolactone (ALDACTONE) 50 MG tablet Take 1 tablet (50 mg) by mouth daily 90 tablet 1     traZODone (DESYREL) 50 MG tablet Take 2 tablets (100 mg) by mouth nightly as needed for sleep 180 tablet 1     triamcinolone (KENALOG) 0.025 % ointment Apply to AA BID x 1-2 weeks and can repeat PRN 80 g 2     No facility-administered encounter medications on file as of 7/19/2018.              Review Of Systems  Skin: As above  Eyes: negative  Ears/Nose/Throat: negative  Respiratory: No shortness of breath, dyspnea on exertion, cough, or hemoptysis  Cardiovascular: negative  Gastrointestinal: negative  Genitourinary: negative  Musculoskeletal: negative  Neurologic: negative  Psychiatric: negative  Hematologic/Lymphatic/Immunologic: negative  Endocrine: negative      O:   NAD, WDWN, Alert & Oriented, Mood & Affect wnl, Vitals stable   Here today alone   /75  Pulse 94  SpO2 97%   General appearance normal   Vitals stable   Alert, oriented and in no acute distress      Following lymph nodes palpated: popliteal no lad  R calf 1cm red plaque       Eyes: Conjunctivae/lids:Normal     ENT: Lips, buccal mucosa, tongue: normal    MSK:Normal    Cardiovascular: peripheral edema none    Pulm: Breathing  Normal    Neuro/Psych: Orientation:Normal; Mood/Affect:Normal      A/P:  1. R calf melanoma ins itu   MELANOMA DISCUSSED WITH PATIENT:  I discussed the specifics of tumor, prognosis, metachronous melanoma, self exam, and genetics with the patient. I explained the need for monthly skin exams including and taught the patient how to do this. Patient was asked about new or changing moles   MOHS MART-1:  I discussed the specifics of tumor, prognosis, metachronous melanoma, self exam, and genetics with the patient. I explained the need for monthly skin exams including palpating lymph nodes, and taught the patient how to do this. Patient was asked about new or changing moles . I reviewed treatment options, including a discussion of wide excision (the gold standard) versus Mohs surgery with MART-1 immunostains.     Note: MART-1 (Melanoma Antigen Recognized by T-cells) antibody immunostaining was used during Mohs surgery as per standard protocol, in addition to routine processing of all specimens with hematoxylin and eosin. The peripheral margins/edges were processed with the MART-1 stain (1 specimens total). The center was examined with hematoxylin & eosin and MART-1 immunostains. The patient was informed of the procedure and its risk/benefits during the consent for the procedure.    One or more of the reagents used in immunohistochemical testing in this case may not have been cleared or approved by the U.S. Food and Drug Administration (FDA). The FDA has determined that such clearance or approval is not necessary. These tests are used for clinical purposes. They should not be regarded as investigational or for research. These reagents  performance characteristics have been determined by [facility]. This laboratory is certified under the Clinical Laboratory Improvement Amendments of 1988 (CLIA-88) as qualified to perform high complexity clinical laboratory testing.    After PGACAC discussed with patient,  decision for Mohs surgery was made. Indication for Mohs was aggressive histology. Patient confirmed the site with Dr. Celeste. After anesthesia with LEC, the tumor was excised using standard Mohs technique in 1 stages(s).  MART 1 stains were performed on 1 specimens. CLEAR MARGINS OBTAINED and Final defect size was 1.7 cm.   REPAIR SECOND INTENT: We discussed the options for wound management in full with the patient including risks/benefits/possible outcomes. Decision made to allow the wound to heal by second intention. EBL minimal; complications none; wound care routine.  The patient was discharged in good condition and will return in one month or prn for wound evaluation.        BENIGN LESIONS DISCUSSED WITH PATIENT:  I discussed the specifics of tumor, prognosis, and genetics of benign lesions.  I explained that treatment of these lesions would be purely cosmetic and not medically neccessary.  I discussed with patient different removal options including excision, cautery and /or laser.      Nature and genetics of benign skin lesions dicussed with patient.  Signs and Symptoms of skin cancer discussed with patient.  ABCDEs of melanoma reviewed with patient.  Patient encouraged to perform monthly skin exams.  UV precautions reviewed with patient.  Patient to follow up with Primary Care provider regarding elevated blood pressure.  Skin care regimen reviewed with patient: Eliminate harsh soaps, i.e. Dial, zest, irsih spring; Mild soaps such as Cetaphil or Dove sensitive skin, avoid hot or cold showers, aggressive use of emollients including vanicream, cetaphil or cerave discussed with patient.    Risks of non-melanoma skin cancer discussed with patient   Return to clinic 6 mnths  Patient to follow up with Primary Care provider regarding elevated blood pressure.        Again, thank you for allowing me to participate in the care of your patient.        Sincerely,        Reuben Celeste MD

## 2018-07-19 NOTE — PROGRESS NOTES
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   .  Patient states this has been present for a while.  Patient reports the following symptoms:  Changing color.  Patient reports the following previous treatments none.  Patient reports the following modifying factors none.  Associated symptoms: none.  Patient has no other skin complaints today.  Remainder of the HPI, Meds, PMH, Allergies, FH, and SH was reviewed in chart.      Past Medical History:   Diagnosis Date     ADD (attention deficit disorder)      Atypical mole 07/19/2018     Degenerative disc disease, cervical      Depressive disorder      Gastro-oesophageal reflux disease      Myofacial muscle pain      Noninfectious ileitis     IBS with constipation     Other chronic pain     myofacial pain syndrome     Sleep apnea     wears CPAP at night     Thyroid disease        Past Surgical History:   Procedure Laterality Date     APPENDECTOMY  2000     ARTHROSCOPY KNEE Right     teenager     BREAST SURGERY  03/2006    augmentation, revisions 2011 and 2015     FOOT SURGERY Right     semoid bone removed from Fx     GYN SURGERY   2009 and 2014    LEEPS x 2      LAPAROSCOPIC CHOLECYSTECTOMY  5/15/2014    Procedure: LAPAROSCOPIC CHOLECYSTECTOMY;  Surgeon: Kd Arthur MD;  Location: [facility] OR     THYROID SURGERY  2001    nodule      TUBAL LIGATION  10/09/2008        Family History   Problem Relation Age of Onset     Cerebrovascular Disease Maternal Grandmother      Cancer Paternal Grandfather      Thyroid Disease Brother      Cerebrovascular Disease Father      2016     Hypertension Father      Skin Cancer Paternal Uncle        Social History     Social History     Marital status:      Spouse name: Xavier      Number of children: N/A     Years of education: 16     Occupational History     nurse      Social History Main Topics     Smoking status: Former Smoker     " Packs/day: 0.00     Years: 20.00     Types: Cigarettes     Smokeless tobacco: Current User      Comment: e-cig     Alcohol use 0.0 oz/week     0 Standard drinks or equivalent per week      Comment: rarely     Drug use: No     Sexual activity: Yes     Partners: Male     Birth control/ protection: Female Surgical     Other Topics Concern      Service Yes      erna      Blood Transfusions No     Caffeine Concern No     energy drink 12 o. 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        Outpatient Encounter Prescriptions as of 7/19/2018   Medication Sig Dispense Refill     Acetaminophen (TYLENOL PO) Take 1,000 mg by mouth every 4 hours as needed for mild pain or fever       amphetamine-dextroamphetamine (ADDERALL XR) 30 MG per 24 hr capsule Take 1 capsule (30 mg) by mouth daily 30 capsule 0     B Complex TABS        baclofen (LIORESAL) 10 MG tablet TAKE 1 TABLET BY MOUTH 3 TIMES A DAY AS NEEDED FOR MUSCLE SPASM  1     cholecalciferol (VITAMIN D3) 48304 UNITS capsule Take 1 capsule by mouth daily       DULoxetine (CYMBALTA) 60 MG EC capsule TAKE 2 CAPSULES BY MOUTH EVERY MORNING 180 capsule 1     HYDROcodone-acetaminophen (NORCO)  MG per tablet Take 1 tablet by mouth every 6 hours as needed for moderate to severe pain       hydroquinone 4 % CREA Apply to face BID x 4-6 months then PRN 30 g 5     ibuprofen (ADVIL,MOTRIN) 200 MG tablet        lisinopril (PRINIVIL/ZESTRIL) 10 MG tablet TAKE 1 TABLET (10 MG) BY MOUTH DAILY 90 tablet 1     magnesium 500 MG TABS Take 1,000 mg by mouth       morphine (MS CONTIN) 15 MG 12 hr tablet    0     omeprazole (PRILOSEC) 20 MG capsule Take 20 mg by mouth       order for DME Equipment being ordered: RES MED AIRCURVE 10 ASV       oxyCODONE-acetaminophen (PERCOCET) 5-325 MG per tablet TK 1 T PO  TID  0     pregabalin (LYRICA) 150 MG capsule Take 1 capsule (150 mg) by mouth 3 times daily Patient  Will contact you when ready to fill 60 capsule 1     pseudoePHEDrine (SUDAFED 12 HOUR) 120 MG 12 hr tablet Take 1 tablet (120 mg) by mouth 2 times daily 90 tablet 1     spironolactone (ALDACTONE) 50 MG tablet Take 1 tablet (50 mg) by mouth daily 90 tablet 1     traZODone (DESYREL) 50 MG tablet Take 2 tablets (100 mg) by mouth nightly as needed for sleep 180 tablet 1     triamcinolone (KENALOG) 0.025 % ointment Apply to AA BID x 1-2 weeks and can repeat PRN 80 g 2     No facility-administered encounter medications on file as of 7/19/2018.              Review Of Systems  Skin: As above  Eyes: negative  Ears/Nose/Throat: negative  Respiratory: No shortness of breath, dyspnea on exertion, cough, or hemoptysis  Cardiovascular: negative  Gastrointestinal: negative  Genitourinary: negative  Musculoskeletal: negative  Neurologic: negative  Psychiatric: negative  Hematologic/Lymphatic/Immunologic: negative  Endocrine: negative      O:   NAD, WDWN, Alert & Oriented, Mood & Affect wnl, Vitals stable   Here today alone   /75  Pulse 94  SpO2 97%   General appearance normal   Vitals stable   Alert, oriented and in no acute distress      Following lymph nodes palpated: popliteal no lad  R calf 1cm red plaque       Eyes: Conjunctivae/lids:Normal     ENT: Lips, buccal mucosa, tongue: normal    MSK:Normal    Cardiovascular: peripheral edema none    Pulm: Breathing Normal    Neuro/Psych: Orientation:Normal; Mood/Affect:Normal      A/P:  1. R calf melanoma ins itu   MELANOMA DISCUSSED WITH PATIENT:  I discussed the specifics of tumor, prognosis, metachronous melanoma, self exam, and genetics with the patient. I explained the  need for monthly skin exams including and taught the patient how to do this. Patient was asked about new or changing moles   MOHS MART-1:  I discussed the specifics of tumor, prognosis, metachronous melanoma, self exam, and genetics with the patient. I explained the need for monthly skin exams including palpating lymph nodes, and taught the patient how to do this. Patient was asked about new or changing moles . I reviewed treatment options, including a discussion of wide excision (the gold standard) versus Mohs surgery with MART-1 immunostains.     Note: MART-1 (Melanoma Antigen Recognized by T-cells) antibody immunostaining was used during Mohs surgery as per standard protocol, in addition to routine processing of all specimens with hematoxylin and eosin. The peripheral margins/edges were processed with the MART-1 stain (1 specimens total). The center was examined with hematoxylin & eosin and MART-1 immunostains. The patient was informed of the procedure and its risk/benefits during the consent for the procedure.    One or more of the reagents used in immunohistochemical testing in this case may not have been cleared or approved by the U.S. Food and Drug Administration (FDA). The FDA has determined that such clearance or approval is not necessary. These tests are used for clinical purposes. They should not be regarded as investigational or for research. These reagents  performance characteristics have been determined by [facility]. This laboratory is certified under the Clinical Laboratory Improvement Amendments of 1988 (CLIA-88) as qualified to perform high complexity clinical laboratory testing.    After [facility] discussed with patient, decision for Mohs surgery was made. Indication for Mohs was aggressive histology. Patient confirmed the site with Dr. Celeste. After anesthesia with [facility], the tumor was excised using standard Mohs technique in 1 stages(s).  MART 1 stains were performed on 1 specimens.  CLEAR MARGINS OBTAINED and Final defect size was 1.7 cm.   REPAIR SECOND INTENT: We discussed the options for wound management in full with the patient including risks/benefits/possible outcomes. Decision made to allow the wound to heal by second intention. EBL minimal; complications none; wound care routine.  The patient was discharged in good condition and will return in one month or prn for wound evaluation.        BENIGN LESIONS DISCUSSED WITH PATIENT:  I discussed the specifics of tumor, prognosis, and genetics of benign lesions.  I explained that treatment of these lesions would be purely cosmetic and not medically neccessary.  I discussed with patient different removal options including excision, cautery and /or laser.      Nature and genetics of benign skin lesions dicussed with patient.  Signs and Symptoms of skin cancer discussed with patient.  ABCDEs of melanoma reviewed with patient.  Patient encouraged to perform monthly skin exams.  UV precautions reviewed with patient.  Patient to follow up with Primary Care provider regarding elevated blood pressure.  Skin care regimen reviewed with patient: Eliminate harsh soaps, i.e. Dial, zest, irsih spring; Mild soaps such as Cetaphil or Dove sensitive skin, avoid hot or cold showers, aggressive use of emollients including vanicream, cetaphil or cerave discussed with patient.    Risks of non-melanoma skin cancer discussed with patient   Return to clinic 6 mnths  Patient to follow up with Primary Care provider regarding elevated blood pressure.

## 2018-07-19 NOTE — PATIENT INSTRUCTIONS
Open Wound Care     for right calf    ? No strenuous activity for 48 hours    ? Take Tylenol as needed for discomfort.                                                .         ? Do not drink alcoholic beverages for 48 hours.    ? Keep the pressure bandage in place for 24 hours. If the bandage becomes blood tinged or loose, reinforce it with gauze and tape.        (Refer to the reverse side of this page for management of bleeding).    ? Remove bandage in 24 hours and begin wound care as follows:     1. Clean area with tap water using a Q tip or gauze pad, (shower / bathe normally)  2. Dry wound with Q tip or gauze pad  3. Apply Aquaphor, Vaseline, Polysporin or Bacitracin Ointment with a Q tip    Do NOT use Neosporin Ointment *  4. Cover the wound with a band-aid or nonstick gauze pad and paper tape.  5. Repeat wound care once a day until wound is completely healed.    It is an old wives tale that a wound heals better when it is exposed to air and allowed to dry out. The wound will heal faster with a better cosmetic result if it is kept moist with ointment and covered with a bandage.  Do not let the wound dry out.      Supplies Needed:                Qtips or gauze pads                Polysporin or Bacitracin Ointment                Bandaids or nonstick gauze pads and paper tape    Wound care kits and brown paper tape are available for purchase at   the pharmacy.    BLEEDIN. Use tightly rolled up gauze or cloth to apply direct pressure over the bandage for 20   minutes.  2. Reapply pressure for an additional 20 minutes if necessary  3. Call the office or go to the nearest emergency room if pressure fails to stop the bleeding.  4. Use additional gauze and tape to maintain pressure once the bleeding has stopped.  5. Begin wound care 24 hours after surgery as directed.                  WOUND HEALING    1. One week after surgery a pink / red halo will form around the outside of the wound.   This is new  skin.  2. The center of the wound will appear yellowish white and produce some drainage.  3. The pink halo will slowly migrate in toward the center of the wound until the wound is covered with new shiny pink skin.  4. There will be no more drainage when the wound is completely healed.  5. It will take six months to one year for the redness to fade.  6. The scar may be itchy, tight and sensitive to extreme temperatures for a year after the surgery.  7. Massaging the area several times a day for several minutes after the wound is completely healed will help the scar soften and normalize faster. Begin massage only after healing is complete.      In case of emergency call: Dr Celeste: [phone]     [facility]: [phone]    [facility]: [phone]

## 2018-08-08 ENCOUNTER — DOCUMENTATION ONLY (OUTPATIENT)
Dept: SLEEP MEDICINE | Facility: CLINIC | Age: 38
End: 2018-08-08

## 2018-08-08 NOTE — PROGRESS NOTES
6 month [facility]    STM Recheck Visit     Diagnostic AHI: 60.6     Message left for patient to return call     Assessment: Pt not meeting objective benchmarks for compliance     Action plan: waiting for patient to return call.   pt to follow up per provider request (1-2 yrs)       Device type: ASV  PAP settings  ASV ResMed    ResMed ASV Auto ()    EPAP Min 5    EPAP Max 10    PS Min 0    PS Max 20    Rate Mode Auto        Objective measures: 14 day rolling measures      Compliance  42 %      Leak  29.85 lpm  last  upload      AHI 6.58   last  upload      Average number of minutes 221      Objective measure goal  Compliance   Goal >70%  Leak   Goal < 24 lpm  AHI  Goal < 5  Usage  Goal >240

## 2018-10-04 DIAGNOSIS — L70.0 ACNE VULGARIS: ICD-10-CM

## 2018-10-04 DIAGNOSIS — I10 BENIGN ESSENTIAL HYPERTENSION: Primary | ICD-10-CM

## 2018-10-04 RX ORDER — SPIRONOLACTONE 50 MG/1
TABLET, FILM COATED ORAL
Qty: 90 TABLET | Refills: 0 | Status: SHIPPED | OUTPATIENT
Start: 2018-10-04 | End: 2019-02-11

## 2018-10-04 NOTE — TELEPHONE ENCOUNTER
"Spironolactone 50mg    Last Written Prescription Date:  2/7/2018  Last Fill Quantity: 90,  # refills: 1   Last office visit: 6/8/2018 with prescribing provider:     Future Office Visit:      Requested Prescriptions   Pending Prescriptions Disp Refills     spironolactone (ALDACTONE) 50 MG tablet [Pharmacy Med Name: SPIRONOLACTONE 50MG TABLETS] 90 tablet 0     Sig: TAKE 1 TABLET(50 MG) BY MOUTH DAILY    Diuretics (Including Combos) Protocol Failed    10/4/2018 11:58 AM       Failed - Normal serum creatinine on file in past 12 months    Recent Labs   Lab Test  09/29/17   0742   CR  0.70             Failed - Normal serum potassium on file in past 12 months    Recent Labs   Lab Test  09/29/17   0742   POTASSIUM  4.2                   Failed - Normal serum sodium on file in past 12 months    Recent Labs   Lab Test  09/29/17   0742   NA  142             Passed - Blood pressure under 140/90 in past 12 months    BP Readings from Last 3 Encounters:   07/19/18 121/75   06/08/18 128/78   02/07/18 96/60                Passed - Recent (12 mo) or future (30 days) visit within the authorizing provider's specialty    Patient had office visit in the last 12 months or has a visit in the next 30 days with authorizing provider or within the authorizing provider's specialty.  See \"Patient Info\" tab in inbasket, or \"Choose Columns\" in Meds & Orders section of the refill encounter.           Passed - Patient is age 18 or older       Passed - No active pregancy on record       Passed - No positive pregnancy test in past 12 months        Medication is being filled for 1 time refill only due to:  Patient needs labs yearly potassium, sodium and creatinine. Future labs ordered BMP. Letter sent to patient.   Marilyn Baltazar RN  "

## 2018-10-04 NOTE — LETTER
[facility]  October 4, 2018      [place] [address]           Phone :  [phone]          Fax:  [phone]  Heath Waller  [address] [place] [id number]      Dear Heath,    We recently received a call from your pharmacy requesting a refill of your medication - SPIRONOLACTONE.    A review of your chart indicates that an appointment is required with the lab for your yearly blood pressure lab work. Please call the clinic at [phone] to schedule your lab only appointment.    We have authorized one refill of your medication to allow time for you to schedule your appointment.    Taking care of your health is important to us, and ongoing visits with your provider are vital to your care.  We look forward to seeing you in the near future.        Sincerely,        Alex Interiano MD / Marilyn Baltazar RN

## 2018-11-21 ENCOUNTER — MYC MEDICAL ADVICE (OUTPATIENT)
Dept: FAMILY MEDICINE | Facility: CLINIC | Age: 38
End: 2018-11-21

## 2018-11-21 DIAGNOSIS — F32.A DEPRESSIVE DISORDER: ICD-10-CM

## 2018-11-21 DIAGNOSIS — M79.7 FIBROMYALGIA: ICD-10-CM

## 2018-11-21 DIAGNOSIS — F98.8 ATTENTION DEFICIT DISORDER (ADD) WITHOUT HYPERACTIVITY: ICD-10-CM

## 2018-11-21 DIAGNOSIS — G89.4 CHRONIC PAIN SYNDROME: ICD-10-CM

## 2018-11-21 RX ORDER — DEXTROAMPHETAMINE SACCHARATE, AMPHETAMINE ASPARTATE MONOHYDRATE, DEXTROAMPHETAMINE SULFATE AND AMPHETAMINE SULFATE 7.5; 7.5; 7.5; 7.5 MG/1; MG/1; MG/1; MG/1
30 CAPSULE, EXTENDED RELEASE ORAL DAILY
Qty: 30 CAPSULE | Refills: 0 | Status: SHIPPED | OUTPATIENT
Start: 2018-11-21 | End: 2018-12-21

## 2018-11-21 NOTE — TELEPHONE ENCOUNTER
"Requested Prescriptions   Pending Prescriptions Disp Refills     DULoxetine (CYMBALTA) 60 MG EC capsule [Pharmacy Med Name: DULOXETINE DR 60MG CAPSULES] 180 capsule 0    Last Written Prescription Date:  5/18/18  Last Fill Quantity: 180,  # refills: 1   Last Office Visit: 6/8/2018   Future Office Visit:      Sig: TAKE 2 CAPSULES BY MOUTH EVERY MORNING    Serotonin-Norepinephrine Reuptake Inhibitors  Failed    11/21/2018  1:57 PM       Failed - PHQ-9 score of less than 5 in past 6 months    PHQ-9 SCORE 9/29/2017 11/17/2017 12/15/2017   Total Score 15 18 9     TINA-7 SCORE 9/29/2017 11/17/2017 12/15/2017   Total Score 13 11 6              Passed - Blood pressure under 140/90 in past 12 months    BP Readings from Last 3 Encounters:   07/19/18 121/75   06/08/18 128/78   02/07/18 96/60                Passed - Patient is age 18 or older       Passed - No active pregnancy on record       Passed - No positive pregnancy test in past 12 months       Passed - Recent (6 mo) or future (30 days) visit within the authorizing provider's specialty    Patient had office visit in the last 6 months or has a visit in the next 30 days with authorizing provider or within the authorizing provider's specialty.  See \"Patient Info\" tab in inbasket, or \"Choose Columns\" in Meds & Orders section of the refill encounter.              "

## 2018-11-21 NOTE — TELEPHONE ENCOUNTER
Panel Medications Disp Refills Start End FELIZ   amphetamine-dextroamphetamine (ADDERALL XR) 30 MG per 24 hr capsule 30 capsule 0 7/19/2018 8/18/2018 --   Sig - Route: Take 1 capsule (30 mg) by mouth daily - Oral   Class: Local Print   Order: [id number]     Routing refill request to provider for review/approval because:  Drug not on the G refill protocol   A break in medication  : 11.21.18, report on desk  Annual Controlled substance agreement: NOT ON FILE  Annual drug screening: NOT ON FILE    Last OV: 6.8.18 scalp lesion, 2.7.18 pre op  Last fill:  last fill (for July) filled 10.4.18    Madelyn Alves RN

## 2018-11-23 NOTE — TELEPHONE ENCOUNTER
Message sent to pt via Pryv informed that Rx is at  ready and for  and to schedule before next refill.     Earl Marroquin    11/23/18  9:19 AM

## 2018-11-26 ENCOUNTER — MYC MEDICAL ADVICE (OUTPATIENT)
Dept: NURSING | Facility: CLINIC | Age: 38
End: 2018-11-26

## 2018-11-26 ASSESSMENT — ANXIETY QUESTIONNAIRES
GAD7 TOTAL SCORE: 16
GAD7 TOTAL SCORE: 16
2. NOT BEING ABLE TO STOP OR CONTROL WORRYING: NEARLY EVERY DAY
7. FEELING AFRAID AS IF SOMETHING AWFUL MIGHT HAPPEN: SEVERAL DAYS
5. BEING SO RESTLESS THAT IT IS HARD TO SIT STILL: SEVERAL DAYS
6. BECOMING EASILY ANNOYED OR IRRITABLE: NEARLY EVERY DAY
4. TROUBLE RELAXING: NEARLY EVERY DAY
GAD7 TOTAL SCORE: 16
1. FEELING NERVOUS, ANXIOUS, OR ON EDGE: MORE THAN HALF THE DAYS
3. WORRYING TOO MUCH ABOUT DIFFERENT THINGS: NEARLY EVERY DAY
7. FEELING AFRAID AS IF SOMETHING AWFUL MIGHT HAPPEN: SEVERAL DAYS

## 2018-11-26 ASSESSMENT — PATIENT HEALTH QUESTIONNAIRE - PHQ9
SUM OF ALL RESPONSES TO PHQ QUESTIONS 1-9: 18
10. IF YOU CHECKED OFF ANY PROBLEMS, HOW DIFFICULT HAVE THESE PROBLEMS MADE IT FOR YOU TO DO YOUR WORK, TAKE CARE OF THINGS AT HOME, OR GET ALONG WITH OTHER PEOPLE: EXTREMELY DIFFICULT
SUM OF ALL RESPONSES TO PHQ QUESTIONS 1-9: 18

## 2018-11-26 NOTE — TELEPHONE ENCOUNTER
[employer] sent with questionnaires and appt reminder for a yearly OV in December with PCP  Deedee Gibson RN, BSN

## 2018-11-26 NOTE — TELEPHONE ENCOUNTER
PHQ-9 SCORE 11/17/2017 12/15/2017 11/26/2018   Total Score MyChart - - 18 (Moderately severe depression)   Total Score 18 9 18     TINA-7 SCORE 11/17/2017 12/15/2017 11/26/2018   Total Score - - 16 (severe anxiety)   Total Score 11 6 16       Routing refill request to provider for review/approval because:  Labs out of range:  PHQ/GD  Mychart sent back to pt requesting an OV and asking what has changed    Deedee Gibson RN, BSN

## 2018-11-27 RX ORDER — DULOXETIN HYDROCHLORIDE 60 MG/1
CAPSULE, DELAYED RELEASE ORAL
Qty: 180 CAPSULE | Refills: 1 | Status: SHIPPED | OUTPATIENT
Start: 2018-11-27 | End: 2019-05-29

## 2018-11-27 ASSESSMENT — ANXIETY QUESTIONNAIRES: GAD7 TOTAL SCORE: 16

## 2018-11-27 ASSESSMENT — PATIENT HEALTH QUESTIONNAIRE - PHQ9: SUM OF ALL RESPONSES TO PHQ QUESTIONS 1-9: 18

## 2018-11-29 ENCOUNTER — OFFICE VISIT (OUTPATIENT)
Dept: FAMILY MEDICINE | Facility: CLINIC | Age: 38
End: 2018-11-29
Payer: COMMERCIAL

## 2018-11-29 VITALS
RESPIRATION RATE: 18 BRPM | DIASTOLIC BLOOD PRESSURE: 78 MMHG | TEMPERATURE: 98 F | SYSTOLIC BLOOD PRESSURE: 108 MMHG | BODY MASS INDEX: 28.65 KG/M2 | HEART RATE: 78 BPM | OXYGEN SATURATION: 98 % | WEIGHT: 194 LBS

## 2018-11-29 DIAGNOSIS — J20.9 ACUTE BRONCHITIS WITH SYMPTOMS > 10 DAYS: Primary | ICD-10-CM

## 2018-11-29 DIAGNOSIS — F33.1 MODERATE EPISODE OF RECURRENT MAJOR DEPRESSIVE DISORDER (H): ICD-10-CM

## 2018-11-29 DIAGNOSIS — J01.90 ACUTE SINUSITIS WITH SYMPTOMS > 10 DAYS: ICD-10-CM

## 2018-11-29 PROCEDURE — 99213 OFFICE O/P EST LOW 20 MIN: CPT | Performed by: NURSE PRACTITIONER

## 2018-11-29 RX ORDER — ALBUTEROL SULFATE 90 UG/1
2 AEROSOL, METERED RESPIRATORY (INHALATION) EVERY 6 HOURS PRN
Qty: 1 INHALER | Refills: 0 | Status: SHIPPED | OUTPATIENT
Start: 2018-11-29 | End: 2021-02-24

## 2018-11-29 RX ORDER — DOXYCYCLINE 100 MG/1
100 CAPSULE ORAL 2 TIMES DAILY
Qty: 14 CAPSULE | Refills: 0 | Status: SHIPPED | OUTPATIENT
Start: 2018-11-29 | End: 2018-12-06

## 2018-11-29 NOTE — LETTER
[address], [address]  [facility] [address]  Phone: [phone]  Fax: [phone]    November 29, 2018        Heath Waller  [address] [place] [id number]          To whom it may concern:    RE: Heath Waller    Patient was seen and treated today at our clinic.        Sincerely,        GREGORIO Conklin CNP

## 2018-11-29 NOTE — PROGRESS NOTES
SUBJECTIVE:   Heath Waller is a 38 year old female who presents to clinic today for the following health issues:      HPI  Acute Illness    Acute illness concerns: cough  Onset:  x2 weeks    Fever: YES- slight    Chills/Sweats: YES- some    Headache (location?): YES    Sinus Pressure:YES    Conjunctivitis:  YES- little bit    Ear Pain: no    Rhinorrhea: YES- some    Congestion: YES    Sore Throat: YES     Cough: YES-productive of yellow sputum, productive of green sputum, with shortness of breath, worsening over time    Wheeze: YES    Decreased Appetite: YES    Nausea: YES    Vomiting: no     Diarrhea:  no     Dysuria/Freq.: no     Fatigue/Achiness: YES    Sick/Strep Exposure: no      Therapies Tried and outcome: ibuprofen,dayquil cough syrup not effective    Cough is the most bothersome.  Lots of phlegm.      Problem list and histories reviewed & adjusted, as indicated.  Additional history: as documented        Current Outpatient Prescriptions   Medication Sig Dispense Refill     Acetaminophen (TYLENOL PO) Take 1,000 mg by mouth every 4 hours as needed for mild pain or fever       amphetamine-dextroamphetamine (ADDERALL XR) 30 MG 24 hr capsule Take 1 capsule (30 mg) by mouth daily 30 capsule 0     B Complex TABS        baclofen (LIORESAL) 10 MG tablet TAKE 1 TABLET BY MOUTH 3 TIMES A DAY AS NEEDED FOR MUSCLE SPASM  1     cholecalciferol (VITAMIN D3) [address]240 UNITS capsule Take 1 capsule by mouth daily       DULoxetine (CYMBALTA) 60 MG capsule TAKE 2 CAPSULES BY MOUTH EVERY MORNING 180 capsule 1     hydroquinone 4 % CREA Apply to face BID x 4-6 months then PRN 30 g 5     ibuprofen (ADVIL,MOTRIN) 200 MG tablet        lisinopril (PRINIVIL/ZESTRIL) 10 MG tablet TAKE 1 TABLET (10 MG) BY MOUTH DAILY 90 tablet 1     magnesium 500 MG TABS Take 1,000 mg by mouth       omeprazole (PRILOSEC) 20 MG CR capsule Take 1 capsule (20 mg) by mouth daily 90 capsule 1     order for DME Equipment being ordered: RES MED AIRCURVE 10  ASV       pregabalin (LYRICA) 150 MG capsule Take 1 capsule (150 mg) by mouth 3 times daily Patient  Will contact you when ready to fill 60 capsule 1     pseudoePHEDrine (SUDAFED 12 HOUR) 120 MG 12 hr tablet Take 1 tablet (120 mg) by mouth 2 times daily 90 tablet 1     spironolactone (ALDACTONE) 50 MG tablet TAKE 1 TABLET(50 MG) BY MOUTH DAILY 90 tablet 0     traZODone (DESYREL) 50 MG tablet Take 2 tablets (100 mg) by mouth nightly as needed for sleep 180 tablet 1     triamcinolone (KENALOG) 0.025 % ointment Apply to AA BID x 1-2 weeks and can repeat PRN 80 g 2     HYDROcodone-acetaminophen (NORCO)  MG per tablet Take 1 tablet by mouth every 6 hours as needed for moderate to severe pain       morphine (MS CONTIN) 15 MG 12 hr tablet   0     oxyCODONE-acetaminophen (PERCOCET) 5-325 MG per tablet TK 1 T PO  TID  0     Allergies   Allergen Reactions     Codeine      Sulfa Drugs      Rash       Toradol [Ketorolac] Itching and Rash       ROS:  Constitutional, HEENT, cardiovascular, pulmonary, gi and gu systems are negative, except as otherwise noted.    OBJECTIVE:     /78 (BP Location: Right arm, Patient Position: Sitting, Cuff Size: Adult Regular)  Pulse 78  Temp 98  F (36.7  C) (Oral)  Resp 18  Wt 194 lb (88 kg)  LMP 11/19/2018  SpO2 98%  BMI 28.65 kg/m2  Body mass index is 28.65 kg/(m^2).  GENERAL: healthy, alert and no distress  EYES: Eyes grossly normal to inspection and conjunctivae and sclerae normal  HENT: ear canals normal, bilat serous effusions, nose and mouth without ulcers or lesions, nasal mucosa congested  NECK: no adenopathy, no asymmetry, masses, or scars and thyroid normal to palpation  RESP: scattered rhonchi throughout, no rales or wheezing, normal effort   CV: regular rate and rhythm, normal S1 S2, no S3 or S4, no murmur, click or rub  SKIN: no suspicious lesions or rashes    Diagnostic Test Results:  none     ASSESSMENT/PLAN:   1. Acute bronchitis with symptoms > 10 days  Will use  doxy to cover sinusitis as well.  Continue supportive cares.    - doxycycline hyclate (VIBRAMYCIN) 100 MG capsule; Take 1 capsule (100 mg) by mouth 2 times daily for 7 days  Dispense: 14 capsule; Refill: 0  - albuterol (PROAIR HFA/PROVENTIL HFA/VENTOLIN HFA) 108 (90 Base) MCG/ACT inhaler; Inhale 2 puffs into the lungs every 6 hours as needed for shortness of breath / dyspnea or wheezing  Dispense: 1 Inhaler; Refill: 0    2. Acute sinusitis with symptoms > 10 days  Will use doxy to cover resp infection as well.  Continue supportive cares.    - doxycycline hyclate (VIBRAMYCIN) 100 MG capsule; Take 1 capsule (100 mg) by mouth 2 times daily for 7 days  Dispense: 14 capsule; Refill: 0    3. Moderate episode of recurrent major depressive disorder (H)  On daily cymbalta.        F/u 3 days if no improvement; sooner if worsening     GREGORIO Conklin [facility]

## 2018-11-29 NOTE — MR AVS SNAPSHOT
After Visit Summary   11/29/2018    Heath Waller    MRN: [id number]           Patient Information     Date Of Birth          1980        Visit Information        Provider Department      11/29/2018 1:00 PM Carla Contreras APRN CNP [facility]        Today's Diagnoses     Acute bronchitis with symptoms > 10 days    -  1    Acute sinusitis with symptoms > 10 days           Follow-ups after your visit        Follow-up notes from your care team     Return in about 3 days (around 12/2/2018) for cough .      Who to contact     If you have questions or need follow up information about today's clinic visit or your schedule please contact [facility] directly at [phone].  Normal or non-critical lab and imaging results will be communicated to you by [facility], letter or phone within 4 business days after the clinic has received the results. If you do not hear from us within 7 days, please contact the clinic through [facility] or phone. If you have a critical or abnormal lab result, we will notify you by phone as soon as possible.  Submit refill requests through [employer] or call your pharmacy and they will forward the refill request to us. Please allow 3 business days for your refill to be completed.          Additional Information About Your Visit        MyChart Information     [employer] gives you secure access to your electronic health record. If you see a primary care provider, you can also send messages to your care team and make appointments. If you have questions, please call your primary care clinic.  If you do not have a primary care provider, please call [phone] and they will assist you.        Care EveryWhere ID     This is your Care EveryWhere ID. This could be used by other organizations to access your [facility] medical records  [id number]        Your Vitals Were     Pulse Temperature Respirations Last Period Pulse Oximetry BMI (Body Mass Index)     78 98  F (36.7  C) (Oral) 18 11/19/2018 98% 28.65 kg/m2       Blood Pressure from Last 3 Encounters:   11/29/18 108/78   07/19/18 121/75   06/08/18 128/78    Weight from Last 3 Encounters:   11/29/18 194 lb (88 kg)   06/08/18 195 lb (88.5 kg)   02/07/18 188 lb 11.2 oz (85.6 kg)              Today, you had the following     No orders found for display         Today's Medication Changes          These changes are accurate as of 11/29/18  1:42 PM.  If you have any questions, ask your nurse or doctor.               Start taking these medicines.        Dose/Directions    albuterol 108 (90 Base) MCG/ACT inhaler   Commonly known as:  PROAIR HFA/PROVENTIL HFA/VENTOLIN HFA   Used for:  Acute bronchitis with symptoms > 10 days   Started by:  Carla Contreras APRN CNP        Dose:  2 puff   Inhale 2 puffs into the lungs every 6 hours as needed for shortness of breath / dyspnea or wheezing   Quantity:  1 Inhaler   Refills:  0       doxycycline hyclate 100 MG capsule   Commonly known as:  VIBRAMYCIN   Used for:  Acute bronchitis with symptoms > 10 days, Acute sinusitis with symptoms > 10 days   Started by:  Carla Contreras APRN CNP        Dose:  100 mg   Take 1 capsule (100 mg) by mouth 2 times daily for 7 days   Quantity:  14 capsule   Refills:  0         Stop taking these medicines if you haven't already. Please contact your care team if you have questions.     HYDROcodone-acetaminophen  MG per tablet   Commonly known as:  NORCO   Stopped by:  Carla Contreras APRN CNP           morphine 15 MG CR tablet   Commonly known as:  MS CONTIN   Stopped by:  Carla Contreras APRN CNP           oxyCODONE-acetaminophen 5-325 MG tablet   Commonly known as:  PERCOCET   Stopped by:  Carla Contreras APRN CNP                Where to get your medicines      These medications were sent to [facility] [address] [place], [place] - [address]  [address],  [facility] [address]     Phone:  [phone]     albuterol 108 (90 Base) MCG/ACT inhaler    doxycycline hyclate 100 MG capsule                Primary Care Provider Office Phone # Fax #    Alex Jude Interiano [phone] [address]  [facility] [address]        Equal Access to Services     [facility] Hospital[facility]IANA : Hadii aad ku hadasho Soomaali, waaxda luqadaha, qaybta kaalmada adeegyada, waxay idiin hayaan adeeg kharash la'aan . So [facility] [phone].    ATENCIÓN: Si habla español, tiene a jean disposición servicios gratuitos de asistencia lingüística. Alexandra al [phone].    We comply with applicable federal civil rights laws and [place] laws. We do not discriminate on the basis of race, color, national origin, age, disability, sex, sexual orientation, or gender identity.            Thank you!     Thank you for choosing [facility]  for your care. Our goal is always to provide you with excellent care. Hearing back from our patients is one way we can continue to improve our services. Please take a few minutes to complete the written survey that you may receive in the mail after your visit with us. Thank you!             Your Updated Medication List - Protect others around you: Learn how to safely use, store and throw away your medicines at www.disposemymeds.org.          This list is accurate as of 11/29/18  1:42 PM.  Always use your most recent med list.                   Brand Name Dispense Instructions for use Diagnosis    albuterol 108 (90 Base) MCG/ACT inhaler    PROAIR HFA/PROVENTIL HFA/VENTOLIN HFA    1 Inhaler    Inhale 2 puffs into the lungs every 6 hours as needed for shortness of breath / dyspnea or wheezing    Acute bronchitis with symptoms > 10 days       amphetamine-dextroamphetamine 30 MG 24 hr capsule    ADDERALL XR    30 capsule    Take 1 capsule (30 mg) by mouth daily    Attention deficit disorder (ADD) without hyperactivity       baclofen 10 MG tablet     LIORESAL     TAKE 1 TABLET BY MOUTH 3 TIMES A DAY AS NEEDED FOR MUSCLE SPASM        doxycycline hyclate 100 MG capsule    VIBRAMYCIN    14 capsule    Take 1 capsule (100 mg) by mouth 2 times daily for 7 days    Acute bronchitis with symptoms > 10 days, Acute sinusitis with symptoms > 10 days       DULoxetine 60 MG capsule    CYMBALTA    180 capsule    TAKE 2 CAPSULES BY MOUTH EVERY MORNING    Depressive disorder, Chronic pain syndrome, Fibromyalgia       hydroquinone 4 % external cream    TAINA    30 g    Apply to face BID x 4-6 months then PRN    Melasma       ibuprofen 200 MG tablet    ADVIL/MOTRIN          lisinopril 10 MG tablet    PRINIVIL/ZESTRIL    90 tablet    TAKE 1 TABLET (10 MG) BY MOUTH DAILY    Benign essential hypertension       magnesium 500 MG Tabs      Take 1,000 mg by mouth        omeprazole 20 MG DR capsule    priLOSEC    90 capsule    Take 1 capsule (20 mg) by mouth daily    Gastroesophageal reflux disease       order for DME      Equipment being ordered: RES MED AIRCURVE 10 ASV        pregabalin 150 MG capsule    LYRICA    60 capsule    Take 1 capsule (150 mg) by mouth 3 times daily Patient  Will contact you when ready to fill    Chronic pain syndrome, Fibromyalgia, TMJ (temporomandibular joint syndrome)       pseudoePHEDrine 120 MG 12 hr tablet    SUDAFED 12 HOUR    90 tablet    Take 1 tablet (120 mg) by mouth 2 times daily    Chronic vasomotor rhinitis       spironolactone 50 MG tablet    ALDACTONE    90 tablet    TAKE 1 TABLET(50 MG) BY MOUTH DAILY    Acne vulgaris       traZODone 50 MG tablet    DESYREL    180 tablet    Take 2 tablets (100 mg) by mouth nightly as needed for sleep    Moderate episode of recurrent major depressive disorder (H)       triamcinolone 0.025 % external ointment    KENALOG    80 g    Apply to AA BID x 1-2 weeks and can repeat PRN    Plaque psoriasis       TYLENOL PO      Take 1,000 mg by mouth every 4 hours as needed for mild pain or fever        vitamin D3  65812 units (250 mcg) capsule    CHOLECALCIFEROL     Take 1 capsule by mouth daily        vitamin tablet    B COMPLEX

## 2018-12-13 ENCOUNTER — TRANSFERRED RECORDS (OUTPATIENT)
Dept: HEALTH INFORMATION MANAGEMENT | Facility: CLINIC | Age: 38
End: 2018-12-13

## 2018-12-21 DIAGNOSIS — J20.9 ACUTE BRONCHITIS WITH SYMPTOMS > 10 DAYS: ICD-10-CM

## 2018-12-21 RX ORDER — ALBUTEROL SULFATE 90 MCG
HFA AEROSOL WITH ADAPTER (GRAM) INHALATION
Qty: 6.7 G | Refills: 0 | OUTPATIENT
Start: 2018-12-21

## 2018-12-21 NOTE — TELEPHONE ENCOUNTER
"Requested Prescriptions   Pending Prescriptions Disp Refills     PROVENTIL  (90 Base) MCG/ACT AERS inhaler [Pharmacy Med Name: PROVENTIL HFA INH (200 PUFFS) 6.7GM] 6.7 g 0    Last Written Prescription Date:  11/29/18  Last Fill Quantity: 1 inhaler,  # refills: 0   Last Office Visit: 11/29/2018 Strong      Return in about 3 days (around 12/2/2018) for cough .     Future Office Visit:      Sig: INHALE 2 PUFFS INTO THE LUNGS EVERY 6 HOURS AS NEEDED FOR SHORTNESS OF BREATH OR DIFFICULT BREATHING OR WHEEZING    Asthma Maintenance Inhalers - Anticholinergics Passed - 12/21/2018 12:42 PM       Passed - Patient is age 12 years or older       Passed - Recent (12 mo) or future (30 days) visit within the authorizing provider's specialty    Patient had office visit in the last 12 months or has a visit in the next 30 days with authorizing provider or within the authorizing provider's specialty.  See \"Patient Info\" tab in inbasket, or \"Choose Columns\" in Meds & Orders section of the refill encounter.              "

## 2019-01-07 ENCOUNTER — OFFICE VISIT (OUTPATIENT)
Dept: FAMILY MEDICINE | Facility: CLINIC | Age: 39
End: 2019-01-07
Payer: COMMERCIAL

## 2019-01-07 VITALS
HEART RATE: 96 BPM | BODY MASS INDEX: 28.52 KG/M2 | DIASTOLIC BLOOD PRESSURE: 92 MMHG | SYSTOLIC BLOOD PRESSURE: 116 MMHG | WEIGHT: 193.1 LBS | TEMPERATURE: 98.7 F

## 2019-01-07 DIAGNOSIS — F98.8 ATTENTION DEFICIT DISORDER (ADD) WITHOUT HYPERACTIVITY: Primary | ICD-10-CM

## 2019-01-07 DIAGNOSIS — F33.1 MODERATE EPISODE OF RECURRENT MAJOR DEPRESSIVE DISORDER (H): ICD-10-CM

## 2019-01-07 PROCEDURE — 99214 OFFICE O/P EST MOD 30 MIN: CPT | Performed by: FAMILY MEDICINE

## 2019-01-07 RX ORDER — SERTRALINE HYDROCHLORIDE 100 MG/1
TABLET, FILM COATED ORAL
Refills: 0 | COMMUNITY
Start: 2018-12-27 | End: 2019-01-07

## 2019-01-07 RX ORDER — SERTRALINE HYDROCHLORIDE 100 MG/1
TABLET, FILM COATED ORAL
Qty: 45 TABLET | Refills: 1 | Status: SHIPPED | OUTPATIENT
Start: 2019-01-07 | End: 2019-03-15

## 2019-01-07 RX ORDER — DEXTROAMPHETAMINE SACCHARATE, AMPHETAMINE ASPARTATE MONOHYDRATE, DEXTROAMPHETAMINE SULFATE AND AMPHETAMINE SULFATE 7.5; 7.5; 7.5; 7.5 MG/1; MG/1; MG/1; MG/1
30 CAPSULE, EXTENDED RELEASE ORAL DAILY
Qty: 30 CAPSULE | Refills: 0 | Status: SHIPPED | OUTPATIENT
Start: 2019-02-07 | End: 2019-03-09

## 2019-01-07 RX ORDER — DEXTROAMPHETAMINE SACCHARATE, AMPHETAMINE ASPARTATE MONOHYDRATE, DEXTROAMPHETAMINE SULFATE AND AMPHETAMINE SULFATE 7.5; 7.5; 7.5; 7.5 MG/1; MG/1; MG/1; MG/1
30 CAPSULE, EXTENDED RELEASE ORAL DAILY
Qty: 30 CAPSULE | Refills: 0 | Status: SHIPPED | OUTPATIENT
Start: 2019-03-10 | End: 2019-09-25

## 2019-01-07 RX ORDER — DEXTROAMPHETAMINE SACCHARATE, AMPHETAMINE ASPARTATE MONOHYDRATE, DEXTROAMPHETAMINE SULFATE AND AMPHETAMINE SULFATE 7.5; 7.5; 7.5; 7.5 MG/1; MG/1; MG/1; MG/1
30 CAPSULE, EXTENDED RELEASE ORAL DAILY
Qty: 30 CAPSULE | Refills: 0 | Status: SHIPPED | OUTPATIENT
Start: 2019-01-07 | End: 2019-02-06

## 2019-01-07 ASSESSMENT — ENCOUNTER SYMPTOMS
PALPITATIONS: 0
DEPRESSION: 1
NERVOUS/ANXIOUS: 0
CONSTITUTIONAL NEGATIVE: 1
INSOMNIA: 1
RESPIRATORY NEGATIVE: 1
NECK PAIN: 1
DOUBLE VISION: 0
HEADACHES: 0
BLURRED VISION: 0
BACK PAIN: 1
ABDOMINAL PAIN: 0

## 2019-01-07 ASSESSMENT — ANXIETY QUESTIONNAIRES
IF YOU CHECKED OFF ANY PROBLEMS ON THIS QUESTIONNAIRE, HOW DIFFICULT HAVE THESE PROBLEMS MADE IT FOR YOU TO DO YOUR WORK, TAKE CARE OF THINGS AT HOME, OR GET ALONG WITH OTHER PEOPLE: VERY DIFFICULT
5. BEING SO RESTLESS THAT IT IS HARD TO SIT STILL: NOT AT ALL
6. BECOMING EASILY ANNOYED OR IRRITABLE: MORE THAN HALF THE DAYS
1. FEELING NERVOUS, ANXIOUS, OR ON EDGE: NEARLY EVERY DAY
3. WORRYING TOO MUCH ABOUT DIFFERENT THINGS: NEARLY EVERY DAY
2. NOT BEING ABLE TO STOP OR CONTROL WORRYING: NEARLY EVERY DAY

## 2019-01-07 ASSESSMENT — PATIENT HEALTH QUESTIONNAIRE - PHQ9
5. POOR APPETITE OR OVEREATING: NEARLY EVERY DAY
SUM OF ALL RESPONSES TO PHQ QUESTIONS 1-9: 20

## 2019-01-07 NOTE — PROGRESS NOTES
HPI    SUBJECTIVE:   Heath Waller is a 38 year old female who presents to clinic today for the following health issues:    Medication Followup of Adderall    Taking Medication as prescribed: NO-lost her job    Side Effects:  None    Medication Helping Symptoms:  not applicable     Pain issues, lead to her loosing her job.  Father also recently diagnoses with lung cancer.  Had been counting on pain pump for back and found out that this was denied recently.  Does not have a psychiatrist, or counselor.  Pain clinic does most Rx for mood meds.  Here primarily for ADHD meds.  Typically only uses for work, so not using much now.    Feels mood is a serious issue.    Recently added sertraline as an adjunct, but has been off for a while and restarted about one week ago.  Has been on Cymbalta for a long time and generally finds helpful for pain issues.  Denies thoughts of self harm.    Review of Systems   Constitutional: Negative.    Eyes: Negative for blurred vision and double vision.   Respiratory: Negative.    Cardiovascular: Negative for palpitations.   Gastrointestinal: Negative for abdominal pain.   Musculoskeletal: Positive for back pain and neck pain.   Neurological: Negative for headaches.   Psychiatric/Behavioral: Positive for depression. Negative for suicidal ideas. The patient has insomnia. The patient is not nervous/anxious.          Physical Exam   Constitutional: She is oriented to person, place, and time.   Neck: No thyromegaly present.   Cardiovascular: Normal rate, regular rhythm and normal heart sounds.   Pulmonary/Chest: Effort normal and breath sounds normal.   Neurological: She is alert and oriented to person, place, and time.   Skin: Skin is warm and dry.   Psychiatric: She exhibits a depressed mood. She expresses no suicidal ideation. She expresses no suicidal plans.   Vitals reviewed.    (F98.8) Attention deficit disorder (ADD) without hyperactivity  (primary encounter diagnosis)  Comment: routine  follow up  Plan: amphetamine-dextroamphetamine (ADDERALL XR) 30         MG 24 hr capsule, amphetamine-dextroamphetamine        (ADDERALL XR) 30 MG 24 hr capsule,         amphetamine-dextroamphetamine (ADDERALL XR) 30         MG 24 hr capsule            (F33.1) Moderate episode of recurrent major depressive disorder (H)  Comment: only recently restarted zoloft, will let that run for a while.  IF not helpfulwill consider swiching to Wellbutrin  Plan:       RTC in     Alex Interiano MD

## 2019-01-08 ENCOUNTER — TRANSFERRED RECORDS (OUTPATIENT)
Dept: HEALTH INFORMATION MANAGEMENT | Facility: CLINIC | Age: 39
End: 2019-01-08

## 2019-01-08 LAB — PHQ9 SCORE: 20

## 2019-01-30 ENCOUNTER — TRANSFERRED RECORDS (OUTPATIENT)
Dept: HEALTH INFORMATION MANAGEMENT | Facility: CLINIC | Age: 39
End: 2019-01-30

## 2019-01-30 LAB — PHQ9 SCORE: 19

## 2019-02-11 DIAGNOSIS — I10 BENIGN ESSENTIAL HYPERTENSION: ICD-10-CM

## 2019-02-11 DIAGNOSIS — L70.0 ACNE VULGARIS: ICD-10-CM

## 2019-02-11 NOTE — TELEPHONE ENCOUNTER
"Requested Prescriptions   Pending Prescriptions Disp Refills     lisinopril (PRINIVIL/ZESTRIL) 10 MG tablet [Pharmacy Med Name: LISINOPRIL 10MG TABLETS]  Last Written Prescription Date:  2/7/18  Last Fill Quantity: 90 TABLET,  # refills: 1   Last office visit: 1/7/2019 with prescribing provider:  KELLY   Future Office Visit:     90 tablet 0     Sig: TAKE 1 TABLET(10 MG) BY MOUTH DAILY    ACE Inhibitors (Including Combos) Protocol Failed - 2/11/2019  4:16 PM       Failed - Blood pressure under 140/90 in past 12 months    BP Readings from Last 3 Encounters:   01/07/19 (!) 116/92   11/29/18 108/78   07/19/18 121/75                Failed - Normal serum creatinine on file in past 12 months    Recent Labs   Lab Test 09/29/17  0742   CR 0.70            Failed - Normal serum potassium on file in past 12 months    Recent Labs   Lab Test 09/29/17  0742   POTASSIUM 4.2            Passed - Recent (12 mo) or future (30 days) visit within the authorizing provider's specialty    Patient had office visit in the last 12 months or has a visit in the next 30 days with authorizing provider or within the authorizing provider's specialty.  See \"Patient Info\" tab in inbasket, or \"Choose Columns\" in Meds & Orders section of the refill encounter.             Passed - Medication is active on med list       Passed - Patient is age 18 or older       Passed - No active pregnancy on record       Passed - No positive pregnancy test within past 12 months        spironolactone (ALDACTONE) 50 MG tablet [Pharmacy Med Name: SPIRONOLACTONE 50MG TABLETS]  Last Written Prescription Date:  10/4/18  Last Fill Quantity: 90 TABLET,  # refills: 0   Last office visit: 1/7/2019 with prescribing provider:  KELLY   Future Office Visit:     90 tablet 0     Sig: TAKE 1 TABLET(50 MG) BY MOUTH DAILY    Diuretics (Including Combos) Protocol Failed - 2/11/2019  4:16 PM       Failed - Blood pressure under 140/90 in past 12 months    BP Readings from Last 3 Encounters: " "  01/07/19 (!) 116/92   11/29/18 108/78   07/19/18 121/75                Failed - Normal serum creatinine on file in past 12 months    Recent Labs   Lab Test 09/29/17  0742   CR 0.70             Failed - Normal serum potassium on file in past 12 months    Recent Labs   Lab Test 09/29/17  0742   POTASSIUM 4.2                   Failed - Normal serum sodium on file in past 12 months    Recent Labs   Lab Test 09/29/17  0742                Passed - Recent (12 mo) or future (30 days) visit within the authorizing provider's specialty    Patient had office visit in the last 12 months or has a visit in the next 30 days with authorizing provider or within the authorizing provider's specialty.  See \"Patient Info\" tab in inbasket, or \"Choose Columns\" in Meds & Orders section of the refill encounter.             Passed - Medication is active on med list       Passed - Patient is age 18 or older       Passed - No active pregancy on record       Passed - No positive pregnancy test in past 12 months          "

## 2019-02-12 ENCOUNTER — TRANSFERRED RECORDS (OUTPATIENT)
Dept: HEALTH INFORMATION MANAGEMENT | Facility: CLINIC | Age: 39
End: 2019-02-12

## 2019-02-12 RX ORDER — LISINOPRIL 10 MG/1
TABLET ORAL
Qty: 30 TABLET | Refills: 0 | Status: SHIPPED | OUTPATIENT
Start: 2019-02-12 | End: 2019-02-25

## 2019-02-12 RX ORDER — SPIRONOLACTONE 50 MG/1
TABLET, FILM COATED ORAL
Qty: 30 TABLET | Refills: 0 | Status: SHIPPED | OUTPATIENT
Start: 2019-02-12 | End: 2019-02-25

## 2019-02-12 NOTE — TELEPHONE ENCOUNTER
Patient due for BP check and blood work.    Spoke with patient. Appointment scheduled for next week.  Will give refill to get patient through.    Marilyn Baltazar RN

## 2019-02-25 ENCOUNTER — OFFICE VISIT (OUTPATIENT)
Dept: FAMILY MEDICINE | Facility: CLINIC | Age: 39
End: 2019-02-25
Payer: COMMERCIAL

## 2019-02-25 VITALS
SYSTOLIC BLOOD PRESSURE: 128 MMHG | HEART RATE: 84 BPM | WEIGHT: 181.1 LBS | TEMPERATURE: 98.1 F | DIASTOLIC BLOOD PRESSURE: 86 MMHG | BODY MASS INDEX: 26.82 KG/M2 | HEIGHT: 69 IN | RESPIRATION RATE: 22 BRPM

## 2019-02-25 DIAGNOSIS — I10 BENIGN ESSENTIAL HYPERTENSION: Primary | ICD-10-CM

## 2019-02-25 DIAGNOSIS — Z13.6 CARDIOVASCULAR SCREENING; LDL GOAL LESS THAN 130: ICD-10-CM

## 2019-02-25 DIAGNOSIS — L70.0 ACNE VULGARIS: ICD-10-CM

## 2019-02-25 DIAGNOSIS — Z86.39 HISTORY OF THYROID DISEASE: ICD-10-CM

## 2019-02-25 PROCEDURE — 80061 LIPID PANEL: CPT | Performed by: FAMILY MEDICINE

## 2019-02-25 PROCEDURE — 80048 BASIC METABOLIC PNL TOTAL CA: CPT | Performed by: FAMILY MEDICINE

## 2019-02-25 PROCEDURE — 36415 COLL VENOUS BLD VENIPUNCTURE: CPT | Performed by: FAMILY MEDICINE

## 2019-02-25 PROCEDURE — 84443 ASSAY THYROID STIM HORMONE: CPT | Performed by: FAMILY MEDICINE

## 2019-02-25 PROCEDURE — 99214 OFFICE O/P EST MOD 30 MIN: CPT | Performed by: FAMILY MEDICINE

## 2019-02-25 RX ORDER — MORPHINE SULFATE 15 MG/1
TABLET, FILM COATED, EXTENDED RELEASE ORAL
Refills: 0 | COMMUNITY
Start: 2019-02-12 | End: 2022-01-21

## 2019-02-25 RX ORDER — METHOCARBAMOL 750 MG/1
TABLET, FILM COATED ORAL
Refills: 3 | COMMUNITY
Start: 2019-02-12 | End: 2021-02-24

## 2019-02-25 RX ORDER — CELECOXIB 200 MG/1
CAPSULE ORAL
Refills: 3 | COMMUNITY
Start: 2019-02-12 | End: 2019-03-26

## 2019-02-25 RX ORDER — SPIRONOLACTONE 50 MG/1
50 TABLET, FILM COATED ORAL DAILY
Qty: 90 TABLET | Refills: 0 | Status: SHIPPED | OUTPATIENT
Start: 2019-02-25 | End: 2019-06-16

## 2019-02-25 RX ORDER — LISINOPRIL 10 MG/1
TABLET ORAL
Qty: 90 TABLET | Refills: 1 | Status: SHIPPED | OUTPATIENT
Start: 2019-02-25 | End: 2019-10-11

## 2019-02-25 RX ORDER — OXYCODONE AND ACETAMINOPHEN 5; 325 MG/1; MG/1
TABLET ORAL
Refills: 0 | COMMUNITY
Start: 2019-02-12 | End: 2022-01-21

## 2019-02-25 ASSESSMENT — ANXIETY QUESTIONNAIRES
7. FEELING AFRAID AS IF SOMETHING AWFUL MIGHT HAPPEN: NOT AT ALL
3. WORRYING TOO MUCH ABOUT DIFFERENT THINGS: NEARLY EVERY DAY
5. BEING SO RESTLESS THAT IT IS HARD TO SIT STILL: NOT AT ALL
GAD7 TOTAL SCORE: 14
IF YOU CHECKED OFF ANY PROBLEMS ON THIS QUESTIONNAIRE, HOW DIFFICULT HAVE THESE PROBLEMS MADE IT FOR YOU TO DO YOUR WORK, TAKE CARE OF THINGS AT HOME, OR GET ALONG WITH OTHER PEOPLE: VERY DIFFICULT
6. BECOMING EASILY ANNOYED OR IRRITABLE: MORE THAN HALF THE DAYS
2. NOT BEING ABLE TO STOP OR CONTROL WORRYING: NEARLY EVERY DAY
1. FEELING NERVOUS, ANXIOUS, OR ON EDGE: NEARLY EVERY DAY

## 2019-02-25 ASSESSMENT — ENCOUNTER SYMPTOMS
GASTROINTESTINAL NEGATIVE: 1
HEADACHES: 0
CONSTITUTIONAL NEGATIVE: 1
CARDIOVASCULAR NEGATIVE: 1
BLURRED VISION: 0
RESPIRATORY NEGATIVE: 1

## 2019-02-25 ASSESSMENT — PATIENT HEALTH QUESTIONNAIRE - PHQ9
5. POOR APPETITE OR OVEREATING: NEARLY EVERY DAY
SUM OF ALL RESPONSES TO PHQ QUESTIONS 1-9: 16

## 2019-02-25 ASSESSMENT — MIFFLIN-ST. JEOR: SCORE: 1555.84

## 2019-02-25 NOTE — PROGRESS NOTES
HPI    SUBJECTIVE:   Heath Waller is a 38 year old female who presents to clinic today for the following health issues:    Hypertension Follow-up      Outpatient blood pressures are not being checked.    Low Salt Diet: no added salt      Amount of exercise or physical activity: None    Problems taking medications regularly: No    Medication side effects: none    Diet: low salt      Generally feeling well, at least baseline.  Needs refills on BP meds.  No issues with side effects.  No CP, palpitations, HA, vision changes.  Last eye exam was many years ago after Lasik.  Does mostly use spirolactone for acne, secondarily as BP medication.    Not currently using Lyrica.    Review of Systems   Constitutional: Negative.    Eyes: Negative for blurred vision.   Respiratory: Negative.    Cardiovascular: Negative.    Gastrointestinal: Negative.    Neurological: Negative for headaches.         Physical Exam   Constitutional: She is oriented to person, place, and time.   Eyes: Conjunctivae and EOM are normal.   Cardiovascular: Normal rate, regular rhythm and normal heart sounds.   Pulmonary/Chest: Effort normal and breath sounds normal.   Musculoskeletal: She exhibits no edema.   Neurological: She is alert and oriented to person, place, and time.   Skin: Skin is warm and dry.   Vitals reviewed.    (I10) Benign essential hypertension  (primary encounter diagnosis)  Comment: well controlled  Plan: lisinopril (PRINIVIL/ZESTRIL) 10 MG tablet            (L70.0) Acne vulgaris  Comment: refill, checking labs  Plan: spironolactone (ALDACTONE) 50 MG tablet            (Z13.6) CARDIOVASCULAR SCREENING; LDL GOAL LESS THAN 130  Comment:   Plan: Lipid panel reflex to direct LDL Fasting            (Z86.39) History of thyroid disease  Comment:   Plan: TSH with free T4 reflex              RTC in 6m    Alex Interiano MD

## 2019-02-26 ENCOUNTER — TRANSFERRED RECORDS (OUTPATIENT)
Dept: HEALTH INFORMATION MANAGEMENT | Facility: CLINIC | Age: 39
End: 2019-02-26

## 2019-02-26 LAB
ANION GAP SERPL CALCULATED.3IONS-SCNC: 10 MMOL/L (ref 3–14)
BUN SERPL-MCNC: 8 MG/DL (ref 7–30)
CALCIUM SERPL-MCNC: 9.4 MG/DL (ref 8.5–10.1)
CHLORIDE SERPL-SCNC: 104 MMOL/L (ref 94–109)
CHOLEST SERPL-MCNC: 236 MG/DL
CO2 SERPL-SCNC: 24 MMOL/L (ref 20–32)
CREAT SERPL-MCNC: 0.57 MG/DL (ref 0.52–1.04)
GFR SERPL CREATININE-BSD FRML MDRD: >90 ML/MIN/{1.73_M2}
GLUCOSE SERPL-MCNC: 98 MG/DL (ref 70–99)
HDLC SERPL-MCNC: 57 MG/DL
LDLC SERPL CALC-MCNC: 153 MG/DL
NONHDLC SERPL-MCNC: 179 MG/DL
PHQ9 SCORE: 18
POTASSIUM SERPL-SCNC: 3.8 MMOL/L (ref 3.4–5.3)
SODIUM SERPL-SCNC: 138 MMOL/L (ref 133–144)
TRIGL SERPL-MCNC: 128 MG/DL
TSH SERPL DL<=0.005 MIU/L-ACNC: 1.09 MU/L (ref 0.4–4)

## 2019-02-26 ASSESSMENT — ANXIETY QUESTIONNAIRES: GAD7 TOTAL SCORE: 14

## 2019-03-18 DIAGNOSIS — G47.33 OBSTRUCTIVE SLEEP APNEA (ADULT) (PEDIATRIC): Primary | ICD-10-CM

## 2019-03-26 ENCOUNTER — OFFICE VISIT (OUTPATIENT)
Dept: FAMILY MEDICINE | Facility: CLINIC | Age: 39
End: 2019-03-26
Payer: COMMERCIAL

## 2019-03-26 VITALS
WEIGHT: 179.4 LBS | TEMPERATURE: 98.8 F | RESPIRATION RATE: 16 BRPM | DIASTOLIC BLOOD PRESSURE: 82 MMHG | BODY MASS INDEX: 26.49 KG/M2 | HEART RATE: 80 BPM | SYSTOLIC BLOOD PRESSURE: 118 MMHG

## 2019-03-26 DIAGNOSIS — J30.0 CHRONIC VASOMOTOR RHINITIS: ICD-10-CM

## 2019-03-26 DIAGNOSIS — F33.1 MODERATE EPISODE OF RECURRENT MAJOR DEPRESSIVE DISORDER (H): ICD-10-CM

## 2019-03-26 DIAGNOSIS — Z01.818 PREOP GENERAL PHYSICAL EXAM: Primary | ICD-10-CM

## 2019-03-26 DIAGNOSIS — M17.31 POST-TRAUMATIC OSTEOARTHRITIS OF RIGHT KNEE: ICD-10-CM

## 2019-03-26 PROCEDURE — 99214 OFFICE O/P EST MOD 30 MIN: CPT | Performed by: FAMILY MEDICINE

## 2019-03-26 RX ORDER — SERTRALINE HYDROCHLORIDE 100 MG/1
200 TABLET, FILM COATED ORAL DAILY
Qty: 60 TABLET | Refills: 1 | Status: SHIPPED | OUTPATIENT
Start: 2019-03-26 | End: 2019-05-31

## 2019-03-26 RX ORDER — SERTRALINE HYDROCHLORIDE 100 MG/1
200 TABLET, FILM COATED ORAL DAILY
Qty: 60 TABLET | Refills: 1 | Status: SHIPPED | OUTPATIENT
Start: 2019-03-26 | End: 2019-03-26

## 2019-03-26 RX ORDER — PSEUDOEPHEDRINE HCL 120 MG/1
120 TABLET, FILM COATED, EXTENDED RELEASE ORAL 2 TIMES DAILY
Qty: 90 TABLET | Refills: 1 | Status: SHIPPED | OUTPATIENT
Start: 2019-03-26 | End: 2020-05-06

## 2019-03-26 NOTE — PROGRESS NOTES
[address], [address]  [facility] [address]  [phone]  Dept: [phone]    PRE-OP EVALUATION:  Today's date: 3/26/2019    Heath Waller (: 1980) presents for pre-operative evaluation assessment as requested by Dr. Pak.  She requires evaluation and anesthesia risk assessment prior to undergoing surgery/procedure for treatment of right knee.    Fax number for surgical facility: [phone]  Primary Physician: Alex Interiano  Type of Anesthesia Anticipated: to be determined; possible IV sedation    Patient has a Health Care Directive or Living Will:  NO    Preop Questions 2018   Who is doing your surgery? Dr Pak   What are you having done? Right knee radioblasion   Date of Surgery/Procedure: 19   Facility or Hospital where procedure/surgery will be performed: [facility]   1.  Do you have a history of Heart attack, stroke, stent, coronary bypass surgery, or other heart surgery? No   2.  Do you ever have any pain or discomfort in your chest? No   3.  Do you have a history of  Heart Failure? No   4.   Are you troubled by shortness of breath when:  walking on a level surface, or up a slight hill, or at night? No   5.  Do you currently have a cold, bronchitis or other respiratory infection? No   6.  Do you have a cough, shortness of breath, or wheezing? No   7.  Do you sometimes get pains in the calves of your legs when you walk? No   8. Do you or anyone in your family have previous history of blood clots? No   9.  Do you or does anyone in your family have a serious bleeding problem such as prolonged bleeding following surgeries or cuts? No   10. Have you ever had problems with anemia or been told to take iron pills? YES - borderline, menorrhagia   11. Have you had any abnormal blood loss such as black, tarry or bloody stools, or abnormal vaginal bleeding? No   12. Have you ever had a blood transfusion? No   13. Have you  or any of your relatives ever had problems with anesthesia? No   14. Do you have sleep apnea, excessive snoring or daytime drowsiness? YES - ROSA, uses CPAP   15. Do you have any prosthetic heart valves? No   16. Do you have prosthetic joints? No   17. Is there any chance that you may be pregnant? No         HPI:     HPI related to upcoming procedure: OA in R knee as distant sequella from serious knee injury.  Having nerve ablation for chronic knee pain.  Otherwise feeling well.  No cold/flu symptoms, CP, dyspnea, HA, n/v, change in bowel or bladder habits, dysuria.    Zoloft has been helpful, but feels she could use a bit more.  Involved in divorce, which also is leading to selling house and moving.  Recently started counseling.    MEDICAL HISTORY:     Patient Active Problem List    Diagnosis Date Noted     Post-traumatic osteoarthritis of right knee 03/26/2019     Priority: Medium     Moderate episode of recurrent major depressive disorder (H) 11/17/2017     Priority: Medium     ROSA (obstructive sleep apnea) 09/29/2017     Priority: Medium     Benign essential hypertension 12/27/2016     Priority: Medium     Anxiety attack 12/27/2016     Priority: Medium     Chronic rhinitis 12/20/2016     Priority: Medium     Depressive disorder 11/14/2016     Priority: Medium     Gastroesophageal reflux disease 11/14/2016     Priority: Medium     History of thyroid disease 11/14/2016     Priority: Medium     Overview:   benign tumor, post partial thyroidectomy       Chronic pain 11/14/2016     Priority: Medium     Attention deficit disorder (ADD) without hyperactivity 11/14/2016     Priority: Medium     Acne vulgaris 11/14/2016     Priority: Medium     TMJ (temporomandibular joint syndrome) 11/14/2016     Priority: Medium     Fibromyalgia 11/14/2016     Priority: Medium     Cervical intraepithelial neoplasia grade III with severe dysplasia 12/03/2008     Priority: Medium     Overview:   LEEP (12/08) confirmed CIN3. Margins  negative, negative ECC.        Past Medical History:   Diagnosis Date     ADD (attention deficit disorder)      Atypical mole 07/19/2018     Degenerative disc disease, cervical      Depressive disorder      Gastro-oesophageal reflux disease      Myofacial muscle pain      Noninfectious ileitis     IBS with constipation     Other chronic pain     myofacial pain syndrome     Sleep apnea     wears CPAP at night     Thyroid disease      Past Surgical History:   Procedure Laterality Date     APPENDECTOMY  2000     ARTHROSCOPY KNEE Right     teenager     BREAST SURGERY  03/2006    augmentation, revisions 2011 and 2015     FOOT SURGERY Right     semoid bone removed from Fx     GYN SURGERY   2009 and 2014    LEEPS x 2      LAPAROSCOPIC CHOLECYSTECTOMY  5/15/2014    Procedure: LAPAROSCOPIC CHOLECYSTECTOMY;  Surgeon: Kd Arthur MD;  Location: [facility] OR     THYROID SURGERY  2001    nodule      TUBAL LIGATION  10/09/2008     Current Outpatient Medications   Medication Sig Dispense Refill     lisinopril (PRINIVIL/ZESTRIL) 10 MG tablet TAKE 1 TABLET(10 MG) BY MOUTH DAILY 90 tablet 1     morphine (MS CONTIN) 15 MG CR tablet TK 1 T PO Q 12 HOURS  0     omeprazole (PRILOSEC) 20 MG CR capsule Take 1 capsule (20 mg) by mouth daily 90 capsule 1     oxyCODONE-acetaminophen (PERCOCET) 5-325 MG tablet TK 1 T PO Q 6 H AS NEEDED. MAX 1 PER DAY  0     pseudoePHEDrine (SUDAFED 12 HOUR) 120 MG 12 hr tablet Take 1 tablet (120 mg) by mouth 2 times daily 90 tablet 1     sertraline (ZOLOFT) 100 MG tablet Take 2 tablets (200 mg) by mouth daily TAKE 1 AND 1/2 TABLETS BY MOUTH EVERY MORNING 60 tablet 1     spironolactone (ALDACTONE) 50 MG tablet Take 1 tablet (50 mg) by mouth daily 90 tablet 0     Acetaminophen (TYLENOL PO) Take 1,000 mg by mouth every 4 hours as needed for mild pain or fever       albuterol (PROAIR HFA/PROVENTIL HFA/VENTOLIN HFA) 108 (90 Base) MCG/ACT inhaler Inhale 2 puffs into the lungs every 6 hours as needed for  shortness of breath / dyspnea or wheezing 1 Inhaler 0     amphetamine-dextroamphetamine (ADDERALL XR) 30 MG 24 hr capsule Take 1 capsule (30 mg) by mouth daily 30 capsule 0     B Complex TABS        baclofen (LIORESAL) 10 MG tablet TAKE 1 TABLET BY MOUTH 3 TIMES A DAY AS NEEDED FOR MUSCLE SPASM  1     celecoxib (CELEBREX) 200 MG capsule TK 1 C PO BID PRN  3     cholecalciferol (VITAMIN D3) 92940 UNITS capsule Take 1 capsule by mouth daily       DULoxetine (CYMBALTA) 60 MG capsule TAKE 2 CAPSULES BY MOUTH EVERY MORNING 180 capsule 1     hydroquinone 4 % CREA Apply to face BID x 4-6 months then PRN 30 g 5     ibuprofen (ADVIL,MOTRIN) 200 MG tablet        magnesium 500 MG TABS Take 1,000 mg by mouth       methocarbamol (ROBAXIN) 750 MG tablet TK 1 T PO TID  3     order for DME Equipment being ordered: RES MED AIRCURVE 10 ASV       pregabalin (LYRICA) 150 MG capsule Take 1 capsule (150 mg) by mouth 3 times daily Patient  Will contact you when ready to fill 60 capsule 1     traZODone (DESYREL) 50 MG tablet Take 2 tablets (100 mg) by mouth nightly as needed for sleep (Patient not taking: Reported on 1/7/2019) 180 tablet 1     triamcinolone (KENALOG) 0.025 % ointment Apply to AA BID x 1-2 weeks and can repeat PRN 80 g 2     OTC products: None, except as noted above    Allergies   Allergen Reactions     Codeine      Sulfa Drugs      Rash       Toradol [Ketorolac] Itching and Rash      Latex Allergy: NO    Social History     Tobacco Use     Smoking status: Former Smoker     Packs/day: 0.00     Years: 20.00     Pack years: 0.00     Types: Cigarettes     Smokeless tobacco: Current User     Tobacco comment: e-cig   Substance Use Topics     Alcohol use: Yes     Alcohol/week: 0.0 oz     Comment: rarely     History   Drug Use No       REVIEW OF SYSTEMS:   Constitutional, neuro, ENT, endocrine, pulmonary, cardiac, gastrointestinal, genitourinary, musculoskeletal, integument and psychiatric systems are negative, except as otherwise  noted.    EXAM:   /82 (BP Location: Right arm, Patient Position: Chair, Cuff Size: Adult Regular)   Pulse 80   Temp 98.8  F (37.1  C) (Oral)   Resp 16   Wt 81.4 kg (179 lb 6.4 oz)   LMP 03/07/2019 (LMP Unknown)   Breastfeeding? No   BMI 26.49 kg/m      GENERAL APPEARANCE: healthy, alert and no distress     EYES: EOMI, PERRL     HENT: ear canals and TM's normal and nose and mouth without ulcers or lesions     NECK: no adenopathy, no asymmetry, masses, or scars and thyroid normal to palpation     RESP: lungs clear to auscultation - no rales, rhonchi or wheezes     CV: regular rates and rhythm, normal S1 S2, no S3 or S4 and no murmur, click or rub     ABDOMEN:  soft, nontender, no HSM or masses and bowel sounds normal     MS: extremities normal- no gross deformities noted, no evidence of inflammation in joints, FROM in all extremities.     SKIN: no suspicious lesions or rashes     NEURO: Normal strength and tone, sensory exam grossly normal, mentation intact and speech normal     PSYCH: mentation appears normal. and affect normal/bright     LYMPHATICS: No cervical adenopathy    DIAGNOSTICS:   EKG: Not indicated due to non-vascular surgery and low risk of event (age <65 and without cardiac risk factors)    Recent Labs   Lab Test 02/25/19  1548 09/29/17  0742 06/30/16  1415 06/06/16  2035   HGB  --   --  14.3 13.8   PLT  --   --  277 282   INR  --   --   --  1.04    142 136 139   POTASSIUM 3.8 4.2 3.1* 3.2*   CR 0.57 0.70 0.78 0.62        IMPRESSION:   Reason for surgery/procedure: R knee OA  Diagnosis/reason for consult: preoperative clearance    The proposed surgical procedure is considered LOW risk.    REVISED CARDIAC RISK INDEX  The patient has the following serious cardiovascular risks for perioperative complications such as (MI, PE, VFib and 3  AV Block):  No serious cardiac risks  INTERPRETATION: 0 risks: Class I (very low risk - 0.4% complication rate)    The patient has the following  additional risks for perioperative complications:  No identified additional risks      ICD-10-CM    1. Preop general physical exam Z01.818    2. Chronic vasomotor rhinitis J30.0 pseudoePHEDrine (SUDAFED 12 HOUR) 120 MG 12 hr tablet   3. Moderate episode of recurrent major depressive disorder (H) F33.1 sertraline (ZOLOFT) 100 MG tablet   4. Post-traumatic osteoarthritis of right knee M17.31        RECOMMENDATIONS:     --Consult hospital rounder / IM to assist post-op medical management    --Patient is to take all scheduled medications on the day of surgery EXCEPT for modifications listed below.    APPROVAL GIVEN to proceed with proposed procedure, without further diagnostic evaluation       Signed Electronically by: Alex Interiano MD    Copy of this evaluation report is provided to requesting physician.    [facility] Preop Guidelines    Revised Cardiac Risk Index

## 2019-04-11 ENCOUNTER — TRANSFERRED RECORDS (OUTPATIENT)
Dept: HEALTH INFORMATION MANAGEMENT | Facility: CLINIC | Age: 39
End: 2019-04-11

## 2019-04-18 LAB — PHQ9 SCORE: 16

## 2019-04-25 ENCOUNTER — TRANSFERRED RECORDS (OUTPATIENT)
Dept: HEALTH INFORMATION MANAGEMENT | Facility: CLINIC | Age: 39
End: 2019-04-25

## 2019-04-25 LAB — PHQ9 SCORE: 10

## 2019-05-08 ENCOUNTER — TRANSFERRED RECORDS (OUTPATIENT)
Dept: HEALTH INFORMATION MANAGEMENT | Facility: CLINIC | Age: 39
End: 2019-05-08

## 2019-05-08 LAB — PHQ9 SCORE: 10

## 2019-05-29 ENCOUNTER — TELEPHONE (OUTPATIENT)
Dept: FAMILY MEDICINE | Facility: CLINIC | Age: 39
End: 2019-05-29

## 2019-05-29 DIAGNOSIS — F32.A DEPRESSIVE DISORDER: ICD-10-CM

## 2019-05-29 DIAGNOSIS — M79.7 FIBROMYALGIA: ICD-10-CM

## 2019-05-29 DIAGNOSIS — G89.4 CHRONIC PAIN SYNDROME: ICD-10-CM

## 2019-05-29 NOTE — TELEPHONE ENCOUNTER
"Requested Prescriptions   Pending Prescriptions Disp Refills     DULoxetine (CYMBALTA) 60 MG capsule [Pharmacy Med Name: DULOXETINE DR 60MG CAPSULES] 180 capsule 0     Sig: TAKE 2 CAPSULES BY MOUTH EVERY MORNING   Last Written Prescription Date:  11/27/18  Last Fill Quantity: 180,  # refills: 1   Last Office Visit: 3/26/2019 Interiano      Return in about 1 month (around 4/26/2019) for depression/anxiety.     Future Office Visit:         Serotonin-Norepinephrine Reuptake Inhibitors  Failed - 5/29/2019  2:35 PM        Failed - PHQ-9 score of less than 5 in past 6 months     PHQ-9 SCORE 11/26/2018 1/7/2019 2/25/2019   PHQ-9 Total Score MyChart 18 (Moderately severe depression) - -   PHQ-9 Total Score 18 20 16     TINA-7 SCORE 12/15/2017 11/26/2018 2/25/2019   Total Score - 16 (severe anxiety) -   Total Score 6 16 14               Passed - Blood pressure under 140/90 in past 12 months     BP Readings from Last 3 Encounters:   03/26/19 118/82   02/25/19 128/86   01/07/19 (!) 116/92                 Passed - Medication is active on med list        Passed - Patient is age 18 or older        Passed - No active pregnancy on record        Passed - No positive pregnancy test in past 12 months        Passed - Recent (6 mo) or future (30 days) visit within the authorizing provider's specialty     Patient had office visit in the last 6 months or has a visit in the next 30 days with authorizing provider or within the authorizing provider's specialty.  See \"Patient Info\" tab in inbasket, or \"Choose Columns\" in Meds & Orders section of the refill encounter.            "

## 2019-05-31 DIAGNOSIS — F33.1 MODERATE EPISODE OF RECURRENT MAJOR DEPRESSIVE DISORDER (H): ICD-10-CM

## 2019-05-31 RX ORDER — SERTRALINE HYDROCHLORIDE 100 MG/1
200 TABLET, FILM COATED ORAL DAILY
Qty: 60 TABLET | Refills: 0 | Status: SHIPPED | OUTPATIENT
Start: 2019-05-31 | End: 2019-07-02

## 2019-05-31 RX ORDER — DULOXETIN HYDROCHLORIDE 60 MG/1
CAPSULE, DELAYED RELEASE ORAL
Qty: 60 CAPSULE | Refills: 0 | Status: SHIPPED | OUTPATIENT
Start: 2019-05-31 | End: 2019-07-02

## 2019-05-31 NOTE — TELEPHONE ENCOUNTER
Routing refill request to provider for review/approval because:  Patient needs to be seen because:  Due for med check (was due in April)    Mychart sent    PHQ/TINA >4  Deedee Gibson RN, BSN

## 2019-05-31 NOTE — TELEPHONE ENCOUNTER
Provider sent in one month of her other med so will send one month of this    Deedee Gibson RN, BSN

## 2019-05-31 NOTE — TELEPHONE ENCOUNTER
"Pt was called today to inform that she needs to make an apt in the next month.  She will call back to schedule.    Requested Prescriptions   Pending Prescriptions Disp Refills     sertraline (ZOLOFT) 100 MG tablet 60 tablet 1     Sig: Take 2 tablets (200 mg) by mouth daily   Last Written Prescription Date:  3/26/19  Last Fill Quantity: 60,  # refills: 1   Last Office Visit: 3/26/2019 Jadyon      Return in about 1 month (around 4/26/2019) for depression/anxiety.     Future Office Visit:         SSRIs Protocol Failed - 5/31/2019 11:05 AM        Failed - PHQ-9 score less than 5 in past 6 months     PHQ-9 SCORE 11/26/2018 1/7/2019 2/25/2019   PHQ-9 Total Score MyChart 18 (Moderately severe depression) - -   PHQ-9 Total Score 18 20 16     TINA-7 SCORE 12/15/2017 11/26/2018 2/25/2019   Total Score - 16 (severe anxiety) -   Total Score 6 16 14               Passed - Medication is active on med list        Passed - Patient is age 18 or older        Passed - No active pregnancy on record        Passed - No positive pregnancy test in last 12 months        Passed - Recent (6 mo) or future (30 days) visit within the authorizing provider's specialty     Patient had office visit in the last 6 months or has a visit in the next 30 days with authorizing provider or within the authorizing provider's specialty.  See \"Patient Info\" tab in inbasket, or \"Choose Columns\" in Meds & Orders section of the refill encounter.              "

## 2019-06-05 ENCOUNTER — TRANSFERRED RECORDS (OUTPATIENT)
Dept: HEALTH INFORMATION MANAGEMENT | Facility: CLINIC | Age: 39
End: 2019-06-05

## 2019-06-16 DIAGNOSIS — L70.0 ACNE VULGARIS: ICD-10-CM

## 2019-06-17 NOTE — TELEPHONE ENCOUNTER
"Requested Prescriptions   Pending Prescriptions Disp Refills     spironolactone (ALDACTONE) 50 MG tablet [Pharmacy Med Name: SPIRONOLACTONE 50MG TABLETS] 90 tablet 0     Sig: TAKE 1 TABLET(50 MG) BY MOUTH DAILY   Last Written Prescription Date:  2/25/19  Last Fill Quantity: 90,  # refills: 0   Last Office Visit: 3/26/2019 Interiano      Return in about 1 month (around 4/26/2019) for depression/anxiety.     Future Office Visit:         Diuretics (Including Combos) Protocol Passed - 6/16/2019  2:25 PM        Passed - Blood pressure under 140/90 in past 12 months     BP Readings from Last 3 Encounters:   03/26/19 118/82   02/25/19 128/86   01/07/19 (!) 116/92                 Passed - Recent (12 mo) or future (30 days) visit within the authorizing provider's specialty     Patient had office visit in the last 12 months or has a visit in the next 30 days with authorizing provider or within the authorizing provider's specialty.  See \"Patient Info\" tab in inbasket, or \"Choose Columns\" in Meds & Orders section of the refill encounter.              Passed - Medication is active on med list        Passed - Patient is age 18 or older        Passed - No active pregancy on record        Passed - Normal serum creatinine on file in past 12 months     Recent Labs   Lab Test 02/25/19  1548   CR 0.57              Passed - Normal serum potassium on file in past 12 months     Recent Labs   Lab Test 02/25/19  1548   POTASSIUM 3.8                    Passed - Normal serum sodium on file in past 12 months     Recent Labs   Lab Test 02/25/19  1548                 Passed - No positive pregnancy test in past 12 months        "

## 2019-06-19 RX ORDER — SPIRONOLACTONE 50 MG/1
TABLET, FILM COATED ORAL
Qty: 90 TABLET | Refills: 0 | Status: SHIPPED | OUTPATIENT
Start: 2019-06-19 | End: 2021-02-24

## 2019-06-19 NOTE — TELEPHONE ENCOUNTER
Prescription approved per [facility] Refill Protocol.    Silva Gay RN Flex

## 2019-07-02 DIAGNOSIS — G89.4 CHRONIC PAIN SYNDROME: ICD-10-CM

## 2019-07-02 DIAGNOSIS — M79.7 FIBROMYALGIA: ICD-10-CM

## 2019-07-02 DIAGNOSIS — F33.1 MODERATE EPISODE OF RECURRENT MAJOR DEPRESSIVE DISORDER (H): ICD-10-CM

## 2019-07-02 DIAGNOSIS — F32.A DEPRESSIVE DISORDER: ICD-10-CM

## 2019-07-02 NOTE — TELEPHONE ENCOUNTER
"Requested Prescriptions   Pending Prescriptions Disp Refills     DULoxetine (CYMBALTA) 60 MG capsule [Pharmacy Med Name: DULOXETINE DR 60MG CAPSULES] 60 capsule 0     Sig: TAKE 2 CAPSULES BY MOUTH EVERY MORNING   Last Written Prescription Date:  5/31/19  Last Fill Quantity: 60,  # refills: 0   Last Office Visit: 3/26/2019 Interiano      Return in about 1 month (around 4/26/2019) for depression/anxiety.     Future Office Visit:         Serotonin-Norepinephrine Reuptake Inhibitors  Failed - 7/2/2019  5:29 PM        Failed - PHQ-9 score of less than 5 in past 6 months     PHQ-9 SCORE 11/26/2018 1/7/2019 2/25/2019   PHQ-9 Total Score MyChart 18 (Moderately severe depression) - -   PHQ-9 Total Score 18 20 16     TINA-7 SCORE 12/15/2017 11/26/2018 2/25/2019   Total Score - 16 (severe anxiety) -   Total Score 6 16 14               Passed - Blood pressure under 140/90 in past 12 months     BP Readings from Last 3 Encounters:   03/26/19 118/82   02/25/19 128/86   01/07/19 (!) 116/92                 Passed - Medication is active on med list        Passed - Patient is age 18 or older        Passed - No active pregnancy on record        Passed - No positive pregnancy test in past 12 months        Passed - Recent (6 mo) or future (30 days) visit within the authorizing provider's specialty     Patient had office visit in the last 6 months or has a visit in the next 30 days with authorizing provider or within the authorizing provider's specialty.  See \"Patient Info\" tab in inbasket, or \"Choose Columns\" in Meds & Orders section of the refill encounter.       ________________________________________________________________________       sertraline (ZOLOFT) 100 MG tablet [Pharmacy Med Name: SERTRALINE 100MG TABLETS] 60 tablet 0     Sig: TAKE 2 TABLETS BY MOUTH DAILY   Last Written Prescription Date:  5/31/19  Last Fill Quantity: 60,  # refills: 0   Last Office Visit: 3/26/2019   Future Office Visit:         SSRIs Protocol Failed - " "7/2/2019  5:29 PM        Failed - PHQ-9 score less than 5 in past 6 months     PHQ-9 SCORE 11/26/2018 1/7/2019 2/25/2019   PHQ-9 Total Score MyChart 18 (Moderately severe depression) - -   PHQ-9 Total Score 18 20 16     TINA-7 SCORE 12/15/2017 11/26/2018 2/25/2019   Total Score - 16 (severe anxiety) -   Total Score 6 16 14               Passed - Medication is active on med list        Passed - Patient is age 18 or older        Passed - No active pregnancy on record        Passed - No positive pregnancy test in last 12 months        Passed - Recent (6 mo) or future (30 days) visit within the authorizing provider's specialty     Patient had office visit in the last 6 months or has a visit in the next 30 days with authorizing provider or within the authorizing provider's specialty.  See \"Patient Info\" tab in inbasket, or \"Choose Columns\" in Meds & Orders section of the refill encounter.            "

## 2019-07-03 ENCOUNTER — TRANSFERRED RECORDS (OUTPATIENT)
Dept: HEALTH INFORMATION MANAGEMENT | Facility: CLINIC | Age: 39
End: 2019-07-03

## 2019-07-03 RX ORDER — DULOXETIN HYDROCHLORIDE 60 MG/1
CAPSULE, DELAYED RELEASE ORAL
Qty: 60 CAPSULE | Refills: 0 | Status: SHIPPED | OUTPATIENT
Start: 2019-07-03 | End: 2019-09-13

## 2019-07-03 RX ORDER — SERTRALINE HYDROCHLORIDE 100 MG/1
TABLET, FILM COATED ORAL
Qty: 60 TABLET | Refills: 0 | Status: SHIPPED | OUTPATIENT
Start: 2019-07-03 | End: 2019-09-13

## 2019-07-03 NOTE — TELEPHONE ENCOUNTER
Routing refill request to provider for review/approval because:  Anayeli given x1 and patient did not follow up, please advise  Labs out of range:  PHQ9 >4  Labs not current:  PHQ9 and GAD7  Patient needs to be seen because: for follow up appointment.     Patient stated on phone in May that she would call and make appointment. Patient did not call back.     Silva Gya RN Flex

## 2019-08-09 ENCOUNTER — TRANSFERRED RECORDS (OUTPATIENT)
Dept: HEALTH INFORMATION MANAGEMENT | Facility: CLINIC | Age: 39
End: 2019-08-09

## 2019-08-09 LAB — PHQ9 SCORE: 6

## 2019-09-04 DIAGNOSIS — F98.8 ATTENTION DEFICIT DISORDER (ADD) WITHOUT HYPERACTIVITY: ICD-10-CM

## 2019-09-04 NOTE — TELEPHONE ENCOUNTER
Controlled Substance Refill Request for amphetamine-dextroamphetamine (ADDERALL XR) 30 MG 24 hr capsule  Problem List Complete:  No     PROVIDER TO CONSIDER COMPLETION OF PROBLEM LIST AND OVERVIEW/CONTROLLED SUBSTANCE AGREEMENT    Last Written Prescription Date:  3/10/2019  Last Fill Quantity: 30,   # refills: 0    THE MOST RECENT OFFICE VISIT MUST BE WITHIN THE PAST 3 MONTHS. AT LEAST ONE FACE TO FACE VISIT MUST OCCUR EVERY 6 MONTHS. ADDITIONAL VISITS CAN BE VIRTUAL.  (THIS STATEMENT SHOULD BE DELETED.)    Last Office Visit with [facility] primary care provider: Dr. Interiano    Future Office visit:     Controlled substance agreement:   Encounter-Level CSA:    There are no encounter-level csa.     Patient-Level CSA:    There are no patient-level csa.         Last Urine Drug Screen: No results found for: CDAUT, No results found for: COMDAT, No results found for: THC13, PCP13, COC13, MAMP13, OPI13, AMP13, BZO13, TCA13, MTD13, BAR13, OXY13, PPX13, BUP13     Processing:  Patient will  in clinic     [email or web address]       checked in past 3 months?  No, route to RN

## 2019-09-04 NOTE — TELEPHONE ENCOUNTER
Disp Refills Start End FELIZ   amphetamine-dextroamphetamine (ADDERALL XR) 30 MG 24 hr capsule 30 capsule 0 3/10/2019 4/9/2019 --   Sig - Route: Take 1 capsule (30 mg) by mouth daily - Oral   Class: Local Print   Earliest Fill Date: 3/7/2019     Routing refill request to provider for review/approval because:  Drug not on the [facility] refill protocol   A break in medication    Jennifer Alves RN, BS  Clinical Nurse Triage.

## 2019-09-05 ENCOUNTER — TRANSFERRED RECORDS (OUTPATIENT)
Dept: HEALTH INFORMATION MANAGEMENT | Facility: CLINIC | Age: 39
End: 2019-09-05

## 2019-09-05 RX ORDER — DEXTROAMPHETAMINE SACCHARATE, AMPHETAMINE ASPARTATE MONOHYDRATE, DEXTROAMPHETAMINE SULFATE AND AMPHETAMINE SULFATE 7.5; 7.5; 7.5; 7.5 MG/1; MG/1; MG/1; MG/1
30 CAPSULE, EXTENDED RELEASE ORAL DAILY
Qty: 30 CAPSULE | Refills: 0 | OUTPATIENT
Start: 2019-09-05

## 2019-09-05 NOTE — TELEPHONE ENCOUNTER
Pt scheduled for 9/13/2019 which was your first available appt. Pt wondering if she can get enough medication until then

## 2019-09-13 ENCOUNTER — OFFICE VISIT (OUTPATIENT)
Dept: FAMILY MEDICINE | Facility: CLINIC | Age: 39
End: 2019-09-13
Payer: COMMERCIAL

## 2019-09-13 ENCOUNTER — TELEPHONE (OUTPATIENT)
Dept: FAMILY MEDICINE | Facility: CLINIC | Age: 39
End: 2019-09-13

## 2019-09-13 VITALS
BODY MASS INDEX: 25.25 KG/M2 | HEART RATE: 64 BPM | SYSTOLIC BLOOD PRESSURE: 120 MMHG | WEIGHT: 171 LBS | RESPIRATION RATE: 12 BRPM | DIASTOLIC BLOOD PRESSURE: 76 MMHG | TEMPERATURE: 98.5 F | OXYGEN SATURATION: 100 %

## 2019-09-13 DIAGNOSIS — G89.4 CHRONIC PAIN SYNDROME: ICD-10-CM

## 2019-09-13 DIAGNOSIS — M79.7 FIBROMYALGIA: ICD-10-CM

## 2019-09-13 DIAGNOSIS — F32.A DEPRESSIVE DISORDER: ICD-10-CM

## 2019-09-13 DIAGNOSIS — Z20.2 STD EXPOSURE: Primary | ICD-10-CM

## 2019-09-13 DIAGNOSIS — F33.1 MODERATE EPISODE OF RECURRENT MAJOR DEPRESSIVE DISORDER (H): ICD-10-CM

## 2019-09-13 DIAGNOSIS — F98.8 ATTENTION DEFICIT DISORDER (ADD) WITHOUT HYPERACTIVITY: ICD-10-CM

## 2019-09-13 PROCEDURE — 87591 N.GONORRHOEAE DNA AMP PROB: CPT | Performed by: FAMILY MEDICINE

## 2019-09-13 PROCEDURE — 87491 CHLMYD TRACH DNA AMP PROBE: CPT | Performed by: FAMILY MEDICINE

## 2019-09-13 PROCEDURE — 99214 OFFICE O/P EST MOD 30 MIN: CPT | Performed by: FAMILY MEDICINE

## 2019-09-13 RX ORDER — AZITHROMYCIN 250 MG/1
1000 TABLET, FILM COATED ORAL ONCE
Qty: 4 TABLET | Refills: 0 | Status: SHIPPED | OUTPATIENT
Start: 2019-09-13 | End: 2019-09-25

## 2019-09-13 RX ORDER — DEXTROAMPHETAMINE SACCHARATE, AMPHETAMINE ASPARTATE MONOHYDRATE, DEXTROAMPHETAMINE SULFATE AND AMPHETAMINE SULFATE 7.5; 7.5; 7.5; 7.5 MG/1; MG/1; MG/1; MG/1
30 CAPSULE, EXTENDED RELEASE ORAL DAILY
Qty: 30 CAPSULE | Refills: 0 | Status: SHIPPED | OUTPATIENT
Start: 2019-09-13 | End: 2020-02-06

## 2019-09-13 RX ORDER — DEXTROAMPHETAMINE SACCHARATE, AMPHETAMINE ASPARTATE MONOHYDRATE, DEXTROAMPHETAMINE SULFATE AND AMPHETAMINE SULFATE 7.5; 7.5; 7.5; 7.5 MG/1; MG/1; MG/1; MG/1
30 CAPSULE, EXTENDED RELEASE ORAL DAILY
Qty: 30 CAPSULE | Refills: 0 | Status: SHIPPED | OUTPATIENT
Start: 2019-10-14 | End: 2019-11-13

## 2019-09-13 RX ORDER — SERTRALINE HYDROCHLORIDE 100 MG/1
200 TABLET, FILM COATED ORAL DAILY
Qty: 180 TABLET | Refills: 1 | Status: SHIPPED | OUTPATIENT
Start: 2019-09-13 | End: 2020-03-19

## 2019-09-13 RX ORDER — DULOXETIN HYDROCHLORIDE 60 MG/1
CAPSULE, DELAYED RELEASE ORAL
Qty: 60 CAPSULE | Refills: 0 | Status: CANCELLED | OUTPATIENT
Start: 2019-09-13

## 2019-09-13 RX ORDER — DEXTROAMPHETAMINE SACCHARATE, AMPHETAMINE ASPARTATE MONOHYDRATE, DEXTROAMPHETAMINE SULFATE AND AMPHETAMINE SULFATE 7.5; 7.5; 7.5; 7.5 MG/1; MG/1; MG/1; MG/1
30 CAPSULE, EXTENDED RELEASE ORAL DAILY
Qty: 30 CAPSULE | Refills: 0 | Status: SHIPPED | OUTPATIENT
Start: 2019-11-14 | End: 2019-12-14

## 2019-09-13 RX ORDER — DULOXETIN HYDROCHLORIDE 60 MG/1
60 CAPSULE, DELAYED RELEASE ORAL 2 TIMES DAILY
Qty: 180 CAPSULE | Refills: 1 | Status: SHIPPED | OUTPATIENT
Start: 2019-09-13 | End: 2020-03-19

## 2019-09-13 ASSESSMENT — ENCOUNTER SYMPTOMS
NERVOUS/ANXIOUS: 0
CONSTITUTIONAL NEGATIVE: 1
ABDOMINAL PAIN: 0
HEADACHES: 0

## 2019-09-13 ASSESSMENT — ANXIETY QUESTIONNAIRES
2. NOT BEING ABLE TO STOP OR CONTROL WORRYING: SEVERAL DAYS
3. WORRYING TOO MUCH ABOUT DIFFERENT THINGS: SEVERAL DAYS
6. BECOMING EASILY ANNOYED OR IRRITABLE: SEVERAL DAYS
7. FEELING AFRAID AS IF SOMETHING AWFUL MIGHT HAPPEN: NOT AT ALL
GAD7 TOTAL SCORE: 7
5. BEING SO RESTLESS THAT IT IS HARD TO SIT STILL: NOT AT ALL
IF YOU CHECKED OFF ANY PROBLEMS ON THIS QUESTIONNAIRE, HOW DIFFICULT HAVE THESE PROBLEMS MADE IT FOR YOU TO DO YOUR WORK, TAKE CARE OF THINGS AT HOME, OR GET ALONG WITH OTHER PEOPLE: SOMEWHAT DIFFICULT
1. FEELING NERVOUS, ANXIOUS, OR ON EDGE: MORE THAN HALF THE DAYS

## 2019-09-13 NOTE — TELEPHONE ENCOUNTER
Pt calling into clinic  anticipated getting refilling of Cymbalta today  Did not receive when went to pharmacy  Note was not complete from visit today so unsure if it had been changed    Med t'd up    Jennifer Alves RN, BS  Clinical Nurse Triage.

## 2019-09-13 NOTE — TELEPHONE ENCOUNTER
MARV for patient to call back to clinic, let him know Cymbalta Rx was sent to pharmacy for him today.     Pily Zamora/LUZ ELENA

## 2019-09-13 NOTE — PROGRESS NOTES
Subjective     Heath Waller is a 38 year old female who presents to clinic today for the following health issues:    HPI     ADHD Follow-Up    Date of last ADHD office visit: 1/7/209  Status since last visit: Stable  Taking controlled (daily) medications as prescribed: Yes                       Parent/Patient Concerns with Medications: None      Generally feeling well.  Uses Adderall principally for work, not on vacations or weekends.  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  Sleep is problematic, but this has always been the case.  No issues with HA, abd pain, anxiety, agitation.    Was exposed to chlamydia.  Is having some discharge and vaginal irritation.  This a partner of several months.  Does typically use condoms, but not always.    Feels mood is doing much better.  Meds helpful, no side effects.         Review of Systems   Constitutional: Negative.    Gastrointestinal: Negative for abdominal pain.   Genitourinary: Positive for vaginal discharge.   Neurological: Negative for headaches.   Psychiatric/Behavioral: Negative for mood changes. The patient is not nervous/anxious.             Objective    /76 (BP Location: Right arm, Patient Position: Chair, Cuff Size: Adult Regular)   Pulse 64   Temp 98.5  F (36.9  C) (Oral)   Resp 12   Wt 77.6 kg (171 lb)   SpO2 100%   BMI 25.25 kg/m    Body mass index is 25.25 kg/m .  Physical Exam   Constitutional: She is oriented to person, place, and time.   Neck: No thyromegaly present.   Cardiovascular: Normal rate, regular rhythm and normal heart sounds.   Pulmonary/Chest: Effort normal and breath sounds normal.   Neurological: She is alert and oriented to person, place, and time.   Skin: Skin is warm and dry.   Psychiatric: She has a normal mood and affect. Her behavior is normal.   Vitals reviewed.     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  May call for another 3m cycle, OV in 6m    Plan: amphetamine-dextroamphetamine (ADDERALL XR) 30         MG 24 hr capsule, amphetamine-dextroamphetamine        (ADDERALL XR) 30 MG 24 hr capsule,         amphetamine-dextroamphetamine (ADDERALL XR) 30         MG 24 hr capsule            (F33.1) Moderate episode of recurrent major depressive disorder (H)  Comment: refillilng, getting PHQ-9  Plan: sertraline (ZOLOFT) 100 MG tablet              RTC in 6m    Alex Interiano MD    \

## 2019-09-14 ASSESSMENT — ANXIETY QUESTIONNAIRES: GAD7 TOTAL SCORE: 7

## 2019-09-15 LAB
C TRACH DNA SPEC QL NAA+PROBE: NEGATIVE
N GONORRHOEA DNA SPEC QL NAA+PROBE: NEGATIVE
SPECIMEN SOURCE: NORMAL
SPECIMEN SOURCE: NORMAL

## 2019-09-25 ENCOUNTER — OFFICE VISIT (OUTPATIENT)
Dept: FAMILY MEDICINE | Facility: CLINIC | Age: 39
End: 2019-09-25
Payer: COMMERCIAL

## 2019-09-25 VITALS
OXYGEN SATURATION: 99 % | SYSTOLIC BLOOD PRESSURE: 94 MMHG | DIASTOLIC BLOOD PRESSURE: 60 MMHG | HEIGHT: 69 IN | WEIGHT: 170 LBS | BODY MASS INDEX: 25.18 KG/M2 | TEMPERATURE: 98.5 F | RESPIRATION RATE: 14 BRPM | HEART RATE: 82 BPM

## 2019-09-25 DIAGNOSIS — Z23 FLU VACCINE NEED: ICD-10-CM

## 2019-09-25 DIAGNOSIS — B35.3 TINEA PEDIS OF BOTH FEET: ICD-10-CM

## 2019-09-25 DIAGNOSIS — Z00.00 ROUTINE GENERAL MEDICAL EXAMINATION AT A HEALTH CARE FACILITY: Primary | ICD-10-CM

## 2019-09-25 DIAGNOSIS — Z12.4 SCREENING FOR MALIGNANT NEOPLASM OF CERVIX: ICD-10-CM

## 2019-09-25 PROCEDURE — G0145 SCR C/V CYTO,THINLAYER,RESCR: HCPCS | Performed by: FAMILY MEDICINE

## 2019-09-25 PROCEDURE — 90471 IMMUNIZATION ADMIN: CPT | Performed by: FAMILY MEDICINE

## 2019-09-25 PROCEDURE — 90686 IIV4 VACC NO PRSV 0.5 ML IM: CPT | Performed by: FAMILY MEDICINE

## 2019-09-25 PROCEDURE — 87624 HPV HI-RISK TYP POOLED RSLT: CPT | Performed by: FAMILY MEDICINE

## 2019-09-25 PROCEDURE — 99396 PREV VISIT EST AGE 40-64: CPT | Mod: 25 | Performed by: FAMILY MEDICINE

## 2019-09-25 RX ORDER — FLUCONAZOLE 200 MG/1
200 TABLET ORAL DAILY
Qty: 14 TABLET | Refills: 0 | Status: SHIPPED | OUTPATIENT
Start: 2019-09-25 | End: 2019-10-09

## 2019-09-25 ASSESSMENT — ENCOUNTER SYMPTOMS
MYALGIAS: 1
HEMATURIA: 0
SORE THROAT: 0
SHORTNESS OF BREATH: 0
PALPITATIONS: 0
PARESTHESIAS: 0
BREAST MASS: 0
FEVER: 0
ABDOMINAL PAIN: 0
NERVOUS/ANXIOUS: 1
DIARRHEA: 0
FREQUENCY: 0
HEADACHES: 0
WEAKNESS: 0
DYSURIA: 0
EYE PAIN: 0
HEARTBURN: 0
DIZZINESS: 0
NAUSEA: 0
CHILLS: 0
JOINT SWELLING: 0
COUGH: 0
ARTHRALGIAS: 0
CONSTIPATION: 0
HEMATOCHEZIA: 0

## 2019-09-25 ASSESSMENT — MIFFLIN-ST. JEOR: SCORE: 1505.49

## 2019-09-25 NOTE — PROGRESS NOTES
SUBJECTIVE:   CC: Heath Waller is an 38 year old woman who presents for preventive health visit.     Healthy Habits:     Getting at least 3 servings of Calcium per day:  Yes    Bi-annual eye exam:  NO    Dental care twice a year:  NO    Sleep apnea or symptoms of sleep apnea:  Daytime drowsiness, Excessive snoring and Sleep apnea    Diet:  Regular (no restrictions)    Frequency of exercise:  None    Taking medications regularly:  Yes    Medication side effects:  None    PHQ-2 Total Score: 2    Additional concerns today:  No    Today's PHQ-2 Score:   PHQ-2 ( 1999 Pf[employer]) 9/25/2019   Q1: Little interest or pleasure in doing things 1   Q2: Feeling down, depressed or hopeless 1   PHQ-2 Score 2   Q1: Little interest or pleasure in doing things Several days   Q2: Feeling down, depressed or hopeless Several days   PHQ-2 Score 2       Abuse: Current or Past(Physical, Sexual or Emotional)- No  Do you feel safe in your environment? Yes    Social History     Tobacco Use     Smoking status: Former Smoker     Packs/day: 0.00     Years: 20.00     Pack years: 0.00     Types: Cigarettes     Smokeless tobacco: Current User     Tobacco comment: e-cig   Substance Use Topics     Alcohol use: Yes     Alcohol/week: 0.0 standard drinks     Comment: rarely       Alcohol Use 9/25/2019   Prescreen: >3 drinks/day or >7 drinks/week? No   Prescreen: >3 drinks/day or >7 drinks/week? -   No flowsheet data found.    Reviewed orders with patient.  Reviewed health maintenance and updated orders accordingly - Yes  Lab work is in process    Mammogram Screening: Patient under age 50, mutual decision reflected in health maintenance.    Persistent dry feel on soles, between toes,   Has tried numerous topicals.  Notes that in the past had some success with a several weeks course of fluconazole, but it reoccurred after several months.    Pertinent mammograms are reviewed under the imaging tab.  History of abnormal Pap smear: Last 3 Pap and HPV  "Results:     Has had several abnormal paps. S/p LEEP x2    Reviewed and updated as needed this visit by clinical staff  Tobacco  Allergies  Meds  Med Hx  Surg Hx  Fam Hx  Soc Hx        Reviewed and updated as needed this visit by Provider            Review of Systems   Constitutional: Negative for chills and fever.   HENT: Negative for congestion, ear pain, hearing loss and sore throat.    Eyes: Negative for pain and visual disturbance.   Respiratory: Negative for cough and shortness of breath.    Cardiovascular: Negative for chest pain, palpitations and peripheral edema.   Gastrointestinal: Negative for abdominal pain, constipation, diarrhea, heartburn, hematochezia and nausea.   Breasts:  Negative for tenderness, breast mass and discharge.   Genitourinary: Negative for dysuria, frequency, genital sores, hematuria, pelvic pain, urgency, vaginal bleeding and vaginal discharge.   Musculoskeletal: Positive for myalgias. Negative for arthralgias and joint swelling.   Skin: Positive for rash.   Neurological: Negative for dizziness, weakness, headaches and paresthesias.   Psychiatric/Behavioral: Negative for mood changes. The patient is nervous/anxious.           OBJECTIVE:   BP 94/60 (BP Location: Right arm, Patient Position: Chair, Cuff Size: Adult Regular)   Pulse 82   Temp 98.5  F (36.9  C) (Oral)   Resp 14   Ht 1.753 m (5' 9\")   Wt 77.1 kg (170 lb)   SpO2 99%   BMI 25.10 kg/m    Physical Exam  Vitals signs and nursing note reviewed. Exam conducted with a chaperone present.   Constitutional:       Appearance: Normal appearance. She is normal weight.   HENT:      Head: Normocephalic and atraumatic.      Right Ear: Tympanic membrane, ear canal and external ear normal.      Left Ear: Tympanic membrane, ear canal and external ear normal.      Mouth/Throat:      Mouth: Mucous membranes are moist.      Pharynx: Oropharynx is clear.   Eyes:      Extraocular Movements: Extraocular movements intact.      " "Conjunctiva/sclera: Conjunctivae normal.      Pupils: Pupils are equal, round, and reactive to light.   Neck:      Musculoskeletal: Neck supple.   Cardiovascular:      Rate and Rhythm: Normal rate and regular rhythm.      Heart sounds: Normal heart sounds.   Pulmonary:      Effort: Pulmonary effort is normal.      Breath sounds: Normal breath sounds.   Abdominal:      General: Bowel sounds are normal.      Palpations: Abdomen is soft. There is no mass.   Genitourinary:     Labia:         Right: No tenderness or lesion.         Left: No tenderness or lesion.       Vagina: Normal.      Cervix: Normal.      Uterus: Normal.       Adnexa: Right adnexa normal and left adnexa normal.   Musculoskeletal: Normal range of motion.         General: No swelling or tenderness.   Lymphadenopathy:      Cervical: No cervical adenopathy.   Skin:     General: Skin is warm and dry.      Comments: Dry, peeling skin over \"palmar\" skin BERNARDA feet   Neurological:      General: No focal deficit present.      Mental Status: She is alert and oriented to person, place, and time.   Psychiatric:         Mood and Affect: Mood normal.         Behavior: Behavior normal.           ASSESSMENT/PLAN:       ICD-10-CM    1. Routine general medical examination at a health care facility Z00.00 Lipid panel reflex to direct LDL Fasting   2. Screening for malignant neoplasm of cervix Z12.4 Pap imaged thin layer screen with HPV - recommended age 30 - 65 years (select HPV order below)   3. Flu vaccine need Z23 INFLUENZA VACCINE IM > 6 MONTHS VALENT IIV4 [99368]   4. Tinea pedis of both feet B35.3 fluconazole (DIFLUCAN) 200 MG tablet       COUNSELING:  Reviewed preventive health counseling, as reflected in patient instructions       Regular exercise       Vision screening    Estimated body mass index is 25.1 kg/m  as calculated from the following:    Height as of this encounter: 1.753 m (5' 9\").    Weight as of this encounter: 77.1 kg (170 lb).         reports that " she has quit smoking. Her smoking use included cigarettes. She smoked 0.00 packs per day for 20.00 years. She uses smokeless tobacco.  Tobacco Cessation Action Plan: Information offered: Patient not interested at this time    Counseling Resources:  ATP IV Guidelines  Pooled Cohorts Equation Calculator  Breast Cancer Risk Calculator  FRAX Risk Assessment  ICSI Preventive Guidelines  Dietary Guidelines for Americans, 2010  [employer]'s MyPlate  ASA Prophylaxis  Lung CA Screening    Alex Interiano MD  [facility]

## 2019-10-01 LAB
COPATH REPORT: NORMAL
PAP: NORMAL

## 2019-10-03 LAB
FINAL DIAGNOSIS: NORMAL
HPV HR 12 DNA CVX QL NAA+PROBE: NEGATIVE
HPV16 DNA SPEC QL NAA+PROBE: NEGATIVE
HPV18 DNA SPEC QL NAA+PROBE: NEGATIVE
SPECIMEN DESCRIPTION: NORMAL
SPECIMEN SOURCE CVX/VAG CYTO: NORMAL

## 2019-10-04 ENCOUNTER — TRANSFERRED RECORDS (OUTPATIENT)
Dept: HEALTH INFORMATION MANAGEMENT | Facility: CLINIC | Age: 39
End: 2019-10-04

## 2019-10-09 DIAGNOSIS — Z00.00 ROUTINE GENERAL MEDICAL EXAMINATION AT A HEALTH CARE FACILITY: ICD-10-CM

## 2019-10-09 PROCEDURE — 80061 LIPID PANEL: CPT | Performed by: FAMILY MEDICINE

## 2019-10-09 PROCEDURE — 36415 COLL VENOUS BLD VENIPUNCTURE: CPT | Performed by: FAMILY MEDICINE

## 2019-10-10 LAB
CHOLEST SERPL-MCNC: 194 MG/DL
HDLC SERPL-MCNC: 53 MG/DL
LDLC SERPL CALC-MCNC: 130 MG/DL
NONHDLC SERPL-MCNC: 141 MG/DL
TRIGL SERPL-MCNC: 55 MG/DL

## 2019-10-11 DIAGNOSIS — I10 BENIGN ESSENTIAL HYPERTENSION: ICD-10-CM

## 2019-10-11 RX ORDER — LISINOPRIL 10 MG/1
TABLET ORAL
Qty: 90 TABLET | Refills: 1 | Status: SHIPPED | OUTPATIENT
Start: 2019-10-11 | End: 2020-05-06

## 2019-10-11 NOTE — TELEPHONE ENCOUNTER
Prescription approved per [facility] Refill Protocol  Jennifer Alves RN BS

## 2019-10-11 NOTE — TELEPHONE ENCOUNTER
"Last Written Prescription Date:  02/25/19  Last Fill Quantity: 90,  # refills: 1   Last office visit: 9/25/2019 with prescribing provider:  Dr Interiano   Future Office Visit:      Requested Prescriptions   Pending Prescriptions Disp Refills     lisinopril (PRINIVIL/ZESTRIL) 10 MG tablet [Pharmacy Med Name: LISINOPRIL 10MG TABLETS] 90 tablet 0     Sig: TAKE 1 TABLET(10 MG) BY MOUTH DAILY       ACE Inhibitors (Including Combos) Protocol Passed - 10/11/2019  8:04 AM        Passed - Blood pressure under 140/90 in past 12 months     BP Readings from Last 3 Encounters:   09/25/19 94/60   09/13/19 120/76   03/26/19 118/82                 Passed - Recent (12 mo) or future (30 days) visit within the authorizing provider's specialty     Patient has had an office visit with the authorizing provider or a provider within the authorizing providers department within the previous 12 mos or has a future within next 30 days. See \"Patient Info\" tab in inbasket, or \"Choose Columns\" in Meds & Orders section of the refill encounter.              Passed - Medication is active on med list        Passed - Patient is age 18 or older        Passed - No active pregnancy on record        Passed - Normal serum creatinine on file in past 12 months     Recent Labs   Lab Test 02/25/19  1548   CR 0.57             Passed - Normal serum potassium on file in past 12 months     Recent Labs   Lab Test 02/25/19  1548   POTASSIUM 3.8             Passed - No positive pregnancy test within past 12 months          "

## 2019-11-05 ENCOUNTER — TRANSFERRED RECORDS (OUTPATIENT)
Dept: HEALTH INFORMATION MANAGEMENT | Facility: CLINIC | Age: 39
End: 2019-11-05

## 2019-11-22 ENCOUNTER — DOCUMENTATION ONLY (OUTPATIENT)
Dept: SLEEP MEDICINE | Facility: CLINIC | Age: 39
End: 2019-11-22

## 2019-11-22 NOTE — PROGRESS NOTES
Patient contacted regarding PAP usage that has either dropped off below an average of 20 minutes per night or device has stopped reporting into Epic or vendor site.    Diagnostic AHI: 60   PSG    Patient still has device : Unknown    Last date device called in 10/29/2019    Last usage date 10/9/2019     Message left for patient to return call       Current reporting of device:         Action plan:  voicemail left; waiting for return call from patient

## 2019-11-26 ENCOUNTER — TELEPHONE (OUTPATIENT)
Dept: FAMILY MEDICINE | Facility: CLINIC | Age: 39
End: 2019-11-26

## 2019-11-26 NOTE — TELEPHONE ENCOUNTER
Panel Management Review      Patient has the following on her problem list:     Depression / Dysthymia review    Measure:  Needs PHQ-9 score of 4 or less during index window.  Administer PHQ-9 and if score is 5 or more, send encounter to provider for next steps.    5 - 7 month window range: 11/8/2019 - 3/7/2020    PHQ-9 SCORE 1/7/2019 2/25/2019 9/13/2019   PHQ-9 Total Score MyChart - - -   PHQ-9 Total Score 20 16 9       If PHQ-9 recheck is 5 or more, route to provider for next steps.    Patient is due for:  PHQ9    Hypertension   Last three blood pressure readings:  BP Readings from Last 3 Encounters:   09/25/19 94/60   09/13/19 120/76   03/26/19 118/82     Blood pressure: Passed    HTN Guidelines:  Less than 140/90      Composite cancer screening  Chart review shows that this patient is due/due soon for the following None  Summary:    Patient is due/failing the following:   PHQ9    Action needed:   Patient needs to do PHQ9.    Type of outreach:    Sent [employer] message.    Questions for provider review:    None                                                                                                                                    Carola Velez       Chart routed to Care Team .

## 2019-12-03 ENCOUNTER — TRANSFERRED RECORDS (OUTPATIENT)
Dept: HEALTH INFORMATION MANAGEMENT | Facility: CLINIC | Age: 39
End: 2019-12-03

## 2019-12-03 LAB — PHQ9 SCORE: 5

## 2019-12-13 DIAGNOSIS — L70.0 ACNE VULGARIS: ICD-10-CM

## 2019-12-14 RX ORDER — SPIRONOLACTONE 50 MG/1
TABLET, FILM COATED ORAL
Qty: 90 TABLET | Refills: 2 | Status: SHIPPED | OUTPATIENT
Start: 2019-12-14 | End: 2020-05-06

## 2019-12-14 NOTE — TELEPHONE ENCOUNTER
Prescription approved per [facility] Refill Protocol.  Deedee Gibson RN, BSN

## 2020-01-07 ENCOUNTER — TRANSFERRED RECORDS (OUTPATIENT)
Dept: HEALTH INFORMATION MANAGEMENT | Facility: CLINIC | Age: 40
End: 2020-01-07

## 2020-02-04 ENCOUNTER — TRANSFERRED RECORDS (OUTPATIENT)
Dept: HEALTH INFORMATION MANAGEMENT | Facility: CLINIC | Age: 40
End: 2020-02-04

## 2020-02-05 DIAGNOSIS — F98.8 ATTENTION DEFICIT DISORDER (ADD) WITHOUT HYPERACTIVITY: ICD-10-CM

## 2020-02-05 NOTE — TELEPHONE ENCOUNTER
2020    Pt needing RX refill on Adderall Pt stated that she is out    Pharmacy: [id number] - [address]      amphetamine-dextroamphetamine (ADDERALL XR) 30 MG 24 hr capsule () 30 mg, DAILY 0 ordered         Dose, Route, Frequency: 30 mg, Oral, DAILY  Start: 2019  End: 2019  Ord/Sold: 2019 (O)  Report  Adh:   Taking:   Long-term:   Pharmacy: [id number], [place] - [address]  Med Dose History  Change       Summary: Take 1 capsule (30 mg) by mouth daily, Disp-30 capsule, R-0, Local Print       Patient Sig: Take 1 capsule (30 mg) by mouth daily       Ordered on: 2019       Authorized by: MICHA KELLY

## 2020-02-05 NOTE — TELEPHONE ENCOUNTER
Adderall XR 30mg      Last Written Prescription Date:  11/14/2019  Last Fill Quantity: 30,   # refills: 0  Last Office Visit: 9/25/2019  Future Office visit:       Routing refill request to provider for review/approval because:  Drug not on the [facility], [facility] or [facility] refill protocol or controlled substance.     checked 2/5/2020: (copied only last 6 months)        Marilyn Baltazar RN

## 2020-02-06 RX ORDER — DEXTROAMPHETAMINE SACCHARATE, AMPHETAMINE ASPARTATE MONOHYDRATE, DEXTROAMPHETAMINE SULFATE AND AMPHETAMINE SULFATE 7.5; 7.5; 7.5; 7.5 MG/1; MG/1; MG/1; MG/1
30 CAPSULE, EXTENDED RELEASE ORAL DAILY
Qty: 30 CAPSULE | Refills: 0 | Status: SHIPPED | OUTPATIENT
Start: 2020-02-06 | End: 2024-01-30

## 2020-03-12 ENCOUNTER — TRANSFERRED RECORDS (OUTPATIENT)
Dept: HEALTH INFORMATION MANAGEMENT | Facility: CLINIC | Age: 40
End: 2020-03-12

## 2020-04-10 ENCOUNTER — TRANSFERRED RECORDS (OUTPATIENT)
Dept: HEALTH INFORMATION MANAGEMENT | Facility: CLINIC | Age: 40
End: 2020-04-10

## 2020-04-22 ENCOUNTER — MYC REFILL (OUTPATIENT)
Dept: FAMILY MEDICINE | Facility: CLINIC | Age: 40
End: 2020-04-22

## 2020-04-22 DIAGNOSIS — J30.0 CHRONIC VASOMOTOR RHINITIS: ICD-10-CM

## 2020-04-22 DIAGNOSIS — K21.9 GASTROESOPHAGEAL REFLUX DISEASE: ICD-10-CM

## 2020-04-22 DIAGNOSIS — F98.8 ATTENTION DEFICIT DISORDER (ADD) WITHOUT HYPERACTIVITY: ICD-10-CM

## 2020-04-27 RX ORDER — DEXTROAMPHETAMINE SACCHARATE, AMPHETAMINE ASPARTATE MONOHYDRATE, DEXTROAMPHETAMINE SULFATE AND AMPHETAMINE SULFATE 7.5; 7.5; 7.5; 7.5 MG/1; MG/1; MG/1; MG/1
30 CAPSULE, EXTENDED RELEASE ORAL DAILY
Qty: 30 CAPSULE | Refills: 0 | OUTPATIENT
Start: 2020-04-27

## 2020-04-27 RX ORDER — PSEUDOEPHEDRINE HCL 120 MG/1
120 TABLET, FILM COATED, EXTENDED RELEASE ORAL 2 TIMES DAILY
Qty: 90 TABLET | Refills: 1 | OUTPATIENT
Start: 2020-04-27

## 2020-05-06 ENCOUNTER — VIRTUAL VISIT (OUTPATIENT)
Dept: FAMILY MEDICINE | Facility: CLINIC | Age: 40
End: 2020-05-06
Payer: COMMERCIAL

## 2020-05-06 DIAGNOSIS — I10 BENIGN ESSENTIAL HYPERTENSION: ICD-10-CM

## 2020-05-06 DIAGNOSIS — K21.9 GASTROESOPHAGEAL REFLUX DISEASE, ESOPHAGITIS PRESENCE NOT SPECIFIED: ICD-10-CM

## 2020-05-06 DIAGNOSIS — J30.0 CHRONIC VASOMOTOR RHINITIS: ICD-10-CM

## 2020-05-06 DIAGNOSIS — L70.0 ACNE VULGARIS: ICD-10-CM

## 2020-05-06 DIAGNOSIS — F33.1 MODERATE EPISODE OF RECURRENT MAJOR DEPRESSIVE DISORDER (H): ICD-10-CM

## 2020-05-06 DIAGNOSIS — F98.8 ATTENTION DEFICIT DISORDER (ADD) WITHOUT HYPERACTIVITY: Primary | ICD-10-CM

## 2020-05-06 PROCEDURE — 99214 OFFICE O/P EST MOD 30 MIN: CPT | Mod: 95 | Performed by: FAMILY MEDICINE

## 2020-05-06 RX ORDER — DEXTROAMPHETAMINE SACCHARATE, AMPHETAMINE ASPARTATE MONOHYDRATE, DEXTROAMPHETAMINE SULFATE AND AMPHETAMINE SULFATE 7.5; 7.5; 7.5; 7.5 MG/1; MG/1; MG/1; MG/1
30 CAPSULE, EXTENDED RELEASE ORAL DAILY
Qty: 30 CAPSULE | Refills: 0 | Status: CANCELLED | OUTPATIENT
Start: 2020-05-06

## 2020-05-06 RX ORDER — LISINOPRIL 10 MG/1
10 TABLET ORAL DAILY
Qty: 90 TABLET | Refills: 1 | Status: SHIPPED | OUTPATIENT
Start: 2020-05-06 | End: 2020-11-09

## 2020-05-06 RX ORDER — SERTRALINE HYDROCHLORIDE 100 MG/1
TABLET, FILM COATED ORAL
Qty: 180 TABLET | Refills: 1 | Status: SHIPPED | OUTPATIENT
Start: 2020-05-06 | End: 2020-11-09

## 2020-05-06 RX ORDER — DEXTROAMPHETAMINE SACCHARATE, AMPHETAMINE ASPARTATE MONOHYDRATE, DEXTROAMPHETAMINE SULFATE AND AMPHETAMINE SULFATE 7.5; 7.5; 7.5; 7.5 MG/1; MG/1; MG/1; MG/1
30 CAPSULE, EXTENDED RELEASE ORAL DAILY
Qty: 30 CAPSULE | Refills: 0 | Status: SHIPPED | OUTPATIENT
Start: 2020-06-06 | End: 2020-07-06

## 2020-05-06 RX ORDER — DEXTROAMPHETAMINE SACCHARATE, AMPHETAMINE ASPARTATE MONOHYDRATE, DEXTROAMPHETAMINE SULFATE AND AMPHETAMINE SULFATE 7.5; 7.5; 7.5; 7.5 MG/1; MG/1; MG/1; MG/1
30 CAPSULE, EXTENDED RELEASE ORAL DAILY
Qty: 30 CAPSULE | Refills: 0 | Status: SHIPPED | OUTPATIENT
Start: 2020-07-07 | End: 2020-08-06

## 2020-05-06 RX ORDER — DEXTROAMPHETAMINE SACCHARATE, AMPHETAMINE ASPARTATE MONOHYDRATE, DEXTROAMPHETAMINE SULFATE AND AMPHETAMINE SULFATE 7.5; 7.5; 7.5; 7.5 MG/1; MG/1; MG/1; MG/1
30 CAPSULE, EXTENDED RELEASE ORAL DAILY
Qty: 30 CAPSULE | Refills: 0 | Status: SHIPPED | OUTPATIENT
Start: 2020-05-06 | End: 2020-06-05

## 2020-05-06 RX ORDER — PSEUDOEPHEDRINE HCL 120 MG/1
120 TABLET, FILM COATED, EXTENDED RELEASE ORAL 2 TIMES DAILY
Qty: 180 TABLET | Refills: 1 | Status: SHIPPED | OUTPATIENT
Start: 2020-05-06 | End: 2020-09-14

## 2020-05-06 RX ORDER — SPIRONOLACTONE 50 MG/1
50 TABLET, FILM COATED ORAL DAILY
Qty: 90 TABLET | Refills: 1 | Status: SHIPPED | OUTPATIENT
Start: 2020-05-06 | End: 2020-11-09

## 2020-05-06 ASSESSMENT — ANXIETY QUESTIONNAIRES
7. FEELING AFRAID AS IF SOMETHING AWFUL MIGHT HAPPEN: NOT AT ALL
6. BECOMING EASILY ANNOYED OR IRRITABLE: MORE THAN HALF THE DAYS
5. BEING SO RESTLESS THAT IT IS HARD TO SIT STILL: NOT AT ALL
GAD7 TOTAL SCORE: 13
IF YOU CHECKED OFF ANY PROBLEMS ON THIS QUESTIONNAIRE, HOW DIFFICULT HAVE THESE PROBLEMS MADE IT FOR YOU TO DO YOUR WORK, TAKE CARE OF THINGS AT HOME, OR GET ALONG WITH OTHER PEOPLE: VERY DIFFICULT
3. WORRYING TOO MUCH ABOUT DIFFERENT THINGS: NEARLY EVERY DAY
1. FEELING NERVOUS, ANXIOUS, OR ON EDGE: NEARLY EVERY DAY
2. NOT BEING ABLE TO STOP OR CONTROL WORRYING: NEARLY EVERY DAY

## 2020-05-06 ASSESSMENT — ENCOUNTER SYMPTOMS
CONSTITUTIONAL NEGATIVE: 1
HEADACHES: 0
DECREASED CONCENTRATION: 1
PALPITATIONS: 0
NERVOUS/ANXIOUS: 1
SHORTNESS OF BREATH: 0
SLEEP DISTURBANCE: 1

## 2020-05-06 ASSESSMENT — PATIENT HEALTH QUESTIONNAIRE - PHQ9
SUM OF ALL RESPONSES TO PHQ QUESTIONS 1-9: 16
5. POOR APPETITE OR OVEREATING: MORE THAN HALF THE DAYS

## 2020-05-06 NOTE — PROGRESS NOTES
"Heath Waller is a 39 year old female who is being evaluated via a billable video visit.      The patient has been notified of following:     \"This video visit will be conducted via a call between you and your physician/provider. We have found that certain health care needs can be provided without the need for an in-person physical exam.  This service lets us provide the care you need with a video conversation.  If a prescription is necessary we can send it directly to your pharmacy.  If lab work is needed we can place an order for that and you can then stop by our lab to have the test done at a later time.    Video visits are billed at different rates depending on your insurance coverage.  Please reach out to your insurance provider with any questions.    If during the course of the call the physician/provider feels a video visit is not appropriate, you will not be charged for this service.\"    Patient has given verbal consent for Video visit? Yes    How would you like to [place] your [place]? [place]    Patient would like the video invitation sent by: Text to cell phone: [phone]    Will anyone else be joining your video visit? No      Subjective     Heath Waller is a 39 year old female who presents to clinic today for the following health issues:    HPI  Hypertension Follow-up      Do you check your blood pressure regularly outside of the clinic? No     Are you following a low salt diet? No    Are your blood pressures ever more than 140 on the top number (systolic) OR more   than 90 on the bottom number (diastolic), for example 140/90? N/A    Depression and Anxiety Follow-Up    How are you doing with your depression since your last visit? Worsened     How are you doing with your anxiety since your last visit?  Worsened     Are you having other symptoms that might be associated with depression or anxiety? Yes:  EMOTIONAL. LACK OF ENERGY    Have you had a significant life event? OTHER: RECENTLY DIVORCE     Do " you have any concerns with your use of alcohol or other drugs? No    Social History     Tobacco Use     Smoking status: Former Smoker     Packs/day: 0.00     Years: 20.00     Pack years: 0.00     Types: Cigarettes     Smokeless tobacco: Never Used     Tobacco comment: e-cig   Substance Use Topics     Alcohol use: Yes     Alcohol/week: 0.0 standard drinks     Comment: rarely     Drug use: No     PHQ 9/13/2019 4/22/2020 5/6/2020   PHQ-9 Total Score 9 10 16   Q9: Thoughts of better off dead/self-harm past 2 weeks Not at all Not at all Not at all     TINA-7 SCORE 9/13/2019 4/22/2020 5/6/2020   Total Score - 10 (moderate anxiety) -   Total Score 7 10 13     Last PHQ-9 5/6/2020   1.  Little interest or pleasure in doing things 3   2.  Feeling down, depressed, or hopeless 1   3.  Trouble falling or staying asleep, or sleeping too much 2   4.  Feeling tired or having little energy 3   5.  Poor appetite or overeating 2   6.  Feeling bad about yourself 2   7.  Trouble concentrating 3   8.  Moving slowly or restless 0   Q9: Thoughts of better off dead/self-harm past 2 weeks 0   PHQ-9 Total Score 16   Difficulty at work, home, or with people Very difficult     TINA-7  5/6/2020   1. Feeling nervous, anxious, or on edge 3   2. Not being able to stop or control worrying 3   3. Worrying too much about different things 3   4. Trouble relaxing 2   5. Being so restless that it is hard to sit still 0   6. Becoming easily annoyed or irritable 2   7. Feeling afraid, as if something awful might happen 0   TINA-7 Total Score 13   If you checked any problems, how difficult have they made it for you to do your work, take care of things at home, or get along with other people? Very difficult     In the past two weeks have you had thoughts of suicide or self-harm?  No.    Do you have concerns about your personal safety or the safety of others?   No    Suicide Assessment Five-step Evaluation and Treatment (SAFE-T)      How many servings of fruits  "and vegetables do you eat daily?  2-3    On average, how many sweetened beverages do you drink each day (Examples: soda, juice, sweet tea, etc.  Do NOT count diet or artificially sweetened beverages)?   4    How many days per week do you exercise enough to make your heart beat faster? 3 or less    How many minutes a day do you exercise enough to make your heart beat faster? 30 - 60  How many days per week do you miss taking your medication? RAN OUT OF MEDS    What makes it hard for you to take your medications?  RAN OUT 2 WEEKS AGO    Medication Followup of ADDERALL 30 MG    Taking Medication as prescribed: NO-RAN OUT    Side Effects:  None    Medication Helping Symptoms:  yes     Looking for medication refills.    Has been out of medication for about 2 weeks.  Noting some issues with feeling fuzzy.   Does feel this is due to her having some withdrawal from her meds.  Her mood is rough, really feeling the lack of her meds.      Does use Adderall only on days when she works.  Sleep is rough right now due to issues with anxiety and chronic pain. Even at baseline.    Video Start Time: 3:53 PM            Reviewed and updated as needed this visit by Provider         Review of Systems   Constitutional: Negative.    Eyes: Negative for visual disturbance.   Respiratory: Negative for shortness of breath.    Cardiovascular: Negative for chest pain, palpitations and peripheral edema.   Neurological: Negative for headaches.   Psychiatric/Behavioral: Positive for decreased concentration and sleep disturbance. Negative for suicidal ideas. The patient is nervous/anxious.             Objective    There were no vitals taken for this visit.  Estimated body mass index is 25.1 kg/m  as calculated from the following:    Height as of 9/25/19: 1.753 m (5' 9\").    Weight as of 9/25/19: 77.1 kg (170 lb).  Physical Exam     GENERAL: healthy, alert and no distress  EYES: Eyes grossly normal to inspection, conjunctivae and sclerae " "normal  RESP: no audible wheeze, cough, or visible cyanosis.  No visible retractions or increased work of breathing.  Able to speak fully in complete sentences.  NEURO: Cranial nerves grossly intact, mentation intact and speech normal  PSYCH: mentation appears normal, affect normal/bright, judgement and insight intact, normal speech and appearance well-groomed      Diagnostic Test Results:  Labs reviewed in Epic        Assessment & Plan       ICD-10-CM    1. Attention deficit disorder (ADD) without hyperactivity  F98.8 amphetamine-dextroamphetamine (ADDERALL XR) 30 MG 24 hr capsule     amphetamine-dextroamphetamine (ADDERALL XR) 30 MG 24 hr capsule     amphetamine-dextroamphetamine (ADDERALL XR) 30 MG 24 hr capsule   2. Moderate episode of recurrent major depressive disorder (H)  F33.1 sertraline (ZOLOFT) 100 MG tablet   3. Chronic vasomotor rhinitis  J30.0 pseudoePHEDrine (SUDAFED 12 HOUR) 120 MG 12 hr tablet   4. Gastroesophageal reflux disease, esophagitis presence not specified  K21.9 omeprazole (PRILOSEC) 20 MG DR capsule   5. Acne vulgaris  L70.0 spironolactone (ALDACTONE) 50 MG tablet   6. Benign essential hypertension  I10 lisinopril (ZESTRIL) 10 MG tablet      3m refills for Adderall provided.  May call for another 3m cycle, OV in 6m.    Mood does seem to be related to being off of medications, if still struggling after 2-4 weeks she will return for follow up.    BMI:   Estimated body mass index is 25.1 kg/m  as calculated from the following:    Height as of 9/25/19: 1.753 m (5' 9\").    Weight as of 9/25/19: 77.1 kg (170 lb).               Return in about 6 months (around 11/6/2020) for depression/anxiety, ADHD.    Alex Interiano MD  [facility]      Video-Visit Details    Type of service:  Video Visit    Video End Time:4:07 PM    Note - after about 5 minutes video disconnected and balance of visit occurred over the phone.    Originating Location (pt. Location): Home    Distant " Location (provider location):  [facility] [employer]     Platform used for Video Visit: Tahng    No follow-ups on file.       Alex Interiano MD

## 2020-05-07 ASSESSMENT — ANXIETY QUESTIONNAIRES: GAD7 TOTAL SCORE: 13

## 2020-05-11 ENCOUNTER — TRANSFERRED RECORDS (OUTPATIENT)
Dept: HEALTH INFORMATION MANAGEMENT | Facility: CLINIC | Age: 40
End: 2020-05-11

## 2020-05-25 ENCOUNTER — MYC MEDICAL ADVICE (OUTPATIENT)
Dept: FAMILY MEDICINE | Facility: CLINIC | Age: 40
End: 2020-05-25

## 2020-08-03 ENCOUNTER — MYC MEDICAL ADVICE (OUTPATIENT)
Dept: FAMILY MEDICINE | Facility: CLINIC | Age: 40
End: 2020-08-03

## 2020-08-03 DIAGNOSIS — F98.8 ATTENTION DEFICIT DISORDER (ADD) WITHOUT HYPERACTIVITY: ICD-10-CM

## 2020-08-03 RX ORDER — DEXTROAMPHETAMINE SACCHARATE, AMPHETAMINE ASPARTATE MONOHYDRATE, DEXTROAMPHETAMINE SULFATE AND AMPHETAMINE SULFATE 7.5; 7.5; 7.5; 7.5 MG/1; MG/1; MG/1; MG/1
30 CAPSULE, EXTENDED RELEASE ORAL DAILY
Qty: 30 CAPSULE | Refills: 0 | Status: SHIPPED | OUTPATIENT
Start: 2020-08-06 | End: 2020-09-05

## 2020-08-03 RX ORDER — DEXTROAMPHETAMINE SACCHARATE, AMPHETAMINE ASPARTATE MONOHYDRATE, DEXTROAMPHETAMINE SULFATE AND AMPHETAMINE SULFATE 7.5; 7.5; 7.5; 7.5 MG/1; MG/1; MG/1; MG/1
30 CAPSULE, EXTENDED RELEASE ORAL DAILY
Qty: 30 CAPSULE | Refills: 0 | Status: SHIPPED | OUTPATIENT
Start: 2020-09-05 | End: 2020-10-05

## 2020-08-03 RX ORDER — DEXTROAMPHETAMINE SACCHARATE, AMPHETAMINE ASPARTATE MONOHYDRATE, DEXTROAMPHETAMINE SULFATE AND AMPHETAMINE SULFATE 7.5; 7.5; 7.5; 7.5 MG/1; MG/1; MG/1; MG/1
30 CAPSULE, EXTENDED RELEASE ORAL DAILY
Qty: 30 CAPSULE | Refills: 0 | Status: SHIPPED | OUTPATIENT
Start: 2020-10-05 | End: 2020-11-04

## 2020-08-03 NOTE — TELEPHONE ENCOUNTER
Routed to Dr. Interiano,    Patient requesting refill of Adderall, last visit was 05/06/2020, due for follow up in 11/2020    Edmnudo Dozier RN

## 2020-08-29 ENCOUNTER — HOSPITAL ENCOUNTER (EMERGENCY)
Facility: CLINIC | Age: 40
Discharge: HOME | End: 2020-08-29
Attending: EMERGENCY MEDICINE | Admitting: EMERGENCY MEDICINE
Payer: COMMERCIAL

## 2020-08-29 VITALS
HEART RATE: 65 BPM | OXYGEN SATURATION: 98 % | DIASTOLIC BLOOD PRESSURE: 66 MMHG | SYSTOLIC BLOOD PRESSURE: 111 MMHG | RESPIRATION RATE: 18 BRPM | TEMPERATURE: 98.7 F

## 2020-08-29 DIAGNOSIS — R53.83 FATIGUE, UNSPECIFIED TYPE: ICD-10-CM

## 2020-08-29 DIAGNOSIS — N30.01 ACUTE CYSTITIS WITH HEMATURIA: ICD-10-CM

## 2020-08-29 LAB
ALBUMIN UR-MCNC: 10 MG/DL
ANION GAP SERPL CALCULATED.3IONS-SCNC: 5 MMOL/L (ref 3–14)
APPEARANCE UR: ABNORMAL
B-HCG FREE SERPL-ACNC: <5 IU/L
BACTERIA #/AREA URNS HPF: ABNORMAL /HPF
BASOPHILS # BLD AUTO: 0 10E9/L (ref 0–0.2)
BASOPHILS NFR BLD AUTO: 0.4 %
BILIRUB UR QL STRIP: NEGATIVE
BUN SERPL-MCNC: 14 MG/DL (ref 7–30)
CALCIUM SERPL-MCNC: 8.3 MG/DL (ref 8.5–10.1)
CHLORIDE SERPL-SCNC: 111 MMOL/L (ref 94–109)
CO2 SERPL-SCNC: 24 MMOL/L (ref 20–32)
COLOR UR AUTO: ABNORMAL
CREAT SERPL-MCNC: 0.64 MG/DL (ref 0.52–1.04)
DEPRECATED S PYO AG THROAT QL EIA: NEGATIVE
DIFFERENTIAL METHOD BLD: NORMAL
EOSINOPHIL # BLD AUTO: 0.1 10E9/L (ref 0–0.7)
EOSINOPHIL NFR BLD AUTO: 0.9 %
ERYTHROCYTE [DISTWIDTH] IN BLOOD BY AUTOMATED COUNT: 13.5 % (ref 10–15)
GFR SERPL CREATININE-BSD FRML MDRD: >90 ML/MIN/{1.73_M2}
GLUCOSE SERPL-MCNC: 95 MG/DL (ref 70–99)
GLUCOSE UR STRIP-MCNC: NEGATIVE MG/DL
HCT VFR BLD AUTO: 39.5 % (ref 35–47)
HGB BLD-MCNC: 12.6 G/DL (ref 11.7–15.7)
HGB UR QL STRIP: ABNORMAL
IMM GRANULOCYTES # BLD: 0 10E9/L (ref 0–0.4)
IMM GRANULOCYTES NFR BLD: 0.3 %
KETONES UR STRIP-MCNC: NEGATIVE MG/DL
LEUKOCYTE ESTERASE UR QL STRIP: ABNORMAL
LYMPHOCYTES # BLD AUTO: 1.8 10E9/L (ref 0.8–5.3)
LYMPHOCYTES NFR BLD AUTO: 19.1 %
MCH RBC QN AUTO: 31.3 PG (ref 26.5–33)
MCHC RBC AUTO-ENTMCNC: 31.9 G/DL (ref 31.5–36.5)
MCV RBC AUTO: 98 FL (ref 78–100)
MONOCYTES # BLD AUTO: 0.6 10E9/L (ref 0–1.3)
MONOCYTES NFR BLD AUTO: 6.3 %
MUCOUS THREADS #/AREA URNS LPF: PRESENT /LPF
NEUTROPHILS # BLD AUTO: 6.8 10E9/L (ref 1.6–8.3)
NEUTROPHILS NFR BLD AUTO: 73 %
NITRATE UR QL: NEGATIVE
NRBC # BLD AUTO: 0 10*3/UL
NRBC BLD AUTO-RTO: 0 /100
PH UR STRIP: 6.5 PH (ref 5–7)
PLATELET # BLD AUTO: 257 10E9/L (ref 150–450)
POTASSIUM SERPL-SCNC: 3.7 MMOL/L (ref 3.4–5.3)
RBC # BLD AUTO: 4.03 10E12/L (ref 3.8–5.2)
RBC #/AREA URNS AUTO: 13 /HPF (ref 0–2)
SARS-COV-2 RNA SPEC QL NAA+PROBE: NORMAL
SODIUM SERPL-SCNC: 140 MMOL/L (ref 133–144)
SOURCE: ABNORMAL
SP GR UR STRIP: 1.02 (ref 1–1.03)
SPECIMEN SOURCE: NORMAL
SQUAMOUS #/AREA URNS AUTO: 2 /HPF (ref 0–1)
STREP GROUP A PCR: NOT DETECTED
TSH SERPL DL<=0.005 MIU/L-ACNC: 0.55 MU/L (ref 0.4–4)
UROBILINOGEN UR STRIP-MCNC: NORMAL MG/DL (ref 0–2)
WBC # BLD AUTO: 9.4 10E9/L (ref 4–11)
WBC #/AREA URNS AUTO: >182 /HPF (ref 0–5)

## 2020-08-29 PROCEDURE — 96374 THER/PROPH/DIAG INJ IV PUSH: CPT

## 2020-08-29 PROCEDURE — U0003 INFECTIOUS AGENT DETECTION BY NUCLEIC ACID (DNA OR RNA); SEVERE ACUTE RESPIRATORY SYNDROME CORONAVIRUS 2 (SARS-COV-2) (CORONAVIRUS DISEASE [COVID-19]), AMPLIFIED PROBE TECHNIQUE, MAKING USE OF HIGH THROUGHPUT TECHNOLOGIES AS DESCRIBED BY CMS-2020-01-R: HCPCS | Performed by: EMERGENCY MEDICINE

## 2020-08-29 PROCEDURE — 84443 ASSAY THYROID STIM HORMONE: CPT | Performed by: EMERGENCY MEDICINE

## 2020-08-29 PROCEDURE — 85025 COMPLETE CBC W/AUTO DIFF WBC: CPT | Performed by: EMERGENCY MEDICINE

## 2020-08-29 PROCEDURE — 84702 CHORIONIC GONADOTROPIN TEST: CPT

## 2020-08-29 PROCEDURE — 80048 BASIC METABOLIC PNL TOTAL CA: CPT | Performed by: EMERGENCY MEDICINE

## 2020-08-29 PROCEDURE — 25000132 ZZH RX MED GY IP 250 OP 250 PS 637: Performed by: EMERGENCY MEDICINE

## 2020-08-29 PROCEDURE — 96375 TX/PRO/DX INJ NEW DRUG ADDON: CPT

## 2020-08-29 PROCEDURE — 25800030 ZZH RX IP 258 OP 636: Performed by: EMERGENCY MEDICINE

## 2020-08-29 PROCEDURE — 81001 URINALYSIS AUTO W/SCOPE: CPT | Performed by: EMERGENCY MEDICINE

## 2020-08-29 PROCEDURE — 87086 URINE CULTURE/COLONY COUNT: CPT | Performed by: EMERGENCY MEDICINE

## 2020-08-29 PROCEDURE — 87651 STREP A DNA AMP PROBE: CPT | Performed by: EMERGENCY MEDICINE

## 2020-08-29 PROCEDURE — 96361 HYDRATE IV INFUSION ADD-ON: CPT

## 2020-08-29 PROCEDURE — C9803 HOPD COVID-19 SPEC COLLECT: HCPCS

## 2020-08-29 PROCEDURE — 40001204 ZZHCL STATISTIC STREP A RAPID: Performed by: EMERGENCY MEDICINE

## 2020-08-29 PROCEDURE — 99284 EMERGENCY DEPT VISIT MOD MDM: CPT | Mod: 25

## 2020-08-29 PROCEDURE — 25000128 H RX IP 250 OP 636: Performed by: EMERGENCY MEDICINE

## 2020-08-29 RX ORDER — DEXAMETHASONE SODIUM PHOSPHATE 10 MG/ML
10 INJECTION, SOLUTION INTRAMUSCULAR; INTRAVENOUS ONCE
Status: COMPLETED | OUTPATIENT
Start: 2020-08-29 | End: 2020-08-29

## 2020-08-29 RX ORDER — CEPHALEXIN 500 MG/1
500 CAPSULE ORAL 2 TIMES DAILY
Qty: 13 CAPSULE | Refills: 0 | Status: SHIPPED | OUTPATIENT
Start: 2020-08-30 | End: 2020-09-06

## 2020-08-29 RX ORDER — IBUPROFEN 600 MG/1
600 TABLET, FILM COATED ORAL ONCE
Status: COMPLETED | OUTPATIENT
Start: 2020-08-29 | End: 2020-08-29

## 2020-08-29 RX ORDER — DIPHENHYDRAMINE HYDROCHLORIDE 50 MG/ML
25 INJECTION INTRAMUSCULAR; INTRAVENOUS ONCE
Status: COMPLETED | OUTPATIENT
Start: 2020-08-29 | End: 2020-08-29

## 2020-08-29 RX ORDER — METOCLOPRAMIDE HYDROCHLORIDE 5 MG/ML
10 INJECTION INTRAMUSCULAR; INTRAVENOUS ONCE
Status: COMPLETED | OUTPATIENT
Start: 2020-08-29 | End: 2020-08-29

## 2020-08-29 RX ORDER — CEPHALEXIN 500 MG/1
500 CAPSULE ORAL ONCE
Status: COMPLETED | OUTPATIENT
Start: 2020-08-29 | End: 2020-08-29

## 2020-08-29 RX ADMIN — IBUPROFEN 600 MG: 600 TABLET, FILM COATED ORAL at 18:21

## 2020-08-29 RX ADMIN — CEPHALEXIN 500 MG: 500 CAPSULE ORAL at 22:03

## 2020-08-29 RX ADMIN — METOCLOPRAMIDE HYDROCHLORIDE 10 MG: 5 INJECTION INTRAMUSCULAR; INTRAVENOUS at 18:22

## 2020-08-29 RX ADMIN — DEXAMETHASONE SODIUM PHOSPHATE 10 MG: 10 INJECTION, SOLUTION INTRAMUSCULAR; INTRAVENOUS at 18:21

## 2020-08-29 RX ADMIN — DIPHENHYDRAMINE HYDROCHLORIDE 25 MG: 50 INJECTION, SOLUTION INTRAMUSCULAR; INTRAVENOUS at 18:19

## 2020-08-29 RX ADMIN — SODIUM CHLORIDE 1000 ML: 9 INJECTION, SOLUTION INTRAVENOUS at 18:17

## 2020-08-29 ASSESSMENT — ENCOUNTER SYMPTOMS
FEVER: 0
HEADACHES: 1
COUGH: 0
FREQUENCY: 1
SHORTNESS OF BREATH: 0
FATIGUE: 1
SORE THROAT: 1

## 2020-08-29 NOTE — ED AVS SNAPSHOT
[facility] Emergency Department  [address]  [facility] [address]  Phone:  [phone]  Fax:  [phone]                                    Heath Waller   MRN: [id number]    Department:  [facility] Emergency Department   Date of Visit:  8/29/2020           After Visit Summary Signature Page    I have received my discharge instructions, and my questions have been answered. I have discussed any challenges I see with this plan with the nurse or doctor.    ..........................................................................................................................................  Patient/Patient Representative Signature      ..........................................................................................................................................  Patient Representative Print Name and Relationship to Patient    ..................................................               ................................................  Date                                   Time    ..........................................................................................................................................  Reviewed by Signature/Title    ...................................................              ..............................................  Date                                               Time          22EPIC Rev 08/18

## 2020-08-29 NOTE — ED PROVIDER NOTES
"  History     Chief Complaint:  Fatigue     HPI:  Heath Waller is a 39 year old female with a history of fibromyalgia and thyroid disease who presents with fatigue. Patient reports an onset of fatigue and headache yesterday while at work. She went to bed early last night but woke up this morning with a sore throat and headache, stating that \"everything feels heavy\" which makes it straining to even walk. Patient also mentions some increased urinary frequency. She denies any fevers, shortness of breath, or cough. Patient called a [email or web address] testing line who advised her to come into the ED. 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    Allergies:  Codeine  Sulfa Drugs  Toradol [Ketorolac]     Medications:    Albuterol inhaler  Adderall  Liioresal  Vitamin D3  Cymbalta  Zestril  Robaxin  Morphine  Prilosec  Percocet  Sudafed  Zoloft  Aldactone    Past Medical History:    ADD  Degenerative disc disease, cervical  Depression  GERD  Chronic pain  Sleep apnea  Thyroid disease  Cervical intraepithelial neoplasia, grade III with severe dysplasia  TMJ  Fibromyalgia  Chronic rhinitis  Osteoarthritis  Anxiety attack     Past Surgical History:    Appendectomy  Arthroscopy knee  Breast surgery  Semoid bone removed  LEEPS x2  Thyroid nodule surgery  Cholecystectomy  Tubal ligation     Family History:    Cerebrovascular disease  Cancer  Thyroid disease  Hypertension    Social History:  Smoking status: former  Alcohol use: rare  Drug use: no  Patient presents alone.  PCP: Alex Interiano    Marital Status:       Review of Systems   Constitutional: Positive for fatigue. Negative for fever.   HENT: Positive for sore throat.    Respiratory: Negative for cough and shortness of breath.    Genitourinary: Positive for frequency.   Neurological: Positive for headaches.   All other systems reviewed and are negative.      Physical Exam     Vitals:  Patient Vitals for the past 24 hrs:   BP Temp Temp src Pulse Resp SpO2   08/29/20 " 2203 111/66 -- -- 65 -- 98 %   08/29/20 2100 112/70 -- -- 60 -- 100 %   08/29/20 2045 119/68 -- -- 60 -- 99 %   08/29/20 2030 118/73 -- -- 61 -- 100 %   08/29/20 2015 118/76 -- -- 61 -- 100 %   08/29/20 2000 122/82 -- -- 71 -- 100 %   08/29/20 1945 124/76 -- -- 70 -- 100 %   08/29/20 1930 124/81 -- -- 57 -- 100 %   08/29/20 1915 124/85 -- -- 60 -- 100 %   08/29/20 1905 -- -- -- -- -- 99 %   08/29/20 1900 121/72 -- -- 65 -- 100 %   08/29/20 1855 -- -- -- -- -- 100 %   08/29/20 1850 -- -- -- -- -- 100 %   08/29/20 1845 119/80 -- -- 61 -- 99 %   08/29/20 1840 -- -- -- -- -- 100 %   08/29/20 1835 -- -- -- -- -- 100 %   08/29/20 1830 116/70 -- -- 61 -- 100 %   08/29/20 1825 -- -- -- -- -- 100 %   08/29/20 1820 -- -- -- -- -- 100 %   08/29/20 1815 125/63 -- -- 59 -- 100 %   08/29/20 1810 -- -- -- -- -- 94 %   08/29/20 1805 -- -- -- -- -- 100 %   08/29/20 1800 113/69 -- -- 57 -- 100 %   08/29/20 1755 112/63 -- -- 68 -- 100 %   08/29/20 1653 111/67 98.7  F (37.1  C) Oral 66 18 100 %       Physical Exam:  Constitutional: Vital signs reviewed as above.   HEENT:    Head: No external signs of trauma. No lesions noted.   Eyes: PEERL, EOMI B/L, no pain or limitation of superior gaze   Ears: Normal B/L TM and external canals   Nose: Noncongested, no exudates. No rhinorrhea. No FB noted   Mouth/Throat:     Mucous membranes are moist and normal.     No Oropharyngeal exudate.     No oropharyngeal erythema noted.    No tonsilar swelling noted.     No uvular deviation noted.    No swelling noted on the floor of the mouth  Neck: FROM. Neck is supple  Cardiovascular: Normal rate, regular rhythm, normal heart sounds and intact distal pulses.    Pulmonary/Chest: Effort normal and breath sounds normal. No respiratory distress. No wheezes noted.   Gastrointestinal: Soft. There is no tenderness. There is no rebound.   Musculoskeletal:   No deformities appreciated   No edema noted  Neurological:    Patient is alert and oriented to person,  place, and time.    Speech is fluent, cognition is normal.   CN 2-12 intact (PERRL, EOMI, symmetric smile, equal eye squeeze and forehead raise, normal and equal sensation to bilateral forehead/cheek/chin, equal hearing to finger rub, midline tongue protrusion with nl side-to-side movement, normal shoulder shrug).    RUE strength 5/5: , finger abd, wrist flex/ext, elbow flex/ext.    LUE strength 5/5: , finger abd, wrist flex/ext, elbow flex/ext.    RLE strength 5/5: ankle flex/ext, knee flex/ext, hip flex.    LLE strength 5/5: ankle flex/ext, knee flex/ext, hip flex.    Sensation equal in all 4 extremities.    No arm drift.     Cerebellar: Normal rapid alternating movements     ( finger-nose-finger, rapid pronation/supination, hand rolling)    Normal heel-to-shin  Skin: Skin is warm and dry.   Psychiatric: The patient appears calm      Emergency Department Course     Laboratory:  CBC: WBC 9.4, HGB 12.6,     BMP: Chloride 111 (H), Calcium 8.3 (L) o/w WNL (Creatinine 0.64)    Urine analysis: Blood small (A), Albumin 10 (A), Leukocyte esterase large (A), WBC/HPF >182 (H), RBC/HPF 13 (H), Bacteria few (A), Squamous epithelial/HPF 2 (H), Mucous present (A) o/w WNL    TSH with free T4 reflex: 0.55    Strep A rapid screen PCR: negative    ISTAT HCG quantitative: <5.0    Group A Strep PCR: pending    Urine culture: pending    Symptomatic COVID-19 virus: pending    Interventions:  1817 0.9% NS bolus, 1000 mL, IV  1819 Benadryl, 25 mg, IV  1821 Decadron, 10 mg, IV  1821 Ibuprofen, 600 mg, PO  1822 Reglan, 10 mg, IV    Emergency Department Course:  1734  Past medical records, nursing notes, and vitals reviewed.    I performed an exam of the patient and obtained history, as documented above.    1805 IV was inserted and blood was drawn for laboratory testing, results above.   Patient provided a throat swab which was sent for testing, pending as noted above.    2118 Rechecked and updated via iPad. Findings and plan  explained to the Patient. Patient discharged home with instructions regarding supportive care, medications, and reasons to return. The importance of close follow-up was reviewed. The patient was prescribed Keflex.    I personally reviewed the laboratory results with the Patient who voiced understanding of the findings and answered all related questions prior to discharge.     Impression & Plan     Covid-19  Heath Waller was evaluated during a global COVID-19 pandemic, which necessitated consideration that the patient might be at risk for infection with the SARS-CoV-2 virus that causes COVID-19.   Applicable protocols for evaluation were followed during the patient's care.   COVID-19 was considered as part of the patient's evaluation. The plan for testing is:  a test was obtained during this visit.    Medical Decision Making:  Heath Waller is a 39 year old female who presents to the emergency department today for evaluation of fatigue.  Please see the HPI and exam for specifics.  Patient remained stable in the ED.  Her work-up seems to indicate a urinary tract infection.  She does not have a fever and while it is also possible that COVID could be part of her symptoms, I believe that antibiotic treatment of her urinary tract infection be a reasonable next step.  She should follow closely in the outpatient setting.  Anticipatory guidance given prior to discharge.      Diagnosis:    ICD-10-CM    1. Fatigue, unspecified type  R53.83 Group A Streptococcus PCR Throat Swab   2. Acute cystitis with hematuria  N30.01         Disposition:  Discharged to home.     Discharge Medications:  Discharge Medication List as of 8/29/2020  9:56 PM      START taking these medications    Details   cephALEXin (KEFLEX) 500 MG capsule Take 1 capsule (500 mg) by mouth 2 times daily for 7 days, Disp-13 capsule,R-0, E-Prescribe             Scribe Disclosure:  Hodan LOOMIS, am serving as a scribe at 5:16 PM on 8/29/2020 to document  services personally performed by Curly Rosales DO based on my observations and the provider's statements to me.       Hodan Sampson  8/29/2020     Curly Rosales DO  08/29/20 [id number]

## 2020-08-29 NOTE — ED TRIAGE NOTES
Patient presents to ER for fatigue that started yesterday along with headache and chills. Patient reports difficulty walking. Was told to come in for COVID19 test. ABC's intact.

## 2020-08-30 LAB
BACTERIA SPEC CULT: NORMAL
LABORATORY COMMENT REPORT: NORMAL
Lab: NORMAL
SARS-COV-2 RNA SPEC QL NAA+PROBE: NEGATIVE
SPECIMEN SOURCE: NORMAL
SPECIMEN SOURCE: NORMAL

## 2020-08-30 NOTE — DISCHARGE INSTRUCTIONS
"Diagnosis: Fatigue, urinary tract infection  What do you do next:   Continue your home medications unless we have specifically changed them  Please begin taking the \"Keflex\" that I have prescribed for your infection.  Follow up as indicated below    When do you return: If you have fevers, uncontrollable vomiting, severe flank pain, gross blood in the urine, abdominal pain, or any other symptoms that concern you, please return to the ED for reevaluation.    Thank you for allowing us to care for you today.    "

## 2020-08-30 NOTE — ED NOTES
"Pt rounding done, pt reports great improvement in sx following med adm. Reports \"just feeling tired\". Pt given warm blanket, denies needing anything at this time.   "

## 2020-09-01 ENCOUNTER — PATIENT OUTREACH (OUTPATIENT)
Dept: CARE COORDINATION | Facility: CLINIC | Age: 40
End: 2020-09-01

## 2020-09-01 DIAGNOSIS — Z20.822 COVID-19 RULED OUT: Primary | ICD-10-CM

## 2020-09-01 NOTE — PROGRESS NOTES
"Clinic Care Coordination Contact    Situation: Patient chart reviewed by care coordinator.    Background: Patient was seen in ED on 08/29 with complaint of fatigue. COVID diagnosis considered, documented patient was \"symptomatic\", although no fever, cough or dyspnea noted. Patient was diagnosed with a UTI and stared on antibiotics.     Assessment: COVID test: Negative.     Plan/Recommendations: RNCC placed get well loop referral/order. No outreach to be completed at this time. RNCC will remain available as needed.     Fay Dickens RN Care Coordinator  [facility]  Email: [email or web address]  Phone: [phone]        "

## 2020-09-11 DIAGNOSIS — J30.0 CHRONIC VASOMOTOR RHINITIS: ICD-10-CM

## 2020-09-11 DIAGNOSIS — F98.8 ATTENTION DEFICIT DISORDER (ADD) WITHOUT HYPERACTIVITY: ICD-10-CM

## 2020-09-11 RX ORDER — DEXTROAMPHETAMINE SACCHARATE, AMPHETAMINE ASPARTATE MONOHYDRATE, DEXTROAMPHETAMINE SULFATE AND AMPHETAMINE SULFATE 7.5; 7.5; 7.5; 7.5 MG/1; MG/1; MG/1; MG/1
30 CAPSULE, EXTENDED RELEASE ORAL DAILY
Qty: 30 CAPSULE | Refills: 0 | Status: CANCELLED | OUTPATIENT
Start: 2020-09-11

## 2020-09-14 RX ORDER — DEXTROAMPHETAMINE SACCHARATE, AMPHETAMINE ASPARTATE MONOHYDRATE, DEXTROAMPHETAMINE SULFATE AND AMPHETAMINE SULFATE 7.5; 7.5; 7.5; 7.5 MG/1; MG/1; MG/1; MG/1
30 CAPSULE, EXTENDED RELEASE ORAL DAILY
Qty: 30 CAPSULE | Refills: 0 | Status: SHIPPED | OUTPATIENT
Start: 2020-11-15 | End: 2020-12-15

## 2020-09-14 RX ORDER — DEXTROAMPHETAMINE SACCHARATE, AMPHETAMINE ASPARTATE MONOHYDRATE, DEXTROAMPHETAMINE SULFATE AND AMPHETAMINE SULFATE 7.5; 7.5; 7.5; 7.5 MG/1; MG/1; MG/1; MG/1
30 CAPSULE, EXTENDED RELEASE ORAL DAILY
Qty: 30 CAPSULE | Refills: 0 | Status: SHIPPED | OUTPATIENT
Start: 2020-09-14 | End: 2020-10-14

## 2020-09-14 RX ORDER — PSEUDOEPHEDRINE HCL 120 MG/1
120 TABLET, FILM COATED, EXTENDED RELEASE ORAL 2 TIMES DAILY
Qty: 180 TABLET | Refills: 1 | Status: SHIPPED | OUTPATIENT
Start: 2020-09-14 | End: 2021-02-24

## 2020-09-14 RX ORDER — DEXTROAMPHETAMINE SACCHARATE, AMPHETAMINE ASPARTATE MONOHYDRATE, DEXTROAMPHETAMINE SULFATE AND AMPHETAMINE SULFATE 7.5; 7.5; 7.5; 7.5 MG/1; MG/1; MG/1; MG/1
30 CAPSULE, EXTENDED RELEASE ORAL DAILY
Qty: 30 CAPSULE | Refills: 0 | Status: SHIPPED | OUTPATIENT
Start: 2020-10-15 | End: 2020-11-14

## 2020-09-14 NOTE — TELEPHONE ENCOUNTER
Routing refill request to provider for review/approval because:  Drug not on the [facility] refill protocol     Stormy Carcamo RN on 9/14/2020 at 8:39 AM

## 2020-10-28 ENCOUNTER — TELEPHONE (OUTPATIENT)
Dept: FAMILY MEDICINE | Facility: CLINIC | Age: 40
End: 2020-10-28

## 2020-10-28 NOTE — TELEPHONE ENCOUNTER
Panel Management Review      Patient has the following on her problem list:     Depression / Dysthymia review    Measure:  Needs PHQ-9 score of 4 or less during index window.  Administer PHQ-9 and if score is 5 or more, send encounter to provider for next steps.    5 - 7 month window range: 9/7/2020 - 1/5/2021    PHQ-9 SCORE 9/13/2019 4/22/2020 5/6/2020   PHQ-9 Total Score MyChart - 10 (Moderate depression) -   PHQ-9 Total Score 9 10 16       If PHQ-9 recheck is 5 or more, route to provider for next steps.    Patient is due for:  PHQ9    Hypertension   Last three blood pressure readings:  BP Readings from Last 3 Encounters:   08/29/20 111/66   09/25/19 94/60   09/13/19 120/76     Blood pressure: Passed    HTN Guidelines:  Less than 140/90      Composite cancer screening  Chart review shows that this patient is due/due soon for the following None  Summary:    Patient is due/failing the following:   PHQ9    Action needed:   Patient needs to do PHQ9.    Type of outreach:    Sent "[employer] message.    Questions for provider review:    None                                                                                                                                    Carola Velez       Chart routed to Care Team .

## 2020-11-08 DIAGNOSIS — F33.1 MODERATE EPISODE OF RECURRENT MAJOR DEPRESSIVE DISORDER (H): ICD-10-CM

## 2020-11-08 DIAGNOSIS — L70.0 ACNE VULGARIS: ICD-10-CM

## 2020-11-08 DIAGNOSIS — M79.7 FIBROMYALGIA: ICD-10-CM

## 2020-11-08 DIAGNOSIS — I10 BENIGN ESSENTIAL HYPERTENSION: ICD-10-CM

## 2020-11-08 DIAGNOSIS — F32.A DEPRESSIVE DISORDER: ICD-10-CM

## 2020-11-08 DIAGNOSIS — K21.9 GASTROESOPHAGEAL REFLUX DISEASE: Primary | ICD-10-CM

## 2020-11-08 DIAGNOSIS — G89.4 CHRONIC PAIN SYNDROME: ICD-10-CM

## 2020-11-08 NOTE — LETTER
[facility]  [address] [place], [address]  [phone]        November 11, 2020    Heath Waller                                                                                                                                                       [address]   [place] [address]            Dear Heath,    We recently received a call from your pharmacy requesting a refill of your medications.     A review of your chart indicates that an appointment is required with your provider for medication check. Please call the clinic at [phone] to schedule your appointment.     We have authorized one refill of your medication to allow time for you to schedule your appointment.     Taking care of your health is important to us and ongoing visits with your provider are vital to your care. We look forward to seeing you in the near future.         Sincerely,     [facility]

## 2020-11-09 RX ORDER — SPIRONOLACTONE 50 MG/1
50 TABLET, FILM COATED ORAL DAILY
Qty: 90 TABLET | Refills: 0 | Status: SHIPPED | OUTPATIENT
Start: 2020-11-09 | End: 2021-02-08

## 2020-11-09 RX ORDER — DULOXETIN HYDROCHLORIDE 60 MG/1
CAPSULE, DELAYED RELEASE ORAL
Qty: 60 CAPSULE | Refills: 0 | Status: SHIPPED | OUTPATIENT
Start: 2020-11-09 | End: 2020-12-08

## 2020-11-09 RX ORDER — LISINOPRIL 10 MG/1
10 TABLET ORAL DAILY
Qty: 90 TABLET | Refills: 0 | Status: SHIPPED | OUTPATIENT
Start: 2020-11-09 | End: 2021-02-08

## 2020-11-09 RX ORDER — SERTRALINE HYDROCHLORIDE 100 MG/1
TABLET, FILM COATED ORAL
Qty: 180 TABLET | Refills: 0 | Status: SHIPPED | OUTPATIENT
Start: 2020-11-09 | End: 2021-02-24

## 2020-11-09 NOTE — TELEPHONE ENCOUNTER
Medication is being filled for 1 time refill only due to:  Patient needs to be seen because 6 month visit due.  Prescription approved per [facility] Refill Protocol.  Routed to  for scheduling  Gertrudis Artis RN, BSN  Message handled by CLINIC NURSE.

## 2020-11-10 NOTE — TELEPHONE ENCOUNTER
Left msg to call back to clinic.Manasa Conklin MA  [facility].

## 2020-11-22 ENCOUNTER — HEALTH MAINTENANCE LETTER (OUTPATIENT)
Age: 40
End: 2020-11-22

## 2020-12-08 DIAGNOSIS — G89.4 CHRONIC PAIN SYNDROME: ICD-10-CM

## 2020-12-08 DIAGNOSIS — M79.7 FIBROMYALGIA: ICD-10-CM

## 2020-12-08 DIAGNOSIS — F32.A DEPRESSIVE DISORDER: ICD-10-CM

## 2020-12-08 NOTE — LETTER
December 24, 2020      Heath Waller  [address]   [place] [address]        Dear Ms. Heath RADHA HsuChristin,    We are contacting you today to notify you that you are due for a medication follow up for further refills for your Duloxetine.    This appointment can be in clinic or virtual by either telephone, video.    Please call [phone] to schedule an appointment.     [facility]      Sincerely,     Alex Interiano MD

## 2020-12-09 NOTE — TELEPHONE ENCOUNTER
Routing refill request to provider for review/approval because:  Anayeli given x1 and patient did not follow up, please advise  Patient needs to be seen because:  Failing visit  Failing PHQ9    Cathy Saldaña RN

## 2020-12-10 RX ORDER — DULOXETIN HYDROCHLORIDE 60 MG/1
CAPSULE, DELAYED RELEASE ORAL
Qty: 60 CAPSULE | Refills: 0 | Status: SHIPPED | OUTPATIENT
Start: 2020-12-10 | End: 2021-01-11

## 2020-12-10 NOTE — TELEPHONE ENCOUNTER
One month fill provided, pt will need f/u appt for ongoing refill.  Please call to schedule virtual visit.    Alex Interiano MD

## 2021-01-07 DIAGNOSIS — G89.4 CHRONIC PAIN SYNDROME: ICD-10-CM

## 2021-01-07 DIAGNOSIS — M79.7 FIBROMYALGIA: ICD-10-CM

## 2021-01-07 DIAGNOSIS — F32.A DEPRESSIVE DISORDER: ICD-10-CM

## 2021-01-07 NOTE — LETTER
January 14, 2021      Heath Waller  [address]   [place] [address]        Dear [facility] Waller,    We recently received a call from your pharmacy requesting a refill of your medication Duloxetine.    We are contacting you today to notify you that you are due for an in clinic medication check for further refills.    We have authorized one refill of your medication to allow time for you to schedule your appointment.      Please call [phone] to schedule an appointment or if you have MyChart you can schedule with your provider as well.    Taking care of your health is important to us, an ongoing visits with your provider are vital to your care. We look forward to seeing you in the near future.    Thank you for using [facility] for your Medical Needs.          Sincerely,     Alex Interiano MD

## 2021-01-11 RX ORDER — DULOXETIN HYDROCHLORIDE 60 MG/1
CAPSULE, DELAYED RELEASE ORAL
Qty: 60 CAPSULE | Refills: 0 | Status: SHIPPED | OUTPATIENT
Start: 2021-01-11 | End: 2021-02-16

## 2021-01-11 NOTE — TELEPHONE ENCOUNTER
One month fill provided, pt will need f/u appt for ongoing refill.  Please call to schedule F2F appt.    Alex Interiano MD

## 2021-01-11 NOTE — TELEPHONE ENCOUNTER
Routing refill request to provider for review/approval because:  Failed protocol for cymbalta - due for an appt, last phq9 > 4 and due for updated phq9    Will forward to the station, please try to help the pt schedule an appt for a med / mood recheck.  Thanks!

## 2021-01-12 NOTE — TELEPHONE ENCOUNTER
1st attempt - Sent [facility] appointment request for in clinic appt with PCP.  -Leigh Adkins

## 2021-01-19 NOTE — TELEPHONE ENCOUNTER
2nd attempt - Left message for patient to schedule in clinic appointment with PCP for F/U med check.  -Leigh LAGUNA

## 2021-02-06 DIAGNOSIS — K21.9 GASTROESOPHAGEAL REFLUX DISEASE: ICD-10-CM

## 2021-02-06 DIAGNOSIS — L70.0 ACNE VULGARIS: ICD-10-CM

## 2021-02-06 DIAGNOSIS — I10 BENIGN ESSENTIAL HYPERTENSION: ICD-10-CM

## 2021-02-08 RX ORDER — SPIRONOLACTONE 50 MG/1
TABLET, FILM COATED ORAL
Qty: 90 TABLET | Refills: 0 | Status: SHIPPED | OUTPATIENT
Start: 2021-02-08 | End: 2021-05-14

## 2021-02-08 RX ORDER — LISINOPRIL 10 MG/1
TABLET ORAL
Qty: 90 TABLET | Refills: 0 | Status: SHIPPED | OUTPATIENT
Start: 2021-02-08 | End: 2021-02-24

## 2021-02-08 NOTE — TELEPHONE ENCOUNTER
Prescription approved per [facility] Refill Protocol.  Marilyn Baltazar RN

## 2021-02-11 DIAGNOSIS — G89.4 CHRONIC PAIN SYNDROME: ICD-10-CM

## 2021-02-11 DIAGNOSIS — M79.7 FIBROMYALGIA: ICD-10-CM

## 2021-02-11 DIAGNOSIS — F32.A DEPRESSIVE DISORDER: ICD-10-CM

## 2021-02-11 NOTE — LETTER
February 16, 2021      Heath Waller  [id number]   [place] [address]        Dear MsCa Waller,    We recently received a call from your pharmacy requesting a refill of your medication Duloxetine.    We are contacting you today to notify you that you are due for a Physical and lab work for further refills.    We have authorized one refill of your medication to allow time for you to schedule your appointment.    This appointment can be in clinic or virtual by either telephone, video.    Please call [phone] to schedule an appointment or if you have MyChart you can schedule with your provider as well.    Taking care of your health is important to us, an ongoing visits with your provider are vital to your care. We look forward to seeing you in the near future.    Thank you for using [facility] for your Medical Needs.          Sincerely,     Alex Interiano MD

## 2021-02-15 NOTE — TELEPHONE ENCOUNTER
"Requested Prescriptions   Pending Prescriptions Disp Refills     DULoxetine (CYMBALTA) 60 MG capsule    Last Written Prescription Date:  01/11/2021  Last Fill Quantity: 60 capsule,  # refills: 0   Last office visit: 09/25/2019 with prescribing provider:  Jaydon   Future Office Visit:     60 capsule 0     Sig: TAKE 2 CAPSULES BY MOUTH EVERY MORNING       Serotonin-Norepinephrine Reuptake Inhibitors  Failed - 2/11/2021  1:20 PM        Failed - PHQ-9 score of less than 5 in past 6 months     Please review last PHQ-9 score.   PHQ 9/13/2019 4/22/2020 5/6/2020   PHQ-9 Total Score 9 10 16   Q9: Thoughts of better off dead/self-harm past 2 weeks Not at all Not at all Not at all             Failed - Recent (6 mo) or future (30 days) visit within the authorizing provider's specialty     Patient had office visit in the last 6 months or has a visit in the next 30 days with authorizing provider or within the authorizing provider's specialty.  See \"Patient Info\" tab in inbasket, or \"Choose Columns\" in Meds & Orders section of the refill encounter.            Passed - Blood pressure under 140/90 in past 12 months     BP Readings from Last 3 Encounters:   08/29/20 111/66   09/25/19 94/60   09/13/19 120/76                 Passed - Medication is active on med list        Passed - Patient is age 18 or older        Passed - No active pregnancy on record        Passed - No positive pregnancy test in past 12 months              "

## 2021-02-16 RX ORDER — DULOXETIN HYDROCHLORIDE 60 MG/1
CAPSULE, DELAYED RELEASE ORAL
Qty: 60 CAPSULE | Refills: 0 | Status: SHIPPED | OUTPATIENT
Start: 2021-02-16 | End: 2021-02-24

## 2021-02-16 NOTE — TELEPHONE ENCOUNTER
One month fill provided, pt will need f/u appt for ongoing refill.  Please call to schedule CPE.    Alex Interiano MD

## 2021-02-16 NOTE — TELEPHONE ENCOUNTER
Routing refill request to provider for review/approval because:  PHQ-9 and TINA-7 elevated.    PHQ 9/13/2019 4/22/2020 5/6/2020   PHQ-9 Total Score 9 10 16   Q9: Thoughts of better off dead/self-harm past 2 weeks Not at all Not at all Not at all     TINA-7 SCORE 9/13/2019 4/22/2020 5/6/2020   Total Score - 10 (moderate anxiety) -   Total Score 7 10 13     Marilyn Baltazar RN

## 2021-02-23 ENCOUNTER — MYC REFILL (OUTPATIENT)
Dept: FAMILY MEDICINE | Facility: CLINIC | Age: 41
End: 2021-02-23

## 2021-02-23 DIAGNOSIS — F98.8 ATTENTION DEFICIT DISORDER (ADD) WITHOUT HYPERACTIVITY: ICD-10-CM

## 2021-02-23 DIAGNOSIS — F32.A DEPRESSIVE DISORDER: ICD-10-CM

## 2021-02-23 DIAGNOSIS — M79.7 FIBROMYALGIA: ICD-10-CM

## 2021-02-23 DIAGNOSIS — G89.4 CHRONIC PAIN SYNDROME: ICD-10-CM

## 2021-02-23 RX ORDER — DULOXETIN HYDROCHLORIDE 60 MG/1
CAPSULE, DELAYED RELEASE ORAL
Qty: 60 CAPSULE | Refills: 0 | Status: CANCELLED | OUTPATIENT
Start: 2021-02-23

## 2021-02-23 RX ORDER — DEXTROAMPHETAMINE SACCHARATE, AMPHETAMINE ASPARTATE MONOHYDRATE, DEXTROAMPHETAMINE SULFATE AND AMPHETAMINE SULFATE 7.5; 7.5; 7.5; 7.5 MG/1; MG/1; MG/1; MG/1
30 CAPSULE, EXTENDED RELEASE ORAL DAILY
Qty: 30 CAPSULE | Refills: 0 | Status: CANCELLED | OUTPATIENT
Start: 2021-02-23

## 2021-02-24 ENCOUNTER — VIRTUAL VISIT (OUTPATIENT)
Dept: FAMILY MEDICINE | Facility: CLINIC | Age: 41
End: 2021-02-24
Payer: COMMERCIAL

## 2021-02-24 DIAGNOSIS — I10 BENIGN ESSENTIAL HYPERTENSION: ICD-10-CM

## 2021-02-24 DIAGNOSIS — G89.4 CHRONIC PAIN SYNDROME: ICD-10-CM

## 2021-02-24 DIAGNOSIS — K21.00 GASTROESOPHAGEAL REFLUX DISEASE WITH ESOPHAGITIS WITHOUT HEMORRHAGE: ICD-10-CM

## 2021-02-24 DIAGNOSIS — M79.7 FIBROMYALGIA: ICD-10-CM

## 2021-02-24 DIAGNOSIS — F33.1 MODERATE EPISODE OF RECURRENT MAJOR DEPRESSIVE DISORDER (H): ICD-10-CM

## 2021-02-24 DIAGNOSIS — F98.8 ATTENTION DEFICIT DISORDER (ADD) WITHOUT HYPERACTIVITY: ICD-10-CM

## 2021-02-24 DIAGNOSIS — J30.0 CHRONIC VASOMOTOR RHINITIS: ICD-10-CM

## 2021-02-24 PROCEDURE — 99214 OFFICE O/P EST MOD 30 MIN: CPT | Mod: 95 | Performed by: PHYSICIAN ASSISTANT

## 2021-02-24 RX ORDER — LISINOPRIL 10 MG/1
10 TABLET ORAL DAILY
Qty: 90 TABLET | Refills: 1 | Status: SHIPPED | OUTPATIENT
Start: 2021-02-24 | End: 2021-11-23

## 2021-02-24 RX ORDER — DULOXETIN HYDROCHLORIDE 60 MG/1
CAPSULE, DELAYED RELEASE ORAL
Qty: 180 CAPSULE | Refills: 1 | Status: SHIPPED | OUTPATIENT
Start: 2021-02-24 | End: 2021-09-15

## 2021-02-24 RX ORDER — PSEUDOEPHEDRINE HCL 120 MG/1
120 TABLET, FILM COATED, EXTENDED RELEASE ORAL 2 TIMES DAILY
Qty: 180 TABLET | Refills: 1 | Status: SHIPPED | OUTPATIENT
Start: 2021-02-24 | End: 2021-09-14

## 2021-02-24 RX ORDER — DEXTROAMPHETAMINE SACCHARATE, AMPHETAMINE ASPARTATE MONOHYDRATE, DEXTROAMPHETAMINE SULFATE AND AMPHETAMINE SULFATE 7.5; 7.5; 7.5; 7.5 MG/1; MG/1; MG/1; MG/1
30 CAPSULE, EXTENDED RELEASE ORAL DAILY
Qty: 30 CAPSULE | Refills: 0 | Status: SHIPPED | OUTPATIENT
Start: 2021-02-24 | End: 2021-03-26

## 2021-02-24 RX ORDER — TRAZODONE HYDROCHLORIDE 50 MG/1
TABLET, FILM COATED ORAL
COMMUNITY
Start: 2021-02-11 | End: 2022-01-21

## 2021-02-24 RX ORDER — DEXTROAMPHETAMINE SACCHARATE, AMPHETAMINE ASPARTATE MONOHYDRATE, DEXTROAMPHETAMINE SULFATE AND AMPHETAMINE SULFATE 7.5; 7.5; 7.5; 7.5 MG/1; MG/1; MG/1; MG/1
30 CAPSULE, EXTENDED RELEASE ORAL DAILY
Qty: 30 CAPSULE | Refills: 0 | Status: SHIPPED | OUTPATIENT
Start: 2021-03-27 | End: 2021-04-26

## 2021-02-24 RX ORDER — DEXTROAMPHETAMINE SACCHARATE, AMPHETAMINE ASPARTATE MONOHYDRATE, DEXTROAMPHETAMINE SULFATE AND AMPHETAMINE SULFATE 7.5; 7.5; 7.5; 7.5 MG/1; MG/1; MG/1; MG/1
30 CAPSULE, EXTENDED RELEASE ORAL DAILY
Qty: 30 CAPSULE | Refills: 0 | Status: SHIPPED | OUTPATIENT
Start: 2021-04-27 | End: 2021-05-27

## 2021-02-24 RX ORDER — SERTRALINE HYDROCHLORIDE 100 MG/1
TABLET, FILM COATED ORAL
Qty: 180 TABLET | Refills: 1 | Status: SHIPPED | OUTPATIENT
Start: 2021-02-24 | End: 2021-11-24

## 2021-02-24 ASSESSMENT — PATIENT HEALTH QUESTIONNAIRE - PHQ9
SUM OF ALL RESPONSES TO PHQ QUESTIONS 1-9: 17
10. IF YOU CHECKED OFF ANY PROBLEMS, HOW DIFFICULT HAVE THESE PROBLEMS MADE IT FOR YOU TO DO YOUR WORK, TAKE CARE OF THINGS AT HOME, OR GET ALONG WITH OTHER PEOPLE: SOMEWHAT DIFFICULT
SUM OF ALL RESPONSES TO PHQ QUESTIONS 1-9: 17

## 2021-02-24 NOTE — PROGRESS NOTES
Answers for HPI/ROS submitted by the patient on 2/24/2021   Chronic problems general questions HPI Form  How many servings of fruits and vegetables do you eat daily?: 0-1  On average, how many sweetened beverages do you drink each day (Examples: soda, juice, sweet tea, etc.  Do NOT count diet or artificially sweetened beverages)?: 3  How many minutes a day do you exercise enough to make your heart beat faster?: 9 or less  How many days a week do you exercise enough to make your heart beat faster?: 3 or less  How many days per week do you miss taking your medication?: 7  If you checked off any problems, how difficult have these problems made it for you to do your work, take care of things at home, or get along with other people?: Somewhat difficult  PHQ9 TOTAL SCORE: 17

## 2021-02-24 NOTE — PROGRESS NOTES
Heath is a 40 year old who is being evaluated via a billable video visit.      How would you like to obtain your AVS? MyChart  If the video visit is dropped, the invitation should be resent by:   Will anyone else be joining your video visit? No    Video Start Time: 1345    Assessment & Plan     Moderate episode of recurrent major depressive disorder (H)  Variable per pt, but tolerable.   - DULoxetine (CYMBALTA) 60 MG capsule; TAKE 2 CAPSULES BY MOUTH EVERY MORNING  - sertraline (ZOLOFT) 100 MG tablet; TAKE 2 TABLETS(200 MG) BY MOUTH DAILY    Chronic pain syndrome  Sees pain clinic  - DULoxetine (CYMBALTA) 60 MG capsule; TAKE 2 CAPSULES BY MOUTH EVERY MORNING    Fibromyalgia    - DULoxetine (CYMBALTA) 60 MG capsule; TAKE 2 CAPSULES BY MOUTH EVERY MORNING    Attention deficit disorder (ADD) without hyperactivity  Controlled per pt. Follow-up 6 months. UDS   - amphetamine-dextroamphetamine (ADDERALL XR) 30 MG 24 hr capsule; Take 1 capsule (30 mg) by mouth daily  - amphetamine-dextroamphetamine (ADDERALL XR) 30 MG 24 hr capsule; Take 1 capsule (30 mg) by mouth daily  - amphetamine-dextroamphetamine (ADDERALL XR) 30 MG 24 hr capsule; Take 1 capsule (30 mg) by mouth daily    Benign essential hypertension  Stable per pt.   - lisinopril (ZESTRIL) 10 MG tablet; Take 1 tablet (10 mg) by mouth daily    Gastroesophageal reflux disease with esophagitis without hemorrhage    - omeprazole (PRILOSEC) 20 MG DR capsule; Take 1 capsule (20 mg) by mouth daily    Chronic vasomotor rhinitis  Uses seasonally  - pseudoePHEDrine (SUDAFED 12 HOUR) 120 MG 12 hr tablet; Take 1 tablet (120 mg) by mouth 2 times daily   :[id number]}     See Patient Instructions    Return in about 6 months (around 8/24/2021) for Mood/Mental Health Recheck, ADD/ADHD recheck, BP recheck/follow up, Lab Work.    MARY JO Mejia [facility]    Glen Joe is a 40 year old who presents for the following health issues     HPI        Hypertension Follow-up      Do you check your blood pressure regularly outside of the clinic? Yes     Are you following a low salt diet? Yes    Are your blood pressures ever more than 140 on the top number (systolic) OR more   than 90 on the bottom number (diastolic), for example 140/90? No    Depression Followup    How are you doing with your depression since your last visit? No change    Are you having other symptoms that might be associated with depression? No    Have you had a significant life event?  No     Are you feeling anxious or having panic attacks?   No    Do you have any concerns with your use of alcohol or other drugs? No    Social History     Tobacco Use     Smoking status: Former Smoker     Packs/day: 0.00     Years: 20.00     Pack years: 0.00     Types: Cigarettes     Smokeless tobacco: Never Used     Tobacco comment: e-cig   Substance Use Topics     Alcohol use: Yes     Alcohol/week: 0.0 standard drinks     Comment: rarely     Drug use: No     PHQ 4/22/2020 5/6/2020 2/24/2021   PHQ-9 Total Score 10 16 17   Q9: Thoughts of better off dead/self-harm past 2 weeks Not at all Not at all Not at all     TINA-7 SCORE 9/13/2019 4/22/2020 5/6/2020   Total Score - 10 (moderate anxiety) -   Total Score 7 10 13     Last PHQ-9 2/24/2021   1.  Little interest or pleasure in doing things 3   2.  Feeling down, depressed, or hopeless 3   3.  Trouble falling or staying asleep, or sleeping too much 3   4.  Feeling tired or having little energy 3   5.  Poor appetite or overeating 1   6.  Feeling bad about yourself 1   7.  Trouble concentrating 3   8.  Moving slowly or restless 0   Q9: Thoughts of better off dead/self-harm past 2 weeks 0   PHQ-9 Total Score 17   Difficulty at work, home, or with people -     TINA-7  5/6/2020   1. Feeling nervous, anxious, or on edge 3   2. Not being able to stop or control worrying 3   3. Worrying too much about different things 3   4. Trouble relaxing 2   5. Being so restless that  it is hard to sit still 0   6. Becoming easily annoyed or irritable 2   7. Feeling afraid, as if something awful might happen 0   TINA-7 Total Score 13   If you checked any problems, how difficult have they made it for you to do your work, take care of things at home, or get along with other people? Very difficult     Medication Followup of Adderall    Taking Medication as prescribed: yes    Side Effects:  None    Medication Helping Symptoms:  yes       Review of Systems   Constitutional, HEENT, cardiovascular, pulmonary, gi and gu systems are negative, except as otherwise noted.      Objective           Vitals:  No vitals were obtained today due to virtual visit.    Physical Exam   GENERAL: Healthy, alert and no distress  EYES: Eyes grossly normal to inspection.  No discharge or erythema, or obvious scleral/conjunctival abnormalities.  RESP: No audible wheeze, cough, or visible cyanosis.  No visible retractions or increased work of breathing.    SKIN: Visible skin clear. No significant rash, abnormal pigmentation or lesions.  NEURO: Cranial nerves grossly intact.  Mentation and speech appropriate for age.  PSYCH: mentation appears normal and affect flat                Video-Visit Details    Type of service:  Video Visit    Video End Time:1:58 PM    Originating Location (pt. Location): Home    Distant Location (provider location):  [facility]     Platform used for Video Visit: [employer] for HPI/ROS submitted by the patient on 2/24/2021   Chronic problems general questions HPI Form  How many servings of fruits and vegetables do you eat daily?: 0-1  On average, how many sweetened beverages do you drink each day (Examples: soda, juice, sweet tea, etc.  Do NOT count diet or artificially sweetened beverages)?: 3  How many minutes a day do you exercise enough to make your heart beat faster?: 9 or less  How many days a week do you exercise enough to make your heart beat faster?: 3 or less  How  many days per week do you miss taking your medication?: 7  If you checked off any problems, how difficult have these problems made it for you to do your work, take care of things at home, or get along with other people?: Somewhat difficult  PHQ9 TOTAL SCORE: 17

## 2021-02-25 ASSESSMENT — PATIENT HEALTH QUESTIONNAIRE - PHQ9: SUM OF ALL RESPONSES TO PHQ QUESTIONS 1-9: 17

## 2021-05-19 ENCOUNTER — TRANSFERRED RECORDS (OUTPATIENT)
Dept: HEALTH INFORMATION MANAGEMENT | Facility: CLINIC | Age: 41
End: 2021-05-19

## 2021-06-16 ENCOUNTER — TRANSFERRED RECORDS (OUTPATIENT)
Dept: HEALTH INFORMATION MANAGEMENT | Facility: CLINIC | Age: 41
End: 2021-06-16

## 2021-07-16 ENCOUNTER — TRANSFERRED RECORDS (OUTPATIENT)
Dept: HEALTH INFORMATION MANAGEMENT | Facility: CLINIC | Age: 41
End: 2021-07-16

## 2021-08-16 ENCOUNTER — TRANSFERRED RECORDS (OUTPATIENT)
Dept: HEALTH INFORMATION MANAGEMENT | Facility: CLINIC | Age: 41
End: 2021-08-16

## 2021-09-13 ENCOUNTER — TRANSFERRED RECORDS (OUTPATIENT)
Dept: HEALTH INFORMATION MANAGEMENT | Facility: CLINIC | Age: 41
End: 2021-09-13

## 2021-09-13 DIAGNOSIS — J30.0 CHRONIC VASOMOTOR RHINITIS: ICD-10-CM

## 2021-09-13 NOTE — LETTER
September 21, 2021      Heath Waller  [id number]   [place] [address]        Dear MsCa GARCIA Arroyo,    We recently received a call from your pharmacy requesting a refill of your medication [facility].    We are contacting you today to notify you that you are due for a medication check for further refills.    We have authorized one refill of your medication to allow time for you to schedule your appointment.    This appointment can be in clinic or virtual by either telephone, video.    Please call [phone] to schedule an appointment or if you have MyChart you can schedule with your provider as well.    Taking care of your health is important to us, an ongoing visits with your provider are vital to your care. We look forward to seeing you in the near future.    Thank you for using [facility] for your Medical Needs.          Sincerely,     Alex Interiano MD

## 2021-09-14 DIAGNOSIS — M79.7 FIBROMYALGIA: ICD-10-CM

## 2021-09-14 DIAGNOSIS — F33.1 MODERATE EPISODE OF RECURRENT MAJOR DEPRESSIVE DISORDER (H): ICD-10-CM

## 2021-09-14 DIAGNOSIS — G89.4 CHRONIC PAIN SYNDROME: ICD-10-CM

## 2021-09-14 RX ORDER — PSEUDOEPHEDRINE HYDROCHLORIDE 120 MG/1
TABLET, FILM COATED, EXTENDED RELEASE ORAL
Qty: 180 TABLET | Refills: 0 | Status: SHIPPED | OUTPATIENT
Start: 2021-09-14 | End: 2023-06-23

## 2021-09-14 NOTE — TELEPHONE ENCOUNTER
Routing refill request to provider for review/approval because:  Drug not on the [facility] refill protocol   Deedee Gibson RN, BSN

## 2021-09-15 RX ORDER — DULOXETIN HYDROCHLORIDE 60 MG/1
CAPSULE, DELAYED RELEASE ORAL
Qty: 180 CAPSULE | Refills: 1 | Status: SHIPPED | OUTPATIENT
Start: 2021-09-15 | End: 2023-06-23

## 2021-09-15 NOTE — TELEPHONE ENCOUNTER
Routing refill request to provider for review/approval because:  Labs not current:  phq, BP  Patient needs to be seen because:  needs OV  Deedee Gibson RN, BSN

## 2021-09-18 ENCOUNTER — HEALTH MAINTENANCE LETTER (OUTPATIENT)
Age: 41
End: 2021-09-18

## 2021-11-22 DIAGNOSIS — K21.00 GASTROESOPHAGEAL REFLUX DISEASE WITH ESOPHAGITIS WITHOUT HEMORRHAGE: ICD-10-CM

## 2021-12-07 ENCOUNTER — OFFICE VISIT (OUTPATIENT)
Dept: FAMILY MEDICINE | Facility: CLINIC | Age: 41
End: 2021-12-07
Payer: COMMERCIAL

## 2021-12-07 VITALS
WEIGHT: 180.2 LBS | DIASTOLIC BLOOD PRESSURE: 70 MMHG | BODY MASS INDEX: 26.61 KG/M2 | HEART RATE: 67 BPM | RESPIRATION RATE: 16 BRPM | SYSTOLIC BLOOD PRESSURE: 115 MMHG | TEMPERATURE: 97.8 F | OXYGEN SATURATION: 99 %

## 2021-12-07 DIAGNOSIS — M79.642 PAIN OF LEFT HAND: Primary | ICD-10-CM

## 2021-12-07 PROCEDURE — 99213 OFFICE O/P EST LOW 20 MIN: CPT | Performed by: PHYSICIAN ASSISTANT

## 2021-12-07 NOTE — LETTER
December 7, 2021      Heath Waller  [id number]   [place] [address]        To Whom It May Concern:    Heath Waller was seen in our clinic. She may return to work with the following: limited to light duty - no gripping with left hand, limit use of left hand. Can return to work with these restrictions 12/8/21. Restrictions in place until follow up in approximately 2 weeks (12/24/21).      Sincerely,        Savana De Jesus PA-C

## 2021-12-07 NOTE — PROGRESS NOTES
"  Assessment & Plan     Pain of left hand  Continue with restrictions of not using hand/grippping with hand. Follow up in 2 weeks. At that time reassess to see if OT or orthopedics may be reasonable.      Review of prior external note(s) from - [facility]where information from [facility] reviewed  22 minutes spent on the date of the encounter doing chart review, history and exam, documentation and further activities per the note      Return in about 2 weeks (around 12/21/2021).    Savana De Jesus PA-C  [facility]    Glen Joe is a 41 year old who presents for the following health issues     HPI     Pain History:  When did you first notice your pain? - 12/1/21 day of injury  Have you seen anyone else for your pain? No [facility]   Where in your body do you have pain? Musculoskeletal problem/pain  Onset/Duration: X1 week   Description  Location: hand  - left  Joint Swelling: YES  Redness: no  Pain: YES- when Gripping its a 7 or 8. There is always throbbing   Warmth: no  Intensity:  mild, moderate  Progression of Symptoms:  improving  Accompanying signs and symptoms:   Fevers: no  Numbness/tingling/weakness: no  History  Trauma to the area: YES- see below  Recent illness:  no  Previous similar problem: no  Previous evaluation:  YES- A week ago at [facility]   Precipitating or alleviating factors:  Aggravating factors include: Gripping   Therapies tried and outcome: ice    Patient seen on 12/1/21 at Urgent Care due to left hand injury The patient was doing wound care and helping pulling up a patient's sock and felt a \"pop\" in her hand near the thumb. The area was painful and swollen. She has been using ibuprofen 800 mg and has been icing. X-ray performed and was normal.    She does feel like symptoms are improving however she continues to have swelling and pain.    Of note patient is right handed.    Review of Systems   GENERAL:  No " fevers  MUSCULOSKELETAL: As noted in HPI          Objective    /70 (BP Location: Right arm, Patient Position: Sitting, Cuff Size: Adult Regular)   Pulse 67   Temp 97.8  F (36.6  C) (Oral)   Resp 16   Wt 81.7 kg (180 lb 3.2 oz)   SpO2 99%   Breastfeeding No   BMI 26.61 kg/m    Body mass index is 26.61 kg/m .  Physical Exam   GENERAL: No acute distress  HEENT: Normocephalic  VASCULAR: 2+ right radial pulse  EXTREMITIES:  Right upper extremity: Tenderness along the dorsal aspect of the hand in the webbing between the 1st and 2nd digit. No laxity of the ulnar collateral ligament. Able to oppose thumb to all fingers. Able to adduction and abduct. More discomfort with adduction.  NEURO: Alert, non-focal

## 2021-12-20 ENCOUNTER — OFFICE VISIT (OUTPATIENT)
Dept: FAMILY MEDICINE | Facility: CLINIC | Age: 41
End: 2021-12-20
Payer: OTHER MISCELLANEOUS

## 2021-12-20 VITALS
DIASTOLIC BLOOD PRESSURE: 78 MMHG | WEIGHT: 175.8 LBS | RESPIRATION RATE: 16 BRPM | OXYGEN SATURATION: 100 % | HEART RATE: 102 BPM | SYSTOLIC BLOOD PRESSURE: 115 MMHG | BODY MASS INDEX: 25.96 KG/M2 | TEMPERATURE: 98.6 F

## 2021-12-20 DIAGNOSIS — M79.645 THUMB PAIN, LEFT: Primary | ICD-10-CM

## 2021-12-20 DIAGNOSIS — M79.642 PAIN OF LEFT HAND: ICD-10-CM

## 2021-12-20 PROCEDURE — 99213 OFFICE O/P EST LOW 20 MIN: CPT | Performed by: PHYSICIAN ASSISTANT

## 2021-12-20 RX ORDER — CYCLOBENZAPRINE HCL 10 MG
10 TABLET ORAL 3 TIMES DAILY PRN
COMMUNITY
Start: 2021-04-11 | End: 2023-06-23

## 2021-12-20 NOTE — PROGRESS NOTES
Assessment & Plan     Thumb pain, left  Further evaluation with orthopedics and OT.  Patient has rested the hand/thumb and been diligent about ice and NSAID use.  If unable to see orthopedics in 2-3 weeks will have follow up in primary care just in case.  Can start OT prior to orthopedics visit.  Restrictions at work continued.  - Occupational Therapy Referral; Future  - Orthopedic  Referral; Future    Pain of left hand    - Occupational Therapy Referral; Future  - Orthopedic  Referral; Future    Review of external notes as documented elsewhere in note      Return in about 3 weeks (around 1/10/2022) for Follow up work comp left thumb/hand injury.    Savana De Jesus PA-C  [facility]    Glen Blacki is a 41 year old who presents for the following health issues    HPI     Work comp follow up- left hand      Patient is a 41-year-old female who presents today for follow-up work comp hand injury.  Date of injury was 12/1/2021.  Patient initially seen and evaluated at [facility] urgent care.  Injury occurred when she was helping a patient with wound care and pulling up the patient's sock.  She felt a pop in her hand near the thumb.  The area was painful and swollen quickly.  She has been using ibuprofen and ice.  X-ray in urgent care was negative.  During follow-up visit on 12/7/2021 patient was given work restrictions and instructed to follow-up in 2 weeks.  She is here today for follow-up.    Patient notes she is still having trouble gripping and is having throbbing pain even at rest.    She has been Icing about 2-3 times daily and is taking ibuprofen 600-800 mg 2-3 times daily    Review of Systems   GENERAL:  No fevers  MUSCULOSKELETAL: As noted in HPI          Objective    /78 (BP Location: Right arm, Patient Position: Sitting, Cuff Size: Adult Regular)   Pulse 102   Temp 98.6  F (37  C) (Oral)   Resp 16   Wt 79.7 kg (175 lb 12.8 oz)   SpO2  100%   Breastfeeding No   BMI 25.96 kg/m    Body mass index is 25.96 kg/m .  Physical Exam   GENERAL: No acute distress  HEENT: Normocephalic  EXTREMITIES:  Right upper extremity: Able to oppose thumb to all fingers with pain. Able to adduct and abduct. More discomfort with adduction.  NEURO: Alert, non-focal

## 2021-12-20 NOTE — LETTER
December 20, 2021      Heath Waller  [id number]   [place] [address]        To Whom It May Concern:    Heath Waller was seen in our clinic. She may return to work with the following: limited to light duty - no gripping with left hand, limit use of left hand. Can return to work with these restrictions 12/20/21. Restrictions in place until follow up in approximately 2-3 weeks (01/10/22).      Sincerely,        Savana De Jesus PA-C

## 2022-01-08 ENCOUNTER — HEALTH MAINTENANCE LETTER (OUTPATIENT)
Age: 42
End: 2022-01-08

## 2022-01-18 NOTE — PROGRESS NOTES
[facility] H&P NOTE:  1/21/2022     Patient Name: Heath Waller  MRN: [id number]    CHIEF COMPLAINT: left hand and thumb pain    Occupation: RN Case Manager    HPI:  Ms. Heath Waller is a 41 right-hand-dominant female who presents for evaluation of left thumb pain.  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  She subsequently developed pain.  She was seen at urgent care on the date of the injury and x-rays were obtained and found to be unremarkable.  She presents today due to ongoing symptoms.    The patient currently reports pain over the dorsal/ulnar thumb MCP joint.  She also reports pain in the thenar musculature.  She describes her pain as a dull ache while at rest, and states that is worse with pinching, gripping, or any use of the hand.  She also reports tingling in the left thumb whenever she grasps anything tightly, but states this is not sustained.  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  She is currently taking ibuprofen for pain.  She is also been wearing a removable hand-based thumb spica splint.  She has been icing the hand.  She has not done anything else treatment wise for her symptoms.  She is currently on light duty at work.    PAST MEDICAL HISTORY:  Past Medical History:   Diagnosis Date     ADD (attention deficit disorder)      Atypical mole 07/19/2018     Degenerative disc disease, cervical      Depressive disorder      Gastro-oesophageal reflux disease      Myofacial muscle pain      Noninfectious ileitis     IBS with constipation     Other chronic pain     myofacial pain syndrome     Sleep apnea     wears CPAP at night     Thyroid disease        PAST SURGICAL HISTORY:  Past Surgical History:   Procedure Laterality Date     APPENDECTOMY  2000     ARTHROSCOPY KNEE Right     teenager      BREAST SURGERY  03/2006    augmentation, revisions 2011 and 2015     FOOT SURGERY Right     semoid bone removed from Fx     GYN SURGERY   2009 and 2014    LEEPS x 2      LAPAROSCOPIC CHOLECYSTECTOMY  5/15/2014    Procedure: LAPAROSCOPIC CHOLECYSTECTOMY;  Surgeon: Kd Arthur MD;  Location: [facility] OR     THYROID SURGERY  2001    nodule      TUBAL LIGATION  10/09/2008       MEDICATIONS:  Current Outpatient Medications   Medication     amphetamine-dextroamphetamine (ADDERALL XR) 30 MG 24 hr capsule     cyclobenzaprine (FLEXERIL) 10 MG tablet     DULoxetine (CYMBALTA) 60 MG capsule     ibuprofen (ADVIL,MOTRIN) 200 MG tablet     lisinopril (ZESTRIL) 10 MG tablet     morphine (MS CONTIN) 15 MG CR tablet     omeprazole (PRILOSEC) 20 MG DR capsule     order for DME     oxyCODONE-acetaminophen (PERCOCET) 5-325 MG tablet     sertraline (ZOLOFT) 100 MG tablet     spironolactone (ALDACTONE) 50 MG tablet     traZODone (DESYREL) 50 MG tablet     WAL-PHED 12 HOUR 120 MG 12 hr tablet     No current facility-administered medications for this visit.       ALLERGIES:     Allergies   Allergen Reactions     Codeine      Sulfa Drugs      Rash       Toradol [Ketorolac] Itching and Rash       FAMILY HISTORY:  No pertinent family history    SOCIAL HISTORY:  Social History     Tobacco Use     Smoking status: Former Smoker     Packs/day: 0.00     Years: 20.00     Pack years: 0.00     Types: Cigarettes     Smokeless tobacco: Never Used     Tobacco comment: e-cig   Substance Use Topics     Alcohol use: Yes     Alcohol/week: 0.0 standard drinks     Comment: rarely     Drug use: No     The patient currently lives with her daughter, and states that her son is also with her every other week.  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  She endorses daily vaping and occasional cigarette use. She endorses occasional alcohol use. She denies illicit drug use.    The patient's past medical, family, and social history was reviewed and  confirmed.    REVIEW OF SYMPTOMS:  General: Negative   Eyes: Negative   Ear, Nose and Throat: Negative   Respiratory: Negative   Cardiovascular: Negative   Gastrointestinal: Negative   Genito-urinary: Negative   Musculoskeletal: Negative  Neurological: Negative   Psychological: Negative  HEME: Negative   ENDO: Negative   SKIN: Negative    VITALS:  There were no vitals filed for this visit.    EXAM:  General: NAD, A&Ox3  HEENT: NC/AT  CV: RRR by peripheral pulse  Pulmonary: Non-labored breathing on RA  Left upper extremity: Skin intact.  No significant edema.  Tenderness to palpation of the dorsal, ulnar, and volar aspects of the thumb MCP joint.  No tenderness to palpation of the radial aspect of the thumb MCP joint.  No tenderness to palpation of the thumb IP joint.  Tenderness palpation of the thumb CMC joint and thenar musculature.  Soft endpoint to thumb MCP radial stress in full extension and 30 degrees of flexion.  Able to fire EPL, FPL, and hand intrinsics.  Able to make a full fist and fully extend all digits.  Sensation intact light touch to the thumb radial digital nerve and ulnar digital nerve.  Sensation intact light touch in the distribution of the median, radial, and ulnar nerves.  Digits warm and well-perfused.  Right upper extremity: Skin intact.  No significant edema.  Firm endpoint to thumb MCP radial stress in full extension and 30 degrees of flexion.  Grossly distally neurovascularly intact.    IMAGING:  Left hand x-rays obtained 12/1/2021 reviewed.  On my interpretation, demonstrates no bony abnormality.    I have personally reviewed the above images and labs.     IMPRESSION AND RECOMMENDATIONS:  Ms. 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  Physical exam concerning for  a left thumb ulnar collateral ligament injury as well as a possible volar plate injury.    We placed the patient into a left thumb spica cast at today's visit.  We will plan to obtain an MRI of the left thumb to evaluate for injuries of the ulnar collateral ligament and volar plate.  We will plan to see the patient back once the MRI is complete to review the results.    Patient seen and discussed with Dr. Cleveland.    Cast/splint application    Date/Time: 1/21/2022 4:17 PM  Performed by: Mary Mullen  Authorized by: Alonzo Cleveland MD     Consent:     Consent obtained:  Verbal    Consent given by:  Patient  Pre-procedure details:     Sensation:  Normal  Procedure details:     Laterality:  Left    Location:  Hand    Cast type:  Thumb spica    Supplies:  Fiberglass  Post-procedure details:     Pain:  Unchanged    Sensation:  Normal    Patient tolerance of procedure:  Tolerated well, no immediate complications    Patient provided with cast or splint care instructions: Yes            Nat Roa MD  Orthopaedic Surgery PGY-4    I saw and evaluated the patient and agree with the findings and plan of care as documented in the note.    Briefly this is a 41F who sustained an injury and felt a pop in her left thumb 12/1/21. She has pain predominantly over the ulnar MCP joint although an endpoint to radial stress is felt. Softer than her contralateral. Concerning for UCL injury. Placed in thumb spica cast today as she has only been using a soft brace. Will obtain MRI of the left thumb to evaluate for UCL and volar plate injuries. F/u after MRI to discuss further management.    Alonzo Cleveland MD    Hand, Upper Extremity & Microvascular Surgery  Department of Orthopedic Surgery  [facility]

## 2022-01-21 ENCOUNTER — OFFICE VISIT (OUTPATIENT)
Dept: ORTHOPEDICS | Facility: CLINIC | Age: 42
End: 2022-01-21
Attending: PHYSICIAN ASSISTANT
Payer: OTHER MISCELLANEOUS

## 2022-01-21 VITALS — BODY MASS INDEX: 27.47 KG/M2 | SYSTOLIC BLOOD PRESSURE: 112 MMHG | WEIGHT: 186 LBS | DIASTOLIC BLOOD PRESSURE: 74 MMHG

## 2022-01-21 DIAGNOSIS — M79.645 THUMB PAIN, LEFT: ICD-10-CM

## 2022-01-21 DIAGNOSIS — M79.642 PAIN OF LEFT HAND: ICD-10-CM

## 2022-01-21 PROCEDURE — 99204 OFFICE O/P NEW MOD 45 MIN: CPT | Mod: 25 | Performed by: STUDENT IN AN ORGANIZED HEALTH CARE EDUCATION/TRAINING PROGRAM

## 2022-01-21 PROCEDURE — 29075 APPL CST ELBW FNGR SHORT ARM: CPT | Mod: LT | Performed by: STUDENT IN AN ORGANIZED HEALTH CARE EDUCATION/TRAINING PROGRAM

## 2022-01-21 NOTE — PATIENT INSTRUCTIONS
Thank you for choosing [facility]    Dr. Cleveland Locations:    [facility] [address], [address]  [place] [address] [address] [place] [address]   Appointments: [phone] Appointments: [phone]   Fax: [phone] Fax: [phone]     Follow up:  After completion of MRI scan, please schedule follow up visit with Dr. Cleveland to discuss findings and next steps. Call [phone] to schedule this appointment.     An order for an MRI was placed today. You may call directly to schedule at [phone] at your convenience.             Caring for Your Cast     A cast is used to protect an injured body part and allow it to heal by limiting the amount of motion occurring around the injury. Pain and swelling of the injured area is normal for 48 hours after your cast is put on. If you have swelling, wiggle your toes or fingers to ease it. Doing so encourages blood flow to your arm or leg.     It is important that you keep your cast dry, unless your doctor tells you differently. If the padding of the cast gets wet, your skin may be damaged and become infected. When showering or taking a bath, put the cast in a heavy plastic bag that can be held in place with a rubber band. If your cast gets wet and does not dry out in four to five hours, call your doctor s office.   To keep the cast clean, use wash clothes or baby wipes around it.   You may experience some itching inside the cast. This is normal. Avoid putting anything in the cast, even your finger, as you can injure your skin and cause infection. Try shaking some talcum powder or blowing cool air from a hair dryer into the cast to ease itching.   If these signs or symptoms develop, call your doctor immediately.       Pain gets worse     Swelling that cuts off blood flow that does not go away, even when you lift the body part above the  level of your heart     Fever after itching. It may be related to an infection.     Fluid draining from your skin under the cast     Your cast may become loose as swelling goes down. If the cast feels too loose or if it is so loose you can take it off, call your doctor s office.     Your doctor or  will give you recommendations for activity based on your injury. Some sports allow casts if properly padded by a doctor or .     For complete healing, your cast should only be removed at the direction of your doctor or clinic staff. A special saw ensures its safe removal and protects the skin and other tissue under the cast.

## 2022-01-21 NOTE — LETTER
January 21, 2022    RE:  Heath Waller                              [address]   [place] [address]            To whom it may concern:    Heath Waller is under my professional care for left thumb injury. She may return to work with the following: The employee is ABLE to return to work today with continued light duty restrictions. A thumb spica cast was applied to the left hand. She must keep cast clean and dry. An MRI of the left hand was also ordered for further evaluation. She will follow up with my office after completion to review the images and discuss treatment recommendations.     Please contact my office by written request fax to [phone].     Sincerely,        Alonzo Cleveland MD

## 2022-01-21 NOTE — LETTER
1/21/2022         RE: Heath Waller  [address]  [place] [address]        Dear Colleague,    Thank you for referring your patient, Heath Waller, to the [facility] [place]. Please see a copy of my visit note below.    Orthopaedic Surgery Hand and Upper Extremity Clinic H&P NOTE:  1/18/2022     Patient Name: Heath Waller  MRN: [id number]    CHIEF COMPLAINT: left hand and thumb pain    Occupation: RN Case Manager    HPI:  Ms. Heath Waller is a 41 right-hand-dominant female who presents for evaluation of left thumb pain.  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  She subsequently developed pain.  She was seen at urgent care on the date of the injury and x-rays were obtained and found to be unremarkable.  She presents today due to ongoing symptoms.    The patient currently reports pain over the dorsal/ulnar thumb MCP joint.  She also reports pain in the thenar musculature.  She describes her pain as a dull ache while at rest, and states that is worse with pinching, gripping, or any use of the hand.  She also reports tingling in the left thumb whenever she grasps anything tightly, but states this is not sustained.  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  She is currently taking ibuprofen for pain.  She is also been wearing a removable hand-based thumb spica splint.  She has been icing the hand.  She has not done anything else treatment wise for her symptoms.  She is currently on light duty at work.    PAST MEDICAL HISTORY:  Past Medical History:   Diagnosis Date     ADD (attention deficit disorder)      Atypical mole 07/19/2018     Degenerative disc disease, cervical      Depressive disorder      Gastro-oesophageal reflux disease      Myofacial muscle pain      Noninfectious ileitis     IBS  with constipation     Other chronic pain     myofacial pain syndrome     Sleep apnea     wears CPAP at night     Thyroid disease        PAST SURGICAL HISTORY:  Past Surgical History:   Procedure Laterality Date     APPENDECTOMY  2000     ARTHROSCOPY KNEE Right     teenager     BREAST SURGERY  03/2006    augmentation, revisions 2011 and 2015     FOOT SURGERY Right     semoid bone removed from Fx     GYN SURGERY   2009 and 2014    LEEPS x 2      LAPAROSCOPIC CHOLECYSTECTOMY  5/15/2014    Procedure: LAPAROSCOPIC CHOLECYSTECTOMY;  Surgeon: Kd Arthur MD;  Location: [facility] OR     THYROID SURGERY  2001    nodule      TUBAL LIGATION  10/09/2008       MEDICATIONS:  Current Outpatient Medications   Medication     amphetamine-dextroamphetamine (ADDERALL XR) 30 MG 24 hr capsule     cyclobenzaprine (FLEXERIL) 10 MG tablet     DULoxetine (CYMBALTA) 60 MG capsule     ibuprofen (ADVIL,MOTRIN) 200 MG tablet     lisinopril (ZESTRIL) 10 MG tablet     morphine (MS CONTIN) 15 MG CR tablet     omeprazole (PRILOSEC) 20 MG DR capsule     order for DME     oxyCODONE-acetaminophen (PERCOCET) 5-325 MG tablet     sertraline (ZOLOFT) 100 MG tablet     spironolactone (ALDACTONE) 50 MG tablet     traZODone (DESYREL) 50 MG tablet     WAL-PHED 12 HOUR 120 MG 12 hr tablet     No current facility-administered medications for this visit.       ALLERGIES:     Allergies   Allergen Reactions     Codeine      Sulfa Drugs      Rash       Toradol [Ketorolac] Itching and Rash       FAMILY HISTORY:  No pertinent family history    SOCIAL HISTORY:  Social History     Tobacco Use     Smoking status: Former Smoker     Packs/day: 0.00     Years: 20.00     Pack years: 0.00     Types: Cigarettes     Smokeless tobacco: Never Used     Tobacco comment: e-cig   Substance Use Topics     Alcohol use: Yes     Alcohol/week: 0.0 standard drinks     Comment: rarely     Drug use: No     The patient currently lives with her daughter, and states that her son is also  with her every other week.  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  She endorses daily vaping and occasional cigarette use. She endorses occasional alcohol use. She denies illicit drug use.    The patient's past medical, family, and social history was reviewed and confirmed.    REVIEW OF SYMPTOMS:  General: Negative   Eyes: Negative   Ear, Nose and Throat: Negative   Respiratory: Negative   Cardiovascular: Negative   Gastrointestinal: Negative   Genito-urinary: Negative   Musculoskeletal: Negative  Neurological: Negative   Psychological: Negative  HEME: Negative   ENDO: Negative   SKIN: Negative    VITALS:  There were no vitals filed for this visit.    EXAM:  General: NAD, A&Ox3  HEENT: NC/AT  CV: RRR by peripheral pulse  Pulmonary: Non-labored breathing on RA  Left upper extremity: Skin intact.  No significant edema.  Tenderness to palpation of the dorsal, ulnar, and volar aspects of the thumb MCP joint.  No tenderness to palpation of the radial aspect of the thumb MCP joint.  No tenderness to palpation of the thumb IP joint.  Tenderness palpation of the thumb CMC joint and thenar musculature.  Soft endpoint to thumb MCP radial stress in full extension and 30 degrees of flexion.  Able to fire EPL, FPL, and hand intrinsics.  Able to make a full fist and fully extend all digits.  Sensation intact light touch to the thumb radial digital nerve and ulnar digital nerve.  Sensation intact light touch in the distribution of the median, radial, and ulnar nerves.  Digits warm and well-perfused.  Right upper extremity: Skin intact.  No significant edema.  Firm endpoint to thumb MCP radial stress in full extension and 30 degrees of flexion.  Grossly distally neurovascularly intact.    IMAGING:  Left hand x-rays obtained 12/1/2021 reviewed.  On my interpretation, demonstrates no bony abnormality.    I have personally reviewed the above images and labs.     IMPRESSION AND RECOMMENDATIONS:  Ms. 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  Physical exam concerning for a left thumb ulnar collateral ligament injury as well as a possible volar plate injury.    We placed the patient into a left thumb spica cast at today's visit.  We will plan to obtain an MRI of the left thumb to evaluate for injuries of the ulnar collateral ligament and volar plate.  We will plan to see the patient back once the MRI is complete to review the results.    Patient seen and discussed with Dr. Cleveland.    Cast/splint application    Date/Time: 1/21/2022 4:17 PM  Performed by: Mary Mullen  Authorized by: Alonzo Cleveland MD     Consent:     Consent obtained:  Verbal    Consent given by:  Patient  Pre-procedure details:     Sensation:  Normal  Procedure details:     Laterality:  Left    Location:  Hand    Cast type:  Thumb spica    Supplies:  Fiberglass  Post-procedure details:     Pain:  Unchanged    Sensation:  Normal    Patient tolerance of procedure:  Tolerated well, no immediate complications    Patient provided with cast or splint care instructions: Yes            Nat Roa MD  Orthopaedic Surgery PGY-4    I saw and evaluated the patient and agree with the findings and plan of care as documented in the note.    Briefly this is a 41F who sustained an injury and felt a pop in her left thumb 12/1/21. She has pain predominantly over the ulnar MCP joint although an endpoint to radial stress is felt. Softer than her contralateral. Concerning for UCL injury. Placed in thumb spica cast today as she has only been using a soft brace. Will obtain MRI of the left thumb to evaluate for UCL and volar plate injuries. F/u after MRI to discuss further management.    Alonzo Cleveland MD    Hand, Upper Extremity & Microvascular Surgery  Department of  Orthopedic Surgery  [facility]           Again, thank you for allowing me to participate in the care of your patient.        Sincerely,        Alonzo Cleveland MD

## 2022-01-27 ENCOUNTER — TELEPHONE (OUTPATIENT)
Dept: ORTHOPEDICS | Facility: CLINIC | Age: 42
End: 2022-01-27
Payer: COMMERCIAL

## 2022-01-27 NOTE — TELEPHONE ENCOUNTER
Request from [facility] for information related to WorkComp Claim.   Patient was last evaluated on 1/21/22.     Request placed in Dr. Cleveland's mailbox in Orth[facility].     Mary Mullen ATC

## 2022-01-28 NOTE — TELEPHONE ENCOUNTER
Reason for Call:  Form, our goal is to have forms completed with 7 days, however, some forms may require a visit or additional information.    Type of letter, form or note:  work comp     Who is the form from?: other - work comp    Where did the form come from: form was faxed in    Phone number of person requesting form: [phone]  Can we leave a detailed message on this number:  Not Applicable    Desired completion date of form: 1/28/2022      How will form be returned?:  fax to [phone]    Has the patient signed a consent form for release of information (may be included with form)? Not Applicable    Additional comments: faxed paperwork to [facility] Attn: Silva Pierson today 1/28/2022 and sent form to be scanned into chart.     Form was completed and faxed today 1/28/22 at [place].     Savana Juarez, ATC

## 2022-01-29 ENCOUNTER — HOSPITAL ENCOUNTER (OUTPATIENT)
Dept: MRI IMAGING | Facility: CLINIC | Age: 42
Discharge: HOME | End: 2022-01-29
Attending: STUDENT IN AN ORGANIZED HEALTH CARE EDUCATION/TRAINING PROGRAM | Admitting: STUDENT IN AN ORGANIZED HEALTH CARE EDUCATION/TRAINING PROGRAM
Payer: OTHER MISCELLANEOUS

## 2022-01-29 DIAGNOSIS — M79.645 THUMB PAIN, LEFT: ICD-10-CM

## 2022-01-29 PROCEDURE — 73221 MRI JOINT UPR EXTREM W/O DYE: CPT | Mod: 26 | Performed by: RADIOLOGY

## 2022-01-29 PROCEDURE — 73221 MRI JOINT UPR EXTREM W/O DYE: CPT | Mod: LT

## 2022-02-01 NOTE — PROGRESS NOTES
Orthopaedic Surgery Hand and Upper Extremity Clinic CLINIC NOTE:  2/4/2022     Patient Name: Heath Waller  MRN: [id number]    CHIEF COMPLAINT: left hand and thumb pain    Occupation: RN Case Manager    Subjective:  Ms. 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 The patient was last seen in clinic 1/21/22, at which point she was placed into a left thumb spica cast and a left thumb MRI was ordered. She presents today to review the MRI results. She states that the thumb overall feels better, but does still throb at night, which effects her sleep.    Objective:  Physical examination:  On general physical examination, the patient is awake, alert, and well appearing. On focused examination of the left upper extremity, there is a thumb spica cast in place. The cast appears to be in good condition. The thumb is warm and well perfused.    IMAGING:  Left thumb MRI obtained 1/29/22.  On my interpretation, demonstrates the ulnar collateral ligament to be intact. No visible volar plate injury. No other abnormalities present.     I have personally reviewed the above images and labs.     IMPRESSION AND RECOMMENDATIONS:  Ms. 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    At this point, we will have the patient continue her left thumb spica cast for 2 more weeks. We will plan to see her back in 2 weeks for cast removal. We will also have her see hand therapy when she follows up.    Patient  seen and discussed with Dr. Cleveland.    Nat Roa MD  Orthopaedic Surgery PGY-4

## 2022-02-04 ENCOUNTER — OFFICE VISIT (OUTPATIENT)
Dept: ORTHOPEDICS | Facility: CLINIC | Age: 42
End: 2022-02-04
Payer: OTHER MISCELLANEOUS

## 2022-02-04 VITALS — DIASTOLIC BLOOD PRESSURE: 78 MMHG | WEIGHT: 186 LBS | SYSTOLIC BLOOD PRESSURE: 110 MMHG | BODY MASS INDEX: 27.47 KG/M2

## 2022-02-04 DIAGNOSIS — M79.645 THUMB PAIN, LEFT: Primary | ICD-10-CM

## 2022-02-04 PROCEDURE — 99213 OFFICE O/P EST LOW 20 MIN: CPT | Mod: GC | Performed by: STUDENT IN AN ORGANIZED HEALTH CARE EDUCATION/TRAINING PROGRAM

## 2022-02-04 NOTE — PATIENT INSTRUCTIONS
Thank you for choosing [facility]    Dr. Cleveland Locations:    [facility] [address], [address]  [address] [address]   Appointments: [phone] Appointments: [phone]   Fax: [phone] Fax: [phone]     Follow up: 2 weeks. We will remove cast and take X-rays at this time and place hand therapy order as well.

## 2022-02-04 NOTE — LETTER
2/4/2022         RE: Heath Waller  [address]  [facility] [address]        Dear Colleague,    Thank you for referring your patient, Heath Waller, to the [facility] [place]. Please see a copy of my visit note below.    Orthopaedic Surgery Hand and Upper Extremity Clinic CLINIC NOTE:  2/4/2022     Patient Name: Heath Waller  MRN: [id number]    CHIEF COMPLAINT: left hand and thumb pain    Occupation: RN Case Manager    Subjective:  Ms. 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 The patient was last seen in clinic 1/21/22, at which point she was placed into a left thumb spica cast and a left thumb MRI was ordered. She presents today to review the MRI results. She states that the thumb overall feels better, but does still throb at night, which effects her sleep.    Objective:  Physical examination:  On general physical examination, the patient is awake, alert, and well appearing. On focused examination of the left upper extremity, there is a thumb spica cast in place. The cast appears to be in good condition. The thumb is warm and well perfused.    IMAGING:  Left thumb MRI obtained 1/29/22.  On my interpretation, demonstrates the ulnar collateral ligament to be intact. No visible volar plate injury. No other abnormalities present.     I have personally reviewed the above images and labs.     IMPRESSION AND RECOMMENDATIONS:  Ms. 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    At this point, we will have the patient continue her left thumb spica cast for 2 more weeks. We will plan to see her back in 2 weeks for cast removal. We will also have her see hand therapy when she follows up.    Patient seen and discussed with Dr. Cleveland.    Nat Roa MD  Orthopaedic Surgery PGY-4    Attestation signed by Alonzo Cleveland MD at 2/4/2022  1:23 PM:  I saw and evaluated the patient and agree with the findings and plan of care as documented in the note.    MRI reviewed with patient. No obvious UCL or RCL tear, likely sprain. Continue cast for 2 more weeks for total of 4 weeks of immobilization. Follow-up 2 weeks for cast removal and referral to hand therapy for ROM.    Alonzo Cleveland MD     Hand, Upper Extremity & Microvascular Surgery  Department of Orthopedic Surgery  [facility]           Again, thank you for allowing me to participate in the care of your patient.        Sincerely,        Alonzo Cleveland MD

## 2022-02-14 NOTE — PROGRESS NOTES
Orthopaedic Surgery Hand and Upper Extremity Clinic CLINIC NOTE:  2/18/2022     Patient Name: Heath Waller  MRN: [id number]    CHIEF COMPLAINT: left hand and thumb pain    Occupation: RN Case Manager    Follow up exam after cast removal    Subjective:  Ms. 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 Patient has subsequently undergone 4 weeks of immobilization in thumb spica cast. She has been doing office work due to work restrictions for her thumb. No cast concerns. She is hopeful she will be able to return back to normal work duty soon.    Objective:  Physical examination:  On general physical examination, the patient is awake, alert, and well appearing. On focused examination of the left upper extremity with the cast removed there reveals no evidence of skin abrasions or ulceration.  Patient has mild tenderness palpation over the dorsum and ulnar aspect of the thumb metacarpal.  She has no tenderness palpation over her first dorsal compartment.  She has no tenderness palpation over her MP joint at the location of her ulnar collateral ligament.  She reports discomfort and feelings of tightness with thumb MP flexion.  She demonstrates ability to perform pinch with good strength.  Her thumb MP joint is stable to radial and ulnar stress at neutral and 30 degrees of flexion, this does not produce pain.  She has ability to oppose her thumb to the base of her small finger and fire her EPL and FPL tendons.  Sensation is intact to light touch in the dorsum of the thumb and radial and ulnar digital aspects.  Thumb is warm well perfused with brisk cap refill.    IMAGING:  No new imaging    IMPRESSION AND RECOMMENDATIONS:  Ms. 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    Patient's cast was removed today's visit.  We will have her begin hand occupational therapy for some range of motion strengthening exercises.  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  Work note with these restrictions was provided.  Patient will follow up as needed at this time should her symptoms not continue to improve or further questions arise.    Patient seen and discussed with Dr. Cleveland.    Shakeel Burk MD  Orthopaedic Surgery PGY4

## 2022-02-18 ENCOUNTER — OFFICE VISIT (OUTPATIENT)
Dept: ORTHOPEDICS | Facility: CLINIC | Age: 42
End: 2022-02-18
Payer: OTHER MISCELLANEOUS

## 2022-02-18 DIAGNOSIS — M79.645 THUMB PAIN, LEFT: Primary | ICD-10-CM

## 2022-02-18 PROCEDURE — 99213 OFFICE O/P EST LOW 20 MIN: CPT | Mod: GC | Performed by: STUDENT IN AN ORGANIZED HEALTH CARE EDUCATION/TRAINING PROGRAM

## 2022-02-18 NOTE — LETTER
2/18/2022         RE: Heath Waller  [address]  [facility] [address]        Dear Colleague,    Thank you for referring your patient, Heath Waller, to the [facility] [place]. Please see a copy of my visit note below.    Orthopaedic Surgery Hand and Upper Extremity Clinic CLINIC NOTE:  2/18/2022     Patient Name: Heath Waller  MRN: [id number]    CHIEF COMPLAINT: left hand and thumb pain    Occupation: RN Case Manager    Follow up exam after cast removal    Subjective:  Ms. 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 Patient has subsequently undergone 4 weeks of immobilization in thumb spica cast. She has been doing office work due to work restrictions for her thumb. No cast concerns. She is hopeful she will be able to return back to normal work duty soon.    Objective:  Physical examination:  On general physical examination, the patient is awake, alert, and well appearing. On focused examination of the left upper extremity with the cast removed there reveals no evidence of skin abrasions or ulceration.  Patient has mild tenderness palpation over the dorsum and ulnar aspect of the thumb metacarpal.  She has no tenderness palpation over her first dorsal compartment.  She has no tenderness palpation over her MP joint at the location of her ulnar collateral ligament.  She reports discomfort and feelings of tightness with thumb MP flexion.  She demonstrates ability to perform pinch with good strength.  Her thumb MP joint is stable to radial and ulnar stress at neutral and 30 degrees of flexion, this does not produce pain.  She has ability to oppose her thumb to the base of her  small finger and fire her EPL and FPL tendons.  Sensation is intact to light touch in the dorsum of the thumb and radial and ulnar digital aspects.  Thumb is warm well perfused with brisk cap refill.    IMAGING:  No new imaging    IMPRESSION AND RECOMMENDATIONS:  Ms. 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    Patient's cast was removed today's visit.  We will have her begin hand occupational therapy for some range of motion strengthening exercises.  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  Work note with these restrictions was provided.  Patient will follow up as needed at this time should her symptoms not continue to improve or further questions arise.    Patient seen and discussed with Dr. Cleveland.    Shakeel Burk MD  Orthopaedic Surgery PGY4    Attestation signed by Alonzo Cleveland MD at 2/20/2022  1:45 PM:  I saw and evaluated the patient and agree with the findings and plan of care as documented in the note.    Alonzo Cleveland MD    Hand, Upper Extremity & Microvascular Surgery  Department of Orthopedic Surgery  [facility]           Again, thank you for allowing me to participate in the care of your patient.        Sincerely,        Alonzo Cleveland MD

## 2022-02-18 NOTE — LETTER
[facility] [place]  [address]  [address]  [place] [facility] [address]  Phone: [phone]  Fax: [phone]    February 18, 2022        Heath Waller  [address]   [place] [facility] [address]          To whom it may concern:    RE: Heath Waller    Patient was seen and treated today at our clinic. Please excuse her from work fo.r the next two weeks. Starting today 2/18/22 - 3/4/22.    Please contact me for questions or concerns.      Sincerely,        Alonzo Cleveland MD

## 2022-02-18 NOTE — PATIENT INSTRUCTIONS
Thank you for choosing [facility]    Dr. Cleveland Locations:    [facility] [address], [address]  [address] [address]   Appointments: [phone] Appointments: [phone]   Fax: [phone] Fax: [phone]     Follow up: as needed.     Hand Therapy orders have been placed with [facility] (formally [facility])  You can call [phone] to schedule at your convenience.

## 2022-02-23 ENCOUNTER — THERAPY VISIT (OUTPATIENT)
Dept: OCCUPATIONAL THERAPY | Facility: CLINIC | Age: 42
End: 2022-02-23
Payer: OTHER MISCELLANEOUS

## 2022-02-23 DIAGNOSIS — M79.645 THUMB PAIN, LEFT: ICD-10-CM

## 2022-02-23 DIAGNOSIS — M79.642 PAIN OF LEFT HAND: ICD-10-CM

## 2022-02-23 PROCEDURE — 97760 ORTHOTIC MGMT&TRAING 1ST ENC: CPT | Mod: GO

## 2022-02-23 PROCEDURE — 97165 OT EVAL LOW COMPLEX 30 MIN: CPT | Mod: GO

## 2022-02-23 PROCEDURE — 97110 THERAPEUTIC EXERCISES: CPT | Mod: GO

## 2022-02-23 NOTE — PROGRESS NOTES
Hand Therapy Initial Evaluation    Current Date:  2/23/2022    Diagnosis: Thumb pain, left   DOI: 12/1/2021    MD Note: - ROM and strengthening, no restrictions    Subjective:  Heath Waller is a 41 year old female.    Patient reports symptoms of the left thumb which occurred due to injury at work when helping a patient dress. Since onset symptoms are Unchanged  General health as reported by patient is good.    Pertinent medical history includes:  Past Medical History:   Diagnosis Date     ADD (attention deficit disorder)      Atypical mole 07/19/2018     Degenerative disc disease, cervical      Depressive disorder      Gastro-oesophageal reflux disease      Myofacial muscle pain      Noninfectious ileitis     IBS with constipation     Other chronic pain     myofacial pain syndrome     Sleep apnea     wears CPAP at night     Thyroid disease    Medical allergies:  Allergies   Allergen Reactions     Codeine      Sulfa Drugs      Rash       Toradol [Ketorolac] Itching and Rash   Surgical history:   Past Surgical History:   Procedure Laterality Date     APPENDECTOMY  2000     ARTHROSCOPY KNEE Right     teenager     BREAST SURGERY  03/2006    augmentation, revisions 2011 and 2015     FOOT SURGERY Right     semoid bone removed from Fx     GYN SURGERY   2009 and 2014    LEEPS x 2      LAPAROSCOPIC CHOLECYSTECTOMY  5/15/2014    Procedure: LAPAROSCOPIC CHOLECYSTECTOMY;  Surgeon: Kd Arthur MD;  Location: [facility] OR     THYROID SURGERY  2001    nodule      TUBAL LIGATION  10/09/2008   Medication history:   Current Outpatient Medications   Medication     amphetamine-dextroamphetamine (ADDERALL XR) 30 MG 24 hr capsule     cyclobenzaprine (FLEXERIL) 10 MG tablet     DULoxetine (CYMBALTA) 60 MG capsule     ibuprofen (ADVIL,MOTRIN) 200 MG tablet     lisinopril (ZESTRIL) 10 MG tablet     omeprazole (PRILOSEC) 20 MG DR capsule     order for DME     sertraline (ZOLOFT) 100 MG tablet     spironolactone (ALDACTONE) 50 MG  tablet     WAL-PHED 12 HOUR 120 MG 12 hr tablet     No current facility-administered medications for this visit.     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    Previous treatment: casting for 4 weeks  Barriers include:none  Mobility: No difficulty  Transportation: drives  Currently working in normal job with restrictions  Leisure activities/hobbies: [employer]    Functional Outcome Measure:   Upper Extremity Functional Index Score:  SCORE:   Column Totals: /80: 38   (A lower score indicates greater disability.)    Objective:  Pain Level (Scale 0-10)   2/23/2022   At Rest 2/10   With Use 8/10     Pain Description  Date 2/23/2022   Location Dorsal thumb   Pain Quality Sharp   Frequency intermittent     Pain is worst  daytime   Exacerbated by  With use   Relieved by rest   Progression Unchanged     Edema (Circumference measured in cm)   2/23/2022 2/23/2022   Thumb R L   P1 6.9 6.6   IP 6.0 5.9   P2 5.0 5.5     Sensation   Decreased Radial Nerve distribution per pt report    ROM  Thumb 2/23/2022 2/23/2022   AROM  (PROM) R L   MP 0/69 0/61   IP +22/66 +42/40   RABD 46 39   PABD 40 36   Kapandji Opposition Scale (0-10/10) 10 10 (5/10 pain)     Strength   (Measured in pounds)  Pain Report: - none  + mild    ++ moderate    +++ severe    2/23/2022 2/23/2022   Trials R L   1  2  3 77 11 (7/10 pain)   Average 77 11     Lat Pinch 2/23/2022 2/23/2022   Trials R L   1  2  3 18 4 (7/10 pain)   Average 18 4     3 Pt Pinch 2/23/2022 2/23/2022   Trials R L   1  2  3 16 5 (7/10 pain)   Average 16 5     Assessment:  Patient presents with symptoms consistent with diagnosis of left thumb  pain,  with conservative intervention.     Patient's limitations or Problem List includes:  Pain, Decreased ROM/motion, Increased edema, Weakness, Sensory disturbance and Tightness in musculature of the left thumb which interferes with the patient's ability to perform Self Care Tasks (dressing, hygiene/toileting), Work Tasks, Sleep Patterns, Recreational Activities, Household Chores and Driving  as compared to previous level of function.    Rehab Potential:  Good - Return to full activity, some limitations    Patient will benefit from skilled Occupational Therapy to increase ROM, overall strength,  strength, pinch strength, stability of thumb and sensation and decrease pain and edema to return to previous activity level and resume normal daily tasks and to reach their rehab potential.    Barriers to Learning:  No barrier    Communication Issues:  Patient appears to be able to clearly communicate and understand verbal and written communication and follow directions correctly.    Chart Review: Chart Review and Simple history review with patient    Identified Performance Deficits: dressing, hygiene and grooming, driving and community mobility, health management and maintenance, home establishment and management, meal preparation and cleanup, shopping, sleep, work and leisure activities    Assessment of Occupational Performance:  5 or more Performance Deficits    Clinical Decision Making (Complexity): Low complexity    Treatment Explanation:  The following has been discussed with the patient:  RX ordered/plan of care  Anticipated outcomes  Possible risks and side effects    Plan:  Frequency:  1 X week, once daily  Duration:  for 12 weeks    Treatment Plan:   Modalities:  US and Paraffin  Therapeutic Exercise:  AROM, AAROM, PROM, Tendon Gliding, Blocking, Place and Hold, Isotonics and Isometrics  Neuromuscular re-education:  Nerve Gliding and Kinesiotaping  Manual Techniques:  Joint mobilization, Friction massage,  Myofascial release and Manual edema mobilization  Orthotic Fabrication:  Static  Self Care:  Self Care Tasks, Ergonomic Considerations and Work Tasks    Discharge Plan:  Achieve all LTG.  Independent in home treatment program.  Reach maximal therapeutic benefit.    Home Exercise Program:  Orthosis Wear and Care  Holland Massage to Thumb  Up to 2 minutes prior to exercises  Thumb Stabilization Web Space Release Method 1 with Clip  Hold for 1-2 minutes  Thumb Active Range of Motion IP Joint Blocking  Reps 10  Sessions per day 3-4  Thumb Active Range of Motion MP Joint Blocking  Reps 10  Sessions per day 3-4  Thumb Active Range of Motion Radial Abduction and Extension  Reps 10  Sessions per day 3-4  Thumb Active Range of Motion Palmar Abduction  Reps 10  Sessions per day 3-4    Next Visit:  Review HEP - progress to strengthening as appropriate with pain  Review orthosis fit  MFR  A/AA/PROM  US  K-taping

## 2022-02-25 ENCOUNTER — TELEPHONE (OUTPATIENT)
Dept: ORTHOPEDICS | Facility: CLINIC | Age: 42
End: 2022-02-25
Payer: COMMERCIAL

## 2022-02-25 NOTE — TELEPHONE ENCOUNTER
Message from Sue regarding Heath's work related injury. Would like Dr Cleveland to know they have light duty, 1-handed work available for Heath. Requests light duty work restriction.    Please call Sue back at [phone]  Ext# [phone]    Fax # [phone]

## 2022-02-25 NOTE — TELEPHONE ENCOUNTER
Reason for Call:  Form, our goal is to have forms completed with 7 days, however, some forms may require a visit or additional information.    Type of letter, form or note:  work comp     Who is the form from?: Patient    Where did the form come from: form was faxed in    Phone number of person requesting form: Sue [phone]  Can we leave a detailed message on this number:  Not Applicable    Desired completion date of form: 02/25/22    How will form be returned?:  fax to [phone]    Has the patient signed a consent form for release of information (may be included with form)?     Additional comments:     Form was started and place in Provider Basket for provider review/ completion at [place].    Completed and faxed 2/25/22    Savana Juarez ATC

## 2022-03-03 ENCOUNTER — THERAPY VISIT (OUTPATIENT)
Dept: OCCUPATIONAL THERAPY | Facility: CLINIC | Age: 42
End: 2022-03-03
Payer: OTHER MISCELLANEOUS

## 2022-03-03 DIAGNOSIS — M79.642 PAIN OF LEFT HAND: ICD-10-CM

## 2022-03-03 DIAGNOSIS — M79.645 THUMB PAIN, LEFT: ICD-10-CM

## 2022-03-03 PROCEDURE — 97140 MANUAL THERAPY 1/> REGIONS: CPT | Mod: GO

## 2022-03-03 PROCEDURE — 97110 THERAPEUTIC EXERCISES: CPT | Mod: GO

## 2022-03-03 PROCEDURE — 97035 APP MDLTY 1+ULTRASOUND EA 15: CPT | Mod: GO

## 2022-03-10 ENCOUNTER — THERAPY VISIT (OUTPATIENT)
Dept: OCCUPATIONAL THERAPY | Facility: CLINIC | Age: 42
End: 2022-03-10
Payer: OTHER MISCELLANEOUS

## 2022-03-10 ENCOUNTER — MYC MEDICAL ADVICE (OUTPATIENT)
Dept: ORTHOPEDICS | Facility: CLINIC | Age: 42
End: 2022-03-10
Payer: COMMERCIAL

## 2022-03-10 DIAGNOSIS — M79.645 THUMB PAIN, LEFT: ICD-10-CM

## 2022-03-10 DIAGNOSIS — M79.642 PAIN OF LEFT HAND: ICD-10-CM

## 2022-03-10 PROCEDURE — 97112 NEUROMUSCULAR REEDUCATION: CPT | Mod: GO | Performed by: OCCUPATIONAL THERAPIST

## 2022-03-10 PROCEDURE — 97035 APP MDLTY 1+ULTRASOUND EA 15: CPT | Mod: GO | Performed by: OCCUPATIONAL THERAPIST

## 2022-03-10 PROCEDURE — 97140 MANUAL THERAPY 1/> REGIONS: CPT | Mod: GO | Performed by: OCCUPATIONAL THERAPIST

## 2022-03-11 NOTE — PROGRESS NOTES
Orthopaedic Surgery Hand and Upper Extremity Clinic CLINIC NOTE:  3/15/2022     Patient Name: Heath Waller  MRN: [id number]    CHIEF COMPLAINT: left hand and thumb pain    Occupation: RN Case Manager    Follow up: she states that last week she was back in the field and states that she has been wearing her splint that OT provided. She denies any new injury, but states that after being back at work her had started to swell up and ache. She states that the pain is keeping her up at night. She also states she has weakness in her .     Subjective:  Ms. 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 She was first seen 1/21/22 at which point she was placed in a thumb spica cast for 4 weeks. MRI did not demonstrate any evidence of tear of UCL on MRI. No other remarkable findings. Cast was removed 2/18/22.     The patient returned to the field last week and was wearing the thermoplastic splint provided by OT, but activity and tasks caused her thumb to swell up in the same dorsal/ulnar location and ache. The pain does throb and keeps her up at night. No numbness/tingling. Endorses weakness in . Activities such as pinching the skin to give an injection aggravates her pain.    Objective:  Physical examination:  - no swelling today, no deformity, normal alignment  +TTP dorsal ulnar aspect of the thumb metacarpal adjacent to the EPL  No mass  No laxity of the UCL or RCL at neutral or 30 deg flexion  No significant volar plate tenderness or laxity  Mild stiffness with opposition  Negative Finkelstein's no pain over 1st dorsal compartment  +TTP CMC joint  EPL/FPL/HI intact  SILT m/r/u  WWP CR< 2s      IMAGING:  XRs today reviewed by me demonstrates no bony abnormality, mild CMC subluxation but otherwise  unremarkable    IMPRESSION AND RECOMMENDATIONS:  Ms. 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 Follows-up for persistent symptoms when she returned to the field last week.    Differential at this point includes sagittal band injury, or EPL/EPB strain, or strain of adductor aponeurosis or FDI.    Recommend no field work, work note provided. Splint during activities and at night. Continue hand therapy. Repeat MRI ordered due to poor quality of last study.    F/u after MRI    Alonzo Cleveland MD    [place], Upper Extremity & Microvascular Surgery  Department of Orthopedic Surgery  [facility]

## 2022-03-15 ENCOUNTER — OFFICE VISIT (OUTPATIENT)
Dept: ORTHOPEDICS | Facility: CLINIC | Age: 42
End: 2022-03-15
Payer: OTHER MISCELLANEOUS

## 2022-03-15 ENCOUNTER — ANCILLARY PROCEDURE (OUTPATIENT)
Dept: GENERAL RADIOLOGY | Facility: CLINIC | Age: 42
End: 2022-03-15
Attending: STUDENT IN AN ORGANIZED HEALTH CARE EDUCATION/TRAINING PROGRAM
Payer: OTHER MISCELLANEOUS

## 2022-03-15 VITALS — DIASTOLIC BLOOD PRESSURE: 82 MMHG | WEIGHT: 186 LBS | BODY MASS INDEX: 27.47 KG/M2 | SYSTOLIC BLOOD PRESSURE: 122 MMHG

## 2022-03-15 DIAGNOSIS — M79.645 THUMB PAIN, LEFT: ICD-10-CM

## 2022-03-15 DIAGNOSIS — M79.645 THUMB PAIN, LEFT: Primary | ICD-10-CM

## 2022-03-15 PROCEDURE — 99214 OFFICE O/P EST MOD 30 MIN: CPT | Performed by: STUDENT IN AN ORGANIZED HEALTH CARE EDUCATION/TRAINING PROGRAM

## 2022-03-15 PROCEDURE — 73140 X-RAY EXAM OF FINGER(S): CPT | Mod: LT | Performed by: RADIOLOGY

## 2022-03-15 NOTE — LETTER
[facility] [place]  [id number] [place] [address]  [address]  [facility] [address]  Phone: [phone]  Fax: [phone]    March 15, 2022        Heath Waller  [address]   [facility] [address]          To whom it may concern:    RE: Heath Waller    Patient was seen and treated today at our clinic. Please excuse her from field work duties starting today 3/15/2022 until 4/12/2022.    Please contact me for questions or concerns.      Sincerely,        Alonzo Cleveland MD

## 2022-03-15 NOTE — PATIENT INSTRUCTIONS
Thank you for choosing [facility]    Dr. Cleveland Locations:    [facility] [address], [address]  [address] [address]   Appointments: [phone] Appointments: [phone]   Fax: [phone] Fax: [phone]     Follow up: after MRI to review findings.     An order for an MRI was placed today. You may call directly to schedule at [phone] at your convenience.

## 2022-03-15 NOTE — LETTER
3/15/2022         RE: Heath Waller  [address]  [facility] [address]        Dear Colleague,    Thank you for referring your patient, Heath Waller, to the [facility] [place]. Please see a copy of my visit note below.    Orthopaedic Surgery Hand and Upper Extremity Clinic CLINIC NOTE:  3/15/2022     Patient Name: Heath Waller  MRN: [id number]    CHIEF COMPLAINT: left hand and thumb pain    Occupation: RN Case Manager    Follow up: she states that last week she was back in the field and states that she has been wearing her splint that OT provided. She denies any new injury, but states that after being back at work her had started to swell up and ache. She states that the pain is keeping her up at night. She also states she has weakness in her .     Subjective:  Ms. 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 She was first seen 1/21/22 at which point she was placed in a thumb spica cast for 4 weeks. MRI did not demonstrate any evidence of tear of UCL on MRI. No other remarkable findings. Cast was removed 2/18/22.     The patient returned to the field last week and was wearing the thermoplastic splint provided by OT, but activity and tasks caused her thumb to swell up in the same dorsal/ulnar location and ache. The pain does throb and keeps her up at night. No numbness/tingling. Endorses weakness in . Activities such as pinching the skin to give an injection aggravates her pain.    Objective:  Physical examination:  - no swelling today, no deformity, normal alignment  +TTP dorsal ulnar aspect of the thumb metacarpal adjacent to the EPL  No mass  No laxity of the UCL or RCL at neutral or 30 deg flexion  No significant volar plate tenderness or laxity  Mild  stiffness with opposition  Negative Finkelstein's no pain over 1st dorsal compartment  +TTP CMC joint  EPL/FPL/HI intact  SILT m/r/u  WWP CR< 2s      IMAGING:  XRs today reviewed by me demonstrates no bony abnormality, mild CMC subluxation but otherwise unremarkable    IMPRESSION AND RECOMMENDATIONS:  Ms. 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 Follows-up for persistent symptoms when she returned to the field last week.    Differential at this point includes sagittal band injury, or EPL/EPB strain, or strain of adductor aponeurosis or FDI.    Recommend no field work, work note provided. Splint during activities and at night. Continue hand therapy. Repeat MRI ordered due to poor quality of last study.    F/u after MRI    Alonzo Cleveland MD    Hand, Upper Extremity & Microvascular Surgery  Department of Orthopedic Surgery  [facility]        Again, thank you for allowing me to participate in the care of your patient.        Sincerely,        Alonzo Cleveland MD

## 2022-03-16 ENCOUNTER — TELEPHONE (OUTPATIENT)
Dept: ORTHOPEDICS | Facility: CLINIC | Age: 42
End: 2022-03-16
Payer: COMMERCIAL

## 2022-03-16 NOTE — TELEPHONE ENCOUNTER
Spoke with Dr. Cleveland and patient, updated work note and faxed. No further questions at this time.     Savana Juarez, ATC

## 2022-03-16 NOTE — LETTER
[facility] [address]  [address] [facility] [address]  Phone: [phone]  Fax: [phone]    March 16, 2022        Heath GARCIA Christin  [address]   [facility] [address]          To whom it may concern:    RE: Heath GARCIA Christin     Please excuse her from field work duties starting today 3/15/2022 until 4/12/2022. She may do light duty office work while wearing her splint.    Please contact me for questions or concerns.      Sincerely,        Alonzo Cleveland MD

## 2022-03-16 NOTE — TELEPHONE ENCOUNTER
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

## 2022-03-16 NOTE — TELEPHONE ENCOUNTER
"Spoke with Helena, the employer would like clarification on what the employee can do, I.e. office work, single arm work, etc. Instead of just \"no field work.\"     LVM for patient to give a call back in order to provide specifics on her work note.     Fax updated work note to: [phone]    Savana Juarez, ATC    "

## 2022-03-17 ENCOUNTER — TELEPHONE (OUTPATIENT)
Dept: ORTHOPEDICS | Facility: CLINIC | Age: 42
End: 2022-03-17
Payer: COMMERCIAL

## 2022-03-17 ENCOUNTER — THERAPY VISIT (OUTPATIENT)
Dept: OCCUPATIONAL THERAPY | Facility: CLINIC | Age: 42
End: 2022-03-17
Payer: OTHER MISCELLANEOUS

## 2022-03-17 DIAGNOSIS — M79.645 THUMB PAIN, LEFT: ICD-10-CM

## 2022-03-17 DIAGNOSIS — M79.642 PAIN OF LEFT HAND: ICD-10-CM

## 2022-03-17 PROCEDURE — 97112 NEUROMUSCULAR REEDUCATION: CPT | Mod: GO | Performed by: OCCUPATIONAL THERAPIST

## 2022-03-17 PROCEDURE — 97140 MANUAL THERAPY 1/> REGIONS: CPT | Mod: GO | Performed by: OCCUPATIONAL THERAPIST

## 2022-03-17 NOTE — TELEPHONE ENCOUNTER
Reason for call:  Nat is requesting office visit notes from 3/15/22 be faxed to:    [phone]    Other phone number:  [phone] for questions

## 2022-03-21 ENCOUNTER — TELEPHONE (OUTPATIENT)
Dept: ORTHOPEDICS | Facility: CLINIC | Age: 42
End: 2022-03-21
Payer: COMMERCIAL

## 2022-03-21 NOTE — TELEPHONE ENCOUNTER
"Per chart note and MRI referral \"Prior MRI is of poor quality - please schedule with a 3T machine\"    Left voicemail letting Nat know that it is not an arthrogram MRI but a 3T MRI and this MRI machine has a stronger magnet. Also, to call back if she has questions.    Savana Juarez, ATC    "

## 2022-03-21 NOTE — TELEPHONE ENCOUNTER
Reason for call:  Message from Nat, regarding a work comp case for Kaveh. Has a new MRI order because the 1st one was poor quality. Needs to know if this will be a standard or arthorogram MRI.  Please call back     [phone]

## 2022-03-24 ENCOUNTER — THERAPY VISIT (OUTPATIENT)
Dept: OCCUPATIONAL THERAPY | Facility: CLINIC | Age: 42
End: 2022-03-24
Payer: OTHER MISCELLANEOUS

## 2022-03-24 DIAGNOSIS — M79.642 PAIN OF LEFT HAND: ICD-10-CM

## 2022-03-24 DIAGNOSIS — M79.645 THUMB PAIN, LEFT: ICD-10-CM

## 2022-03-24 PROCEDURE — 97112 NEUROMUSCULAR REEDUCATION: CPT | Mod: GO | Performed by: OCCUPATIONAL THERAPIST

## 2022-03-24 PROCEDURE — 97140 MANUAL THERAPY 1/> REGIONS: CPT | Mod: GO | Performed by: OCCUPATIONAL THERAPIST

## 2022-03-30 ENCOUNTER — ANCILLARY PROCEDURE (OUTPATIENT)
Dept: MRI IMAGING | Facility: CLINIC | Age: 42
End: 2022-03-30
Attending: STUDENT IN AN ORGANIZED HEALTH CARE EDUCATION/TRAINING PROGRAM
Payer: OTHER MISCELLANEOUS

## 2022-03-30 DIAGNOSIS — M79.645 THUMB PAIN, LEFT: ICD-10-CM

## 2022-03-30 PROCEDURE — 73221 MRI JOINT UPR EXTREM W/O DYE: CPT | Mod: LT | Performed by: RADIOLOGY

## 2022-03-31 ENCOUNTER — THERAPY VISIT (OUTPATIENT)
Dept: OCCUPATIONAL THERAPY | Facility: CLINIC | Age: 42
End: 2022-03-31
Payer: OTHER MISCELLANEOUS

## 2022-03-31 DIAGNOSIS — M79.642 PAIN OF LEFT HAND: ICD-10-CM

## 2022-03-31 DIAGNOSIS — M79.645 THUMB PAIN, LEFT: ICD-10-CM

## 2022-03-31 PROCEDURE — 97140 MANUAL THERAPY 1/> REGIONS: CPT | Mod: GO | Performed by: OCCUPATIONAL THERAPIST

## 2022-03-31 PROCEDURE — 97112 NEUROMUSCULAR REEDUCATION: CPT | Mod: GO | Performed by: OCCUPATIONAL THERAPIST

## 2022-03-31 PROCEDURE — 97110 THERAPEUTIC EXERCISES: CPT | Mod: GO | Performed by: OCCUPATIONAL THERAPIST

## 2022-04-05 ENCOUNTER — THERAPY VISIT (OUTPATIENT)
Dept: OCCUPATIONAL THERAPY | Facility: CLINIC | Age: 42
End: 2022-04-05
Payer: OTHER MISCELLANEOUS

## 2022-04-05 ENCOUNTER — OFFICE VISIT (OUTPATIENT)
Dept: ORTHOPEDICS | Facility: CLINIC | Age: 42
End: 2022-04-05
Payer: OTHER MISCELLANEOUS

## 2022-04-05 VITALS
HEIGHT: 69 IN | WEIGHT: 186 LBS | SYSTOLIC BLOOD PRESSURE: 123 MMHG | DIASTOLIC BLOOD PRESSURE: 81 MMHG | BODY MASS INDEX: 27.55 KG/M2

## 2022-04-05 DIAGNOSIS — M79.642 PAIN OF LEFT HAND: ICD-10-CM

## 2022-04-05 DIAGNOSIS — M18.12 ARTHRITIS OF CARPOMETACARPAL (CMC) JOINT OF LEFT THUMB: Primary | ICD-10-CM

## 2022-04-05 DIAGNOSIS — M79.645 THUMB PAIN, LEFT: ICD-10-CM

## 2022-04-05 PROCEDURE — 20600 DRAIN/INJ JOINT/BURSA W/O US: CPT | Mod: FA | Performed by: STUDENT IN AN ORGANIZED HEALTH CARE EDUCATION/TRAINING PROGRAM

## 2022-04-05 PROCEDURE — 97112 NEUROMUSCULAR REEDUCATION: CPT | Mod: GO | Performed by: OCCUPATIONAL THERAPIST

## 2022-04-05 PROCEDURE — 97140 MANUAL THERAPY 1/> REGIONS: CPT | Mod: GO | Performed by: OCCUPATIONAL THERAPIST

## 2022-04-05 PROCEDURE — 99214 OFFICE O/P EST MOD 30 MIN: CPT | Mod: 25 | Performed by: STUDENT IN AN ORGANIZED HEALTH CARE EDUCATION/TRAINING PROGRAM

## 2022-04-05 PROCEDURE — 97110 THERAPEUTIC EXERCISES: CPT | Mod: GO | Performed by: OCCUPATIONAL THERAPIST

## 2022-04-05 RX ADMIN — LIDOCAINE HYDROCHLORIDE 0.5 ML: 10 INJECTION, SOLUTION INFILTRATION; PERINEURAL at 15:43

## 2022-04-05 ASSESSMENT — PAIN SCALES - GENERAL: PAINLEVEL: NO PAIN (1)

## 2022-04-05 NOTE — PROGRESS NOTES
Orthopaedic Surgery Hand Clinic Progress Note    Patient Name: Heath Waller  MRN: [id number]  : 1980  Date: 2022     Patient Name: Heath Waller  MRN: [id number]     CHIEF COMPLAINT: left hand and thumb pain     Occupation: RN Case Manager       Subjective:  Ms. 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 She was first seen 22 at which point she was placed in a thumb spica cast for 4 weeks. MRI did not demonstrate any evidence of tear of UCL on MRI. No other remarkable findings. Cast was removed 22    She returned to work in the field and states that she wore the splint that OT provided. Within a week, her symptoms returned and she realized she could not do her work. She therefore returned to see me and is back to office work and light duty. She denies any new injury. She is very clear that her pain is along the ulnar border of the thumb metacarpal/insertion of the FDI. She reports weakness in pinch and . She also reports limitations in twisting. She cannot twist open jars.    We obtained a repeat MRI with a stronger magnet and she is here today to discuss results.      Objective:  There were no vitals taken for this visit.    General: alert, appropriate, NAD  HEENT: NC/AT  CV: RRR by pulse  Pulm: Unlabored on RA  MSK:  - no swelling today, no deformity, normal alignment  +TTP dorsal ulnar aspect of the thumb metacarpal adjacent to the EPL/origin of the FDI  No mass  No laxity of the UCL or RCL at neutral or 30 deg flexion, this is not painful to testing  No significant volar plate tenderness or laxity  Mild stiffness with opposition  Negative Finkelstein's no pain over 1st dorsal compartment  +TTP CMC, + CMC grind  She demonstrates pinching which reproduces her pain  EPL/FPL/HI  intact  SILT m/r/u  WWP CR< 2s    Imaging:  New MRI 3/30/22   reviewed by me demonstrates no injury to the UCL, RCL, or volar plate. There is no edema or evidence of injury along the ulnar border of the metacarpal where she states she has pain.    XRs of thumb 3/15/22 demonstrates subluxation of CMC joint, but no narrowing or osteophytes    Assessment/Plan:   Ms. 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 She returned due to persistent symptoms when she returned to the field.     New MRI does not show any sagittal band injury, EPL or EPB strain, adductor strain, or FDI strain.    However she is very clear about the location of her pain, and that it is reproducible by pinching of her thumb and index finger and twisting. She also has tenderness of her CMC.    It is possible she has an atypical presentation of thumb CMC arthritis and that her FDI is overcompensating to stabilize the joint which is subluxating on x-rays.     I therefore recommended a thumb CMC injection which would be both diagnostic and potentially therapeutic. She agreed.    After obtaining written consent, I sterilized the skin over the CMC joint with chlorhexidine. I then anesthetized the skin with ethyl chloride freeze spray after which I injected 0.5cc of betamethasone 6mg/mL and 0.5cc of 1% lidocaine into the CMC joint. The patient tolerated the procedure well.    We extended her work restrictions. No field work.. Splint during activities and at night. She will discuss with hand therapy about dynamic strengthening and splinting of the thumb CMC joint.     F/u with me in 6 weeks if it is still problematic at that time.    Hand / Upper Extremity Injection/Arthrocentesis: L thumb CMC    Date/Time:  4/5/2022 3:43 PM  Performed by: Alonzo Cleveland MD  Authorized by: Alonzo Cleveland MD     Indications:  Pain  Needle Size:  22 G  Guidance: landmark    Approach:  Dorsal  Condition: osteoarthritis    Location:  Thumb  Site:  L thumb CMC    Medications:  0.5 mL lidocaine 1 %  Medications comment:  . 5 ml of Betamethasone 6mg/mL   NDC: 4820-1187-90  LOT [id number]  EXP10/31/22  Outcome:  Tolerated well, no immediate complications  Procedure discussed: discussed risks, benefits, and alternatives    Prep: patient was prepped and draped in usual sterile fashion        Alonzo Cleveland MD    Hand, Upper Extremity & Microvascular Surgery  Department of Orthopedic Surgery  [facility]

## 2022-04-05 NOTE — LETTER
[facility] [address]  [address]LO[address]  [facility] [address]  Phone: [phone]  Fax: [phone]    April 5, 2022        Heath Wallre  [address]   [facility] [address]          To whom it may concern:    RE: Heath Waller     Please continue current work restrictions.  Unable to perform field work duties at this time. OK to perform light duty, office work tasks while wearing splint.  These restrictions are in place x6 weeks, ~5/17/22.  She will follow up with my office if unable to return to work with out restrictions at that time, otherwise will follow up as needed.       Please contact me for questions or concerns.      Sincerely,            Alonzo Cleveland MD

## 2022-04-05 NOTE — LETTER
2022         RE: Heath Waller  [address]  [facility] [address]        Dear Colleague,    Thank you for referring your patient, Heath Waller, to the [facility] [place]. Please see a copy of my visit note below.    Orthopaedic Surgery Hand Clinic Progress Note    Patient Name: Heath Waller  MRN: [id number]  : 1980  Date: 2022     Patient Name: Heath Waller  MRN: [id number]     CHIEF COMPLAINT: left hand and thumb pain     Occupation: RN Case Manager       Subjective:  Ms. 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 She was first seen 22 at which point she was placed in a thumb spica cast for 4 weeks. MRI did not demonstrate any evidence of tear of UCL on MRI. No other remarkable findings. Cast was removed 22    She returned to work in the field and states that she wore the splint that OT provided. Within a week, her symptoms returned and she realized she could not do her work. She therefore returned to see me and is back to office work and light duty. She denies any new injury. She is very clear that her pain is along the ulnar border of the thumb metacarpal/insertion of the FDI. She reports weakness in pinch and . She also reports limitations in twisting. She cannot twist open jars.    We obtained a repeat MRI with a stronger magnet and she is here today to discuss results.      Objective:  There were no vitals taken for this visit.    General: alert, appropriate, NAD  HEENT: NC/AT  CV: RRR by pulse  Pulm: Unlabored on RA  MSK:  - no swelling today, no deformity, normal alignment  +TTP dorsal ulnar aspect of the thumb metacarpal adjacent to the EPL/origin of the FDI  No mass  No laxity of the UCL or RCL at neutral or 30  deg flexion, this is not painful to testing  No significant volar plate tenderness or laxity  Mild stiffness with opposition  Negative Finkelstein's no pain over 1st dorsal compartment  +TTP CMC, + CMC grind  She demonstrates pinching which reproduces her pain  EPL/FPL/HI intact  SILT m/r/u  WWP CR< 2s    Imaging:  New MRI 3/30/22   reviewed by me demonstrates no injury to the UCL, RCL, or volar plate. There is no edema or evidence of injury along the ulnar border of the metacarpal where she states she has pain.    XRs of thumb 3/15/22 demonstrates subluxation of CMC joint, but no narrowing or osteophytes    Assessment/Plan:   Ms. 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 She returned due to persistent symptoms when she returned to the field.     New MRI does not show any sagittal band injury, EPL or EPB strain, adductor strain, or FDI strain.    However she is very clear about the location of her pain, and that it is reproducible by pinching of her thumb and index finger and twisting. She also has tenderness of her CMC.    It is possible she has an atypical presentation of thumb CMC arthritis and that her FDI is overcompensating to stabilize the joint which is subluxating on x-rays.     I therefore recommended a thumb CMC injection which would be both diagnostic and potentially therapeutic. She agreed.    After obtaining written consent, I sterilized the skin over the CMC joint with chlorhexidine. I then anesthetized the skin with ethyl chloride freeze spray after which I injected 0.5cc of betamethasone 6mg/mL and 0.5cc of 1% lidocaine into the CMC joint. The patient tolerated the procedure well.    We extended her work restrictions. No field work.. Splint during  activities and at night. She will discuss with hand therapy about dynamic strengthening and splinting of the thumb CMC joint.     F/u with me in 6 weeks if it is still problematic at that time.    Hand / Upper Extremity Injection/Arthrocentesis: L thumb CMC    Date/Time: 4/5/2022 3:43 PM  Performed by: Alonzo Cleveland MD  Authorized by: Alonzo Cleveland MD     Indications:  Pain  Needle Size:  22 G  Guidance: landmark    Approach:  Dorsal  Condition: osteoarthritis    Location:  Thumb  Site:  L thumb CMC    Medications:  0.5 mL lidocaine 1 %  Medications comment:  . 5 ml of Betamethasone 6mg/mL   NDC: 0445-1451-17  LOT [id number]  EXP10/31/22  Outcome:  Tolerated well, no immediate complications  Procedure discussed: discussed risks, benefits, and alternatives    Prep: patient was prepped and draped in usual sterile fashion        Alonzo Cleveland MD    Hand, Upper Extremity & Microvascular Surgery  Department of Orthopedic Surgery  [facility]          Again, thank you for allowing me to participate in the care of your patient.        Sincerely,        Alonzo Cleveland MD

## 2022-04-06 RX ORDER — LIDOCAINE HYDROCHLORIDE 10 MG/ML
0.5 INJECTION, SOLUTION INFILTRATION; PERINEURAL
Status: SHIPPED | OUTPATIENT
Start: 2022-04-05

## 2022-04-08 ENCOUNTER — TELEPHONE (OUTPATIENT)
Dept: ORTHOPEDICS | Facility: CLINIC | Age: 42
End: 2022-04-08
Payer: COMMERCIAL

## 2022-04-08 NOTE — TELEPHONE ENCOUNTER
Workability letter and office visit note dated 4/5/22 faxed to Work Comp:   [phone]    Fax confirmed via RightFax.     Mary Mullen, ATC

## 2022-04-11 ENCOUNTER — OFFICE VISIT (OUTPATIENT)
Dept: FAMILY MEDICINE | Facility: CLINIC | Age: 42
End: 2022-04-11
Payer: COMMERCIAL

## 2022-04-11 VITALS — SYSTOLIC BLOOD PRESSURE: 122 MMHG | DIASTOLIC BLOOD PRESSURE: 78 MMHG

## 2022-04-11 DIAGNOSIS — D18.01 CHERRY ANGIOMA: ICD-10-CM

## 2022-04-11 DIAGNOSIS — D22.9 MULTIPLE BENIGN NEVI: ICD-10-CM

## 2022-04-11 DIAGNOSIS — D48.9 NEOPLASM OF UNCERTAIN BEHAVIOR: ICD-10-CM

## 2022-04-11 DIAGNOSIS — B07.9 VIRAL WARTS, UNSPECIFIED TYPE: ICD-10-CM

## 2022-04-11 DIAGNOSIS — Z12.83 SKIN CANCER SCREENING: Primary | ICD-10-CM

## 2022-04-11 DIAGNOSIS — L81.4 LENTIGINES: ICD-10-CM

## 2022-04-11 DIAGNOSIS — L82.1 SK (SEBORRHEIC KERATOSIS): ICD-10-CM

## 2022-04-11 DIAGNOSIS — L82.0 INFLAMED SEBORRHEIC KERATOSIS: ICD-10-CM

## 2022-04-11 DIAGNOSIS — Z86.018 HISTORY OF DYSPLASTIC NEVUS: ICD-10-CM

## 2022-04-11 PROCEDURE — 99203 OFFICE O/P NEW LOW 30 MIN: CPT | Mod: 25 | Performed by: PHYSICIAN ASSISTANT

## 2022-04-11 PROCEDURE — 11102 TANGNTL BX SKIN SINGLE LES: CPT | Mod: XS | Performed by: PHYSICIAN ASSISTANT

## 2022-04-11 PROCEDURE — 88305 TISSUE EXAM BY PATHOLOGIST: CPT | Performed by: PATHOLOGY

## 2022-04-11 PROCEDURE — 17110 DESTRUCTION B9 LES UP TO 14: CPT | Performed by: PHYSICIAN ASSISTANT

## 2022-04-11 NOTE — LETTER
4/11/2022         RE: Heath Waller  [address]  [facility] [address]        Dear Colleague,    Thank you for referring your patient, Heath Waller, to the [facility] [place]. Please see a copy of my visit note below.    [facility] Dermatology Note  Encounter Date: Apr 11, 2022  Office Visit     Dermatology Problem List:  1. Severely atypical nevus, right calf s/p MMS 7/19/2018 by Dr. Celeste  2. NUB, left medial cheek s/p shave biopsy 4/11/2022   ____________________________________________    Assessment & Plan:    # NUB, left medial cheek, Ddx irritated SK vs BCC vs other   - Shave biopsy today. See procedure note below.    # Hx DN - NERD on the R calf  -continue annual skin exams    # ISK, left clavicle  - Cryotherapy performed today. See procedure note below.      # Verrucous papule - left elbow  - Cryotherapy performed today. See procedure note below.    -Continue OTC treatments    #Benign lesions: SKs, cherry angiomas, lentigines, multiple benign nevi  -reassured  -discussed etiology of each  -Sunscreen: Apply 20 minutes prior to going outdoors and reapply every two hours, when wet or sweating. We recommend using an SPF 30 or higher, and to use one that is water resistant.     - Signs and Symptoms of non-melanoma skin cancer and ABCDEs of melanoma reviewed with patient. Patient encouraged to perform monthly self skin exams and educated on how to perform them. UV precautions reviewed with patient. Patient was asked about new or changing moles/lesions on body.     Procedures Performed:   - Shave biopsy procedure note, location(s): left medial cheek. After discussion of benefits and risks including but not limited to bleeding, infection, scar, incomplete removal, recurrence, and non-diagnostic biopsy, written consent and photographs were obtained. The area was cleaned with isopropyl alcohol. 0.5mL of 1% lidocaine with epinephrine was injected to  obtain adequate anesthesia of lesion(s). Shave biopsy at site(s) performed. Hemostasis was achieved with aluminium chloride. Petrolatum ointment and a sterile dressing were applied. The patient tolerated the procedure and no complications were noted. The patient was provided with verbal and written post care instructions.     - Cryotherapy procedure note, location(s): left clavicle and left clavicle After verbal consent and discussion of risks and benefits including, but not limited to, dyspigmentation/scar, blister, and pain, 2 lesion(s) was(were) treated with 1-2 mm freeze border for 1-2 cycles with liquid nitrogen. Post cryotherapy instructions were provided.    Follow-up: pending path results, then annually    Staff and Scribe:     Scribe Disclosure:  I, Ramona Balderas, am serving as a scribe to document services personally performed by Beatrice Retana PA-C based on data collection and the provider's statements to me.   Provider Disclosure:   The documentation recorded by the scribe accurately reflects the services I personally performed and the decisions made by me.    All risks, benefits and alternatives were discussed with patient.  Patient is in agreement and understands the assessment and plan.  All questions were answered.    Beatrice Retana PA-C, [facility]  [facility] [place]: Phone: [phone], Fax: [phone]  [facility]: Phone: [phone],  Fax: [phone]  [facility]: Phone: [phone], Fax: [phone]  ____________________________________________    CC: Derm Problem (FBSC, lesion L cheek area present over the past 3 months)      HPI:  Ms. Heath Waller is a(n) 41 year old female who presents today as a new patient for FBSE. The patient reports a new lesion on her nose that she first noticed three months ago. Does reports a Mohs procedure in 2018 for a severely atypical nevus on the right  calf. Done by Dr. Celeste. Denies a family history of skin cancer. Has had several biopsies in the past.     Patient is otherwise feeling well, without additional skin concerns.    Labs Reviewed:  N/A    Physical Exam:  Vitals: /78   SKIN: Total skin excluding the undergarment areas was performed. The exam included the head/face, neck, both arms, chest, back, abdomen, both legs, digits and/or nails.   - Chun type I. Greater than 100 nevi.  - Left medial cheek 7 mm pink scaly verrucous papule.  - There are dome shaped bright red papules on the trunk and extremities.   - Scattered brown macules on sun exposed areas.  - There are waxy stuck on tan to brown papules on the trunk and extremities.  - There is a tan to brown waxy stuck on papule with surrounding erythema on the left clavicle.   - Verrucous papule on the left elbow.  - No other lesions of concern on areas examined.     Medications:  Current Outpatient Medications   Medication     amphetamine-dextroamphetamine (ADDERALL XR) 30 MG 24 hr capsule     cyclobenzaprine (FLEXERIL) 10 MG tablet     DULoxetine (CYMBALTA) 60 MG capsule     ibuprofen (ADVIL,MOTRIN) 200 MG tablet     lisinopril (ZESTRIL) 10 MG tablet     omeprazole (PRILOSEC) 20 MG DR capsule     order for DME     sertraline (ZOLOFT) 100 MG tablet     spironolactone (ALDACTONE) 50 MG tablet     WAL-PHED 12 HOUR 120 MG 12 hr tablet     Current Facility-Administered Medications   Medication     lidocaine 1 % injection 0.5 mL        Past Medical History:   Patient Active Problem List   Diagnosis     Cervical intraepithelial neoplasia grade III with severe dysplasia     Depressive disorder     Gastroesophageal reflux disease     History of thyroid disease     Chronic pain     Attention deficit disorder (ADD) without hyperactivity     Acne vulgaris     TMJ (temporomandibular joint syndrome)     Fibromyalgia     Chronic rhinitis     Benign essential hypertension     Anxiety attack     ROSA  (obstructive sleep apnea)     Moderate episode of recurrent major depressive disorder (H)     Post-traumatic osteoarthritis of right knee     Pain of left hand     Thumb pain, left     Past Medical History:   Diagnosis Date     ADD (attention deficit disorder)      Atypical mole 07/19/2018     Degenerative disc disease, cervical      Depressive disorder      Gastro-oesophageal reflux disease      Myofacial muscle pain      Noninfectious ileitis     IBS with constipation     Other chronic pain     myofacial pain syndrome     Sleep apnea     wears CPAP at night     Thyroid disease        Again, thank you for allowing me to participate in the care of your patient.        Sincerely,        Beartice Retana PA-C

## 2022-04-11 NOTE — PROGRESS NOTES
[facility] Dermatology Note  Encounter Date: Apr 11, 2022  Office Visit     Dermatology Problem List:  1. Severely atypical nevus, right calf s/p MMS 7/19/2018 by Dr. Celeste  2. NUB, left medial cheek s/p shave biopsy 4/11/2022   ____________________________________________    Assessment & Plan:    # NUB, left medial cheek, Ddx irritated SK vs BCC vs other   - Shave biopsy today. See procedure note below.    # Hx DN - NERD on the R calf  -continue annual skin exams    # ISK, left clavicle  - Cryotherapy performed today. See procedure note below.      # Verrucous papule - left elbow  - Cryotherapy performed today. See procedure note below.    -Continue OTC treatments    #Benign lesions: SKs, cherry angiomas, lentigines, multiple benign nevi  -reassured  -discussed etiology of each  -Sunscreen: Apply 20 minutes prior to going outdoors and reapply every two hours, when wet or sweating. We recommend using an SPF 30 or higher, and to use one that is water resistant.     - Signs and Symptoms of non-melanoma skin cancer and ABCDEs of melanoma reviewed with patient. Patient encouraged to perform monthly self skin exams and educated on how to perform them. UV precautions reviewed with patient. Patient was asked about new or changing moles/lesions on body.     Procedures Performed:   - Shave biopsy procedure note, location(s): left medial cheek. After discussion of benefits and risks including but not limited to bleeding, infection, scar, incomplete removal, recurrence, and non-diagnostic biopsy, written consent and photographs were obtained. The area was cleaned with isopropyl alcohol. 0.5mL of 1% lidocaine with epinephrine was injected to obtain adequate anesthesia of lesion(s). Shave biopsy at site(s) performed. Hemostasis was achieved with aluminium chloride. Petrolatum ointment and a sterile dressing were applied. The patient tolerated the procedure and no complications were noted. The patient was  provided with verbal and written post care instructions.     - Cryotherapy procedure note, location(s): left clavicle and left clavicle After verbal consent and discussion of risks and benefits including, but not limited to, dyspigmentation/scar, blister, and pain, 2 lesion(s) was(were) treated with 1-2 mm freeze border for 1-2 cycles with liquid nitrogen. Post cryotherapy instructions were provided.    Follow-up: pending path results, then annually    Staff and Scribe:     Scribe Disclosure:  I, Ramona Balderas, am serving as a scribe to document services personally performed by Beatrice Retana PA-C based on data collection and the provider's statements to me.   Provider Disclosure:   The documentation recorded by the scribe accurately reflects the services I personally performed and the decisions made by me.    All risks, benefits and alternatives were discussed with patient.  Patient is in agreement and understands the assessment and plan.  All questions were answered.    Beatrice Retana PA-C, [facility]  [facility] [place]: Phone: [phone], Fax: [phone]  [facility]: Phone: [phone],  Fax: [phone]  [facility]: Phone: [phone], Fax: [phone]  ____________________________________________    CC: Derm Problem (FBSC, lesion L cheek area present over the past 3 months)      HPI:  Ms. Heath Waller is a(n) 41 year old female who presents today as a new patient for FBSE. The patient reports a new lesion on her nose that she first noticed three months ago. Does reports a Mohs procedure in 2018 for a severely atypical nevus on the right calf. Done by Dr. Celeste. Denies a family history of skin cancer. Has had several biopsies in the past.     Patient is otherwise feeling well, without additional skin concerns.    Labs Reviewed:  N/A    Physical Exam:  Vitals: /78   SKIN: Total skin excluding the  undergarment areas was performed. The exam included the head/face, neck, both arms, chest, back, abdomen, both legs, digits and/or nails.   - Chun type I. Greater than 100 nevi.  - Left medial cheek 7 mm pink scaly verrucous papule.  - There are dome shaped bright red papules on the trunk and extremities.   - Scattered brown macules on sun exposed areas.  - There are waxy stuck on tan to brown papules on the trunk and extremities.  - There is a tan to brown waxy stuck on papule with surrounding erythema on the left clavicle.   - Verrucous papule on the left elbow.  - No other lesions of concern on areas examined.     Medications:  Current Outpatient Medications   Medication     amphetamine-dextroamphetamine (ADDERALL XR) 30 MG 24 hr capsule     cyclobenzaprine (FLEXERIL) 10 MG tablet     DULoxetine (CYMBALTA) 60 MG capsule     ibuprofen (ADVIL,MOTRIN) 200 MG tablet     lisinopril (ZESTRIL) 10 MG tablet     omeprazole (PRILOSEC) 20 MG DR capsule     order for DME     sertraline (ZOLOFT) 100 MG tablet     spironolactone (ALDACTONE) 50 MG tablet     WAL-PHED 12 HOUR 120 MG 12 hr tablet     Current Facility-Administered Medications   Medication     lidocaine 1 % injection 0.5 mL        Past Medical History:   Patient Active Problem List   Diagnosis     Cervical intraepithelial neoplasia grade III with severe dysplasia     Depressive disorder     Gastroesophageal reflux disease     History of thyroid disease     Chronic pain     Attention deficit disorder (ADD) without hyperactivity     Acne vulgaris     TMJ (temporomandibular joint syndrome)     Fibromyalgia     Chronic rhinitis     Benign essential hypertension     Anxiety attack     ROSA (obstructive sleep apnea)     Moderate episode of recurrent major depressive disorder (H)     Post-traumatic osteoarthritis of right knee     Pain of left hand     Thumb pain, left     Past Medical History:   Diagnosis Date     ADD (attention deficit disorder)      Atypical mole  07/19/2018     Degenerative disc disease, cervical      Depressive disorder      Gastro-oesophageal reflux disease      Myofacial muscle pain      Noninfectious ileitis     IBS with constipation     Other chronic pain     myofacial pain syndrome     Sleep apnea     wears CPAP at night     Thyroid disease

## 2022-04-11 NOTE — PATIENT INSTRUCTIONS

## 2022-04-12 ENCOUNTER — THERAPY VISIT (OUTPATIENT)
Dept: OCCUPATIONAL THERAPY | Facility: CLINIC | Age: 42
End: 2022-04-12
Payer: OTHER MISCELLANEOUS

## 2022-04-12 DIAGNOSIS — M79.645 THUMB PAIN, LEFT: ICD-10-CM

## 2022-04-12 DIAGNOSIS — M79.642 PAIN OF LEFT HAND: Primary | ICD-10-CM

## 2022-04-12 LAB
PATH REPORT.COMMENTS IMP SPEC: NORMAL
PATH REPORT.FINAL DX SPEC: NORMAL
PATH REPORT.GROSS SPEC: NORMAL
PATH REPORT.MICROSCOPIC SPEC OTHER STN: NORMAL
PATH REPORT.RELEVANT HX SPEC: NORMAL

## 2022-04-12 PROCEDURE — 97110 THERAPEUTIC EXERCISES: CPT | Mod: GO | Performed by: OCCUPATIONAL THERAPIST

## 2022-04-12 PROCEDURE — 97140 MANUAL THERAPY 1/> REGIONS: CPT | Mod: GO | Performed by: OCCUPATIONAL THERAPIST

## 2022-04-12 NOTE — PROGRESS NOTES
Progress Note - Hand Therapy  Reporting period is 2/23/22 to 4/12/22.  Current Date:  2/23/2022    Diagnosis: Thumb pain, left   DOI: 12/1/2021    MD Note: - ROM and strengthening, no restrictions  Subjectve:   Subjective changes as noted by patient:  It's getting better. If I don't have to use it, I'm okay. I'm wearing the splint almost all the time.  Functional changes noted by patient:  Improvement in Household Chores  Patient has noted adverse reaction to:  None    Objective:  Changes noted in objective findings: Yes, see below    Pain Level (Scale 0-10)   2/23/2022 4/12   At Rest 2/10 0/10   With Use 8/10 8/10, not frequently     Pain Description  Date 2/23/2022 4/12/22   Location Dorsal thumb Dorsal thumb   Pain Quality Sharp sharp   Frequency intermittent   intermittent   Pain is worst  daytime daytime   Exacerbated by  With use Use, gripping   Relieved by rest rest   Progression Unchanged Getting better     Edema (Circumference measured in cm)   2/23/2022 2/23/2022 4/12/22   Thumb R L    P1 6.9 6.6    IP 6.0 5.9    P2 5.0 5.5 5.5     Sensation   Decreased Radial Nerve distribution per pt report    ROM  Thumb 2/23/2022 2/23/2022 4/12/22   AROM  (PROM) R L L   MP 0/69 0/61 0/62   IP +22/66 +42/40 +34/56   RABD 46 39 45   PABD 40 36 44   Kapandji Opposition Scale (0-10/10) 10 10 (5/10 pain) 10 (5/10)  8 (0/10)     Strength   (Measured in pounds)  Pain Report: - none  + mild    ++ moderate    +++ severe    2/23/2022 2/23/2022 4/12   Trials R L L   1  2  3 77 11 (7/10 pain) 11 (0/10)   Average 77 11      Lat Pinch 2/23/2022 2/23/2022   Trials R L   1  2  3 18 4 (7/10 pain)   Average 18 4     3 Pt Pinch 2/23/2022 2/23/2022   Trials R L   1  2  3 16 5 (7/10 pain)   Average 16 5     Assessment:  Response to therapy has been improvement to:  Strength:   and pinch  Pain:  frequency is less    Overall Assessment:  Patient's symptoms are resolving.  Patient would benefit from continued therapy to achieve rehab  potential  STG/LTG:  STGoals have been reviewed;  see goal sheet for details and updates.  LTGoals have been reviewed;  see goal sheet for details and updates.    I have re-evaluated this patient and find that the nature, scope, duration and intensity of the therapy is appropriate for the medical condition of the patient.      Plan:  Frequency:  1 X week, once daily  Duration:  for 4 weeks    Treatment Plan:   Modalities:  US and Paraffin  Therapeutic Exercise:  AROM, AAROM, PROM, Tendon Gliding, Blocking, Place and Hold, Isotonics and Isometrics  Neuromuscular re-education:  Nerve Gliding and Kinesiotaping  Manual Techniques:  Joint mobilization, Friction massage, Myofascial release and Manual edema mobilization  Orthotic Fabrication:  Static  Self Care:  Self Care Tasks, Ergonomic Considerations and Work Tasks    Discharge Plan:  Achieve all LTG.  Independent in home treatment program.  Reach maximal therapeutic benefit.    Home Exercise Program:  Orthosis Wear and Care  Majestic Massage to Thumb  Up to 2 minutes prior to exercises  Thumb Stabilization Web Space Release Method 1 with Clip  Hold for 1-2 minutes  Thumb Active Range of Motion IP Joint Blocking  Reps 10  Sessions per day 3-4  Thumb Active Range of Motion MP Joint Blocking  Reps 10  Sessions per day 3-4  Thumb Active Range of Motion Radial Abduction and Extension  Reps 10  Sessions per day 3-4  Thumb Active Range of Motion Palmar Abduction  Reps 10  Sessions per day 3-4    Next Visit:  Review HEP - progress to strengthening as appropriate with pain  Review orthosis fit  MFR  A/AA/PROM  US  K-taping

## 2022-04-26 ENCOUNTER — THERAPY VISIT (OUTPATIENT)
Dept: OCCUPATIONAL THERAPY | Facility: CLINIC | Age: 42
End: 2022-04-26
Payer: OTHER MISCELLANEOUS

## 2022-04-26 DIAGNOSIS — M79.642 PAIN OF LEFT HAND: Primary | ICD-10-CM

## 2022-04-26 DIAGNOSIS — M79.645 THUMB PAIN, LEFT: ICD-10-CM

## 2022-04-26 PROCEDURE — 97140 MANUAL THERAPY 1/> REGIONS: CPT | Mod: GO | Performed by: OCCUPATIONAL THERAPIST

## 2022-04-26 PROCEDURE — 97112 NEUROMUSCULAR REEDUCATION: CPT | Mod: GO | Performed by: OCCUPATIONAL THERAPIST

## 2022-04-26 PROCEDURE — 97110 THERAPEUTIC EXERCISES: CPT | Mod: GO | Performed by: OCCUPATIONAL THERAPIST

## 2022-04-29 ENCOUNTER — TELEPHONE (OUTPATIENT)
Dept: ORTHOPEDICS | Facility: CLINIC | Age: 42
End: 2022-04-29
Payer: COMMERCIAL

## 2022-04-29 NOTE — TELEPHONE ENCOUNTER
Reason for call: Maxwell, called on behalf of  nurse  Nat, requesting to know if patient has an upcoming office visit. If so he would like to know that date of that OV.    Tel: [phone]

## 2022-04-29 NOTE — TELEPHONE ENCOUNTER
Left voicemail for [employer], Work Comp, stating patient does not have a return visit scheduled. If he needs further information, he should fax a written request.   Provided both triage number and our fax number.     DONA Horn RN       no

## 2022-05-03 ENCOUNTER — THERAPY VISIT (OUTPATIENT)
Dept: OCCUPATIONAL THERAPY | Facility: CLINIC | Age: 42
End: 2022-05-03
Payer: OTHER MISCELLANEOUS

## 2022-05-03 DIAGNOSIS — M79.645 THUMB PAIN, LEFT: ICD-10-CM

## 2022-05-03 DIAGNOSIS — M79.642 PAIN OF LEFT HAND: Primary | ICD-10-CM

## 2022-05-03 PROCEDURE — 97112 NEUROMUSCULAR REEDUCATION: CPT | Mod: GO | Performed by: OCCUPATIONAL THERAPIST

## 2022-05-03 PROCEDURE — 97140 MANUAL THERAPY 1/> REGIONS: CPT | Mod: GO | Performed by: OCCUPATIONAL THERAPIST

## 2022-05-04 NOTE — RESULT ENCOUNTER NOTE
Emergency Dept/Urgent Care discharge antibiotic (if prescribed): Cephalexin (Keflex) 500 mg capsule, 1 capsule (500 mg) by mouth 2 times daily for 7 days.  Date of Rx (if applicable):  8/29/20  No changes in treatment per Urine culture protocol.  
Final urine culture report is NEGATIVE per [place] [facility] Lab Result protocol.    If NEGATIVE result, no change in treatment, per [place] [facility] Lab Result protocol.
Yes

## 2022-05-17 ENCOUNTER — THERAPY VISIT (OUTPATIENT)
Dept: OCCUPATIONAL THERAPY | Facility: CLINIC | Age: 42
End: 2022-05-17
Payer: OTHER MISCELLANEOUS

## 2022-05-17 DIAGNOSIS — M79.645 THUMB PAIN, LEFT: ICD-10-CM

## 2022-05-17 DIAGNOSIS — M79.642 PAIN OF LEFT HAND: Primary | ICD-10-CM

## 2022-05-17 PROCEDURE — 97112 NEUROMUSCULAR REEDUCATION: CPT | Mod: GO | Performed by: OCCUPATIONAL THERAPIST

## 2022-05-17 PROCEDURE — 97140 MANUAL THERAPY 1/> REGIONS: CPT | Mod: GO | Performed by: OCCUPATIONAL THERAPIST

## 2022-05-24 ENCOUNTER — MYC MEDICAL ADVICE (OUTPATIENT)
Dept: ORTHOPEDICS | Facility: CLINIC | Age: 42
End: 2022-05-24
Payer: COMMERCIAL

## 2022-05-25 NOTE — TELEPHONE ENCOUNTER
Routing to on-call provider regarding patient concerns.   Patient was provided letter for light duty work on 4/5/22.   Patient was to return to work on 5/17/22 without restrictions and per letter patient to follow up if unable to return to work at that time.     Next available office date with provider 6/7/22.   Please advise if OK to resume work restrictions of light duty until follow up with provider.     Mary Mullen, ATC

## 2022-05-27 NOTE — TELEPHONE ENCOUNTER
Received message from Dr. Rome:     Hi Mary,     Over-the-counter anti-inflammatories and Tylenol could indeed be used.  Also does she still have her old OT brace available?  If so she could wear that.     There does not appear to be an acute injury that would require urgent intervention.  I would consider following up with Dr. Cleveland whenever he has availability.     Thanks,     Ruperto

## 2022-06-07 ENCOUNTER — TELEPHONE (OUTPATIENT)
Dept: ORTHOPEDICS | Facility: CLINIC | Age: 42
End: 2022-06-07
Payer: COMMERCIAL

## 2022-06-07 NOTE — TELEPHONE ENCOUNTER
Received a call from Zaheer Arreola, [employer], on behalf of Nat, nurse , wanting to verify last visit and medical update.  He asked for updated office visit notes from 6/2/22.   He was informed that patient was not seen on that date, and her last visit was 4/5/22 with Dr. Cleveland.   He had down that patient was seeing Dr. Benji Patel.   He was informed that is incorrect. He states he spoke with Michelle from our office who told him patient was to see Dr. Patel on 6/2/22. Let him know patient was scheduled for a therapy appointment on that date and no showed.   He verbalized understanding.     DONA Horn RN

## 2022-06-08 ENCOUNTER — TELEPHONE (OUTPATIENT)
Dept: ORTHOPEDICS | Facility: CLINIC | Age: 42
End: 2022-06-08
Payer: COMMERCIAL

## 2022-06-08 NOTE — TELEPHONE ENCOUNTER
Phone call to Nat, nurse , work comp. She asked if patient was seen on 6/2/22. She was informed patient has not been seen since 4/5/22 and had been instructed to follow up in the office. There are no future appointments scheduled. Patient no showed for her physical therapy appointment on 6/2/22.   She has not been able to reach the patient and will most likely send her a letter.   She was appreciative of assistance.     DONA Horn RN

## 2022-06-08 NOTE — TELEPHONE ENCOUNTER
Reason for call:  Calling about follow up appointment on 6/2/22.    Other phone number:  [phone]

## 2022-06-22 ENCOUNTER — E-VISIT (OUTPATIENT)
Dept: FAMILY MEDICINE | Facility: CLINIC | Age: 42
End: 2022-06-22
Payer: COMMERCIAL

## 2022-06-22 DIAGNOSIS — J06.9 UPPER RESPIRATORY TRACT INFECTION, UNSPECIFIED TYPE: Primary | ICD-10-CM

## 2022-06-22 PROCEDURE — 99421 OL DIG E/M SVC 5-10 MIN: CPT | Performed by: PHYSICIAN ASSISTANT

## 2022-06-22 NOTE — LETTER
[facility]  [address] [address]  [phone]  Dept: [phone]      6/22/2022    Re: Heath Waller      TO WHOM IT MAY CONCERN:    Heath Waller was seen on 6/22/22 via e-visit.  Please excuse her absence due to illness. She may return to [facility] to work on 6/23/22 as long as symptoms continue to improve and she remains fever free without fever reducing medications for at least 24 hours.    Cordially      Yanna Moseley PA-C  [facility]

## 2022-07-12 ENCOUNTER — DOCUMENTATION ONLY (OUTPATIENT)
Dept: ORTHOPEDICS | Facility: CLINIC | Age: 42
End: 2022-07-12

## 2022-07-12 NOTE — PROGRESS NOTES
Work comp HCPR received  Unable to be complete as pt has not been seen since April  Forms returned to Woody Ortega [phone] stating so and that pt will need a follow-up for an update  Sent to be scanned into chart    Faina Burnette

## 2022-11-20 ENCOUNTER — HEALTH MAINTENANCE LETTER (OUTPATIENT)
Age: 42
End: 2022-11-20

## 2023-01-13 NOTE — PROGRESS NOTES
SUBJECTIVE:      Arava Pregnancy And Lactation Text: This medication is Pregnancy Category X and is absolutely contraindicated during pregnancy. It is unknown if it is excreted in breast milk.

## 2023-04-15 ENCOUNTER — HEALTH MAINTENANCE LETTER (OUTPATIENT)
Age: 43
End: 2023-04-15

## 2023-06-23 ENCOUNTER — OFFICE VISIT (OUTPATIENT)
Dept: FAMILY MEDICINE | Facility: CLINIC | Age: 43
End: 2023-06-23
Payer: COMMERCIAL

## 2023-06-23 VITALS
OXYGEN SATURATION: 97 % | HEIGHT: 69 IN | RESPIRATION RATE: 16 BRPM | BODY MASS INDEX: 29.18 KG/M2 | HEART RATE: 57 BPM | WEIGHT: 197 LBS | SYSTOLIC BLOOD PRESSURE: 116 MMHG | TEMPERATURE: 98.6 F | DIASTOLIC BLOOD PRESSURE: 68 MMHG

## 2023-06-23 DIAGNOSIS — M79.7 FIBROMYALGIA: ICD-10-CM

## 2023-06-23 DIAGNOSIS — E07.9 THYROID MASS: ICD-10-CM

## 2023-06-23 DIAGNOSIS — I10 BENIGN ESSENTIAL HYPERTENSION: Primary | ICD-10-CM

## 2023-06-23 DIAGNOSIS — F98.8 ATTENTION DEFICIT DISORDER (ADD) WITHOUT HYPERACTIVITY: ICD-10-CM

## 2023-06-23 DIAGNOSIS — F33.1 MODERATE EPISODE OF RECURRENT MAJOR DEPRESSIVE DISORDER (H): ICD-10-CM

## 2023-06-23 LAB
ERYTHROCYTE [DISTWIDTH] IN BLOOD BY AUTOMATED COUNT: 12.4 % (ref 10–15)
HCT VFR BLD AUTO: 40.8 % (ref 35–47)
HGB BLD-MCNC: 13.9 G/DL (ref 11.7–15.7)
MCH RBC QN AUTO: 32.6 PG (ref 26.5–33)
MCHC RBC AUTO-ENTMCNC: 34.1 G/DL (ref 31.5–36.5)
MCV RBC AUTO: 96 FL (ref 78–100)
PLATELET # BLD AUTO: 277 10E3/UL (ref 150–450)
RBC # BLD AUTO: 4.26 10E6/UL (ref 3.8–5.2)
WBC # BLD AUTO: 7 10E3/UL (ref 4–11)

## 2023-06-23 PROCEDURE — 84443 ASSAY THYROID STIM HORMONE: CPT | Performed by: PHYSICIAN ASSISTANT

## 2023-06-23 PROCEDURE — 80061 LIPID PANEL: CPT | Performed by: PHYSICIAN ASSISTANT

## 2023-06-23 PROCEDURE — 85027 COMPLETE CBC AUTOMATED: CPT | Performed by: PHYSICIAN ASSISTANT

## 2023-06-23 PROCEDURE — 80053 COMPREHEN METABOLIC PANEL: CPT | Performed by: PHYSICIAN ASSISTANT

## 2023-06-23 PROCEDURE — 99214 OFFICE O/P EST MOD 30 MIN: CPT | Performed by: PHYSICIAN ASSISTANT

## 2023-06-23 PROCEDURE — 36415 COLL VENOUS BLD VENIPUNCTURE: CPT | Performed by: PHYSICIAN ASSISTANT

## 2023-06-23 RX ORDER — CYCLOBENZAPRINE HCL 10 MG
10 TABLET ORAL 3 TIMES DAILY PRN
Qty: 30 TABLET | Refills: 3 | Status: SHIPPED | OUTPATIENT
Start: 2023-06-23 | End: 2024-08-28

## 2023-06-23 RX ORDER — DEXTROAMPHETAMINE SACCHARATE, AMPHETAMINE ASPARTATE MONOHYDRATE, DEXTROAMPHETAMINE SULFATE AND AMPHETAMINE SULFATE 5; 5; 5; 5 MG/1; MG/1; MG/1; MG/1
20 CAPSULE, EXTENDED RELEASE ORAL DAILY
Qty: 30 CAPSULE | Refills: 0 | Status: SHIPPED | OUTPATIENT
Start: 2023-06-23 | End: 2024-01-30 | Stop reason: DRUGHIGH

## 2023-06-23 ASSESSMENT — ENCOUNTER SYMPTOMS
SLEEP DISTURBANCE: 0
EYES NEGATIVE: 1
GASTROINTESTINAL NEGATIVE: 1
HEADACHES: 0
DIZZINESS: 0
DECREASED CONCENTRATION: 1
FATIGUE: 0
FEVER: 0
SHORTNESS OF BREATH: 0
PALPITATIONS: 0
LIGHT-HEADEDNESS: 0
NERVOUS/ANXIOUS: 1
CHILLS: 0
WHEEZING: 0
AGITATION: 0
COUGH: 0

## 2023-06-23 ASSESSMENT — ANXIETY QUESTIONNAIRES
1. FEELING NERVOUS, ANXIOUS, OR ON EDGE: NEARLY EVERY DAY
7. FEELING AFRAID AS IF SOMETHING AWFUL MIGHT HAPPEN: MORE THAN HALF THE DAYS
2. NOT BEING ABLE TO STOP OR CONTROL WORRYING: NEARLY EVERY DAY
IF YOU CHECKED OFF ANY PROBLEMS ON THIS QUESTIONNAIRE, HOW DIFFICULT HAVE THESE PROBLEMS MADE IT FOR YOU TO DO YOUR WORK, TAKE CARE OF THINGS AT HOME, OR GET ALONG WITH OTHER PEOPLE: EXTREMELY DIFFICULT
GAD7 TOTAL SCORE: 20
6. BECOMING EASILY ANNOYED OR IRRITABLE: NEARLY EVERY DAY
4. TROUBLE RELAXING: NEARLY EVERY DAY
3. WORRYING TOO MUCH ABOUT DIFFERENT THINGS: NEARLY EVERY DAY
GAD7 TOTAL SCORE: 20
5. BEING SO RESTLESS THAT IT IS HARD TO SIT STILL: NEARLY EVERY DAY
7. FEELING AFRAID AS IF SOMETHING AWFUL MIGHT HAPPEN: MORE THAN HALF THE DAYS
GAD7 TOTAL SCORE: 20
8. IF YOU CHECKED OFF ANY PROBLEMS, HOW DIFFICULT HAVE THESE MADE IT FOR YOU TO DO YOUR WORK, TAKE CARE OF THINGS AT HOME, OR GET ALONG WITH OTHER PEOPLE?: EXTREMELY DIFFICULT

## 2023-06-23 ASSESSMENT — PATIENT HEALTH QUESTIONNAIRE - PHQ9
SUM OF ALL RESPONSES TO PHQ QUESTIONS 1-9: 18
SUM OF ALL RESPONSES TO PHQ QUESTIONS 1-9: 18
10. IF YOU CHECKED OFF ANY PROBLEMS, HOW DIFFICULT HAVE THESE PROBLEMS MADE IT FOR YOU TO DO YOUR WORK, TAKE CARE OF THINGS AT HOME, OR GET ALONG WITH OTHER PEOPLE: EXTREMELY DIFFICULT

## 2023-06-23 NOTE — PROGRESS NOTES
Assessment & Plan     Benign essential hypertension  Blood pressure well controlled at 116/68.  She was on lisinopril and been off of this for about 5 years.  Since her blood pressure is well controlled we will not restart this today.  We will recheck labs now including a CBC, complete metabolic panel, lipid panel  - CBC with platelets; Future  - Comprehensive metabolic panel (BMP + Alb, Alk Phos, ALT, AST, Total. Bili, TP); Future  - Lipid panel reflex to direct LDL Fasting; Future  - CBC with platelets  - Comprehensive metabolic panel (BMP + Alb, Alk Phos, ALT, AST, Total. Bili, TP)  - Lipid panel reflex to direct LDL Fasting    Attention deficit disorder (ADD) without hyperactivity  Worsening ADHD.  Been off of Adderall for some time.  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  Formally diagnosed in 2011.  We will restart her on Adderall 20 mg.  She was at 30 mg when she stopped the medication.  Effects of the medication were discussed.  She will be following up and establishing care at the [facility].  I did recommend following up in 1 month for next refill and to monitor symptoms  - amphetamine-dextroamphetamine (ADDERALL XR) 20 MG 24 hr capsule; Take 1 capsule (20 mg) by mouth daily    Moderate episode of recurrent major depressive disorder (H)  Worsening anxiety and depression since the passing of her niece about a month ago.  She does have underlying anxiety and depression.  She was on Zoloft 100 mg when she stopped her medications.  Restart her on Zoloft 50 mg daily.  Side effects of the medication were discussed.  Recommended follow-up with PCP in 1 month.  She denies any thoughts of harming herself or others.  No suicidal ideations  - sertraline (ZOLOFT) 50 MG tablet; Take 1 tablet (50 mg) by mouth daily    Thyroid mass  Underwent a left lobe  thyroidectomy in 2001 due to thyroid mass.  She states it was benign.  We will check a TSH level  - TSH with free  T4 reflex; Future  - TSH with free T4 reflex    Fibromyalgia  Previously on Cymbalta and Flexeril.  We will refill Flexeril today to take at night.  We did discuss side effects of the medication including sedative side effects.  She is not to take this if she is going to drive or operate heavy equipment.  We will hold off on starting Cymbalta for now.  - cyclobenzaprine (FLEXERIL) 10 MG tablet; Take 1 tablet (10 mg) by mouth 3 times daily as needed for muscle spasms       Follow-up Visit   Expected date:  Jul 23, 2023 (Approximate)      Follow Up Appointment Details:     Follow-up with whom?: PCP    Follow-Up for what?: Chronic Disease f/u    Chronic Disease f/u: General (Other)    How?: In Person    Is this an as-needed follow-up?: Yes                   Current Outpatient Medications   Medication     amphetamine-dextroamphetamine (ADDERALL XR) 20 MG 24 hr capsule     cyclobenzaprine (FLEXERIL) 10 MG tablet     sertraline (ZOLOFT) 50 MG tablet     amphetamine-dextroamphetamine (ADDERALL XR) 30 MG 24 hr capsule     ibuprofen (ADVIL,MOTRIN) 200 MG tablet     order for DME     Current Facility-Administered Medications   Medication     lidocaine 1 % injection 0.5 mL         Nino Goodson PA-C  [facility]   Heath is a 42 year old, presenting for the following health issues:  Mental Health Problem (Pt started new job about 2 months ago and says that recent events of her 3yr old niece passing has made things hard for her. Has been off all meds for about 3 years but would like to discuss starting meds again. Also having trouble with her ADHD)        6/23/2023     3:06 PM   Additional Questions   Roomed by Luz Marina Singleton   Accompanied by none     Heath is a pleasant 42-year-old female who presents to the clinic today for refill on medications.  She has been off of all of her prescription medications for about 5 years.  Past medical history significant for hypertension, ADHD,  anxiety, depression, thyroid mass to left lobe that was removed in 2021, and fibromyalgia..    She was on Adderall 30 mg, Zoloft 100 mg, Cymbalta, Flexeril and lisinopril.  She has been off of all of her medications for about 5 years that she has been feeling well.  Recently lost her 3-year-old niece about a month ago and has been having significant PTSD, anxiety, and depression ever since.  She has decreased mood and is very emotional throughout the day.  He would like to restart on some Zoloft to help with grieving    She was diagnosed with ADHD in 2011.  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  She is having difficulty getting tasks done and it is interfering with her job.  Prior to going to the nurse manager role she had a new job where she did not need as much focus and attention and it is interfering with her day-to-day life.  She would like to restart Adderall.    She does have hypertension she has been off of lisinopril for some time.  Blood pressure today is 116/68.    Underlying history of fibromyalgia she was on Cymbalta and Flexeril.  She would like to restart the Flexeril and hold off on the Cymbalta at this time.  She feels that her fibromyalgia symptoms have been flaring up recently.    Mental Health Problem  Pertinent negatives include no chest pain, chills, coughing, fatigue, fever or headaches.   History of Present Illness       Mental Health Follow-up:  Patient presents to follow-up on Depression & Anxiety.Patient's depression since last visit has been:  Worse  The patient is having other symptoms associated with depression.  Patient's anxiety since last visit has been:  Worse  The patient is having other symptoms associated with anxiety.  Any significant life events: job concerns and grief or loss  Patient is feeling anxious or having panic attacks.  Patient has no concerns about alcohol  "or drug use.    Reason for visit:  ADD    She eats 2-3 servings of fruits and vegetables daily.She consumes 2 sweetened beverage(s) daily.She exercises with enough effort to increase her heart rate 9 or less minutes per day.  She exercises with enough effort to increase her heart rate 3 or less days per week.   She is taking medications regularly.    Today's PHQ-9         PHQ-9 Total Score: 18    PHQ-9 Q9 Thoughts of better off dead/self-harm past 2 weeks :   Not at all    How difficult have these problems made it for you to do your work, take care of things at home, or get along with other people: Extremely difficult  Today's TINA-7 Score: 20       Review of Systems   Constitutional: Negative for chills, fatigue and fever.   HENT: Negative.    Eyes: Negative.    Respiratory: Negative for cough, shortness of breath and wheezing.    Cardiovascular: Negative for chest pain and palpitations.   Gastrointestinal: Negative.    Genitourinary: Negative.    Skin: Negative.    Neurological: Negative for dizziness, light-headedness and headaches. Paresthesias:    Psychiatric/Behavioral: Positive for decreased concentration and mood changes. Negative for agitation, behavioral problems, self-injury, sleep disturbance and suicidal ideas. The patient is nervous/anxious.           Objective    /68 (BP Location: Left arm, Patient Position: Sitting, Cuff Size: Adult Regular)   Pulse 57   Temp 98.6  F (37  C) (Oral)   Resp 16   Ht 1.753 m (5' 9\")   Wt 89.4 kg (197 lb)   LMP 06/21/2023 (Exact Date)   SpO2 97%   BMI 29.09 kg/m    Body mass index is 29.09 kg/m .  Physical Exam  Vitals and nursing note reviewed.   Constitutional:       General: She is not in acute distress.     Appearance: Normal appearance. She is not ill-appearing, toxic-appearing or diaphoretic.   HENT:      Head: Normocephalic and atraumatic.   Eyes:      Conjunctiva/sclera: Conjunctivae normal.   Cardiovascular:      Rate and Rhythm: Normal rate and " regular rhythm.      Heart sounds: No murmur heard.     No friction rub. No gallop.   Pulmonary:      Effort: Pulmonary effort is normal.      Breath sounds: No wheezing, rhonchi or rales.   Skin:     General: Skin is warm and dry.   Neurological:      General: No focal deficit present.      Mental Status: She is alert and oriented to person, place, and time.      Motor: No weakness.   Psychiatric:         Mood and Affect: Mood normal.         Behavior: Behavior normal.         Thought Content: Thought content normal. Thought content does not include homicidal or suicidal ideation. Thought content does not include homicidal or suicidal plan.         Judgment: Judgment normal.

## 2023-06-24 LAB
ALBUMIN SERPL BCG-MCNC: 4.6 G/DL (ref 3.5–5.2)
ALP SERPL-CCNC: 81 U/L (ref 35–104)
ALT SERPL W P-5'-P-CCNC: 10 U/L (ref 0–50)
ANION GAP SERPL CALCULATED.3IONS-SCNC: 12 MMOL/L (ref 7–15)
AST SERPL W P-5'-P-CCNC: 18 U/L (ref 0–45)
BILIRUB SERPL-MCNC: 0.4 MG/DL
BUN SERPL-MCNC: 9 MG/DL (ref 6–20)
CALCIUM SERPL-MCNC: 9.3 MG/DL (ref 8.6–10)
CHLORIDE SERPL-SCNC: 106 MMOL/L (ref 98–107)
CHOLEST SERPL-MCNC: 214 MG/DL
CREAT SERPL-MCNC: 0.75 MG/DL (ref 0.51–0.95)
DEPRECATED HCO3 PLAS-SCNC: 22 MMOL/L (ref 22–29)
GFR SERPL CREATININE-BSD FRML MDRD: >90 ML/MIN/1.73M2
GLUCOSE SERPL-MCNC: 92 MG/DL (ref 70–99)
HDLC SERPL-MCNC: 53 MG/DL
LDLC SERPL CALC-MCNC: 147 MG/DL
NONHDLC SERPL-MCNC: 161 MG/DL
POTASSIUM SERPL-SCNC: 3.7 MMOL/L (ref 3.4–5.3)
PROT SERPL-MCNC: 7.2 G/DL (ref 6.4–8.3)
SODIUM SERPL-SCNC: 140 MMOL/L (ref 136–145)
TRIGL SERPL-MCNC: 69 MG/DL
TSH SERPL DL<=0.005 MIU/L-ACNC: 1.61 UIU/ML (ref 0.3–4.2)

## 2023-07-19 NOTE — TELEPHONE ENCOUNTER
Panel Management Review      Patient has the following on her problem list:     Depression / Dysthymia review  PHQ-9 SCORE 11/15/2016 11/22/2016 1/2/2017   Total Score 19 21 18      Patient is due for:  DAP    Hypertension   Last three blood pressure readings:  BP Readings from Last 3 Encounters:   01/24/17 125/80   01/13/17 140/113   01/11/17 122/81     Blood pressure: Passed    HTN Guidelines:  Age 18-59 BP range:  Less than 140/90  Age 60-85 with Diabetes:  Less than 140/90  Age 60-85 without Diabetes:  less than 150/90      Composite cancer screening  Chart review shows that this patient is due/due soon for the following None  Summary:    Patient is due/failing the following:   DAP    Action needed:   DAP Printed and hm updated    Type of outreach:    none, patient seen today and bp is within goal    Questions for provider review:    None                                                                                                                                    Fay Jett CMA       Chart routed to Care Team .           Spoke with patient. States that she had a colonoscopy where a polyp was found but it was unable to be removed. Polyp area was tattooed, patient reports pathology was benign. Patient does have color copies of colonoscopy. Instructed to bring copies to appointment. Patient does not have pathology, will request from Dr Martin Bergman ph ([phone]).

## 2023-09-11 ENCOUNTER — VIRTUAL VISIT (OUTPATIENT)
Dept: FAMILY MEDICINE | Facility: CLINIC | Age: 43
End: 2023-09-11
Payer: COMMERCIAL

## 2023-09-11 DIAGNOSIS — F98.8 ATTENTION DEFICIT DISORDER (ADD) WITHOUT HYPERACTIVITY: Primary | ICD-10-CM

## 2023-09-11 DIAGNOSIS — F33.1 MODERATE EPISODE OF RECURRENT MAJOR DEPRESSIVE DISORDER (H): ICD-10-CM

## 2023-09-11 PROCEDURE — 99214 OFFICE O/P EST MOD 30 MIN: CPT | Mod: VID | Performed by: PHYSICIAN ASSISTANT

## 2023-09-11 RX ORDER — DEXTROAMPHETAMINE SACCHARATE, AMPHETAMINE ASPARTATE MONOHYDRATE, DEXTROAMPHETAMINE SULFATE AND AMPHETAMINE SULFATE 7.5; 7.5; 7.5; 7.5 MG/1; MG/1; MG/1; MG/1
30 CAPSULE, EXTENDED RELEASE ORAL DAILY
Qty: 30 CAPSULE | Refills: 0 | Status: SHIPPED | OUTPATIENT
Start: 2023-11-12 | End: 2023-12-12

## 2023-09-11 RX ORDER — DEXTROAMPHETAMINE SACCHARATE, AMPHETAMINE ASPARTATE MONOHYDRATE, DEXTROAMPHETAMINE SULFATE AND AMPHETAMINE SULFATE 7.5; 7.5; 7.5; 7.5 MG/1; MG/1; MG/1; MG/1
30 CAPSULE, EXTENDED RELEASE ORAL DAILY
Qty: 30 CAPSULE | Refills: 0 | Status: SHIPPED | OUTPATIENT
Start: 2023-10-12 | End: 2023-11-11

## 2023-09-11 RX ORDER — DEXTROAMPHETAMINE SACCHARATE, AMPHETAMINE ASPARTATE MONOHYDRATE, DEXTROAMPHETAMINE SULFATE AND AMPHETAMINE SULFATE 7.5; 7.5; 7.5; 7.5 MG/1; MG/1; MG/1; MG/1
30 CAPSULE, EXTENDED RELEASE ORAL DAILY
Qty: 30 CAPSULE | Refills: 0 | Status: SHIPPED | OUTPATIENT
Start: 2023-09-11 | End: 2023-10-11

## 2023-09-11 RX ORDER — SERTRALINE HYDROCHLORIDE 100 MG/1
100 TABLET, FILM COATED ORAL DAILY
Qty: 90 TABLET | Refills: 1 | Status: SHIPPED | OUTPATIENT
Start: 2023-09-11 | End: 2024-03-25

## 2023-09-11 ASSESSMENT — ANXIETY QUESTIONNAIRES
GAD7 TOTAL SCORE: 16
6. BECOMING EASILY ANNOYED OR IRRITABLE: NEARLY EVERY DAY
IF YOU CHECKED OFF ANY PROBLEMS ON THIS QUESTIONNAIRE, HOW DIFFICULT HAVE THESE PROBLEMS MADE IT FOR YOU TO DO YOUR WORK, TAKE CARE OF THINGS AT HOME, OR GET ALONG WITH OTHER PEOPLE: VERY DIFFICULT
4. TROUBLE RELAXING: MORE THAN HALF THE DAYS
7. FEELING AFRAID AS IF SOMETHING AWFUL MIGHT HAPPEN: SEVERAL DAYS
GAD7 TOTAL SCORE: 16
3. WORRYING TOO MUCH ABOUT DIFFERENT THINGS: NEARLY EVERY DAY
1. FEELING NERVOUS, ANXIOUS, OR ON EDGE: NEARLY EVERY DAY
5. BEING SO RESTLESS THAT IT IS HARD TO SIT STILL: SEVERAL DAYS
2. NOT BEING ABLE TO STOP OR CONTROL WORRYING: NEARLY EVERY DAY

## 2023-09-11 ASSESSMENT — PATIENT HEALTH QUESTIONNAIRE - PHQ9
SUM OF ALL RESPONSES TO PHQ QUESTIONS 1-9: 16
SUM OF ALL RESPONSES TO PHQ QUESTIONS 1-9: 16
10. IF YOU CHECKED OFF ANY PROBLEMS, HOW DIFFICULT HAVE THESE PROBLEMS MADE IT FOR YOU TO DO YOUR WORK, TAKE CARE OF THINGS AT HOME, OR GET ALONG WITH OTHER PEOPLE: VERY DIFFICULT

## 2023-09-11 NOTE — PROGRESS NOTES
"Heath is a 42 year old who is being evaluated via a billable video visit.      How would you like to obtain your AVS? MyChart  If the video visit is dropped, the invitation should be resent by: Text to cell phone: [phone]  Will anyone else be joining your video visit? No          Assessment & Plan     Attention deficit disorder (ADD) without hyperactivity  Tolerated restarting the adderall but felt more effective at 30mg when taking previously. Ok to go back up.   - amphetamine-dextroamphetamine (ADDERALL XR) 30 MG 24 hr capsule; Take 1 capsule (30 mg) by mouth daily for 30 days  - amphetamine-dextroamphetamine (ADDERALL XR) 30 MG 24 hr capsule; Take 1 capsule (30 mg) by mouth daily for 30 days  - amphetamine-dextroamphetamine (ADDERALL XR) 30 MG 24 hr capsule; Take 1 capsule (30 mg) by mouth daily for 30 days    Moderate episode of recurrent major depressive disorder (H)  Tolerated restarting zoloft but would like to increase (has taken as high as 150mg previously). Increasing dose as per below  - sertraline (ZOLOFT) 100 MG tablet; Take 1 tablet (100 mg) by mouth daily       BMI:   Estimated body mass index is 29.09 kg/m  as calculated from the following:    Height as of 6/23/23: 1.753 m (5' 9\").    Weight as of 6/23/23: 89.4 kg (197 lb).         Woody Villeda PA-C  [facility] BARB Joe is a 42 year old, presenting for the following health issues:  Recheck Medication      9/11/2023     1:13 PM   Additional Questions   Roomed by Cristy OLIVERA   Accompanied by Self         9/11/2023     1:13 PM   Patient Reported Additional Medications   Patient reports taking the following new medications None       History of Present Illness       Mental Health Follow-up:  Patient presents to follow-up on Depression & Anxiety.Patient's depression since last visit has been:  Medium  The patient is not having other symptoms associated with depression.  Patient's anxiety since last visit " has been:  Medium  The patient is not having other symptoms associated with anxiety.  Any significant life events: job concerns and grief or loss  Patient is feeling anxious or having panic attacks.  Patient has no concerns about alcohol or drug use.    Reason for visit:  ADD    She eats 2-3 servings of fruits and vegetables daily.She consumes 2 sweetened beverage(s) daily.She exercises with enough effort to increase her heart rate 10 to 19 minutes per day.  She exercises with enough effort to increase her heart rate 3 or less days per week.   She is taking medications regularly.     Heath Waller is a 42 year old female who presents today for adderall recheck  She restarted after a number of years off of the medication -- her prior job didn't require the medication as frequently  She now has a new position and feeling like she needs it again  Tolerated restarting recently at 20mg XR but would like to increase again to 30mg  That is was she has been on previously     Also started zoloft again last summer with the work stress, grieving for niece, etc  Started on zoloft 50mg, but still sees room for improvement  Irritability; low energy  Feels ongoing rumination/undercurrent of angst  Wondering about increasing dose?      Review of Systems   Constitutional, HEENT, cardiovascular, pulmonary, gi and gu systems are negative, except as otherwise noted.      Objective           Vitals:  No vitals were obtained today due to virtual visit.    Physical Exam   GENERAL: Healthy, alert and no distress  EYES: Eyes grossly normal to inspection.  No discharge or erythema, or obvious scleral/conjunctival abnormalities.  RESP: No audible wheeze, cough, or visible cyanosis.  No visible retractions or increased work of breathing.    SKIN: Visible skin clear. No significant rash, abnormal pigmentation or lesions.  NEURO: Cranial nerves grossly intact.  Mentation and speech appropriate for age.  PSYCH: Mentation appears normal, affect  normal/bright, judgement and insight intact, normal speech and appearance well-groomed.            Video-Visit Details    Type of service:  Video Visit     Originating Location (pt. Location): Home    Distant Location (provider location):  Off-site  Platform used for Video Visit: Thang

## 2023-10-16 ENCOUNTER — E-VISIT (OUTPATIENT)
Dept: URGENT CARE | Facility: CLINIC | Age: 43
End: 2023-10-16

## 2023-10-16 DIAGNOSIS — R06.2 WHEEZING: Primary | ICD-10-CM

## 2023-10-16 PROCEDURE — 99207 PR NON-BILLABLE SERV PER CHARTING: CPT | Performed by: FAMILY MEDICINE

## 2023-10-16 NOTE — PATIENT INSTRUCTIONS
Dear Heath Waller,    We are sorry you are not feeling well. Based on the responses you provided, it is recommended that you be seen in-person in urgent care so we can better evaluate your symptoms. Please click here to find the nearest urgent care location to you.   You will not be charged for this Visit. Thank you for trusting us with your care.    Cassandra Quan MD

## 2023-11-25 ENCOUNTER — HEALTH MAINTENANCE LETTER (OUTPATIENT)
Age: 43
End: 2023-11-25

## 2024-01-30 ENCOUNTER — VIRTUAL VISIT (OUTPATIENT)
Dept: FAMILY MEDICINE | Facility: CLINIC | Age: 44
End: 2024-01-30
Payer: COMMERCIAL

## 2024-01-30 DIAGNOSIS — F98.8 ATTENTION DEFICIT DISORDER (ADD) WITHOUT HYPERACTIVITY: Primary | ICD-10-CM

## 2024-01-30 DIAGNOSIS — R05.1 ACUTE COUGH: ICD-10-CM

## 2024-01-30 PROCEDURE — 99213 OFFICE O/P EST LOW 20 MIN: CPT | Mod: 95 | Performed by: FAMILY MEDICINE

## 2024-01-30 RX ORDER — ALBUTEROL SULFATE 90 UG/1
1-2 AEROSOL, METERED RESPIRATORY (INHALATION) EVERY 4 HOURS PRN
Qty: 18 G | Refills: 3 | Status: SHIPPED | OUTPATIENT
Start: 2024-01-30

## 2024-01-30 RX ORDER — DEXTROAMPHETAMINE SACCHARATE, AMPHETAMINE ASPARTATE MONOHYDRATE, DEXTROAMPHETAMINE SULFATE AND AMPHETAMINE SULFATE 7.5; 7.5; 7.5; 7.5 MG/1; MG/1; MG/1; MG/1
30 CAPSULE, EXTENDED RELEASE ORAL DAILY
Qty: 30 CAPSULE | Refills: 0 | Status: SHIPPED | OUTPATIENT
Start: 2024-01-30 | End: 2024-05-06

## 2024-01-30 RX ORDER — ALBUTEROL SULFATE 90 UG/1
1-2 AEROSOL, METERED RESPIRATORY (INHALATION)
COMMUNITY
Start: 2023-10-16 | End: 2024-01-30

## 2024-01-30 ASSESSMENT — PATIENT HEALTH QUESTIONNAIRE - PHQ9
5. POOR APPETITE OR OVEREATING: NEARLY EVERY DAY
SUM OF ALL RESPONSES TO PHQ QUESTIONS 1-9: 18

## 2024-01-30 ASSESSMENT — ANXIETY QUESTIONNAIRES
IF YOU CHECKED OFF ANY PROBLEMS ON THIS QUESTIONNAIRE, HOW DIFFICULT HAVE THESE PROBLEMS MADE IT FOR YOU TO DO YOUR WORK, TAKE CARE OF THINGS AT HOME, OR GET ALONG WITH OTHER PEOPLE: SOMEWHAT DIFFICULT
6. BECOMING EASILY ANNOYED OR IRRITABLE: NEARLY EVERY DAY
1. FEELING NERVOUS, ANXIOUS, OR ON EDGE: MORE THAN HALF THE DAYS
GAD7 TOTAL SCORE: 17
2. NOT BEING ABLE TO STOP OR CONTROL WORRYING: NEARLY EVERY DAY
5. BEING SO RESTLESS THAT IT IS HARD TO SIT STILL: MORE THAN HALF THE DAYS
GAD7 TOTAL SCORE: 17
3. WORRYING TOO MUCH ABOUT DIFFERENT THINGS: NEARLY EVERY DAY
7. FEELING AFRAID AS IF SOMETHING AWFUL MIGHT HAPPEN: SEVERAL DAYS

## 2024-01-30 NOTE — PROGRESS NOTES
"Heath is a 43 year old who is being evaluated via a billable video visit.      How would you like to obtain your AVS? [email or web address]  If the video visit is dropped, the invitation should be resent by: Other e-mail: [email or web address]  Will anyone else be joining your video visit? No          Assessment & Plan     Attention deficit disorder (ADD) without hyperactivity  PDMP reviewed, no red flags.  Refill medication at current dose.  Follow up in 6 months or sooner as needed.  - amphetamine-dextroamphetamine (ADDERALL XR) 30 MG 24 hr capsule; Take 1 capsule (30 mg) by mouth daily    Acute cough  Refill for PRN use  - albuterol (PROAIR HFA/PROVENTIL HFA/VENTOLIN HFA) 108 (90 Base) MCG/ACT inhaler; Inhale 1-2 puffs into the lungs every 4 hours as needed for shortness of breath, wheezing or cough      8 minutes spent by me on the date of the encounter doing chart review, history and exam, documentation and further activities per the note      BMI  Estimated body mass index is 29.09 kg/m  as calculated from the following:    Height as of 6/23/23: 1.753 m (5' 9\").    Weight as of 6/23/23: 89.4 kg (197 lb).     See Patient Instructions    Subjective   Heath is a 43 year old, presenting for the following health issues:  Recheck Medication        9/11/2023     1:13 PM   Additional Questions   Roomed by Cristy OLIVERA   Accompanied by Self     HPI       Medication Followup of Adderall  Taking Medication as prescribed: yes  Side Effects:  None  Medication Helping Symptoms:  no      ADHD Follow-Up (Adult)  Concerns:  Distractability, Finishing tasks, and Frustration tolerance  Changes since last visit: Same  Taking controlled (daily) medications as prescribed: Yes  Sleep: no problems  Adult ADHD Self-Reporting form given to patient?:  No  Currently in counseling: No    Medication Benefits:   Controlled symptoms: None  Uncontrolled symptoms:  Distractability, Finishing tasks, and Frustration tolerance    Medication Side Effects:  Reports:  none  Sleep " Problems? no    Working well for her, no concerns.    She had a cold last week and the inhaler she had from previous illness was helpful, would like refills if able.        Objective    Vitals - Patient Reported  Systolic (Patient Reported):  (Patient was not ablet to take today)  Diastolic (Patient Reported):  (Patient was not ablet to take today)  Weight (Patient Reported):  (Patient was not ablet to take today)  Height (Patient Reported):  (Patient was not ablet to take today)  SpO2 (Patient Reported):  (Patient was not ablet to take today)  Temperature (Patient Reported):  (Patient was not ablet to take today)  Pulse (Patient Reported):  (Patient was not ablet to take today)      Vitals:  No vitals were obtained today due to virtual visit.    Physical Exam   GENERAL: alert and no distress  EYES: Eyes grossly normal to inspection.  No discharge or erythema, or obvious scleral/conjunctival abnormalities.  RESP: No audible wheeze, cough, or visible cyanosis.    SKIN: Visible skin clear. No significant rash, abnormal pigmentation or lesions.  NEURO: Cranial nerves grossly intact.  Mentation and speech appropriate for age.  PSYCH: Appropriate affect, tone, and pace of words        Video-Visit Details    Type of service:  Video Visit     Originating Location (pt. Location): Home    Distant Location (provider location):  On-site  Platform used for Video Visit: Thang  Signed Electronically by: Nasima Hood MD

## 2024-03-24 DIAGNOSIS — F33.1 MODERATE EPISODE OF RECURRENT MAJOR DEPRESSIVE DISORDER (H): ICD-10-CM

## 2024-03-25 RX ORDER — SERTRALINE HYDROCHLORIDE 100 MG/1
100 TABLET, FILM COATED ORAL DAILY
Qty: 90 TABLET | Refills: 0 | Status: SHIPPED | OUTPATIENT
Start: 2024-03-25 | End: 2024-08-08

## 2024-04-10 ENCOUNTER — APPOINTMENT (OUTPATIENT)
Dept: LAB | Facility: CLINIC | Age: 44
End: 2024-04-10
Payer: COMMERCIAL

## 2024-04-10 ENCOUNTER — LAB REQUISITION (OUTPATIENT)
Dept: LAB | Facility: CLINIC | Age: 44
End: 2024-04-10

## 2024-04-10 PROCEDURE — 36415 COLL VENOUS BLD VENIPUNCTURE: CPT | Performed by: INTERNAL MEDICINE

## 2024-04-10 PROCEDURE — 86481 TB AG RESPONSE T-CELL SUSP: CPT | Performed by: INTERNAL MEDICINE

## 2024-04-12 LAB
GAMMA INTERFERON BACKGROUND BLD IA-ACNC: 0.04 IU/ML
M TB IFN-G BLD-IMP: NEGATIVE
M TB IFN-G CD4+ BCKGRND COR BLD-ACNC: 9.96 IU/ML
MITOGEN IGNF BCKGRD COR BLD-ACNC: 0.01 IU/ML
MITOGEN IGNF BCKGRD COR BLD-ACNC: 0.01 IU/ML
QUANTIFERON MITOGEN: 10 IU/ML
QUANTIFERON NIL TUBE: 0.04 IU/ML
QUANTIFERON TB1 TUBE: 0.05 IU/ML
QUANTIFERON TB2 TUBE: 0.05

## 2024-05-06 ENCOUNTER — MYC REFILL (OUTPATIENT)
Dept: FAMILY MEDICINE | Facility: CLINIC | Age: 44
End: 2024-05-06
Payer: COMMERCIAL

## 2024-05-06 DIAGNOSIS — F98.8 ATTENTION DEFICIT DISORDER (ADD) WITHOUT HYPERACTIVITY: ICD-10-CM

## 2024-05-08 RX ORDER — DEXTROAMPHETAMINE SACCHARATE, AMPHETAMINE ASPARTATE MONOHYDRATE, DEXTROAMPHETAMINE SULFATE AND AMPHETAMINE SULFATE 7.5; 7.5; 7.5; 7.5 MG/1; MG/1; MG/1; MG/1
30 CAPSULE, EXTENDED RELEASE ORAL DAILY
Qty: 30 CAPSULE | Refills: 0 | Status: SHIPPED | OUTPATIENT
Start: 2024-05-08 | End: 2024-06-27

## 2024-06-03 ENCOUNTER — PATIENT OUTREACH (OUTPATIENT)
Dept: FAMILY MEDICINE | Facility: CLINIC | Age: 44
End: 2024-06-03
Payer: COMMERCIAL

## 2024-06-03 NOTE — TELEPHONE ENCOUNTER
Patient Quality Outreach    Patient is due for the following:   Cervical Cancer Screening - PAP Needed  Physical Preventive Adult Physical    Next Steps:   Schedule a Adult Preventative    Type of outreach:    Sent MyChart message.    Next Steps:  Reach out within 90 days via Phone, MyChart, and Letter.    Max number of attempts reached: No. Will try again in 90 days if patient still on fail list.    Questions for provider review:    None           Vesna Vines

## 2024-06-16 ENCOUNTER — HEALTH MAINTENANCE LETTER (OUTPATIENT)
Age: 44
End: 2024-06-16

## 2024-06-27 ENCOUNTER — OFFICE VISIT (OUTPATIENT)
Dept: INTERNAL MEDICINE | Facility: CLINIC | Age: 44
End: 2024-06-27
Payer: COMMERCIAL

## 2024-06-27 VITALS
WEIGHT: 171.5 LBS | HEART RATE: 78 BPM | OXYGEN SATURATION: 99 % | SYSTOLIC BLOOD PRESSURE: 120 MMHG | RESPIRATION RATE: 14 BRPM | BODY MASS INDEX: 25.33 KG/M2 | TEMPERATURE: 97.4 F | DIASTOLIC BLOOD PRESSURE: 70 MMHG

## 2024-06-27 DIAGNOSIS — F98.8 ATTENTION DEFICIT DISORDER (ADD) WITHOUT HYPERACTIVITY: ICD-10-CM

## 2024-06-27 DIAGNOSIS — R21 RASH: Primary | ICD-10-CM

## 2024-06-27 PROCEDURE — 99213 OFFICE O/P EST LOW 20 MIN: CPT | Performed by: INTERNAL MEDICINE

## 2024-06-27 RX ORDER — TRIAMCINOLONE ACETONIDE 1 MG/G
CREAM TOPICAL 2 TIMES DAILY
Qty: 30 G | Refills: 0 | Status: SHIPPED | OUTPATIENT
Start: 2024-06-27

## 2024-06-27 RX ORDER — DEXTROAMPHETAMINE SACCHARATE, AMPHETAMINE ASPARTATE MONOHYDRATE, DEXTROAMPHETAMINE SULFATE AND AMPHETAMINE SULFATE 7.5; 7.5; 7.5; 7.5 MG/1; MG/1; MG/1; MG/1
30 CAPSULE, EXTENDED RELEASE ORAL DAILY
Qty: 30 CAPSULE | Refills: 0 | Status: CANCELLED | OUTPATIENT
Start: 2024-06-27

## 2024-06-27 RX ORDER — DEXTROAMPHETAMINE SACCHARATE, AMPHETAMINE ASPARTATE MONOHYDRATE, DEXTROAMPHETAMINE SULFATE AND AMPHETAMINE SULFATE 7.5; 7.5; 7.5; 7.5 MG/1; MG/1; MG/1; MG/1
30 CAPSULE, EXTENDED RELEASE ORAL DAILY
Qty: 30 CAPSULE | Refills: 0 | Status: SHIPPED | OUTPATIENT
Start: 2024-06-27 | End: 2024-08-01

## 2024-06-27 ASSESSMENT — PATIENT HEALTH QUESTIONNAIRE - PHQ9
SUM OF ALL RESPONSES TO PHQ QUESTIONS 1-9: 12
SUM OF ALL RESPONSES TO PHQ QUESTIONS 1-9: 12
10. IF YOU CHECKED OFF ANY PROBLEMS, HOW DIFFICULT HAVE THESE PROBLEMS MADE IT FOR YOU TO DO YOUR WORK, TAKE CARE OF THINGS AT HOME, OR GET ALONG WITH OTHER PEOPLE: SOMEWHAT DIFFICULT

## 2024-06-27 NOTE — PROGRESS NOTES
Assessment & Plan     Rash  Inflammatory skin lesion lower extremity.  Suggest trial of topical triamcinolone twice daily for the next 10 to 14 days, follow-up if no improvement.  - triamcinolone (KENALOG) 0.1 % external cream; Apply topically 2 times daily Do not use on face    Attention deficit disorder (ADD) without hyperactivity  Filled per patient request, PDMP reviewed.  - amphetamine-dextroamphetamine (ADDERALL XR) 30 MG 24 hr capsule; Take 1 capsule (30 mg) by mouth daily    Patient is new to me in clinic and states she will be establishing care in [place].    See Patient Instructions    Subjective   Heath is a 43 year old, presenting for the following health issues:  Derm Problem    History of Present Illness       Reason for visit:  New red spot on leg  Symptom onset:  1-3 days ago    She eats 2-3 servings of fruits and vegetables daily.She consumes 3 sweetened beverage(s) daily.She exercises with enough effort to increase her heart rate 9 or less minutes per day.  She exercises with enough effort to increase her heart rate 3 or less days per week.   She is taking medications regularly.     Patient does not report any systemic symptoms.  She reports no obvious predisposing factors.  She states she woke up with a spot noted on her leg.  No other skin rash elsewhere.    Review of Systems  CONSTITUTIONAL: NEGATIVE for change in weight  EYES: NEGATIVE for vision changes or irritation  ENT/MOUTH: NEGATIVE for ear, mouth and throat problems  RESP: NEGATIVE for significant cough or SOB  CV: NEGATIVE for chest pain, palpitations or peripheral edema  GI: NEGATIVE for nausea, abdominal pain, heartburn, or change in bowel habits  : NEGATIVE for frequency, dysuria, or hematuria  MUSCULOSKELETAL: NEGATIVE for significant arthralgias or myalgia  HEME: NEGATIVE for bleeding problems  PSYCHIATRIC: NEGATIVE for changes in mood or affect      Objective    /70 (BP Location: Left arm, Patient Position: Sitting,  Cuff Size: Adult Regular)   Pulse 78   Temp 97.4  F (36.3  C) (Temporal)   Resp 14   Wt 77.8 kg (171 lb 8 oz)   LMP 06/06/2024 (Approximate)   SpO2 99%   BMI 25.33 kg/m    Body mass index is 25.33 kg/m .  Physical Exam   GENERAL: alert and no distress  SKIN: There is a small dime size slightly erythematous slightly scaly inflammatory macular skin lesion noted to the lower extremity.  This does not appear to have any central clearing.  Does not appear to have any superficial surrounding erythema.  PSYCH: mentation appears normal, affect normal/bright        Signed Electronically by: Yo Munguia MD

## 2024-08-01 ENCOUNTER — VIRTUAL VISIT (OUTPATIENT)
Dept: FAMILY MEDICINE | Facility: CLINIC | Age: 44
End: 2024-08-01
Payer: COMMERCIAL

## 2024-08-01 ENCOUNTER — MYC REFILL (OUTPATIENT)
Dept: INTERNAL MEDICINE | Facility: CLINIC | Age: 44
End: 2024-08-01
Payer: COMMERCIAL

## 2024-08-01 DIAGNOSIS — F98.8 ATTENTION DEFICIT DISORDER (ADD) WITHOUT HYPERACTIVITY: ICD-10-CM

## 2024-08-01 DIAGNOSIS — F98.8 ATTENTION DEFICIT DISORDER (ADD) WITHOUT HYPERACTIVITY: Primary | ICD-10-CM

## 2024-08-01 PROCEDURE — 99213 OFFICE O/P EST LOW 20 MIN: CPT | Mod: 95 | Performed by: NURSE PRACTITIONER

## 2024-08-01 RX ORDER — DEXTROAMPHETAMINE SACCHARATE, AMPHETAMINE ASPARTATE MONOHYDRATE, DEXTROAMPHETAMINE SULFATE AND AMPHETAMINE SULFATE 7.5; 7.5; 7.5; 7.5 MG/1; MG/1; MG/1; MG/1
30 CAPSULE, EXTENDED RELEASE ORAL DAILY
Qty: 30 CAPSULE | Refills: 0 | Status: SHIPPED | OUTPATIENT
Start: 2024-09-01

## 2024-08-01 RX ORDER — DEXTROAMPHETAMINE SACCHARATE, AMPHETAMINE ASPARTATE MONOHYDRATE, DEXTROAMPHETAMINE SULFATE AND AMPHETAMINE SULFATE 7.5; 7.5; 7.5; 7.5 MG/1; MG/1; MG/1; MG/1
30 CAPSULE, EXTENDED RELEASE ORAL DAILY
Qty: 30 CAPSULE | Refills: 0 | Status: SHIPPED | OUTPATIENT
Start: 2024-08-01 | End: 2024-10-03

## 2024-08-01 RX ORDER — DEXTROAMPHETAMINE SACCHARATE, AMPHETAMINE ASPARTATE MONOHYDRATE, DEXTROAMPHETAMINE SULFATE AND AMPHETAMINE SULFATE 7.5; 7.5; 7.5; 7.5 MG/1; MG/1; MG/1; MG/1
30 CAPSULE, EXTENDED RELEASE ORAL DAILY
Qty: 30 CAPSULE | Refills: 0 | OUTPATIENT
Start: 2024-08-01

## 2024-08-01 ASSESSMENT — PATIENT HEALTH QUESTIONNAIRE - PHQ9
SUM OF ALL RESPONSES TO PHQ QUESTIONS 1-9: 11
10. IF YOU CHECKED OFF ANY PROBLEMS, HOW DIFFICULT HAVE THESE PROBLEMS MADE IT FOR YOU TO DO YOUR WORK, TAKE CARE OF THINGS AT HOME, OR GET ALONG WITH OTHER PEOPLE: SOMEWHAT DIFFICULT
SUM OF ALL RESPONSES TO PHQ QUESTIONS 1-9: 11

## 2024-08-01 NOTE — PROGRESS NOTES
Heath is a 43 year old who is being evaluated via a billable video visit.    How would you like to obtain your AVS? MyChart  If the video visit is dropped, the invitation should be resent by: Text to cell phone: [phone]  Will anyone else be joining your video visit? No      Assessment & Plan         Attention deficit disorder (ADD) without hyperactivity  Stable.  Appt to establish care in September.  Will send refills to last until then.  - amphetamine-dextroamphetamine (ADDERALL XR) 30 MG 24 hr capsule; Take 1 capsule (30 mg) by mouth daily  - amphetamine-dextroamphetamine (ADDERALL XR) 30 MG 24 hr capsule; Take 1 capsule (30 mg) by mouth daily      Subjective   Heath is a 43 year old, presenting for the following health issues:  Recheck Medication      8/1/2024     2:15 PM   Additional Questions   Roomed by yany mcintyre   Accompanied by self         8/1/2024     2:15 PM   Patient Reported Additional Medications   Patient reports taking the following new medications na     HPI     Hx of adhd.  Able to review records from [facility].  Stable on adderall dose.  No side effects.          Review of Systems  Constitutional, HEENT, cardiovascular, pulmonary, gi and gu systems are negative, except as otherwise noted.      Objective           Vitals:  No vitals were obtained today due to virtual visit.    Physical Exam   GENERAL: alert and no distress  EYES: Eyes grossly normal to inspection.  No discharge or erythema, or obvious scleral/conjunctival abnormalities.  RESP: No audible wheeze, cough, or visible cyanosis.    SKIN: Visible skin clear. No significant rash, abnormal pigmentation or lesions.  NEURO: Cranial nerves grossly intact.  Mentation and speech appropriate for age.  PSYCH: Appropriate affect, tone, and pace of words          Video-Visit Details    Type of service:  Video Visit   Originating Location (pt. Location): Home    Distant Location (provider location):  On-site  Platform used for Video Visit:  Thang  Signed Electronically by: GREGORIO Fernández Ra CNP

## 2024-08-03 DIAGNOSIS — F33.1 MODERATE EPISODE OF RECURRENT MAJOR DEPRESSIVE DISORDER (H): ICD-10-CM

## 2024-08-05 RX ORDER — SERTRALINE HYDROCHLORIDE 100 MG/1
100 TABLET, FILM COATED ORAL DAILY
Qty: 90 TABLET | Refills: 0 | OUTPATIENT
Start: 2024-08-05

## 2024-08-07 DIAGNOSIS — F33.1 MODERATE EPISODE OF RECURRENT MAJOR DEPRESSIVE DISORDER (H): ICD-10-CM

## 2024-08-08 RX ORDER — SERTRALINE HYDROCHLORIDE 100 MG/1
100 TABLET, FILM COATED ORAL DAILY
Qty: 90 TABLET | Refills: 0 | Status: SHIPPED | OUTPATIENT
Start: 2024-08-08 | End: 2024-08-28

## 2024-08-28 ENCOUNTER — OFFICE VISIT (OUTPATIENT)
Dept: FAMILY MEDICINE | Facility: CLINIC | Age: 44
End: 2024-08-28
Payer: COMMERCIAL

## 2024-08-28 VITALS
HEIGHT: 69 IN | SYSTOLIC BLOOD PRESSURE: 113 MMHG | RESPIRATION RATE: 14 BRPM | WEIGHT: 170 LBS | HEART RATE: 72 BPM | DIASTOLIC BLOOD PRESSURE: 72 MMHG | BODY MASS INDEX: 25.18 KG/M2 | TEMPERATURE: 98.1 F | OXYGEN SATURATION: 100 %

## 2024-08-28 DIAGNOSIS — Z12.31 VISIT FOR SCREENING MAMMOGRAM: ICD-10-CM

## 2024-08-28 DIAGNOSIS — Z00.00 ROUTINE GENERAL MEDICAL EXAMINATION AT A HEALTH CARE FACILITY: Primary | ICD-10-CM

## 2024-08-28 DIAGNOSIS — F33.1 MODERATE EPISODE OF RECURRENT MAJOR DEPRESSIVE DISORDER (H): ICD-10-CM

## 2024-08-28 DIAGNOSIS — Z11.4 SCREENING FOR HIV (HUMAN IMMUNODEFICIENCY VIRUS): ICD-10-CM

## 2024-08-28 DIAGNOSIS — Z13.220 LIPID SCREENING: ICD-10-CM

## 2024-08-28 DIAGNOSIS — Z12.4 CERVICAL CANCER SCREENING: ICD-10-CM

## 2024-08-28 DIAGNOSIS — M79.7 FIBROMYALGIA: ICD-10-CM

## 2024-08-28 DIAGNOSIS — L98.8 MOHS DEFECT: ICD-10-CM

## 2024-08-28 DIAGNOSIS — Z98.890 MOHS DEFECT: ICD-10-CM

## 2024-08-28 DIAGNOSIS — Z12.11 SCREEN FOR COLON CANCER: ICD-10-CM

## 2024-08-28 DIAGNOSIS — F98.8 ATTENTION DEFICIT DISORDER (ADD) WITHOUT HYPERACTIVITY: ICD-10-CM

## 2024-08-28 DIAGNOSIS — Z11.59 NEED FOR HEPATITIS C SCREENING TEST: ICD-10-CM

## 2024-08-28 DIAGNOSIS — Z90.09 HISTORY OF LOBECTOMY OF THYROID: ICD-10-CM

## 2024-08-28 LAB
ALBUMIN SERPL BCG-MCNC: 4.4 G/DL (ref 3.5–5.2)
ALP SERPL-CCNC: 86 U/L (ref 40–150)
ALT SERPL W P-5'-P-CCNC: 8 U/L (ref 0–50)
ANION GAP SERPL CALCULATED.3IONS-SCNC: 9 MMOL/L (ref 7–15)
AST SERPL W P-5'-P-CCNC: 19 U/L (ref 0–45)
BASOPHILS # BLD AUTO: 0.1 10E3/UL (ref 0–0.2)
BASOPHILS NFR BLD AUTO: 1 %
BILIRUB SERPL-MCNC: 0.4 MG/DL
BUN SERPL-MCNC: 7.8 MG/DL (ref 6–20)
CALCIUM SERPL-MCNC: 8.8 MG/DL (ref 8.8–10.4)
CHLORIDE SERPL-SCNC: 105 MMOL/L (ref 98–107)
CHOLEST SERPL-MCNC: 213 MG/DL
CREAT SERPL-MCNC: 0.71 MG/DL (ref 0.51–0.95)
CREAT UR-MCNC: 220 MG/DL
EGFRCR SERPLBLD CKD-EPI 2021: >90 ML/MIN/1.73M2
EOSINOPHIL # BLD AUTO: 0.3 10E3/UL (ref 0–0.7)
EOSINOPHIL NFR BLD AUTO: 5 %
ERYTHROCYTE [DISTWIDTH] IN BLOOD BY AUTOMATED COUNT: 15.4 % (ref 10–15)
FASTING STATUS PATIENT QL REPORTED: ABNORMAL
FASTING STATUS PATIENT QL REPORTED: NORMAL
GLUCOSE SERPL-MCNC: 83 MG/DL (ref 70–99)
HCO3 SERPL-SCNC: 27 MMOL/L (ref 22–29)
HCT VFR BLD AUTO: 39.6 % (ref 35–47)
HCV AB SERPL QL IA: NONREACTIVE
HDLC SERPL-MCNC: 69 MG/DL
HGB BLD-MCNC: 12.6 G/DL (ref 11.7–15.7)
HIV 1+2 AB+HIV1 P24 AG SERPL QL IA: NONREACTIVE
IMM GRANULOCYTES # BLD: 0 10E3/UL
IMM GRANULOCYTES NFR BLD: 0 %
LDLC SERPL CALC-MCNC: 132 MG/DL
LYMPHOCYTES # BLD AUTO: 1.1 10E3/UL (ref 0.8–5.3)
LYMPHOCYTES NFR BLD AUTO: 23 %
MCH RBC QN AUTO: 30.1 PG (ref 26.5–33)
MCHC RBC AUTO-ENTMCNC: 31.8 G/DL (ref 31.5–36.5)
MCV RBC AUTO: 95 FL (ref 78–100)
MONOCYTES # BLD AUTO: 0.3 10E3/UL (ref 0–1.3)
MONOCYTES NFR BLD AUTO: 7 %
NEUTROPHILS # BLD AUTO: 3.1 10E3/UL (ref 1.6–8.3)
NEUTROPHILS NFR BLD AUTO: 64 %
NONHDLC SERPL-MCNC: 144 MG/DL
PLATELET # BLD AUTO: 285 10E3/UL (ref 150–450)
POTASSIUM SERPL-SCNC: 4 MMOL/L (ref 3.4–5.3)
PROT SERPL-MCNC: 7.1 G/DL (ref 6.4–8.3)
RBC # BLD AUTO: 4.19 10E6/UL (ref 3.8–5.2)
SODIUM SERPL-SCNC: 141 MMOL/L (ref 135–145)
TRIGL SERPL-MCNC: 61 MG/DL
TSH SERPL DL<=0.005 MIU/L-ACNC: 1.23 UIU/ML (ref 0.3–4.2)
WBC # BLD AUTO: 4.9 10E3/UL (ref 4–11)

## 2024-08-28 PROCEDURE — 86803 HEPATITIS C AB TEST: CPT

## 2024-08-28 PROCEDURE — 85025 COMPLETE CBC W/AUTO DIFF WBC: CPT

## 2024-08-28 PROCEDURE — 80061 LIPID PANEL: CPT

## 2024-08-28 PROCEDURE — 80053 COMPREHEN METABOLIC PANEL: CPT

## 2024-08-28 PROCEDURE — 36415 COLL VENOUS BLD VENIPUNCTURE: CPT

## 2024-08-28 PROCEDURE — G0124 SCREEN C/V THIN LAYER BY MD: HCPCS | Performed by: PATHOLOGY

## 2024-08-28 PROCEDURE — 87389 HIV-1 AG W/HIV-1&-2 AB AG IA: CPT

## 2024-08-28 PROCEDURE — 84443 ASSAY THYROID STIM HORMONE: CPT

## 2024-08-28 PROCEDURE — 99396 PREV VISIT EST AGE 40-64: CPT | Mod: 25

## 2024-08-28 PROCEDURE — 90677 PCV20 VACCINE IM: CPT

## 2024-08-28 PROCEDURE — G0145 SCR C/V CYTO,THINLAYER,RESCR: HCPCS

## 2024-08-28 PROCEDURE — 90715 TDAP VACCINE 7 YRS/> IM: CPT

## 2024-08-28 PROCEDURE — 87624 HPV HI-RISK TYP POOLED RSLT: CPT

## 2024-08-28 PROCEDURE — 90471 IMMUNIZATION ADMIN: CPT

## 2024-08-28 PROCEDURE — 90472 IMMUNIZATION ADMIN EACH ADD: CPT

## 2024-08-28 PROCEDURE — 99214 OFFICE O/P EST MOD 30 MIN: CPT | Mod: 25

## 2024-08-28 PROCEDURE — G0481 DRUG TEST DEF 8-14 CLASSES: HCPCS

## 2024-08-28 RX ORDER — SERTRALINE HYDROCHLORIDE 100 MG/1
100 TABLET, FILM COATED ORAL DAILY
Qty: 90 TABLET | Refills: 3 | Status: SHIPPED | OUTPATIENT
Start: 2024-08-28

## 2024-08-28 RX ORDER — DEXTROAMPHETAMINE SACCHARATE, AMPHETAMINE ASPARTATE, DEXTROAMPHETAMINE SULFATE AND AMPHETAMINE SULFATE 2.5; 2.5; 2.5; 2.5 MG/1; MG/1; MG/1; MG/1
10 TABLET ORAL DAILY
Qty: 30 TABLET | Refills: 0 | Status: SHIPPED | OUTPATIENT
Start: 2024-08-28 | End: 2024-10-03

## 2024-08-28 RX ORDER — CYCLOBENZAPRINE HCL 10 MG
10 TABLET ORAL 3 TIMES DAILY PRN
Qty: 30 TABLET | Refills: 3 | Status: SHIPPED | OUTPATIENT
Start: 2024-08-28

## 2024-08-28 SDOH — HEALTH STABILITY: PHYSICAL HEALTH: ON AVERAGE, HOW MANY MINUTES DO YOU ENGAGE IN EXERCISE AT THIS LEVEL?: 0 MIN

## 2024-08-28 SDOH — HEALTH STABILITY: PHYSICAL HEALTH: ON AVERAGE, HOW MANY DAYS PER WEEK DO YOU ENGAGE IN MODERATE TO STRENUOUS EXERCISE (LIKE A BRISK WALK)?: 0 DAYS

## 2024-08-28 ASSESSMENT — SOCIAL DETERMINANTS OF HEALTH (SDOH): HOW OFTEN DO YOU GET TOGETHER WITH FRIENDS OR RELATIVES?: ONCE A WEEK

## 2024-08-28 NOTE — PROGRESS NOTES
Preventive Care Visit  [facility]  Reena DriverGREGORIO CNP, Family Medicine  Aug 28, 2024      Assessment & Plan     (Z00.00) Routine general medical examination at a health care facility  (primary encounter diagnosis)  Comment: Reviewed health maintenance/screening services guidelines/recommendations as well as vaccination recommendations as indicated which Heath understands. Services ordered after shared decision making agreed upon. Recommended healthy habits/diet and exercise.     (F98.8) Attention deficit disorder (ADD) without hyperactivity  Comment: Chronic medical condition. Due to patient's adderall 30 mg XR wearing off before her work day is complete, will trial adderall immediate release 10 mg around noon. Will continue to monitor.  Plan: [id number] - Urine Drug Confirmation Panel         (Comprehensive), amphetamine-dextroamphetamine         (ADDERALL) 10 MG tablet    (F33.1) Moderate episode of recurrent major depressive disorder (H)  Comment: Chronic stable medical condition; continue present management on current treatment regimen. Refill sent.  Plan: sertraline (ZOLOFT) 100 MG tablet    (M79.7) Fibromyalgia  Comment: Chronic stable medical condition; continue present management on current treatment regimen. Refill sent  Plan: cyclobenzaprine (FLEXERIL) 10 MG tablet    (Z12.31) Visit for screening mammogram  Plan: MA Screening Bilateral w/ Rei    (Z12.11) Screen for colon cancer  Plan: Colonoscopy Screening  Referral    (Z11.4) Screening for HIV (human immunodeficiency virus)  Plan: HIV Antigen Antibody Combo    (Z11.59) Need for hepatitis C screening test  Plan: Hepatitis C Screen Reflex to HCV RNA Quant and         Genotype    (Z12.4) Cervical cancer screening  Plan: HPV and Gynecologic Cytology Panel -         Recommended Age 30 - 65 Years    (Z90.09) History of lobectomy of thyroid  Plan: TSH with free T4 reflex, CBC with platelets and         "differential    (Z13.220) Lipid screening  Plan: Comprehensive metabolic panel (BMP + Alb, Alk         Phos, ALT, AST, Total. Bili, TP), Lipid panel         reflex to direct LDL Fasting    (L98.8,  Z98.890) History of Mohs procedure of right lower extremity.  Plan: Adult Dermatology  Referral           BMI  Estimated body mass index is 25.1 kg/m  as calculated from the following:    Height as of this encounter: 1.753 m (5' 9\").    Weight as of this encounter: 77.1 kg (170 lb).     Counseling  Appropriate preventive services were addressed with this patient via screening, questionnaire, or discussion as appropriate for fall prevention, nutrition, physical activity, Tobacco-use cessation, social engagement, weight loss and cognition.  Checklist reviewing preventive services available has been given to the patient.  Reviewed patient's diet, addressing concerns and/or questions.   The patient was instructed to see the dentist every 6 months.       Glen Joe is a 43 year old, presenting for the following:  Physical        8/28/2024     8:14 AM   Additional Questions   Roomed by Yamile NORIEGA  Patient presents to the clinic today for a well woman exam and to follow-up on her chronic medical conditions.    ADHD - Patient currently takes Adderall XR 30 mg daily but reports it typically does not last through her work day. Patient reports that she tolerates well and only takes the days she works.    Fibromyalgia - take flexeril 10 mg as needed; needs a refill today.    Depression - patient reports that she is currently stable on zoloft 100 mg daily and needs a refill.       8/28/2024   General Health   How would you rate your overall physical health? Good   Feel stress (tense, anxious, or unable to sleep) Very much      (!) STRESS CONCERN      8/28/2024   Nutrition   Three or more servings of calcium each day? Yes   Diet: Regular (no restrictions)   How many servings of fruit and vegetables per day? (!) " 2-3   How many sweetened beverages each day? (!) 3            8/28/2024   Exercise   Days per week of moderate/strenous exercise 0 days   Average minutes spent exercising at this level 0 min      (!) EXERCISE CONCERN      8/28/2024   Social Factors   Frequency of gathering with friends or relatives Once a week   Worry food won't last until get money to buy more No   Food not last or not have enough money for food? No   Do you have housing? (Housing is defined as stable permanent housing and does not include staying ouside in a car, in a tent, in an abandoned building, in an overnight shelter, or couch-surfing.) Yes   Are you worried about losing your housing? No   Lack of transportation? No   Unable to get utilities (heat,electricity)? No            8/28/2024   Dental   Dentist two times every year? (!) NO               Today's PHQ-9 Score:       8/1/2024     2:18 PM   PHQ-9 SCORE   PHQ-9 Total Score MyChart 11 (Moderate depression)   PHQ-9 Total Score 11           8/28/2024   Substance Use   Alcohol more than 3/day or more than 7/wk No   Do you use any other substances recreationally? (!) CANNABIS PRODUCTS        Social History     Tobacco Use    Smoking status: Every Day     Current packs/day: 0.00     Types: Cigarettes     Passive exposure: Current    Smokeless tobacco: Never    Tobacco comments:     e-cig   Vaping Use    Vaping status: Every Day   Substance Use Topics    Alcohol use: Yes     Alcohol/week: 0.0 standard drinks of alcohol     Comment: rarely    Drug use: No          Mammogram Screening - Mammogram every 1-2 years updated in Health Maintenance based on mutual decision making          8/28/2024   One time HIV Screening   Previous HIV test? Yes          8/28/2024   STI Screening   New sexual partner(s) since last STI/HIV test? (!) YES HIV Screen ordered         History of abnormal Pap smear: YES - reflected in Problem List and Health Maintenance accordingly        Latest Ref Rng & Units 9/25/2019      "3:29 PM 9/25/2019     3:09 PM 6/27/2016    12:00 AM   PAP / HPV   PAP (Historical)   NIL     HPV 16 DNA NEG^Negative Negative      HPV 18 DNA NEG^Negative Negative      Other HR HPV NEG^Negative Negative      PAP-ABSTRACT    See Scanned Document           This result is from an external source.     ASCVD Risk   The 10-year ASCVD risk score (Ciarra CARABALLO, et al., 2019) is: 2.6%    Values used to calculate the score:      Age: 43 years      Sex: Female      Is Non- : No      Diabetic: No      Tobacco smoker: Yes      Systolic Blood Pressure: 113 mmHg      Is BP treated: No      HDL Cholesterol: 53 mg/dL      Total Cholesterol: 214 mg/dL       Reviewed and updated as needed this visit by Provider   Tobacco  Allergies  Meds  Problems  Med Hx  Surg Hx  Fam Hx            Past Medical History:   Diagnosis Date    ADD (attention deficit disorder)     Atypical mole 07/19/2018    Degenerative disc disease, cervical     Depressive disorder     Gastro-oesophageal reflux disease     Myofacial muscle pain     Noninfectious ileitis     IBS with constipation    Other chronic pain     myofacial pain syndrome    Sleep apnea     wears CPAP at night    Thyroid disease      Past Surgical History:   Procedure Laterality Date    APPENDECTOMY  2000    ARTHROSCOPY KNEE Right     teenager    BREAST SURGERY  03/2006    augmentation, revisions 2011 and 2015    FOOT SURGERY Right     semoid bone removed from Fx    GYN SURGERY   2009 and 2014    LEEPS x 2     LAPAROSCOPIC CHOLECYSTECTOMY  5/15/2014    Procedure: LAPAROSCOPIC CHOLECYSTECTOMY;  Surgeon: Kd Arthur MD;  Location: [facility] OR    THYROID SURGERY  2001    nodule     TUBAL LIGATION  10/09/2008        Objective    Exam  /72 (BP Location: Right arm, Patient Position: Chair, Cuff Size: Adult Regular)   Pulse 72   Temp 98.1  F (36.7  C) (Oral)   Resp 14   Ht 1.753 m (5' 9\")   Wt 77.1 kg (170 lb)   LMP 08/21/2024 (Exact Date)   SpO2 " "100%   BMI 25.10 kg/m     Estimated body mass index is 25.1 kg/m  as calculated from the following:    Height as of this encounter: 1.753 m (5' 9\").    Weight as of this encounter: 77.1 kg (170 lb).    Physical Exam  GENERAL: alert and no distress  EYES: Eyes grossly normal to inspection, PERRL and conjunctivae and sclerae normal  HENT: ear canals and TM's normal, nose and mouth without ulcers or lesions  NECK: no adenopathy, no asymmetry, masses, or scars  RESP: lungs clear to auscultation - no rales, rhonchi or wheezes  BREAST: normal without masses, tenderness or nipple discharge and no palpable axillary masses or adenopathy  CV: regular rate and rhythm, normal S1 S2, no S3 or S4, no murmur, click or rub, no peripheral edema  ABDOMEN: soft, nontender, no hepatosplenomegaly, no masses and bowel sounds normal   (female) w/bimanual: normal female external genitalia, normal urethral meatus, normal vaginal mucosa, and normal cervix/adnexa/uterus without masses or discharge  MS: no gross musculoskeletal defects noted, no edema  SKIN: no suspicious lesions or rashes  NEURO: Normal strength and tone, mentation intact and speech normal  PSYCH: mentation appears normal, affect normal/bright        Signed Electronically by: GREGORIO Martinez CNP    "

## 2024-08-28 NOTE — PROGRESS NOTES
Prior to immunization administration, verified patients identity using patient s name and date of birth. Please see Immunization Activity for additional information.     Screening Questionnaire for Adult Immunization    Are you sick today?   No   Do you have allergies to medications, food, a vaccine component or latex?   No   Have you ever had a serious reaction after receiving a vaccination?   No   Do you have a long-term health problem with heart, lung, kidney, or metabolic disease (e.g., diabetes), asthma, a blood disorder, no spleen, complement component deficiency, a cochlear implant, or a spinal fluid leak?  Are you on long-term aspirin therapy?   No   Do you have cancer, leukemia, HIV/AIDS, or any other immune system problem?   No   Do you have a parent, brother, or sister with an immune system problem?   No   In the past 3 months, have you taken medications that affect  your immune system, such as prednisone, other steroids, or anticancer drugs; drugs for the treatment of rheumatoid arthritis, Crohn s disease, or psoriasis; or have you had radiation treatments?   No   Have you had a seizure, or a brain or other nervous system problem?   No   During the past year, have you received a transfusion of blood or blood    products, or been given immune (gamma) globulin or antiviral drug?   No   For women: Are you pregnant or is there a chance you could become       pregnant during the next month?   No   Have you received any vaccinations in the past 4 weeks?   No     Immunization questionnaire answers were all negative.      Patient instructed to remain in clinic for 15 minutes afterwards, and to report any adverse reactions.     Screening performed by Samaria Rehman CMA on 8/28/2024 at 9:12 AM.

## 2024-08-28 NOTE — PATIENT INSTRUCTIONS
Patient Education   Preventive Care Advice   This is general advice given by our system to help you stay healthy. However, your care team may have specific advice just for you. Please talk to your care team about your preventive care needs.  Nutrition  Eat 5 or more servings of fruits and vegetables each day.  Try wheat bread, brown rice and whole grain pasta (instead of white bread, rice, and pasta).  Get enough calcium and vitamin D. Check the label on foods and aim for 100% of the RDA (recommended daily allowance).  Lifestyle  Exercise at least 150 minutes each week  (30 minutes a day, 5 days a week).  Do muscle strengthening activities 2 days a week. These help control your weight and prevent disease.  No smoking.  Wear sunscreen to prevent skin cancer.  Have a dental exam and cleaning every 6 months.  Yearly exams  See your health care team every year to talk about:  Any changes in your health.  Any medicines your care team has prescribed.  Preventive care, family planning, and ways to prevent chronic diseases.  Shots (vaccines)   HPV shots (up to age 26), if you've never had them before.  Hepatitis B shots (up to age 59), if you've never had them before.  COVID-19 shot: Get this shot when it's due.  Flu shot: Get a flu shot every year.  Tetanus shot: Get a tetanus shot every 10 years.  Pneumococcal, hepatitis A, and RSV shots: Ask your care team if you need these based on your risk.  Shingles shot (for age 50 and up)  General health tests  Diabetes screening:  Starting at age 35, Get screened for diabetes at least every 3 years.  If you are younger than age 35, ask your care team if you should be screened for diabetes.  Cholesterol test: At age 39, start having a cholesterol test every 5 years, or more often if advised.  Bone density scan (DEXA): At age 50, ask your care team if you should have this scan for osteoporosis (brittle bones).  Hepatitis C: Get tested at least once in your life.  STIs (sexually  transmitted infections)  Before age 24: Ask your care team if you should be screened for STIs.  After age 24: Get screened for STIs if you're at risk. You are at risk for STIs (including HIV) if:  You are sexually active with more than one person.  You don't use condoms every time.  You or a partner was diagnosed with a sexually transmitted infection.  If you are at risk for HIV, ask about PrEP medicine to prevent HIV.  Get tested for HIV at least once in your life, whether you are at risk for HIV or not.  Cancer screening tests  Cervical cancer screening: If you have a cervix, begin getting regular cervical cancer screening tests starting at age 21.  Breast cancer scan (mammogram): If you've ever had breasts, begin having regular mammograms starting at age 40. This is a scan to check for breast cancer.  Colon cancer screening: It is important to start screening for colon cancer at age 45.  Have a colonoscopy test every 10 years (or more often if you're at risk) Or, ask your provider about stool tests like a FIT test every year or Cologuard test every 3 years.  To learn more about your testing options, visit:   .  For help making a decision, visit:   https://bit.ly/fl55247.  Prostate cancer screening test: If you have a prostate, ask your care team if a prostate cancer screening test (PSA) at age 55 is right for you.  Lung cancer screening: If you are a current or former smoker ages 50 to 80, ask your care team if ongoing lung cancer screenings are right for you.  For informational purposes only. Not to replace the advice of your health care provider. Copyright   2023 [facility]. All rights reserved. Clinically reviewed by the [facility]. [employer] [id number] - REV 01/24.  Learning About Stress  What is stress?     Stress is your body's response to a hard situation. Your body can have a physical, emotional, or mental response. Stress is a fact of life for most people, and it  affects everyone differently. What causes stress for you may not be stressful for someone else.  A lot of things can cause stress. You may feel stress when you go on a job interview, take a test, or run a race. This kind of short-term stress is normal and even useful. It can help you if you need to work hard or react quickly. For example, stress can help you finish an important job on time.  Long-term stress is caused by ongoing stressful situations or events. Examples of long-term stress include long-term health problems, ongoing problems at work, or conflicts in your family. Long-term stress can harm your health.  How does stress affect your health?  When you are stressed, your body responds as though you are in danger. It makes hormones that speed up your heart, make you breathe faster, and give you a burst of energy. This is called the fight-or-flight stress response. If the stress is over quickly, your body goes back to normal and no harm is done.  But if stress happens too often or lasts too long, it can have bad effects. Long-term stress can make you more likely to get sick, and it can make symptoms of some diseases worse. If you tense up when you are stressed, you may develop neck, shoulder, or low back pain. Stress is linked to high blood pressure and heart disease.  Stress also harms your emotional health. It can make you clarke, tense, or depressed. Your relationships may suffer, and you may not do well at work or school.  What can you do to manage stress?  You can try these things to help manage stress:   Do something active. Exercise or activity can help reduce stress. Walking is a great way to get started. Even everyday activities such as housecleaning or yard work can help.  Try yoga or augustin chi. These techniques combine exercise and meditation. You may need some training at first to learn them.  Do something you enjoy. For example, listen to music or go to a movie. Practice your hobby or do volunteer  "work.  Meditate. This can help you relax, because you are not worrying about what happened before or what may happen in the future.  Do guided imagery. Imagine yourself in any setting that helps you feel calm. You can use online videos, books, or a teacher to guide you.  Do breathing exercises. For example:  From a standing position, bend forward from the waist with your knees slightly bent. Let your arms dangle close to the floor.  Breathe in slowly and deeply as you return to a standing position. Roll up slowly and lift your head last.  Hold your breath for just a few seconds in the standing position.  Breathe out slowly and bend forward from the waist.  Let your feelings out. Talk, laugh, cry, and express anger when you need to. Talking with supportive friends or family, a counselor, or a becca leader about your feelings is a healthy way to relieve stress. Avoid discussing your feelings with people who make you feel worse.  Write. It may help to write about things that are bothering you. This helps you find out how much stress you feel and what is causing it. When you know this, you can find better ways to cope.  What can you do to prevent stress?  You might try some of these things to help prevent stress:  Manage your time. This helps you find time to do the things you want and need to do.  Get enough sleep. Your body recovers from the stresses of the day while you are sleeping.  Get support. Your family, friends, and community can make a difference in how you experience stress.  Limit your news feed. Avoid or limit time on social media or news that may make you feel stressed.  Do something active. Exercise or activity can help reduce stress. Walking is a great way to get started.  Where can you learn more?  Go to https://www.healthwise.net/patiented  Enter N032 in the search box to learn more about \"Learning About Stress.\"  Current as of: October 24, 2023               Content Version: 14.0    2012-8803 " Healthwise, [employer].   Care instructions adapted under license by your healthcare professional. If you have questions about a medical condition or this instruction, always ask your healthcare professional. [employer] disclaims any warranty or liability for your use of this information.      Substance Use Disorder: Care Instructions  Overview     You can improve your life and health by stopping your use of alcohol or drugs. When you don't drink or use drugs, you may feel and sleep better. You may get along better with your family, friends, and coworkers. There are medicines and programs that can help with substance use disorder.  How can you care for yourself at home?  Here are some ways to help you stay sober and prevent relapse.  If you have been given medicine to help keep you sober or reduce your cravings, be sure to take it exactly as prescribed.  Talk to your doctor about programs that can help you stop using drugs or drinking alcohol.  Do not keep alcohol or drugs in your home.  Plan ahead. Think about what you'll say if other people ask you to drink or use drugs. Try not to spend time with people who drink or use drugs.  Use the time and money spent on drinking or drugs to do something that's important to you.  Preventing a relapse  Have a plan to deal with relapse. Learn to recognize changes in your thinking that lead you to drink or use drugs. Get help before you start to drink or use drugs again.  Try to stay away from situations, friends, or places that may lead you to drink or use drugs.  If you feel the need to drink alcohol or use drugs again, seek help right away. Call a trusted friend or family member. Some people get support from organizations such as Narcotics Anonymous or [employer] or from treatment facilities.  If you relapse, get help as soon as you can. Some people make a plan with another person that outlines what they want that person to do for them if they relapse.  The plan usually includes how to handle the relapse and who to notify in case of relapse.  Don't give up. Remember that a relapse doesn't mean that you have failed. Use the experience to learn the triggers that lead you to drink or use drugs. Then quit again. Recovery is a lifelong process. Many people have several relapses before they are able to quit for good.  Follow-up care is a key part of your treatment and safety. Be sure to make and go to all appointments, and call your doctor if you are having problems. It's also a good idea to know your test results and keep a list of the medicines you take.  When should you call for help?   Call 911  anytime you think you may need emergency care. For example, call if you or someone else:    Has overdosed or has withdrawal signs. Be sure to tell the emergency workers that you are or someone else is using or trying to quit using drugs. Overdose or withdrawal signs may include:  Losing consciousness.  Seizure.  Seeing or hearing things that aren't there (hallucinations).     Is thinking or talking about suicide or harming others.   Where to get help 24 hours a day, 7 days a week   If you or someone you know talks about suicide, self-harm, a mental health crisis, a substance use crisis, or any other kind of emotional distress, get help right away. You can:    Call the Suicide and Crisis Lifeline at Pending sale to [facility].     Call 6-845-585-XFDM (1-128.873.5254).     Text HOME to 220340 to access the Crisis Text Line.   Consider saving these numbers in your phone.  Go to [employer].org for more information or to chat online.  Call your doctor now or seek immediate medical care if:    You are having withdrawal symptoms. These may include nausea or vomiting, sweating, shakiness, and anxiety.   Watch closely for changes in your health, and be sure to contact your doctor if:    You have a relapse.     You need more help or support to stop.   Where can you learn more?  Go to  "https://www.[employer].net/patiented  Enter H573 in the search box to learn more about \"Substance Use Disorder: Care Instructions.\"  Current as of: November 15, 2023               Content Version: 14.0    6645-5033 [employer].   Care instructions adapted under license by your healthcare professional. If you have questions about a medical condition or this instruction, always ask your healthcare professional. [employer] disclaims any warranty or liability for your use of this information.      Safer Sex: Care Instructions  Overview  Safer sex is a way to reduce your risk of getting a sexually transmitted infection (STI). It can also help prevent pregnancy.  Several products can help you practice safer sex and reduce your chance of STIs. One of the best is a condom. There are internal and external condoms. You can use a special rubber sheet (dental dam) for protection during oral sex. Disposable gloves can keep your hands from touching blood, semen, or other body fluids that can carry infections.  Remember that birth control methods such as diaphragms, IUDs, foams, and birth control pills do not stop you from getting STIs.  Follow-up care is a key part of your treatment and safety. Be sure to make and go to all appointments, and call your doctor if you are having problems. It's also a good idea to know your test results and keep a list of the medicines you take.  How can you care for yourself at home?  Think about getting vaccinated to help prevent hepatitis A, hepatitis B, and human papillomavirus (HPV). They can be spread through sex.  Use a condom every time you have sex. Use an external condom, which goes on the penis. Or use an internal condom, which goes into the vagina or anus.  Make sure you use the right size external condom. A condom that's too small can break easily. A condom that's too big can slip off during sex.  Use a new condom each time you have sex. Be careful not to poke " "a hole in the condom when you open the wrapper.  Don't use an internal condom and an external condom at the same time.  Never use petroleum jelly (such as Vaseline), grease, hand lotion, baby oil, or anything with oil in it. These products can make holes in the condom.  After intercourse, hold the edge of the condom as you remove it. This will help keep semen from spilling out of the condom.  Do not have sex with anyone who has symptoms of an STI, such as sores on the genitals or mouth.  Do not drink a lot of alcohol or use drugs before sex.  Limit your sex partners. Sex with one partner who has sex only with you can reduce your risk of getting an STI.  Don't share sex toys. But if you do share them, use a condom and clean the sex toys between each use.  Talk to any partners before you have sex. Talk about what you feel comfortable with and whether you have any boundaries with sex. And find out if your partner or partners may be at risk for any STI. Keep in mind that a person may be able to spread an STI even if they do not have symptoms. You and any partners may want to get tested for STIs.  Where can you learn more?  Go to https://www.[employer].net/patiented  Enter B608 in the search box to learn more about \"Safer Sex: Care Instructions.\"  Current as of: November 27, 2023               Content Version: 14.0    1870-5083 [employer].   Care instructions adapted under license by your healthcare professional. If you have questions about a medical condition or this instruction, always ask your healthcare professional. [employer] disclaims any warranty or liability for your use of this information.         "

## 2024-08-29 LAB
HPV HR 12 DNA CVX QL NAA+PROBE: POSITIVE
HPV16 DNA CVX QL NAA+PROBE: NEGATIVE
HPV18 DNA CVX QL NAA+PROBE: NEGATIVE
HUMAN PAPILLOMA VIRUS FINAL DIAGNOSIS: ABNORMAL

## 2024-09-02 LAB
AMPHET UR CFM-MCNC: 9680 NG/ML
AMPHET/CREAT UR: 4400 NG/MG {CREAT}
OXYCODONE UR CFM-MCNC: 262 NG/ML
OXYCODONE/CREAT UR: 119 NG/MG {CREAT}
OXYMORPHONE UR CFM-MCNC: 151 NG/ML
OXYMORPHONE/CREAT UR: 69 NG/MG {CREAT}

## 2024-09-03 ENCOUNTER — OFFICE VISIT (OUTPATIENT)
Dept: URGENT CARE | Facility: URGENT CARE | Age: 44
End: 2024-09-03
Payer: COMMERCIAL

## 2024-09-03 ENCOUNTER — ANCILLARY PROCEDURE (OUTPATIENT)
Dept: GENERAL RADIOLOGY | Facility: CLINIC | Age: 44
End: 2024-09-03
Attending: FAMILY MEDICINE
Payer: COMMERCIAL

## 2024-09-03 DIAGNOSIS — S99.922A INJURY OF TOE ON LEFT FOOT, INITIAL ENCOUNTER: Primary | ICD-10-CM

## 2024-09-03 PROCEDURE — 99213 OFFICE O/P EST LOW 20 MIN: CPT | Performed by: FAMILY MEDICINE

## 2024-09-03 PROCEDURE — 73660 X-RAY EXAM OF TOE(S): CPT | Mod: TC | Performed by: RADIOLOGY

## 2024-09-03 NOTE — PROGRESS NOTES
(T59.672L) Injury of toe on left foot, initial encounter  (primary encounter diagnosis)  Comment:     Suspect partial avulsion of toenail.  No fracture on x-ray.  Plan: XR Toe Left G/E 2 Views          Advised soaks in warm soapy water twice daily.  Protect the toe with bandaging and padding.  Gradually increase activity.  Advised that nail will likely eventually fall off but there is no necessity to remove at this point.            CHIEF COMPLAINT    Injury to left great toe.      HISTORY    This injury occurred 3 days ago.  She stepped off a patio and caught her left great toe.  She feels the nail may have lifted off and there was some bleeding afterward.  The toe has remained quite painful, especially with walking.      REVIEW OF SYSTEMS      Unremarkable except as above.      EXAM  LMP 08/21/2024 (Exact Date)     Left great toe.  No obvious deformity.  Nail is still in normal position and not obviously broken.  Considerably tender.  No purulent drainage or lymphangitis.        Results for orders placed or performed in visit on 09/03/24   XR Toe Left G/E 2 Views     Status: None    Narrative    EXAM: XR TOE LEFT G/E 2 VIEWS  LOCATION: [facility] [place]  DATE: 9/3/2024    INDICATION: fell off patio, lifted nail ? Toe pain.  COMPARISON: None.      Impression    IMPRESSION: No fracture or foreign body. Mild hallux valgus. No degenerative changes.

## 2024-09-04 ENCOUNTER — TELEPHONE (OUTPATIENT)
Dept: FAMILY MEDICINE | Facility: CLINIC | Age: 44
End: 2024-09-04
Payer: COMMERCIAL

## 2024-09-04 NOTE — TELEPHONE ENCOUNTER
----- Message from Reena Dirver sent at 9/4/2024  4:47 PM CDT -----  Please call patient and ask her if she is taking oxycodone as her recent drug screen shows it in her system and I do not have record of it being prescribed to her according to the PDMP. If she is taking it, what is she taking it for and who is prescribing the medication? Thanks for helping to clarify.

## 2024-09-04 NOTE — TELEPHONE ENCOUNTER
Outgoing call to patient. Attempt # 1. Left message to call back any triage nurse.    Farheen Whelan RN on 9/4/2024 at 5:37 PM

## 2024-09-05 LAB
BKR AP ASSOCIATED HPV REPORT: ABNORMAL
BKR LAB AP GYN ADEQUACY: ABNORMAL
BKR LAB AP GYN INTERPRETATION: ABNORMAL
BKR LAB AP LMP: ABNORMAL
BKR LAB AP PREVIOUS ABNORMAL: ABNORMAL
PATH REPORT.COMMENTS IMP SPEC: ABNORMAL
PATH REPORT.COMMENTS IMP SPEC: ABNORMAL
PATH REPORT.RELEVANT HX SPEC: ABNORMAL

## 2024-09-05 NOTE — TELEPHONE ENCOUNTER
"Outgoing call to patient. Notified Patient of GREGORIO Martinez CNP's message: \"Please call patient and ask her if she is taking oxycodone as her recent drug screen shows it in her system and I do not have record of it being prescribed to her according to the PDMP. If she is taking it, what is she taking it for and who is prescribing the medication? Thanks for helping to clarify.\"    Patient reports that she had taken a dose of oxycodone (\"very old leftover prescription.. I think from the pain clinic from low back pain\"). She had taken this during a fibromyalgia flare up.     Farheen Whelan RN on 9/5/2024 at 8:30 AM  "

## 2024-09-05 NOTE — TELEPHONE ENCOUNTER
Will wait for Reena to return next week.    Allen Rehman PA-C on 9/5/2024 at 12:02 PM (covering for Reena Driver CNP)

## 2024-09-06 ENCOUNTER — PATIENT OUTREACH (OUTPATIENT)
Dept: FAMILY MEDICINE | Facility: CLINIC | Age: 44
End: 2024-09-06
Payer: COMMERCIAL

## 2024-09-06 NOTE — TELEPHONE ENCOUNTER
8/28/24 Atypical glandular cells (NOS), + HR HPV (neg 16/18). Plan colp with ECC & EMB due by 11/28/24.  9/6/2024 Pt notified by phone. Pt will schedule a consult for hysterectomy before doing the colp/EMB.

## 2024-09-09 ENCOUNTER — ANCILLARY PROCEDURE (OUTPATIENT)
Dept: MAMMOGRAPHY | Facility: CLINIC | Age: 44
End: 2024-09-09
Payer: COMMERCIAL

## 2024-09-09 ENCOUNTER — OFFICE VISIT (OUTPATIENT)
Dept: OBGYN | Facility: CLINIC | Age: 44
End: 2024-09-09
Payer: COMMERCIAL

## 2024-09-09 VITALS — DIASTOLIC BLOOD PRESSURE: 70 MMHG | BODY MASS INDEX: 25.16 KG/M2 | WEIGHT: 170.4 LBS | SYSTOLIC BLOOD PRESSURE: 114 MMHG

## 2024-09-09 DIAGNOSIS — N92.0 MENORRHAGIA WITH REGULAR CYCLE: ICD-10-CM

## 2024-09-09 DIAGNOSIS — N94.6 DYSMENORRHEA: ICD-10-CM

## 2024-09-09 DIAGNOSIS — R87.619 ATYPICAL GLANDULAR CELLS ON CERVICAL PAP SMEAR: Primary | ICD-10-CM

## 2024-09-09 DIAGNOSIS — N39.3 SUI (STRESS URINARY INCONTINENCE, FEMALE): ICD-10-CM

## 2024-09-09 DIAGNOSIS — Z12.31 VISIT FOR SCREENING MAMMOGRAM: ICD-10-CM

## 2024-09-09 DIAGNOSIS — R87.810 CERVICAL HIGH RISK HPV (HUMAN PAPILLOMAVIRUS) TEST POSITIVE: ICD-10-CM

## 2024-09-09 PROCEDURE — 88305 TISSUE EXAM BY PATHOLOGIST: CPT | Performed by: PATHOLOGY

## 2024-09-09 PROCEDURE — 77063 BREAST TOMOSYNTHESIS BI: CPT | Mod: TC | Performed by: RADIOLOGY

## 2024-09-09 PROCEDURE — 77067 SCR MAMMO BI INCL CAD: CPT | Mod: TC | Performed by: RADIOLOGY

## 2024-09-09 PROCEDURE — 99204 OFFICE O/P NEW MOD 45 MIN: CPT | Mod: 25 | Performed by: OBSTETRICS & GYNECOLOGY

## 2024-09-09 PROCEDURE — 57505 ENDOCERVICAL CURETTAGE: CPT | Performed by: OBSTETRICS & GYNECOLOGY

## 2024-09-09 NOTE — PROGRESS NOTES
SUBJECTIVE:   CC: hx CIN3, HUB, dysmenorrhea.                                                Heath Waller is a 43 year old  female who presents to clinic today for ongoing management of high grade cervical dysplasia.  Regular monthly periods, very heavy and painful. No help with scheduled NSAIDs.  Has used oral contraceptives in the past, does not desire to return to medication for menstrual symptoms.   Tired of having multiple problems with her uterus and cervix and strongly desires definitive management. Not interested in repeat colposcopy, desires definitive management.  She understands that endometrial biopsy and endocervical curettage are necessary to evaluate recent SHELBY pap.     She reports both stress and urge incontinence, both multiple times daily. She would be interested in surgical intervention of ANA.     Cervical cancer screening history:  07: [place] CELINA I (care everywhere)  08: [place] CELINA 3 (care everywhere)  LEEP () confirmed CIN3. Margins negative, negative ECC.  08: ASCUS Pap + HR HPV (care everywhere)  10/29/10: NIL Pap (care everywhere)  04/10/12: NIL Pap (care everywhere)  14: HSIL Pap (care everywhere)  14: [place] CELINA 3 (care everywhere)  14: [facility] CELINA 3 margins free of dysplasia. (care everywhere)  03/2/15: NIL Pap, Neg HR HPV (care everywhere)  16: NIL Pap, Neg HR HPV (care everywhere)  19: NIL Pap, Neg HR HPV. 24 Atypical glandular cells (NOS), + HR HPV (neg 16/18). Plan colp with ECC & EMB due by 24.    Problem list and histories reviewed & adjusted, as indicated.  Additional history: as documented.      Patient Active Problem List   Diagnosis    Cervical intraepithelial neoplasia grade III with severe dysplasia    Depressive disorder    Gastroesophageal reflux disease    History of thyroid disease    Chronic pain    Attention deficit disorder (ADD) without hyperactivity    Acne vulgaris    TMJ (temporomandibular joint  syndrome)    Fibromyalgia    Chronic rhinitis    Benign essential hypertension    Anxiety attack    ROSA (obstructive sleep apnea)    Moderate episode of recurrent major depressive disorder (H)    Post-traumatic osteoarthritis of right knee    Pain of left hand    Thumb pain, left     Past Surgical History:   Procedure Laterality Date    APPENDECTOMY  01/01/2000    ARTHROSCOPY KNEE Right     teenager    BREAST SURGERY  03/01/2006    augmentation, revisions 2011 and 2015    FOOT SURGERY Right     semoid bone removed from Fx    GYN SURGERY   2009 and 2014    LEEPS x 2     LAPAROSCOPIC CHOLECYSTECTOMY  05/15/2014    Procedure: LAPAROSCOPIC CHOLECYSTECTOMY;  Surgeon: Kd Arthur MD;  Location: [facility] OR    THYROID SURGERY  01/01/2001    nodule     TUBAL LIGATION  10/09/2008      Social History     Tobacco Use    Smoking status: Every Day     Current packs/day: 0.00     Types: Cigarettes     Passive exposure: Current    Smokeless tobacco: Never    Tobacco comments:     e-cig   Substance Use Topics    Alcohol use: Yes     Alcohol/week: 0.0 standard drinks of alcohol     Comment: rarely      Problem (# of Occurrences) Relation (Name,Age of Onset)    Cancer (2) Paternal Grandfather, Father: lung cancer     Hypertension (1) Father    Cerebrovascular Disease (2) Maternal Grandmother, Father: 2016    Thyroid Disease (1) Brother    Liver Cancer (1) Paternal Grandmother    Lung Cancer (1) Father (60): stage IV    Skin Cancer (1) Paternal Uncle    No Known Problems (3) Son, Daughter, Daughter              Current Outpatient Medications   Medication Sig Dispense Refill    albuterol (PROAIR HFA/PROVENTIL HFA/VENTOLIN HFA) 108 (90 Base) MCG/ACT inhaler Inhale 1-2 puffs into the lungs every 4 hours as needed for shortness of breath, wheezing or cough 18 g 3    amphetamine-dextroamphetamine (ADDERALL XR) 30 MG 24 hr capsule Take 1 capsule (30 mg) by mouth daily 30 capsule 0    amphetamine-dextroamphetamine (ADDERALL XR) 30 MG  24 hr capsule Take 1 capsule (30 mg) by mouth daily 30 capsule 0    amphetamine-dextroamphetamine (ADDERALL) 10 MG tablet Take 1 tablet (10 mg) by mouth daily. 30 tablet 0    cyclobenzaprine (FLEXERIL) 10 MG tablet Take 1 tablet (10 mg) by mouth 3 times daily as needed for muscle spasms. 30 tablet 3    ibuprofen (ADVIL,MOTRIN) 200 MG tablet       order for DME Equipment being ordered: RES MED AIRCURVE 10 ASV      sertraline (ZOLOFT) 100 MG tablet Take 1 tablet (100 mg) by mouth daily. 90 tablet 3    triamcinolone (KENALOG) 0.1 % external cream Apply topically 2 times daily Do not use on face 30 g 0     Current Facility-Administered Medications   Medication Dose Route Frequency Provider Last Rate Last Admin    lidocaine 1 % injection 0.5 mL  0.5 mL   Alonzo Cleveland MD   0.5 mL at 04/05/22 1543     Allergies   Allergen Reactions    Codeine      Itchy, hives- tolerated cough syrup okay    Sulfa Antibiotics      Rash      Toradol [Ketorolac] Itching and Rash       OBJECTIVE:   Wt 77.3 kg (170 lb 6.4 oz)   LMP 08/21/2024 (Exact Date)   BMI 25.16 kg/m     Const: sitting in chair in no acute distress, comfortable  Eyes: no scleral icterus, EOMI  CV: regular rate, well perfused  Pulm: no increased work of breathing, no cough  Skin: warm and dry, no rashes/lesions  Psych: mood stable, appropriate affect  Abd: soft, no hepatosplenomegaly, non-tender to palpation  Neuro: A+Ox3   : External genitalia normal well-estrogenized, healthy tissue.  No obvious excoriations, lesions, or rashes. Bartholins, urethra, normal.  Normal moist pink vaginal mucosa.  SSE: Normal cervix with post-LEEP changes including slightly shortened cervix, normal physiologic discharge.   Bimanual: No CMT, small mobile anteverted uterus with moderate descensus. No adnexal masses or tenderness appreciated.     Procedure:      Endometrial biopsy and endocervical curettage:  Consent was obtained. A speculum was inserted into the vagina and the cervix  visualized. The cervix was swabbed with betadine x3. An endometrial biopsy pipelle was then introduced through the cervical os but would not pass into the uterus. A tenaculum was placed on the anterior lip of the cervix and the biopsy pipelle then passed readily into the uterus, which sounded to 9cm. The plunger was then deployed with good suction. The pipelle was rotated to collect a thorough sample. Sample was moderate. The curette was then used to collect an endocervical curettage and further sample collected with the cervical brush. The cervix was found to be hemostatic and the tenaculum and speculum were removed. The patient tolerated the procedure without difficulty.      ASSESSMENT/PLAN:                                                    Heath Waller is a 43 year old female  here for hysterectomy consult. She has a longstanding history of cervical dysplasia, heavy uterine bleeding, and dysmenorrhea refractory to medical management. She most recently had a SHELBY pap with HR HPV and agrees to endometrial biopsy today and endocervical curettage only as pre-operative work up. She is tired of constant pap smears and colposcopies and declines to continue.     1. Atypical glandular cells on cervical Pap smear  - Colposcopy was not performed today as patient was scheduled for surgical consultation and not in procedure room. She is not interested in further evaluation of her cervix at this time, rather strongly desires definitive management for severe cervical dysplasia, HR HPV, and now SHELBY pap. Agreed to endometrial biopsy and ecc for evaluation today.  - Surgical Pathology Exam  - ENDOMETRIAL BIOPSY W/O CERVICAL DILATION  - ENDOCERVICAL CURETTAGE    2. Cervical high risk HPV (human papillomavirus) test positive  - Surgical Pathology Exam  - ENDOCERVICAL CURETTAGE    3. Dysmenorrhea  - inadequate control with scheduled NSAIDs. Does not desire hormonal intervention. Desires definitive management with  hysterectomy.    4. Menorrhagia with regular cycle  - declines medical management. Desires definitive management with hysterectomy. Anatomically she is a reasonable candidate for vNOTES hysterectomy. She is s/p bilateral tubal ligation, would remove any remaining fallopian tube tissue with hysterectomy.   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 ANA (stress urinary incontinence, female)  - desires midurethral sling placement, interested in single incision sling. Reviewed Solyx.      Plan to request case when results are back. Patient desires surgery as soon as possible. Will likely plan virtual visit pre-op to review any quesitons or concerns.         Ramona Lion MD  Obstetrics and Gynecology   [facility]

## 2024-09-09 NOTE — PATIENT INSTRUCTIONS
"What is a hysterectomy?  Hysterectomy is the surgical removal of the uterus. It ends menstruation and the ability to become pregnant. Depending on the reason for the surgery, a hysterectomy may also involve the removal of other organs and tissues such as the ovaries and/or fallopian tubes.     A supracervical hysterectomy is the removal of the upper part of the uterus leaving the cervix behind.   A total hysterectomy is the removal of the uterus and cervix.   A total hysterectomy with bilateral salpingo-oophorectomy is the removal of the uterus, cervix, fallopian tubes (salpingo) and ovaries (oophor). If you haven't experienced menopause, removing the ovaries will usually initiate it since your body can no longer produce as much estrogen.   Why is hysterectomy performed?  A hysterectomy may be performed to treat:     Abnormal vaginal bleeding that is not controlled by other treatment methods   Severe endometriosis (uterine tissue that grows outside the uterus)   Leiomyomas or uterine fibroids (benign tumors) that have increased in size, are painful or are causing bleeding   Increased pelvic pain related to the uterus but not controlled by other treatment   Uterine prolapse (uterus that has \"dropped\" into the vaginal canal due to weakened support muscles) that can lead to urinary incontinence or difficulty with bowel movements   Cervical or uterine cancer or abnormalities that may lead to cancer for cancer prevention   Are there alternatives to hysterectomy?  Yes. A hysterectomy is only one way to treat problems affecting the uterus. For certain conditions, however, hysterectomy may be the best choice. Please ask your health care provider to discuss what alternatives are available to treat your specific condition.  Does hysterectomy affect sexual function?  A woman's sexual function is usually not affected after hysterectomy, and her sexual desire should not change. Only if the ovaries were removed with the uterus " "prior to menopause, decreased sex drive may occur and vaginal dryness may be a problem during sex. However, estrogen therapy can relieve vaginal dryness and other hormone-related effects.    Day of procedure:  A health care provider will explain the procedure in detail, including possible complications and side effects. He or she will also answer your questions.  In addition:   Blood and urine tests are taken   An enema or bowel prep may be given to cleanse the bowel   Abdominal and pelvic areas may be shaved   An intravenous (IV) line is placed in a vein in your arm to deliver medications and fluids   During the procedure  An anesthesiologist will give you either:   General anesthesia in which you will not be awake during the procedure; or   Regional anesthesia (also called epidural or spinal anesthesia) in which medications are placed near the nerves in your lower back to \"block\" pain while you stay awake.   The surgeon removes the uterus through an incision in your abdomen or vagina or through several small incisions during laparoscopy. The method used during surgery depends on why you need the surgery and the results of your pelvic exam.  During a vaginal hysterectomy, some doctors use a laparoscope (a procedure called laparoscopically assisted vaginal hysterectomy or LAVH) to help them view the uterus and perform the surgery.   A laparoscope with advanced instruments can also be used to perform hysterectomy completely through tiny incisions (total or supracervical laparoscopic hysterectomy). In more difficult cases, surgeons may employ assistance of robotic instruments placed through the laparoscope to complete the laparoscopic hysterectomy (robotic-assisted laparoscopic hysterectomy).  How long does the procedure last?  The procedure lasts 1 to 3 hours. The amount of time you spend in the hospital for recovery varies, depending on the type of surgery performed.  The day of discharge  A responsible adult must " drive you home the day you are discharged from the hospital.  Home recovery   You may resume your normal diet, as tolerated.   You may take a bath or shower. Wash the incision with soap and water (the stitches do not have to be removed, as they will dissolve in about 6 weeks). A dressing over the incision is not necessary. Do not submerge the skin incisions until they are completely healed  You may use lotions and creams on the skin around the incision to relieve itching.   Increase your activity gradually every day, when you feel capable and aren't in pain. Completely normal activities can be resumed within 4 to 6 weeks or sooner if the procedure was performed vaginally or through the laparoscope.   Drive when you feel capable and are no longer requiring narcotic pain medications-- about 2 weeks after surgery.   You can travel out of town 3 weeks after surgery, including air travel.   Avoid lifting heavy objects (over 15 pounds) for at least 4 weeks.   Do not douche or put anything into the vagina for 4 weeks.   You may have intercourse 6 weeks after surgery, or as directed by your health care provider.   Light swimming is permitted 2 weeks after surgery in a swimming pool, but avoid vigorous swimming until 4 weeks after surgery.   Resume your exercise routine in 4 to 6 weeks, depending on how you feel.   Your doctor can tell you when it's best to go back to work. You can usually go back to work in 3 to 6 weeks, depending on the procedure.   How will I feel after hysterectomy?  Physically  After hysterectomy, your periods will stop. Occasionally, you may feel bloated and have symptoms similar to when you were menstruating. It is normal to have light vaginal bleeding or a dark brown discharge for about 4 to 6 weeks after surgery.  You may feel discomfort at the incision site for about 4 weeks, and any redness, bruising or swelling will disappear in 4 to 6 weeks. Feeling burning or itching around the incision is  normal. You may also experience a numb feeling around the incision and down your leg. This is normal and, if present, usually lasts about 2 months.  Most people experience moderate soreness in the abdomen and increased fatigue levels for 1 to 4 weeks following surgery.   If the ovaries remain, you should not experience hormone-related effects, but you may continue to have period symptoms (breast tenderness, moodiness, bloating, headache, low back pain) monthly. If the ovaries were removed with the uterus before menopause, you may experience the symptoms that often occur with menopause, such as hot flashes. If you develop these symptoms we can discuss hormone replacement therapy to relieve menopausal symptoms.  Emotionally  Emotional reactions to hysterectomy vary, depending on how well you were prepared for the surgery, the reason for having it, and whether the problem has been treated.  Some women may feel a sense of loss or become depressed, but these emotional reactions are usually temporary. Other women may find that hysterectomy improves their health and well-being, and may even be a life-saving operation. Please discuss your emotional concerns with your health care provider.  What are the complications of hysterectomy?  As with any surgery, there is a slight chance that problems may occur. Problems could include blood clots, severe infection, bleeding after surgery, bowel blockage or injury, urinary tract injury, or problems related to anesthesia.  When should I call my health care provider?  Call your health care provider if you have:   Bright red vaginal bleeding   A fever over 100.4 F   Severe nausea or vomiting   Difficulty urinating, burning feeling when urinating, or frequent urination   Increasing amount of pain   Increasing redness, swelling, or drainage from your incision   Inability to pass gas for 24 hours after surgery     Urethral Sling: Before Your Surgery  What is urethral sling surgery?      Urethral sling surgery is done to treat stress incontinence. A sling is placed around the urethra to support it and help it retain urine. Your urethra is the tube that carries urine from the bladder to outside the body.  There are different types of urethral sling surgeries. The two main types of slings are midurethral and traditional. Midurethral slings are made out of synthetic mesh material. Traditional slings are made out of a strip of human or animal tissue.  You may be asleep during surgery. If you are awake, you will get medicine to prevent pain and help you relax.  To do the surgery, the doctor makes small cuts (incisions) in the vagina and lower belly or upper thigh. Then the doctor places a strip of mesh tape or tissue through the incisions and under your urethra like a sling or hammock. Then the incisions are closed with stitches.  You may go home the same day as your surgery. Or you may stay in the hospital overnight. You will probably be able to go back to work in 1 to 2 weeks. But you will need at least 6 weeks to fully recover before returning to all normal activities. You must avoid heavy lifting and strenuous activities during this time.  How do you prepare for surgery?  Surgery can be stressful. This information will help you understand what you can expect. And it will help you safely prepare for surgery.  Preparing for surgery    You may need to empty your bowels with an enema or laxative. Your doctor will tell you how to do this.     Be sure you have someone to take you home. Anesthesia and pain medicine will make it unsafe for you to drive or get home on your own.     Understand exactly what surgery is planned, along with the risks, benefits, and other options.     If you take a medicine that prevents blood clots, your doctor may tell you to stop taking it before your surgery. Or your doctor may tell you to keep taking it. (These medicines include aspirin and other blood thinners.) Make sure  that you understand exactly what your doctor wants you to do.     Tell your doctor ALL the medicines, vitamins, supplements, and herbal remedies you take. Some may increase the risk of problems during your surgery. Your doctor will tell you if you should stop taking any of them before the surgery and how soon to do it.     Make sure your doctor and the hospital have a copy of your advance directive. If you don't have one, you may want to prepare one. It lets others know your health care wishes. It's a good thing to have before any type of surgery or procedure.   What happens on the day of surgery?    Follow the instructions exactly about when to stop eating and drinking. If you don't, your surgery may be canceled. If your doctor told you to take your medicines on the day of surgery, take them with only a sip of water.     Take a shower before you come in for your surgery. Do not apply lotions, perfumes, deodorants, or nail polish.     Do not shave the surgical site yourself.     Take off all jewelry and piercings. And take out contact lenses, if you wear them.   At the hospital or surgery center    Bring a picture ID.     You will be kept comfortable and safe by your anesthesia provider. The anesthesia may make you sleep. Or it may just numb the area being worked on.     The surgery will take about 30 minutes to 1 hour.   When should you call your doctor?   You have questions or concerns.     You don't understand how to prepare for your surgery.     You become ill before the surgery (such as fever, flu, or a cold).     You need to reschedule or have changed your mind about having the surgery.

## 2024-09-09 NOTE — NURSING NOTE
"Chief Complaint   Patient presents with    Consult     For hysterectomy. Patient has a history of abnormal pap smears and having colposcopies and LEEPS. Last pap was 24- ATYP cells with positive HR HPV.        Initial /70   Wt 77.3 kg (170 lb 6.4 oz)   LMP 2024 (Exact Date)   BMI 25.16 kg/m   Estimated body mass index is 25.16 kg/m  as calculated from the following:    Height as of 24: 1.753 m (5' 9\").    Weight as of this encounter: 77.3 kg (170 lb 6.4 oz).  BP completed using cuff size: regular    Questioned patient about current smoking habits.  Pt. currently smokes.  Advised about smoking cessation.          The following HM Due: NONE    Cosmo Foster CMA               "

## 2024-09-11 LAB
PATH REPORT.COMMENTS IMP SPEC: NORMAL
PATH REPORT.COMMENTS IMP SPEC: NORMAL
PATH REPORT.FINAL DX SPEC: NORMAL
PATH REPORT.GROSS SPEC: NORMAL
PATH REPORT.MICROSCOPIC SPEC OTHER STN: NORMAL
PATH REPORT.RELEVANT HX SPEC: NORMAL
PHOTO IMAGE: NORMAL

## 2024-09-16 ENCOUNTER — TELEPHONE (OUTPATIENT)
Dept: OBGYN | Facility: CLINIC | Age: 44
End: 2024-09-16
Payer: COMMERCIAL

## 2024-09-16 ENCOUNTER — PREP FOR PROCEDURE (OUTPATIENT)
Dept: OBGYN | Facility: CLINIC | Age: 44
End: 2024-09-16
Payer: COMMERCIAL

## 2024-09-16 DIAGNOSIS — N92.0 MENORRHAGIA WITH REGULAR CYCLE: Primary | ICD-10-CM

## 2024-09-16 DIAGNOSIS — N39.3 URINARY, INCONTINENCE, STRESS FEMALE: ICD-10-CM

## 2024-09-16 DIAGNOSIS — N93.9 ABNORMAL UTERINE BLEEDING: ICD-10-CM

## 2024-09-16 NOTE — TELEPHONE ENCOUNTER
Surgeon:Ramona Lion MD   Assist:  Tonny Dos Santos   Location: [facility]   Date/time preference:  per patient     Surgery:  vNOTES hysterctomy, possible conversion to total laparoscopic hysterectomy, with bilateral salpingectomy and Solyx midurethral sling   Length of Surgery:  120 min   Diagnosis:  heavy uterine bleeding, abnormal uterine bleeding, stress urinary incontinence   Anesthesia type:  GENERAL     Special instructions / equipment:  vNOTES card with dilute vasopressin, 0 degree 10mm scope, Doian retractors, marcain for urethral sling, Solyx with [employer] rep, [employer] rep for vNOTES   Am admit or same day: SAME DAY   Bowel prep: Yes: mag citrate   Pre op: PCP   Office visit with surgeon prior to surgery: Yes, 1 week prior, 15 min video visit

## 2024-09-18 NOTE — TELEPHONE ENCOUNTER
Type of surgery: vNOTES hysterectomy, possible conversion to total laparoscopic hysterectomy, with bilateral salpingectomy and Solyx midurethral sling  Location of surgery: [facility]  Date and time of surgery: 11/6/24 @ 7:50 am  Surgeon: Dr. Lion  Pre-Op Appt Date: Patient advised to schedule.  Post-Op Appt Date:    Packet sent out: Yes  Pre-cert/Authorization completed:  No  Date: 9/18/24

## 2024-09-19 ENCOUNTER — MYC MEDICAL ADVICE (OUTPATIENT)
Dept: OBGYN | Facility: CLINIC | Age: 44
End: 2024-09-19
Payer: COMMERCIAL

## 2024-09-27 NOTE — TELEPHONE ENCOUNTER
[facility] Call Center    Phone Message    May a detailed message be left on voicemail: yes     Reason for Call: Other: . Pt is returning a call from Elysia, writer unable to reach chat, please call Heath, thank you    Action Taken: Message routed to:  Other: obgyn    Travel Screening: Not Applicable     Date of Service:

## 2024-09-27 NOTE — TELEPHONE ENCOUNTER
I spoke with Heath:   She has advised her employer that her intended RTW date is 12/02/2024.  Expected recovery time 3- 6 weeks.   Form faxed to [phone]  Original in Dr. Lion's fax bin and copy sent to yee Luther CMA

## 2024-09-27 NOTE — TELEPHONE ENCOUNTER
I Left message on voice mail and sent a my chart.  I would like to speak with Heath finley: her form  Elysia Luther CMA

## 2024-10-01 ENCOUNTER — TELEPHONE (OUTPATIENT)
Dept: FAMILY MEDICINE | Facility: CLINIC | Age: 44
End: 2024-10-01
Payer: COMMERCIAL

## 2024-10-01 NOTE — TELEPHONE ENCOUNTER
Patient Quality Outreach    Patient is due for the following:   Colon Cancer Screening    Next Steps:   Contacted patient to schedule their Colon Cancer Screening    Type of outreach:    Sent [employer] message.      Questions for provider review:    None           Siena Cartagena CMA         DISPLAY PLAN FREE TEXT

## 2024-10-03 ENCOUNTER — TELEPHONE (OUTPATIENT)
Dept: GASTROENTEROLOGY | Facility: CLINIC | Age: 44
End: 2024-10-03
Payer: COMMERCIAL

## 2024-10-03 ENCOUNTER — MYC REFILL (OUTPATIENT)
Dept: FAMILY MEDICINE | Facility: CLINIC | Age: 44
End: 2024-10-03
Payer: COMMERCIAL

## 2024-10-03 DIAGNOSIS — F98.8 ATTENTION DEFICIT DISORDER (ADD) WITHOUT HYPERACTIVITY: ICD-10-CM

## 2024-10-03 NOTE — TELEPHONE ENCOUNTER
"Endoscopy Scheduling Screen    Have you had any respiratory illness or flu-like symptoms in the last 10 days?  No    What is your communication preference for Instructions and/or Bowel Prep?   MyChart    What insurance is in the chart?  Other:  [employer]    Ordering/Referring Provider: AYUSH ZAFAR    (If ordering provider performs procedure, schedule with ordering provider unless otherwise instructed. )    BMI: Estimated body mass index is 25.16 kg/m  as calculated from the following:    Height as of 8/28/24: 1.753 m (5' 9\").    Weight as of 9/9/24: 77.3 kg (170 lb 6.4 oz).     Sedation Ordered  general anesthesia.   BMI<= 45 45 < BMI <= 48 48 < BMI < = 50  BMI > 50   No Restrictions No [facility]  No [facility]  [facility] with exceptions Hospital Only OR Only       Do you have a history of malignant hyperthermia?  No    (Females) Are you currently pregnant?   No     Have you been diagnosed or told you have pulmonary hypertension?   No    Do you have an LVAD?  No    Have you been told you have moderate to severe sleep apnea?  Yes. Do you use a CPAP? No. (RN Review required for scheduling unless scheduling in Hospital.)     Have you been told you have COPD, asthma, or any other lung disease?  No    Do you have any heart conditions?  No     Have you ever had or are you waiting for an organ transplant?  No. Continue scheduling, no site restrictions.    Have you had a stroke or transient ischemic attack (TIA aka \"mini stroke\" in the last 6 months?   No    Have you been diagnosed with or been told you have cirrhosis of the liver?   No.    Are you currently on dialysis?   No    Do you need assistance transferring?   No    BMI: Estimated body mass index is 25.16 kg/m  as calculated from the following:    Height as of 8/28/24: 1.753 m (5' 9\").    Weight as of 9/9/24: 77.3 kg (170 lb 6.4 oz).     Is patients BMI > 40 and scheduling location [facility]?  No    Do you take an injectable or oral medication for weight loss or diabetes " (excluding insulin)?  No    Do you take the medication Naltrexone?  No    Do you take blood thinners?  No       Prep   Are you currently on dialysis or do you have chronic kidney disease?  No    Do you have a diagnosis of diabetes?  No    Do you have a diagnosis of cystic fibrosis (CF)?  No    On a regular basis do you go 3 -5 days between bowel movements?  Yes (Extended Prep)    BMI > 40?  No    Preferred Pharmacy:    [facility]/pharmacy #[id number] - [place], [place] - [address] [facility]  [address] [address] [address]  Phone: [phone] Fax: [phone]      Final Scheduling Details     Procedure scheduled  Colonoscopy    Surgeon:  KEITH     Date of procedure:  2/25/25     Pre-OP / PAC:   Yes - Patient informed of pre-op requirement.    Location  [facility] - Patient preference.    Sedation   General Anesthesia - Per order.      Patient Reminders:   You will receive a call from a Nurse to review instructions and health history.  This assessment must be completed prior to your procedure.  Failure to complete the Nurse assessment may result in the procedure being cancelled.      On the day of your procedure, please designate an adult(s) who can drive you home stay with you for the next 24 hours. The medicines used in the exam will make you sleepy. You will not be able to drive.      You cannot take public transportation, ride share services, or non-medical taxi service without a responsible caregiver.  Medical transport services are allowed with the requirement that a responsible caregiver will receive you at your destination.  We require that drivers and caregivers are confirmed prior to your procedure.

## 2024-10-04 RX ORDER — DEXTROAMPHETAMINE SACCHARATE, AMPHETAMINE ASPARTATE MONOHYDRATE, DEXTROAMPHETAMINE SULFATE AND AMPHETAMINE SULFATE 7.5; 7.5; 7.5; 7.5 MG/1; MG/1; MG/1; MG/1
30 CAPSULE, EXTENDED RELEASE ORAL DAILY
Qty: 30 CAPSULE | Refills: 0 | Status: SHIPPED | OUTPATIENT
Start: 2024-10-04

## 2024-10-04 RX ORDER — DEXTROAMPHETAMINE SACCHARATE, AMPHETAMINE ASPARTATE, DEXTROAMPHETAMINE SULFATE AND AMPHETAMINE SULFATE 2.5; 2.5; 2.5; 2.5 MG/1; MG/1; MG/1; MG/1
10 TABLET ORAL DAILY
Qty: 30 TABLET | Refills: 0 | Status: SHIPPED | OUTPATIENT
Start: 2024-10-04

## 2024-10-04 NOTE — TELEPHONE ENCOUNTER
Can you please contact patient and ask if Chana Washington is managing her Adderall as it looks like it was last filled by her? Thanks.

## 2024-10-04 NOTE — TELEPHONE ENCOUNTER
Reena Driver APRN CNP  You are new pcp and patient will ask you to fill adderall rx's from now on     RN spoke to patient, explained controlled substance policy that meds should be filled by one provider only     Patient states she does not know how the refill request got sent to two providers, new pcp is Reena Driver APRN CNP and patient will only fill with this provider     Linda Mosher, Registered Nurse  [facility]

## 2024-10-14 ENCOUNTER — PATIENT OUTREACH (OUTPATIENT)
Dept: OBGYN | Facility: CLINIC | Age: 44
End: 2024-10-14
Payer: COMMERCIAL

## 2024-10-14 DIAGNOSIS — D06.9 CERVICAL INTRAEPITHELIAL NEOPLASIA GRADE III WITH SEVERE DYSPLASIA: Primary | ICD-10-CM

## 2024-10-17 ENCOUNTER — PATIENT OUTREACH (OUTPATIENT)
Dept: CARE COORDINATION | Facility: CLINIC | Age: 44
End: 2024-10-17
Payer: COMMERCIAL

## 2024-10-24 ENCOUNTER — OFFICE VISIT (OUTPATIENT)
Dept: FAMILY MEDICINE | Facility: CLINIC | Age: 44
End: 2024-10-24
Payer: COMMERCIAL

## 2024-10-24 VITALS
HEIGHT: 69 IN | HEART RATE: 93 BPM | OXYGEN SATURATION: 97 % | TEMPERATURE: 98.2 F | SYSTOLIC BLOOD PRESSURE: 129 MMHG | DIASTOLIC BLOOD PRESSURE: 83 MMHG | BODY MASS INDEX: 24.34 KG/M2 | WEIGHT: 164.3 LBS | RESPIRATION RATE: 16 BRPM

## 2024-10-24 DIAGNOSIS — D06.9 CERVICAL INTRAEPITHELIAL NEOPLASIA GRADE III WITH SEVERE DYSPLASIA: ICD-10-CM

## 2024-10-24 DIAGNOSIS — L30.9 DERMATITIS: ICD-10-CM

## 2024-10-24 DIAGNOSIS — Z01.818 PREOP GENERAL PHYSICAL EXAM: Primary | ICD-10-CM

## 2024-10-24 PROCEDURE — 91320 SARSCV2 VAC 30MCG TRS-SUC IM: CPT

## 2024-10-24 PROCEDURE — 90480 ADMN SARSCOV2 VAC 1/ONLY CMP: CPT

## 2024-10-24 PROCEDURE — 90471 IMMUNIZATION ADMIN: CPT

## 2024-10-24 PROCEDURE — 99214 OFFICE O/P EST MOD 30 MIN: CPT | Mod: 25

## 2024-10-24 PROCEDURE — 90656 IIV3 VACC NO PRSV 0.5 ML IM: CPT

## 2024-10-24 RX ORDER — CLOBETASOL PROPIONATE 0.5 MG/G
OINTMENT TOPICAL 2 TIMES DAILY
Qty: 30 G | Refills: 1 | Status: SHIPPED | OUTPATIENT
Start: 2024-10-24

## 2024-10-24 ASSESSMENT — PATIENT HEALTH QUESTIONNAIRE - PHQ9
10. IF YOU CHECKED OFF ANY PROBLEMS, HOW DIFFICULT HAVE THESE PROBLEMS MADE IT FOR YOU TO DO YOUR WORK, TAKE CARE OF THINGS AT HOME, OR GET ALONG WITH OTHER PEOPLE: SOMEWHAT DIFFICULT
SUM OF ALL RESPONSES TO PHQ QUESTIONS 1-9: 10
SUM OF ALL RESPONSES TO PHQ QUESTIONS 1-9: 10

## 2024-10-24 NOTE — PATIENT INSTRUCTIONS
How to Take Your Medication Before Surgery  Preoperative Medication Instructions   Antiplatelet or Anticoagulation Medication Instructions   - Patient is on no antiplatelet or anticoagulation medications.    Additional Medication Instructions  Take all scheduled medications on the day of surgery EXCEPT for modifications listed below:   - Herbal medications and vitamins: DO NOT TAKE 14 days prior to surgery.   - ibuprofen (Advil, Motrin): DO NOT TAKE 1 day before surgery.    - Psychostimulants: Hold the day of surgery   - SSRIs, SNRIs, TCAs, Antipsychotics: Continue without modification.        Patient Education   Preparing for Your Surgery  For Adults  Getting started  In most cases, a nurse will call to review your health history and instructions. They will give you an arrival time based on your scheduled surgery time. Please be ready to share:  Your doctor's clinic name and phone number  Your medical, surgical, and anesthesia history  A list of allergies and sensitivities  A list of medicines, including herbal treatments and over-the-counter drugs  Whether the patient has a legal guardian (ask how to send us the papers in advance)  Note: You may not receive a call if you were seen at our PAC (Preoperative Assessment Center).  Please tell us if you're pregnant--or if there's any chance you might be pregnant. Some surgeries may injure a fetus (unborn baby), so they require a pregnancy test. Surgeries that are safe for a fetus don't always need a test, and you can choose whether to have one.   Preparing for surgery  Within 10 to 30 days of surgery: Have a pre-op exam (sometimes called an H&P, or History and Physical). This can be done at a clinic or pre-operative center.  If you're having a , you may not need this exam. Talk to your care team.  At your pre-op exam, talk to your care team about all medicines you take. (This includes CBD oil and any drugs, such as THC, marijuana, and other forms of cannabis.)  If you need to stop any medicine before surgery, ask when to start taking it again.  This is for your safety. Many medicines and drugs can make you bleed too much during surgery. Some change how well surgery (anesthesia) drugs work.  Call your insurance company to let them know you're having surgery. (If you don't have insurance, call [phone].)  Call your clinic if there's any change in your health. This includes a scrape or scratch near the surgery site, or any signs of a cold (sore throat, runny nose, cough, rash, fever).  Eating and drinking guidelines  For your safety: Unless your surgeon tells you otherwise, follow the guidelines below.  Eat and drink as normal until 8 hours before you arrive for surgery. After that, no food or milk. You can spit out gum when you arrive.  Drink clear liquids until 2 hours before you arrive. These are liquids you can see through, like water, Gatorade, and Propel Water. They also include plain black coffee and tea (no cream or milk).  No alcohol for 24 hours before you arrive. The night before surgery, stop any drinks that contain THC.  If your care team tells you to take medicine on the morning of surgery, it's okay to take it with a sip of water. No other medicines or drugs are allowed (including CBD oil)--follow your care team's instructions.  If you have questions the day of surgery, call your hospital or surgery center.   Preventing infection  Shower or bathe the night before and the morning of surgery. Follow the instructions your clinic gave you. (If no instructions, use regular soap.)  Don't shave or clip hair near your surgery site. We'll remove the hair if needed.  Don't smoke or vape the morning of surgery. No chewing tobacco for 6 hours before you arrive. A nicotine patch is okay. You may spit out nicotine gum when you arrive.  For some surgeries, the surgeon will tell you to fully quit smoking and nicotine.  We will make every effort to keep you safe from  infection. We will:  Clean our hands often with soap and water (or an alcohol-based hand rub).  Clean the skin at your surgery site with a special soap that kills germs.  Give you a special gown to keep you warm. (Cold raises the risk of infection.)  Wear hair covers, masks, gowns, and gloves during surgery.  Give antibiotic medicine, if prescribed. Not all surgeries need this medicine.  What to bring on the day of surgery  Photo ID and insurance card  Copy of your health care directive, if you have one  Glasses and hearing aids (bring cases)  You can't wear contacts during surgery  Inhaler and eye drops, if you use them (tell us about these when you arrive)  CPAP machine or breathing device, if you use them  A few personal items, if spending the night  If you have . . .  A pacemaker, ICD (cardiac defibrillator), or other implant: Bring the ID card.  An implanted stimulator: Bring the remote control.  A legal guardian: Bring a copy of the certified (court-stamped) guardianship papers.  Please remove any jewelry, including body piercings. Leave jewelry and other valuables at home.  If you're going home the day of surgery  You must have a responsible adult drive you home. They should stay with you overnight as well.  If you don't have someone to stay with you, and you aren't safe to go home alone, we may keep you overnight. Insurance often won't pay for this.  After surgery  If it's hard to control your pain or you need more pain medicine, please call your surgeon's office.  Questions?   If you have any questions for your care team, list them here:   ____________________________________________________________________________________________________________________________________________________________________________________________________________________________________________________________  For informational purposes only. Not to replace the advice of your health care provider. Copyright   2003, 2019 [facility]. All rights reserved. Clinically reviewed by Bebeto Chinchilla MD. [employer] [id number] - REV 08/24.

## 2024-10-24 NOTE — PROGRESS NOTES
Preoperative Evaluation  [facility]  [address] [facility] [address]  Phone: [phone]  Primary Provider: GREGORIO Martinez CNP  Pre-op Performing Provider: GREGORIO Martinez CNP  Oct 24, 2024             10/24/2024   Surgical Information   What procedure is being done? hysterectomy    Facility or Hospital where procedure/surgery will be performed: [facility]    Who is doing the procedure / surgery? dr curtis    Date of surgery / procedure: [id number]    Time of surgery / procedure: 7am    Where do you plan to recover after surgery? at home with family        Patient-reported     Fax number for surgical facility: Note does not need to be faxed, will be available electronically in Epic.    Assessment & Plan     The proposed surgical procedure is considered INTERMEDIATE risk.    (Z01.818) Preop general physical exam  (primary encounter diagnosis)  (D06.9) Cervical intraepithelial neoplasia grade III with severe dysplasia  Comment: The patient is able to exercise at significantly greater than 4 metabolic equivalents without becoming winded or short of breath. The patient does not have any significant cardiac or pulmonary diagnoses which would complicate or delay surgery. At this time, the patient is medically maximized for surgery and there are no absolute contraindications to proceed as  currently planned.     (L30.9) Dermatitis  Comment: Finger dermatitis on bilateral hands. Patient has been using triamcinolone without improvement. She has an upcoming dermatology appointment in February. Discussed using aquaphor with gloves and band aids over open wounds until healed; monitor for infections. Switch to high does steroid ointment. Will resubmit referral to derm with higher urgency if she does not have improvement.   Plan: clobetasol (TEMOVATE) 0.05 % external ointment               - No identified additional risk factors other than previously addressed    Preoperative  Medication Instructions  Antiplatelet or Anticoagulation Medication Instructions   - Patient is on no antiplatelet or anticoagulation medications.    Additional Medication Instructions  Take all scheduled medications on the day of surgery EXCEPT for modifications listed below:   - Herbal medications and vitamins: DO NOT TAKE 14 days prior to surgery.   - ibuprofen (Advil, Motrin): DO NOT TAKE 1 day before surgery.    - Psychostimulants: Hold the day of surgery   - SSRIs, SNRIs, TCAs, Antipsychotics: Continue without modification.     Recommendation  Approval given to proceed with proposed procedure, without further diagnostic evaluation.    Glen Joe is a 43 year old, presenting for the following:  Pre-Op Exam          10/24/2024    10:10 AM   Additional Questions   Roomed by Siena GONSALVES         10/24/2024    10:10 AM   Patient Reported Additional Medications   Patient reports taking the following new medications Sudafed otc     HPI related to upcoming procedure:   Heath Waller is a 43 year old female here today for a pre-operative examination. The patient is scheduled to undergo a total vaginal hysterectomy on November 6, 2024, performed by . The patient has previously undergone surgery before and has never had any previous problems with anesthesia. The patient reports that she does not have any personal history of cardiac or pulmonary diseases. Significant preoperative risk factors include: N/A. The patient reports that there is not a significant family history of sudden cardiac death or prior complications with anesthesia in the family. The patient is usually able to exercise at a high level of intensity.         10/24/2024   Pre-Op Questionnaire   Have you ever had a heart attack or stroke? No    Have you ever had surgery on your heart or blood vessels, such as a stent placement, a coronary artery bypass, or surgery on an artery in your head, neck, heart, or legs? No    Do you have chest pain  with activity? No    Do you have a history of heart failure? No    Do you currently have a cold, bronchitis or symptoms of other infection? No    Do you have a cough, shortness of breath, or wheezing? No    Do you or anyone in your family have previous history of blood clots? No    Do you or does anyone in your family have a serious bleeding problem such as prolonged bleeding following surgeries or cuts? (!) YES Borderline Anemia    Have you ever had problems with anemia or been told to take iron pills? No    Have you had any abnormal blood loss such as black, tarry or bloody stools, or abnormal vaginal bleeding? No    Have you ever had a blood transfusion? No    Are you willing to have a blood transfusion if it is medically needed before, during, or after your surgery? Yes    Have you or any of your relatives ever had problems with anesthesia? No    Do you have sleep apnea, excessive snoring or daytime drowsiness? (!) YES    Do you have a CPAP machine? (!) NO     Do you have any artifical heart valves or other implanted medical devices like a pacemaker, defibrillator, or continuous glucose monitor? No    Do you have artificial joints? No    Are you allergic to latex? No        Patient-reported     Health Care Directive  Patient does not have a Health Care Directive: Discussed advance care planning with patient; however, patient declined at this time.    Preoperative Review of    reviewed - controlled substances reflected in medication list.        Patient Active Problem List    Diagnosis Date Noted    Pain of left hand 02/23/2022     Priority: Medium    Thumb pain, left 02/23/2022     Priority: Medium    Post-traumatic osteoarthritis of right knee 03/26/2019     Priority: Medium    Moderate episode of recurrent major depressive disorder (H) 11/17/2017     Priority: Medium    ROSA (obstructive sleep apnea) 09/29/2017     Priority: Medium    Benign essential hypertension 12/27/2016     Priority: Medium     Anxiety attack 12/27/2016     Priority: Medium    Chronic rhinitis 12/20/2016     Priority: Medium    Depressive disorder 11/14/2016     Priority: Medium    Gastroesophageal reflux disease 11/14/2016     Priority: Medium    History of thyroid disease 11/14/2016     Priority: Medium     Overview:   benign tumor, post partial thyroidectomy      Chronic pain 11/14/2016     Priority: Medium    Attention deficit disorder (ADD) without hyperactivity 11/14/2016     Priority: Medium    Acne vulgaris 11/14/2016     Priority: Medium    TMJ (temporomandibular joint syndrome) 11/14/2016     Priority: Medium    Fibromyalgia 11/14/2016     Priority: Medium    Cervical intraepithelial neoplasia grade III with severe dysplasia 12/03/2008     Priority: Medium     08/21/07: [facility] CELINA I (care everywhere)  11/24/08: [facility] CELINA 3 (care everywhere)  LEEP (12/08) confirmed CIN3. Margins negative, negative ECC.  02/28/08: ASCUS Pap + HR HPV (care everywhere)  10/29/10: NIL Pap (care everywhere)  04/10/12: NIL Pap (care everywhere)  01/20/14: HSIL Pap (care everywhere)  01/30/14: [facility] CELINA 3 (care everywhere)  03/12/14: LEEP CELINA 3 margins free of dysplasia. (care everywhere)  03/2/15: NIL Pap, Neg HR HPV (care everywhere)  06/27/16: NIL Pap, Neg HR HPV (care everywhere)  09/25/19: NIL Pap, Neg HR HPV. 8/28/24 Atypical glandular cells (NOS), + HR HPV (neg 16/18). Plan colp with ECC & EMB due by 11/28/24.  9/9/24 [facility] ECC - neg, EMB - neg. Plan hysterectomy  9/6/2024 Pt notified by phone. Pt will schedule a consult for hysterectomy before doing the colp/EMB.  11/6/24 hysterectomy scheduled            Past Medical History:   Diagnosis Date    ADD (attention deficit disorder)     Atypical mole 07/19/2018    Degenerative disc disease, cervical     Depressive disorder     Gastro-oesophageal reflux disease     Myofacial muscle pain     Noninfectious ileitis     IBS with constipation    Other chronic pain     myofacial pain syndrome    Sleep apnea      wears CPAP at night    Thyroid disease      Past Surgical History:   Procedure Laterality Date    APPENDECTOMY  01/01/2000    ARTHROSCOPY KNEE Right     teenager    BREAST SURGERY  03/01/2006    augmentation, revisions 2011 and 2015    FOOT SURGERY Right     semoid bone removed from Fx    GYN SURGERY   2009 and 2014    LEEPS x 2     LAPAROSCOPIC CHOLECYSTECTOMY  05/15/2014    Procedure: LAPAROSCOPIC CHOLECYSTECTOMY;  Surgeon: Kd Arthur MD;  Location: [facility] OR    THYROID SURGERY  01/01/2001    nodule     TUBAL LIGATION  10/09/2008     Current Outpatient Medications   Medication Sig Dispense Refill    albuterol (PROAIR HFA/PROVENTIL HFA/VENTOLIN HFA) 108 (90 Base) MCG/ACT inhaler Inhale 1-2 puffs into the lungs every 4 hours as needed for shortness of breath, wheezing or cough 18 g 3    amphetamine-dextroamphetamine (ADDERALL XR) 30 MG 24 hr capsule Take 1 capsule (30 mg) by mouth daily. 30 capsule 0    amphetamine-dextroamphetamine (ADDERALL XR) 30 MG 24 hr capsule Take 1 capsule (30 mg) by mouth daily 30 capsule 0    amphetamine-dextroamphetamine (ADDERALL) 10 MG tablet Take 1 tablet (10 mg) by mouth daily. 30 tablet 0    clobetasol (TEMOVATE) 0.05 % external ointment Apply topically 2 times daily. For use up to two weeks at a time. 30 g 1    cyclobenzaprine (FLEXERIL) 10 MG tablet Take 1 tablet (10 mg) by mouth 3 times daily as needed for muscle spasms. 30 tablet 3    ibuprofen (ADVIL,MOTRIN) 200 MG tablet       order for DME Equipment being ordered: RES MED AIRCURVE 10 ASV      sertraline (ZOLOFT) 100 MG tablet Take 1 tablet (100 mg) by mouth daily. 90 tablet 3    triamcinolone (KENALOG) 0.1 % external cream Apply topically 2 times daily Do not use on face 30 g 0       Allergies   Allergen Reactions    Codeine      Itchy, hives- tolerated cough syrup okay    Sulfa Antibiotics      Rash      Toradol [Ketorolac] Itching and Rash        Social History     Tobacco Use    Smoking status: Every Day      "Current packs/day: 0.00     Types: Cigarettes, Vaping Device     Passive exposure: Current    Smokeless tobacco: Never    Tobacco comments:     e-cig   Substance Use Topics    Alcohol use: Yes     Alcohol/week: 0.0 standard drinks of alcohol     Comment: rarely       History   Drug Use No           Objective    /83 (BP Location: Right arm, Patient Position: Sitting, Cuff Size: Adult Regular)   Pulse 93   Temp 98.2  F (36.8  C) (Oral)   Resp 16   Ht 1.753 m (5' 9\")   Wt 74.5 kg (164 lb 4.8 oz)   LMP 10/17/2024 (Approximate)   SpO2 97%   BMI 24.26 kg/m     Estimated body mass index is 24.26 kg/m  as calculated from the following:    Height as of this encounter: 1.753 m (5' 9\").    Weight as of this encounter: 74.5 kg (164 lb 4.8 oz).  Physical Exam  GENERAL: alert and no distress  RESP: lungs clear to auscultation - no rales, rhonchi or wheezes  CV: regular rate and rhythm, normal S1 S2, no S3 or S4, no murmur, click or rub, no peripheral edema  MS: no gross musculoskeletal defects noted, no edema    Recent Labs   Lab Test 08/28/24  0928   HGB 12.6         POTASSIUM 4.0   CR 0.71        Diagnostics  No labs were ordered during this visit.   No EKG required, no history of coronary heart disease, significant arrhythmia, peripheral arterial disease or other structural heart disease.    Revised Cardiac Risk Index (RCRI)  The patient has the following serious cardiovascular risks for perioperative complications:   - No serious cardiac risks = 0 points     RCRI Interpretation: 0 points: Class I (very low risk - 0.4% complication rate)         Signed Electronically by: GREGORIO Martinez CNP  A copy of this evaluation report is provided to the requesting physician.         Answers submitted by the patient for this visit:  Patient Health Questionnaire (Submitted on 10/24/2024)  If you checked off any problems, how difficult have these problems made it for you to do your work, take care of things at " home, or get along with other people?: Somewhat difficult  PHQ9 TOTAL SCORE: 10

## 2024-10-31 ENCOUNTER — VIRTUAL VISIT (OUTPATIENT)
Dept: OBGYN | Facility: CLINIC | Age: 44
End: 2024-10-31
Payer: COMMERCIAL

## 2024-10-31 DIAGNOSIS — D06.9 CERVICAL INTRAEPITHELIAL NEOPLASIA GRADE III WITH SEVERE DYSPLASIA: Primary | ICD-10-CM

## 2024-10-31 DIAGNOSIS — N92.0 MENORRHAGIA WITH REGULAR CYCLE: ICD-10-CM

## 2024-10-31 DIAGNOSIS — N39.3 SUI (STRESS URINARY INCONTINENCE, FEMALE): ICD-10-CM

## 2024-10-31 PROCEDURE — 99214 OFFICE O/P EST MOD 30 MIN: CPT | Mod: 95 | Performed by: OBSTETRICS & GYNECOLOGY

## 2024-10-31 NOTE — PROGRESS NOTES
Heath Waller is a 44 year old female who is being evaluated via a billable video visit.            Heath Waller is a 44 year old female who presents for virtual visit today for the following health issue(s):  Patient presents with:  Md Request F/U:  To discuss surgery that is scheduled for 11/06/2024. Patient is scheduled to have vaginal natural orifice transluminal endoscopic surgery hysterectomy, possible conversion to total laparoscopic hysterectomy with bilateral salpingectomy and saolyx midurethral sling. Has no concerns today.      Patient will be at her home in [place], [place] for the phone visit. Provider is in clinic- [facility] in [place].     Hx LEEP and bilateral salpingectomy.     Additional information:   Heath is eager to move forward with definitive management of stress urinary incontinence, vaginal bleeding, and cervical dysplasia. She has over the counter analgesics at home as well as stool softener and is prepared for the mild bowel prep. We discussed radha-op expectations and performed surgical consent today.     All questions answered.     Plan for vNOTES hysterectomy with bilateral salpingectomy and solyx midurethral sling.     Patient location: Home in [place]  Physician location: [facility], in [place]     Ramona Lion MD

## 2024-11-06 ENCOUNTER — ANESTHESIA EVENT (OUTPATIENT)
Dept: SURGERY | Facility: CLINIC | Age: 44
End: 2024-11-06
Payer: COMMERCIAL

## 2024-11-06 ENCOUNTER — HOSPITAL ENCOUNTER (OUTPATIENT)
Facility: CLINIC | Age: 44
Discharge: HOME | End: 2024-11-06
Attending: OBSTETRICS & GYNECOLOGY | Admitting: OBSTETRICS & GYNECOLOGY
Payer: COMMERCIAL

## 2024-11-06 ENCOUNTER — ANESTHESIA (OUTPATIENT)
Dept: SURGERY | Facility: CLINIC | Age: 44
End: 2024-11-06
Payer: COMMERCIAL

## 2024-11-06 VITALS
DIASTOLIC BLOOD PRESSURE: 89 MMHG | HEIGHT: 69 IN | HEART RATE: 69 BPM | OXYGEN SATURATION: 99 % | TEMPERATURE: 97 F | BODY MASS INDEX: 24.17 KG/M2 | SYSTOLIC BLOOD PRESSURE: 142 MMHG | WEIGHT: 163.2 LBS | RESPIRATION RATE: 12 BRPM

## 2024-11-06 DIAGNOSIS — G89.18 ACUTE POST-OPERATIVE PAIN: Primary | ICD-10-CM

## 2024-11-06 LAB
BASOPHILS # BLD AUTO: 0.1 10E3/UL (ref 0–0.2)
BASOPHILS NFR BLD AUTO: 1 %
EOSINOPHIL # BLD AUTO: 0.2 10E3/UL (ref 0–0.7)
EOSINOPHIL NFR BLD AUTO: 2 %
ERYTHROCYTE [DISTWIDTH] IN BLOOD BY AUTOMATED COUNT: 13.6 % (ref 10–15)
HCG UR QL: NEGATIVE
HCT VFR BLD AUTO: 42.3 % (ref 35–47)
HGB BLD-MCNC: 13.8 G/DL (ref 11.7–15.7)
IMM GRANULOCYTES # BLD: 0 10E3/UL
IMM GRANULOCYTES NFR BLD: 0 %
LYMPHOCYTES # BLD AUTO: 1.3 10E3/UL (ref 0.8–5.3)
LYMPHOCYTES NFR BLD AUTO: 19 %
MCH RBC QN AUTO: 29.9 PG (ref 26.5–33)
MCHC RBC AUTO-ENTMCNC: 32.6 G/DL (ref 31.5–36.5)
MCV RBC AUTO: 92 FL (ref 78–100)
MONOCYTES # BLD AUTO: 0.6 10E3/UL (ref 0–1.3)
MONOCYTES NFR BLD AUTO: 9 %
NEUTROPHILS # BLD AUTO: 4.6 10E3/UL (ref 1.6–8.3)
NEUTROPHILS NFR BLD AUTO: 68 %
NRBC # BLD AUTO: 0 10E3/UL
NRBC BLD AUTO-RTO: 0 /100
PLATELET # BLD AUTO: 295 10E3/UL (ref 150–450)
RBC # BLD AUTO: 4.62 10E6/UL (ref 3.8–5.2)
WBC # BLD AUTO: 6.7 10E3/UL (ref 4–11)

## 2024-11-06 PROCEDURE — 370N000017 HC ANESTHESIA TECHNICAL FEE, PER MIN: Performed by: OBSTETRICS & GYNECOLOGY

## 2024-11-06 PROCEDURE — 999N000141 HC STATISTIC PRE-PROCEDURE NURSING ASSESSMENT: Performed by: OBSTETRICS & GYNECOLOGY

## 2024-11-06 PROCEDURE — 88307 TISSUE EXAM BY PATHOLOGIST: CPT | Mod: TC | Performed by: OBSTETRICS & GYNECOLOGY

## 2024-11-06 PROCEDURE — 360N000077 HC SURGERY LEVEL 4, PER MIN: Performed by: OBSTETRICS & GYNECOLOGY

## 2024-11-06 PROCEDURE — 57288 REPAIR BLADDER DEFECT: CPT | Mod: 80 | Performed by: OBSTETRICS & GYNECOLOGY

## 2024-11-06 PROCEDURE — 258N000003 HC RX IP 258 OP 636: Performed by: NURSE ANESTHETIST, CERTIFIED REGISTERED

## 2024-11-06 PROCEDURE — 272N000001 HC OR GENERAL SUPPLY STERILE: Performed by: OBSTETRICS & GYNECOLOGY

## 2024-11-06 PROCEDURE — 258N000003 HC RX IP 258 OP 636: Performed by: OBSTETRICS & GYNECOLOGY

## 2024-11-06 PROCEDURE — 58262 VAG HYST INCLUDING T/O: CPT | Mod: 80 | Performed by: OBSTETRICS & GYNECOLOGY

## 2024-11-06 PROCEDURE — 57288 REPAIR BLADDER DEFECT: CPT | Mod: 51 | Performed by: OBSTETRICS & GYNECOLOGY

## 2024-11-06 PROCEDURE — 58262 VAG HYST INCLUDING T/O: CPT | Performed by: OBSTETRICS & GYNECOLOGY

## 2024-11-06 PROCEDURE — 85004 AUTOMATED DIFF WBC COUNT: CPT | Performed by: OBSTETRICS & GYNECOLOGY

## 2024-11-06 PROCEDURE — 250N000025 HC SEVOFLURANE, PER MIN: Performed by: OBSTETRICS & GYNECOLOGY

## 2024-11-06 PROCEDURE — 250N000011 HC RX IP 250 OP 636: Performed by: NURSE ANESTHETIST, CERTIFIED REGISTERED

## 2024-11-06 PROCEDURE — 81025 URINE PREGNANCY TEST: CPT | Performed by: OBSTETRICS & GYNECOLOGY

## 2024-11-06 PROCEDURE — 250N000011 HC RX IP 250 OP 636: Performed by: OBSTETRICS & GYNECOLOGY

## 2024-11-06 PROCEDURE — 250N000011 HC RX IP 250 OP 636: Performed by: ANESTHESIOLOGY

## 2024-11-06 PROCEDURE — 250N000013 HC RX MED GY IP 250 OP 250 PS 637: Performed by: OBSTETRICS & GYNECOLOGY

## 2024-11-06 PROCEDURE — C1771 REP DEV, URINARY, W/SLING: HCPCS | Performed by: OBSTETRICS & GYNECOLOGY

## 2024-11-06 PROCEDURE — 710N000009 HC RECOVERY PHASE 1, LEVEL 1, PER MIN: Performed by: OBSTETRICS & GYNECOLOGY

## 2024-11-06 PROCEDURE — 710N000012 HC RECOVERY PHASE 2, PER MINUTE: Performed by: OBSTETRICS & GYNECOLOGY

## 2024-11-06 PROCEDURE — 250N000009 HC RX 250: Performed by: NURSE ANESTHETIST, CERTIFIED REGISTERED

## 2024-11-06 PROCEDURE — 36415 COLL VENOUS BLD VENIPUNCTURE: CPT | Performed by: OBSTETRICS & GYNECOLOGY

## 2024-11-06 PROCEDURE — 250N000009 HC RX 250: Performed by: OBSTETRICS & GYNECOLOGY

## 2024-11-06 DEVICE — IMPLANTABLE DEVICE: Type: IMPLANTABLE DEVICE | Site: VAGINA | Status: FUNCTIONAL

## 2024-11-06 RX ORDER — OXYCODONE HYDROCHLORIDE 5 MG/1
5 TABLET ORAL
Status: DISCONTINUED | OUTPATIENT
Start: 2024-11-06 | End: 2024-11-06 | Stop reason: HOSPADM

## 2024-11-06 RX ORDER — ONDANSETRON 2 MG/ML
4 INJECTION INTRAMUSCULAR; INTRAVENOUS EVERY 30 MIN PRN
Status: DISCONTINUED | OUTPATIENT
Start: 2024-11-06 | End: 2024-11-06 | Stop reason: HOSPADM

## 2024-11-06 RX ORDER — HYDROMORPHONE HCL IN WATER/PF 6 MG/30 ML
0.4 PATIENT CONTROLLED ANALGESIA SYRINGE INTRAVENOUS EVERY 5 MIN PRN
Status: DISCONTINUED | OUTPATIENT
Start: 2024-11-06 | End: 2024-11-06 | Stop reason: HOSPADM

## 2024-11-06 RX ORDER — FENTANYL CITRATE 50 UG/ML
25 INJECTION, SOLUTION INTRAMUSCULAR; INTRAVENOUS EVERY 5 MIN PRN
Status: DISCONTINUED | OUTPATIENT
Start: 2024-11-06 | End: 2024-11-06 | Stop reason: HOSPADM

## 2024-11-06 RX ORDER — ONDANSETRON 4 MG/1
4 TABLET, ORALLY DISINTEGRATING ORAL EVERY 30 MIN PRN
Status: DISCONTINUED | OUTPATIENT
Start: 2024-11-06 | End: 2024-11-06 | Stop reason: HOSPADM

## 2024-11-06 RX ORDER — DEXAMETHASONE SODIUM PHOSPHATE 4 MG/ML
4 INJECTION, SOLUTION INTRA-ARTICULAR; INTRALESIONAL; INTRAMUSCULAR; INTRAVENOUS; SOFT TISSUE
Status: DISCONTINUED | OUTPATIENT
Start: 2024-11-06 | End: 2024-11-06 | Stop reason: HOSPADM

## 2024-11-06 RX ORDER — ONDANSETRON 2 MG/ML
INJECTION INTRAMUSCULAR; INTRAVENOUS PRN
Status: DISCONTINUED | OUTPATIENT
Start: 2024-11-06 | End: 2024-11-06

## 2024-11-06 RX ORDER — CEFAZOLIN SODIUM/WATER 2 G/20 ML
2 SYRINGE (ML) INTRAVENOUS
Status: COMPLETED | OUTPATIENT
Start: 2024-11-06 | End: 2024-11-06

## 2024-11-06 RX ORDER — BUPIVACAINE HYDROCHLORIDE AND EPINEPHRINE 5; 5 MG/ML; UG/ML
INJECTION, SOLUTION EPIDURAL; INTRACAUDAL; PERINEURAL PRN
Status: DISCONTINUED | OUTPATIENT
Start: 2024-11-06 | End: 2024-11-06 | Stop reason: HOSPADM

## 2024-11-06 RX ORDER — PROPOFOL 10 MG/ML
INJECTION, EMULSION INTRAVENOUS PRN
Status: DISCONTINUED | OUTPATIENT
Start: 2024-11-06 | End: 2024-11-06

## 2024-11-06 RX ORDER — NALOXONE HYDROCHLORIDE 0.4 MG/ML
0.1 INJECTION, SOLUTION INTRAMUSCULAR; INTRAVENOUS; SUBCUTANEOUS
Status: DISCONTINUED | OUTPATIENT
Start: 2024-11-06 | End: 2024-11-06 | Stop reason: HOSPADM

## 2024-11-06 RX ORDER — CEFAZOLIN SODIUM/WATER 2 G/20 ML
2 SYRINGE (ML) INTRAVENOUS SEE ADMIN INSTRUCTIONS
Status: DISCONTINUED | OUTPATIENT
Start: 2024-11-06 | End: 2024-11-06 | Stop reason: HOSPADM

## 2024-11-06 RX ORDER — OXYCODONE HYDROCHLORIDE 5 MG/1
5 TABLET ORAL
Status: COMPLETED | OUTPATIENT
Start: 2024-11-06 | End: 2024-11-06

## 2024-11-06 RX ORDER — FENTANYL CITRATE 50 UG/ML
INJECTION, SOLUTION INTRAMUSCULAR; INTRAVENOUS PRN
Status: DISCONTINUED | OUTPATIENT
Start: 2024-11-06 | End: 2024-11-06

## 2024-11-06 RX ORDER — LIDOCAINE HYDROCHLORIDE 20 MG/ML
INJECTION, SOLUTION INFILTRATION; PERINEURAL PRN
Status: DISCONTINUED | OUTPATIENT
Start: 2024-11-06 | End: 2024-11-06

## 2024-11-06 RX ORDER — METRONIDAZOLE 500 MG/100ML
500 INJECTION, SOLUTION INTRAVENOUS
Status: COMPLETED | OUTPATIENT
Start: 2024-11-06 | End: 2024-11-06

## 2024-11-06 RX ORDER — SODIUM CHLORIDE, SODIUM LACTATE, POTASSIUM CHLORIDE, CALCIUM CHLORIDE 600; 310; 30; 20 MG/100ML; MG/100ML; MG/100ML; MG/100ML
INJECTION, SOLUTION INTRAVENOUS CONTINUOUS PRN
Status: DISCONTINUED | OUTPATIENT
Start: 2024-11-06 | End: 2024-11-06

## 2024-11-06 RX ORDER — HYDROMORPHONE HCL IN WATER/PF 6 MG/30 ML
0.2 PATIENT CONTROLLED ANALGESIA SYRINGE INTRAVENOUS EVERY 5 MIN PRN
Status: DISCONTINUED | OUTPATIENT
Start: 2024-11-06 | End: 2024-11-06 | Stop reason: HOSPADM

## 2024-11-06 RX ORDER — OXYCODONE HYDROCHLORIDE 5 MG/1
5-10 TABLET ORAL EVERY 4 HOURS PRN
Qty: 10 TABLET | Refills: 0 | Status: SHIPPED | OUTPATIENT
Start: 2024-11-06

## 2024-11-06 RX ORDER — GLYCOPYRROLATE 0.2 MG/ML
INJECTION, SOLUTION INTRAMUSCULAR; INTRAVENOUS PRN
Status: DISCONTINUED | OUTPATIENT
Start: 2024-11-06 | End: 2024-11-06

## 2024-11-06 RX ORDER — PROPOFOL 10 MG/ML
INJECTION, EMULSION INTRAVENOUS CONTINUOUS PRN
Status: DISCONTINUED | OUTPATIENT
Start: 2024-11-06 | End: 2024-11-06

## 2024-11-06 RX ORDER — HYDROXYZINE HYDROCHLORIDE 25 MG/1
25 TABLET, FILM COATED ORAL
Status: DISCONTINUED | OUTPATIENT
Start: 2024-11-06 | End: 2024-11-06 | Stop reason: HOSPADM

## 2024-11-06 RX ORDER — SODIUM CHLORIDE, SODIUM LACTATE, POTASSIUM CHLORIDE, CALCIUM CHLORIDE 600; 310; 30; 20 MG/100ML; MG/100ML; MG/100ML; MG/100ML
INJECTION, SOLUTION INTRAVENOUS CONTINUOUS
Status: DISCONTINUED | OUTPATIENT
Start: 2024-11-06 | End: 2024-11-06 | Stop reason: HOSPADM

## 2024-11-06 RX ORDER — OXYCODONE HYDROCHLORIDE 5 MG/1
10 TABLET ORAL
Status: DISCONTINUED | OUTPATIENT
Start: 2024-11-06 | End: 2024-11-06 | Stop reason: HOSPADM

## 2024-11-06 RX ORDER — ACETAMINOPHEN 325 MG/1
975 TABLET ORAL ONCE
Status: DISCONTINUED | OUTPATIENT
Start: 2024-11-06 | End: 2024-11-06 | Stop reason: HOSPADM

## 2024-11-06 RX ORDER — FENTANYL CITRATE 50 UG/ML
50 INJECTION, SOLUTION INTRAMUSCULAR; INTRAVENOUS EVERY 5 MIN PRN
Status: DISCONTINUED | OUTPATIENT
Start: 2024-11-06 | End: 2024-11-06 | Stop reason: HOSPADM

## 2024-11-06 RX ORDER — ACETAMINOPHEN 325 MG/1
975 TABLET ORAL ONCE
Status: COMPLETED | OUTPATIENT
Start: 2024-11-06 | End: 2024-11-06

## 2024-11-06 RX ORDER — DEXAMETHASONE SODIUM PHOSPHATE 4 MG/ML
INJECTION, SOLUTION INTRA-ARTICULAR; INTRALESIONAL; INTRAMUSCULAR; INTRAVENOUS; SOFT TISSUE PRN
Status: DISCONTINUED | OUTPATIENT
Start: 2024-11-06 | End: 2024-11-06

## 2024-11-06 RX ORDER — ONDANSETRON 4 MG/1
4 TABLET, FILM COATED ORAL EVERY 6 HOURS PRN
Qty: 4 TABLET | Refills: 0 | Status: SHIPPED | OUTPATIENT
Start: 2024-11-06

## 2024-11-06 RX ORDER — IBUPROFEN 800 MG/1
800 TABLET, FILM COATED ORAL ONCE
Status: DISCONTINUED | OUTPATIENT
Start: 2024-11-06 | End: 2024-11-06 | Stop reason: HOSPADM

## 2024-11-06 RX ADMIN — HYDROMORPHONE HYDROCHLORIDE 0.5 MG: 1 INJECTION, SOLUTION INTRAMUSCULAR; INTRAVENOUS; SUBCUTANEOUS at 08:36

## 2024-11-06 RX ADMIN — LIDOCAINE HYDROCHLORIDE 30 MG: 20 INJECTION, SOLUTION INFILTRATION; PERINEURAL at 08:01

## 2024-11-06 RX ADMIN — GLYCOPYRROLATE 0.2 MG: 0.2 INJECTION, SOLUTION INTRAMUSCULAR; INTRAVENOUS at 08:01

## 2024-11-06 RX ADMIN — SUGAMMADEX 150 MG: 100 INJECTION, SOLUTION INTRAVENOUS at 10:13

## 2024-11-06 RX ADMIN — MIDAZOLAM 2 MG: 1 INJECTION INTRAMUSCULAR; INTRAVENOUS at 07:57

## 2024-11-06 RX ADMIN — OXYCODONE HYDROCHLORIDE 5 MG: 5 TABLET ORAL at 11:48

## 2024-11-06 RX ADMIN — SODIUM CHLORIDE, POTASSIUM CHLORIDE, SODIUM LACTATE AND CALCIUM CHLORIDE: 600; 310; 30; 20 INJECTION, SOLUTION INTRAVENOUS at 07:55

## 2024-11-06 RX ADMIN — ACETAMINOPHEN 975 MG: 325 TABLET, FILM COATED ORAL at 07:03

## 2024-11-06 RX ADMIN — FENTANYL CITRATE 25 MCG: 0.05 INJECTION, SOLUTION INTRAMUSCULAR; INTRAVENOUS at 11:22

## 2024-11-06 RX ADMIN — FENTANYL CITRATE 25 MCG: 0.05 INJECTION, SOLUTION INTRAMUSCULAR; INTRAVENOUS at 11:34

## 2024-11-06 RX ADMIN — PROPOFOL 200 MG: 10 INJECTION, EMULSION INTRAVENOUS at 08:01

## 2024-11-06 RX ADMIN — ROCURONIUM BROMIDE 50 MG: 50 INJECTION, SOLUTION INTRAVENOUS at 08:02

## 2024-11-06 RX ADMIN — METRONIDAZOLE 500 MG: 500 INJECTION, SOLUTION INTRAVENOUS at 07:08

## 2024-11-06 RX ADMIN — ROCURONIUM BROMIDE 20 MG: 50 INJECTION, SOLUTION INTRAVENOUS at 09:19

## 2024-11-06 RX ADMIN — HYDROMORPHONE HYDROCHLORIDE 0.5 MG: 1 INJECTION, SOLUTION INTRAMUSCULAR; INTRAVENOUS; SUBCUTANEOUS at 08:25

## 2024-11-06 RX ADMIN — ONDANSETRON 4 MG: 2 INJECTION INTRAMUSCULAR; INTRAVENOUS at 10:11

## 2024-11-06 RX ADMIN — Medication 2 G: at 08:00

## 2024-11-06 RX ADMIN — PHENYLEPHRINE HYDROCHLORIDE 200 MCG: 10 INJECTION INTRAVENOUS at 08:08

## 2024-11-06 RX ADMIN — FENTANYL CITRATE 100 MCG: 50 INJECTION INTRAMUSCULAR; INTRAVENOUS at 08:01

## 2024-11-06 RX ADMIN — IBUPROFEN 800 MG: 200 TABLET, FILM COATED ORAL at 12:43

## 2024-11-06 RX ADMIN — PROPOFOL 75 MCG/KG/MIN: 10 INJECTION, EMULSION INTRAVENOUS at 08:02

## 2024-11-06 RX ADMIN — PHENYLEPHRINE HYDROCHLORIDE 100 MCG: 10 INJECTION INTRAVENOUS at 08:21

## 2024-11-06 RX ADMIN — PHENYLEPHRINE HYDROCHLORIDE 150 MCG: 10 INJECTION INTRAVENOUS at 08:05

## 2024-11-06 RX ADMIN — DEXAMETHASONE SODIUM PHOSPHATE 8 MG: 4 INJECTION, SOLUTION INTRA-ARTICULAR; INTRALESIONAL; INTRAMUSCULAR; INTRAVENOUS; SOFT TISSUE at 08:01

## 2024-11-06 RX ADMIN — HYDROMORPHONE HYDROCHLORIDE 0.5 MG: 1 INJECTION, SOLUTION INTRAMUSCULAR; INTRAVENOUS; SUBCUTANEOUS at 08:50

## 2024-11-06 ASSESSMENT — ACTIVITIES OF DAILY LIVING (ADL)
ADLS_ACUITY_SCORE: 0

## 2024-11-06 ASSESSMENT — LIFESTYLE VARIABLES: TOBACCO_USE: 1

## 2024-11-06 NOTE — ANESTHESIA PREPROCEDURE EVALUATION
Anesthesia Pre-Procedure Evaluation    Patient: Heath Waller   MRN: [id number] : 1980        Procedure : Procedure(s):  vaginal natural orifice transluminal endoscopic surgery hysterectomy, possible conversion to total laparoscopic hysterectomy with bilateral salpingectomy and saolyx midurethral sling          Past Medical History:   Diagnosis Date    ADD (attention deficit disorder)     Atypical mole 2018    Degenerative disc disease, cervical     Depressive disorder     Gastro-oesophageal reflux disease     Myofacial muscle pain     Noninfectious ileitis     IBS with constipation    Other chronic pain     myofacial pain syndrome    Sleep apnea     wears CPAP at night-haven't used it in a few years    Thyroid disease       Past Surgical History:   Procedure Laterality Date    APPENDECTOMY  2000    ARTHROSCOPY KNEE Right     teenager    BREAST SURGERY  2006    augmentation, revisions  and     FOOT SURGERY Right     semoid bone removed from Fx    GYN SURGERY    and     LEEPS x 2     LAPAROSCOPIC CHOLECYSTECTOMY  05/15/2014    Procedure: LAPAROSCOPIC CHOLECYSTECTOMY;  Surgeon: Kd Arthur MD;  Location: [facility] OR    THYROID SURGERY  2001    nodule     TUBAL LIGATION  10/09/2008      Allergies   Allergen Reactions    Codeine      Itchy, hives- tolerated cough syrup okay    Sulfa Antibiotics Hives     Rash      Toradol [Ketorolac] Hives and Itching      Social History     Tobacco Use    Smoking status: Every Day     Current packs/day: 0.00     Types: Cigarettes, Vaping Device     Passive exposure: Current    Smokeless tobacco: Never    Tobacco comments:     e-cig   Substance Use Topics    Alcohol use: Yes     Alcohol/week: 0.0 standard drinks of alcohol     Comment: rarely      Wt Readings from Last 1 Encounters:   24 74 kg (163 lb 3.2 oz)        Anesthesia Evaluation   Pt has had prior anesthetic. Type: General.    No history of anesthetic complications        ROS/MED HX  ENT/Pulmonary:     (+) sleep apnea,               tobacco use, Current use,                       Neurologic:  - neg neurologic ROS     Cardiovascular:     (+)  hypertension- -   -  - -                                      METS/Exercise Tolerance:     Hematologic:  - neg hematologic  ROS     Musculoskeletal:       GI/Hepatic:     (+) GERD,                   Renal/Genitourinary:  - neg Renal ROS     Endo:  - neg endo ROS     Psychiatric/Substance Use:     (+) psychiatric history anxiety and depression   Recreational drug usage: Cannabis.    Infectious Disease:  - neg infectious disease ROS     Malignancy:       Other:      (+)  , H/O Chronic Pain,         Physical Exam    Airway        Mallampati: II   TM distance: > 3 FB   Neck ROM: full   Mouth opening: > 3 cm    Respiratory Devices and Support         Dental       (+) Modest Abnormalities - crowns, retainers, 1 or 2 missing teeth      Cardiovascular   cardiovascular exam normal          Pulmonary   pulmonary exam normal                OUTSIDE LABS:  CBC:   Lab Results   Component Value Date    WBC 6.7 11/06/2024    WBC 4.9 08/28/2024    HGB 13.8 11/06/2024    HGB 12.6 08/28/2024    HCT 42.3 11/06/2024    HCT 39.6 08/28/2024     11/06/2024     08/28/2024     BMP:   Lab Results   Component Value Date     08/28/2024     06/23/2023    POTASSIUM 4.0 08/28/2024    POTASSIUM 3.7 06/23/2023    CHLORIDE 105 08/28/2024    CHLORIDE 106 06/23/2023    CO2 27 08/28/2024    CO2 22 06/23/2023    BUN 7.8 08/28/2024    BUN 9.0 06/23/2023    CR 0.71 08/28/2024    CR 0.75 06/23/2023    GLC 83 08/28/2024    GLC 92 06/23/2023     COAGS:   Lab Results   Component Value Date    INR 1.04 06/06/2016     POC:   Lab Results   Component Value Date    HCG Negative 11/06/2024    HCGS Negative 06/06/2016     HEPATIC:   Lab Results   Component Value Date    ALBUMIN 4.4 08/28/2024    PROTTOTAL 7.1 08/28/2024    ALT 8 08/28/2024    AST 19 08/28/2024     ALKPHOS 86 08/28/2024    BILITOTAL 0.4 08/28/2024     OTHER:   Lab Results   Component Value Date    FORREST 8.8 08/28/2024    MAG 2.0 06/30/2016    LIPASE 42 05/19/2014    TSH 1.23 08/28/2024       Anesthesia Plan    ASA Status:  2       Anesthesia Type: General.     - Airway: ETT   Induction: Intravenous.   Maintenance: Balanced.        Consents    Anesthesia Plan(s) and associated risks, benefits, and realistic alternatives discussed. Questions answered and patient/representative(s) expressed understanding.     - Discussed:     - Discussed with:  Patient      - Extended Intubation/Ventilatory Support Discussed: No.      - Patient is DNR/DNI Status: No     Use of blood products discussed: No .     Postoperative Care    Pain management: IV analgesics, Oral pain medications, Multi-modal analgesia.   PONV prophylaxis: Ondansetron (or other 5HT-3), Dexamethasone or Solumedrol     Comments:               Will Saenz MD    I have reviewed the pertinent notes and labs in the chart from the past 30 days and (re)examined the patient.  Any updates or changes from those notes are reflected in this note.               # Hypertension: Noted on problem list

## 2024-11-06 NOTE — PROGRESS NOTES
Belonging returned to pt, discharge and meds given to spouse , pain controled pt ambulated 100 feet with out difficulty , voided spontaneously . Discahrged home with

## 2024-11-06 NOTE — ANESTHESIA CARE TRANSFER NOTE
Patient: Heath Waller    Procedure: Procedure(s):  vaginal natural orifice transluminal endoscopic surgery hysterectomy, possible conversion to total laparoscopic hysterectomy with bilateral salpingectomy and saolyx midurethral sling       Diagnosis: Menorrhagia with regular cycle [N92.0]  Abnormal uterine bleeding [N93.9]  Urinary, incontinence, stress female [N39.3]  Diagnosis Additional Information: No value filed.    Anesthesia Type:   General     Note:    Oropharynx: oral airway in place  Level of Consciousness: drowsy  Oxygen Supplementation: face mask  Level of Supplemental Oxygen (L/min / FiO2): 6  Independent Airway: airway patency satisfactory and stable  Dentition: dentition unchanged  Vital Signs Stable: post-procedure vital signs reviewed and stable  Report to RN Given: handoff report given  Patient transferred to: PACU    Handoff Report: Identifed the Patient, Identified the Reponsible Provider, Reviewed the pertinent medical history, Discussed the surgical course, Reviewed Intra-OP anesthesia mangement and issues during anesthesia, Set expectations for post-procedure period and Allowed opportunity for questions and acknowledgement of understanding      Vitals:  Vitals Value Taken Time   /86 11/06/24 1024   Temp     Pulse 90 11/06/24 1028   Resp 18 11/06/24 1028   SpO2 99 % 11/06/24 1028   Vitals shown include unfiled device data.    Electronically Signed By: GREGORIO Howe CRNA  November 6, 2024  10:30 AM

## 2024-11-06 NOTE — OP NOTE
[facility]  Operative Note    Patient: Heath Waller  : 1980  MRN: [id number]    Date of Service: 2024    Preoperative diagnosis:   - History of CIN3  - Heavy uterine bleeding  - Dysmenorrhea  - Stress urinary incontinence    Postoperative diagnosis:   - same as above    Procedure:   - Total Laparoscopic Assisted Vaginal Hysterectomy via vaginal Natural Orifice Transluminal Endoscopic Surgery  - Bilateral salpingectomy   - Guerra's Culdoplasty  - Mid urethral sling placement    Surgeon: Ramona Lion MD   Assistants: Alexandria Lancaster MD    Anesthesia: GETA  Complications:  none  EBL: 200 cc  Urine: 200 cc of clear urine  IVF: see anesthesia record    Findings: Normal sized uterus in mid position on EUA, adhesions of omentum to the anterior abdominal wall, no other intraabdominal pathology noted on scan of abdomen. Normal uterus, fallopian tubes, and right ovary. Small hemorrhagic cyst of left ovary. Grade 2 cystocele when completely relaxed.    Indications: Heath Waller is a 44 year old female who presented with SHELBY pap smear, hx CIN3, heavy uterine bleeding and dysmenorrhea resistant to medical management. She underwent benign endometrial biopsy and endocervical curettage. She has elected to proceed with total hysterectomy.  The risks, benefits and alternatives of surgery were discussed in detail with the patient and she agreed to proceed.  Informed consent was signed prior to the procedure.    OPERATIVE PROCEDURE:  The patient was taken to the operating room where general anesthetic was established. She was positioned in the dorsal lithotomy position in [facility], and was then prepped and draped in the usual sterile fashion. An operative timeout was performed.  The bladder was drained.  A weighted speculum was placed in the posterior fornix, and with the aid of a noel retractor anteriorly, the cervix was visualized and grasped with two single-toothed tenaculae. Dilute  vasopression solution was injected under the vaginal epithelium, and then incision was made with a scalpel circumferentially at the cervicovaginal junction. The vagina was dissected from the cervix anteriorly and posteriorly. The peritoneal cavity was entered sharply anteriorly, and the peritoneum was tagged with a suture.  Attention was then turned posteriorly. The peritoneum was identified and entered sharply and similarly tagged.  The bilateral uterosacral ligaments were then clamped, transected and suture ligated with transfixion sutures of 0 Vicryl and tagged long. Bleeding at the right uterosacral was controlled with bi-polar cautery. The ring of the Dhaval retractor was then introduced with the introducer anteriorly, and the anterior retractor was removed.  This was repeated posteriorly.  The outer ring was rolled inward 2 times. A moist lap sponge was placed posterior to the uterus for bowel retraction. The gel point ring was then situated on the outer ring, and carbon dioxide gas was insufflated to a pressure of 8 mmHg.  The patient was placed in 20 degrees of Trendelenburg.  Both adnexa were examined, and appeared normal with the above noted exception.  Both ureters were seen in the usual location with normal peristalsis.  The posterior cul-de-sac appeared normal, and anteriorly we could visualize the omentum.     The blunt tipped Ligasure bipolar seal and cut device was then introduced along with a grasper, and remaining uterosacral ligament attachments, cardinal ligament including the uterine vessels, and the round ligament were taken down on the left side.  Attention was turned to the right side, where the same procedure was performed, and where the utero-ovarian ligament was also sealed and transected in the same manner.  Remaining attachments of the utero-ovarian ligament on the left side were then taken down, thus mobilizing the uterus completely.  This was placed at the pelvic brim to hold back the  bowels.  First the left and then the right fallopian tube was elevated and the mesosalpinx transected with the bipolar device.  Each fallopian tube was removed through a 10 mm port.  The cervix was then grasped and brought down to the opening of the Dhaval retractor.  The pelvis was examined and hemostasis achieved. The gel point cap was removed and the gas was evacuated.  The uterus was removed.  The Dhaval retractor was then removed.      There was persistent oozing at the posterior peritoneal edge and this was run with locking 0-vicryl stitches for hemostasis. vagina was closed with a running locking stitch of 0 Vicryl, incorporating the uterosacral ligament at the midline for suspension.  Hemostasis was noted to be excellent. A figure of X was placed at the left uterosacral pedical for hemostasis. A 2-0 vicryl was then used to place a Guerra's stitch for management of prolapse. The vaginal apex was then closed with a horizontal closure with running 0-vicryl with excellent hemostasis.    Attention was then turned to the bladder neck and then the midurethral location for incision was identified. The vaginal mucosa was marked half way between the meatus and the bladder neck and infiltrated with 6cc of 0.25% marcaine with epi which was also infiltrated on each side of the urethra deep to the endopelvic fascia. A 2 cm incision was made in the vaginal epithelium at the midurethra. Sharp dissection was used to open this space to the level of the pubic rami bilaterally.  Solyx single incision sling was then placed on the right side, the anchor was deployed, and the sling was found to be adequately anchored.  The same procedure was performed on the left side, and the tension was examined at the mid urethra. Cystoscopy was performed, and revealed a normal bladder with normal ureteral orifices and no perforation of the bladder with placement of the sling. Cystoscopy was terminated and the bladder was drained. The vaginal  incision was closed with 2.0 vicryl. All instruments were then removed. Hemostasis was noted to be good.    At the end of the procedure, the sponge, needle and instrument counts were correct x2. The bladder was again drained with the joshi catheter which was then was removed from the bladder.  The patient was awakened and taken to the recovery room in stable condition.          Dr. Lancaster's assistance was needed for retraction, exposure, and for safe completion of the operative procedure(s) listed above.      Ramona Lion MD  November 6, 2024  7:43 AM

## 2024-11-06 NOTE — ANESTHESIA POSTPROCEDURE EVALUATION
Patient: Heath Waller    Procedure: Procedure(s):  vaginal natural orifice transluminal endoscopic surgery hysterectomy, possible conversion to total laparoscopic hysterectomy with bilateral salpingectomy and saolyx midurethral sling       Anesthesia Type:  General    Note:  Disposition: Outpatient   Postop Pain Control: Uneventful            Sign Out: Well controlled pain   PONV: No   Neuro/Psych: Uneventful            Sign Out: Acceptable/Baseline neuro status   Airway/Respiratory: Uneventful            Sign Out: Acceptable/Baseline resp. status   CV/Hemodynamics: Uneventful            Sign Out: Acceptable CV status; No obvious hypovolemia; No obvious fluid overload   Other NRE: NONE   DID A NON-ROUTINE EVENT OCCUR? No           Last vitals:  Vitals Value Taken Time   /81 11/06/24 1200   Temp 97.52  F (36.4  C) 11/06/24 1210   Pulse 82 11/06/24 1210   Resp 38 11/06/24 1210   SpO2 98 % 11/06/24 1213   Vitals shown include unfiled device data.    Electronically Signed By: Will Saenz MD  November 6, 2024  1:11 PM

## 2024-11-06 NOTE — ANESTHESIA PROCEDURE NOTES
Airway       Patient location during procedure: OR       Procedure Start/Stop Times: 11/6/2024 8:03 AM  Staff -        Anesthesiologist:  Will Saenz MD       CRNA: Cassidy Bhakta APRN CRNA       Performed By: anesthesiologist and CRNA  Consent for Airway        Urgency: elective  Indications and Patient Condition       Indications for airway management: radha-procedural       Induction type:intravenous       Mask difficulty assessment: 1 - vent by mask    Final Airway Details       Final airway type: endotracheal airway       Successful airway: ETT - single and Oral  Endotracheal Airway Details        ETT size (mm): 7.0       Cuffed: yes       Successful intubation technique: direct laryngoscopy       DL Blade Type: Winston 2       Grade View of Cords: 1       Adjucts: stylet       Position: Right       Measured from: gums/teeth       Secured at (cm): 21       Bite block used: None    Post intubation assessment        Placement verified by: capnometry, equal breath sounds and chest rise        Number of attempts at approach: 1       Number of other approaches attempted: 0       Secured with: tape       Ease of procedure: easy       Dentition: Unchanged (pt removed flipped tooth)    Medication(s) Administered   Medication Administration Time: 11/6/2024 8:03 AM

## 2024-11-06 NOTE — DISCHARGE INSTRUCTIONS
Post-op discharge instructions following laparoscopic assisted vaginal hysterectomy:    You will likely experience some incisional and pelvic pain over the next 1-2 weeks.     You can take up to 3 tabs of ibuprofen (600mg) every 6 hours as needed for pain.  You can additionally take 1-2 tabs of tylenol (325-650mg regular strength 500-1000mg extra strength), every 6 hours for additional pain control as needed.  It is best to alternate your medications so you are taking something every 3 hours if you are having continued pain.    You have been prescribed narcotics for breakthrough pain if the above regimen is not adequate. Start by taking 1 tab and wait 30 minutes. If your pain has not improved to a 3/10 you can take a second tab. Never take more than 2 tabs at once. This medication can be taken ever 4 hours as needed.    If you have vaginal pain you can take sitz baths 1-2x/day. Fill the tub with 2-3 inches of warm water and soak the perineal and vaginal area for 10 minutes. Ice or warm packs can also be applied for comfort.       Caring for your incision:   Leave the steri-strips on for 7 days and then peel off in the shower.     Discharge instructions after receiving anesthesia or sedation:   * You have received medicine (anesthesia, sedation or both) that made you sleepy. This will affect your ability to think clearly and make good decisions.   * For your safety, you will need a responsible adult to drive you home and to stay with you for 24 hours.  * For 24 hours:  - Do not drive or use any machinery.   - Do not make important decisions.  - Do not drink alcohol. (It is also important to not drink alcohol as long as you are taking prescription pain medicine.)     Moving around after your hospital visit:   Get regular activity and try to walk for a total of 30 minutes per day. Start by walking for 5 to 10 minutes at one time and slowly build to walking for 30 minutes one time.    Slowly return to your regular level  of activity. Save your energy by spreading out activities that make you tired. Rest as needed.    Total laparoscopic or vaginal hysterectomy:   - you may resume driving in 7 days. Before you drive again, be sure you do not have severe pain and you are no longer taking prescription pain medicines   - you can return to work in 2-6weeks   - you can resume sexual activity in 8 weeks   - you may shower   - you may take a tub bath after 4 weeks or right away if your skin incisions are covered in glue   - do not lift more than 20 pounds for 6 weeks (a full gallon of milk is 9 lbs)    Side effects of a hysterectomy:   The following are normal side effects of a hysterectomy:    - Aches in shoulders and upper chest likely caused from gas used during surgery (may last for up to 48 hours). To relieve discomfort, take prescribed pain medication or ibuprofen, lie flat or raise your hips above your shoulder level. If these don't work, call your physician.   - Slight oozing blood or watery discharge from the incision   - Bruising on abdomen   - Puffy feeling in abdomen for 1 -2 weeks   - Slight vaginal bleeding     What you may eat and drink after your hospital stay:   Resume your regular diet.     When should you be concerned?   Call your doctor if:   - you develop a temperature of 101 degrees Fahrenheit   - you have nausea and vomiting that will not stop   - you have increased pain that cannot be relieved with rest or pain medicine   - you have bright red vaginal bleeding that saturates one pad or more per hour. (It is normal to have some vaginal discharge for several weeks. It may vary in color from red to pink to brown to tan.)   - your incision becomes red, more tender, has increased drainage, or signs of infection (pain, swelling, redness, odor, warmth, green or yellow discharge)   - you have hives (itchy raised rash)   - you have any new pain or swelling in your legs   - you have problems breathing   - you have chest pain  that gets worse with deep breathing or coughing   - you have any change in movement (such as new weakness or inability to move as usual)   - you are unable to urinate or have pain or burning when you urinate   - you have any questions of concerns.     In an emergency, call 911 or have someone take you to the nearest hospital Emergency Department.     Why were you at the hospital?   Total laparoscopic hysterectomy      Maximum acetaminophen (Tylenol) dose from all sources should not exceed 4 grams (4000 mg) per day. You had tylenol 975 mg at 7:00 am

## 2024-11-07 ENCOUNTER — TELEPHONE (OUTPATIENT)
Dept: OBGYN | Facility: CLINIC | Age: 44
End: 2024-11-07

## 2024-11-07 ENCOUNTER — OFFICE VISIT (OUTPATIENT)
Dept: OBGYN | Facility: CLINIC | Age: 44
End: 2024-11-07
Payer: COMMERCIAL

## 2024-11-07 VITALS — BODY MASS INDEX: 25.22 KG/M2 | SYSTOLIC BLOOD PRESSURE: 136 MMHG | WEIGHT: 170.8 LBS | DIASTOLIC BLOOD PRESSURE: 82 MMHG

## 2024-11-07 DIAGNOSIS — R33.9 URINARY RETENTION: Primary | ICD-10-CM

## 2024-11-07 PROCEDURE — 99024 POSTOP FOLLOW-UP VISIT: CPT | Performed by: OBSTETRICS & GYNECOLOGY

## 2024-11-07 NOTE — TELEPHONE ENCOUNTER
Spoke with Pt.     Surgery 11/6:    - Total Laparoscopic Assisted Vaginal Hysterectomy via vaginal Natural Orifice Transluminal Endoscopic Surgery  - Bilateral salpingectomy   - Guerra's Culdoplasty  - Mid urethral sling placement    Voided very small amount prior to discharge from hospital at 11am 11/6. Has not been able to void since > 24 hours.    Has been drinking fluids and feel needs to void but unable to do so. Is starting to feel pain in lower left abdomen.     Advised to go to ER and patient agreeable.     Routing to provider as [email or web address].    Mai Ashford RN  [place] [email or web address]

## 2024-11-07 NOTE — TELEPHONE ENCOUNTER
[facility] Call Center    Phone Message    May a detailed message be left on voicemail: yes     Reason for Call: Symptoms or Concerns     If patient has red-flag symptoms, warm transfer to triage line    Current symptom or concern: not able to urinate    Symptoms have been present for:  1 day(s) had surgery 11/6 in morning    Has patient previously been seen for this? No    By : Ramona Lion      Are there any new or worsening symptoms? Yes: not able to urinate    Action Taken: Message routed to:  Other: [facility]    Travel Screening: Not Applicable

## 2024-11-07 NOTE — NURSING NOTE
"Chief Complaint   Patient presents with    Urinary Problem     Total Laparoscopic Assisted Vaginal Hysterectomy via vaginal Natural Orifice Transluminal Endoscopic Surgery  Bilateral salpingectomy, Guerra's Culdoplasty, Mid urethral sling placement- yesterday 24. Patient has not been able to urinate since leaving the hospital.         Initial /82   Wt 77.5 kg (170 lb 12.8 oz)   LMP 10/21/2024   BMI 25.22 kg/m   Estimated body mass index is 25.22 kg/m  as calculated from the following:    Height as of 24: 1.753 m (5' 9\").    Weight as of this encounter: 77.5 kg (170 lb 12.8 oz).  BP completed using cuff size: regular    Questioned patient about current smoking habits.  Pt. quit smoking some time ago.          The following HM Due: NONE    Cosmo Foster CMA               "

## 2024-11-07 NOTE — PROGRESS NOTES
Heath Waller is a 44 year old female who is POD#1 s/p Total Laparoscopic Assisted Vaginal Hysterectomy via vaginal Natural Orifice Transluminal Endoscopic Surgery,  Bilateral salpingectomy, Guerra's Culdoplasty, and Mid urethral sling placement who presents today with 24 hours of urinary retention. She has a small volume void immediately post-op and has been unable to void on her own since then.     Today we discussed placing a catheter for 24 hours and returning for trial of void tomorrow, where the nurse will back fill her bladder with 300cc of fluid and then have her attempt to void. She will pass as long as she empties >200cc of fluid.   If she does not pass, she will have the joshi replaced and return on Tues for another voiding trial. If that fails, will plan to return to the OR to release the mesh partially.       Ramona Lion MD

## 2024-11-07 NOTE — TELEPHONE ENCOUNTER
Please call patient back and have her come into clinic instead of ER for joshi placement, she can then come for voiding trial tomorrow in clinic.     Ramona Lion MD

## 2024-11-08 ENCOUNTER — ALLIED HEALTH/NURSE VISIT (OUTPATIENT)
Dept: OBGYN | Facility: CLINIC | Age: 44
End: 2024-11-08
Payer: COMMERCIAL

## 2024-11-08 DIAGNOSIS — R33.9 URINE RETENTION: Primary | ICD-10-CM

## 2024-11-08 PROCEDURE — 99207 PR NO CHARGE NURSE ONLY: CPT

## 2024-11-08 PROCEDURE — 88307 TISSUE EXAM BY PATHOLOGIST: CPT | Mod: 26 | Performed by: PATHOLOGY

## 2024-11-08 NOTE — PROGRESS NOTES
Pt at clinic for void trail.     Pt states pain is well under control.     Has been having regular output from joshi.     Clear, yellow urine observed.     Leg bag disconnected and bladder was backfilled with 300 ml of sterile saline.     Pt states she could feel urge to void.     Joshi removed, small amount of urine/saline came out with joshi removal. Pt walked to bathroom to void. Was able to void 215 ml.     Pt states void was easy, did not need to strain. Advised to call clinic if she is unable to empty bladder, feels it is not emptying all the way or if she has any significant increase in pain.     Pt very happy and relived she was able to void.     Anastasia FULLER RN  OB/GYN [place]

## 2024-11-10 ENCOUNTER — OFFICE VISIT (OUTPATIENT)
Dept: URGENT CARE | Facility: URGENT CARE | Age: 44
End: 2024-11-10
Payer: COMMERCIAL

## 2024-11-10 VITALS
HEART RATE: 72 BPM | BODY MASS INDEX: 26.85 KG/M2 | OXYGEN SATURATION: 99 % | WEIGHT: 181.8 LBS | SYSTOLIC BLOOD PRESSURE: 135 MMHG | TEMPERATURE: 98.6 F | DIASTOLIC BLOOD PRESSURE: 84 MMHG

## 2024-11-10 DIAGNOSIS — R33.9 URINARY RETENTION: Primary | ICD-10-CM

## 2024-11-10 PROCEDURE — 99213 OFFICE O/P EST LOW 20 MIN: CPT | Performed by: FAMILY MEDICINE

## 2024-11-10 ASSESSMENT — ENCOUNTER SYMPTOMS: FEVER: 0

## 2024-11-10 NOTE — PROGRESS NOTES
Assessment & Plan     Urinary retention  Patient is a registered nurse, has unable to void with weight gain x 2 days.  Recently had surgery, discussed that this should be best treated in the emergency department to rule out obstructive uropathy versus neurogenic bladder.  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  Explained we normally do not perform this procedure in the urgent care and that if she has any hematuria, difficulty or ability to void she should go to the ER for further management.             Return in about 1 day (around 11/11/2024) for If symptoms do not improve or gets worse..    Markie Smith MD  [facility] ALBER Joe is a 44 year old female who presents to clinic today for the following health issues:  Chief Complaint   Patient presents with    Urinary Retention     ONSET FRIDAY       HPI    44-year-old female presents to urgent care with difficulty and inability to void.  Recently had surgery.  Has had more than a 10 pound weight gain, lower abdominal discomfort.  No flank pain fever or nausea.  She is requesting urinary cath kit.    Review of Systems   Constitutional:  Negative for fever.           Objective    /84   Pulse 72   Temp 98.6  F (37  C)   Wt 82.5 kg (181 lb 12.8 oz)   LMP 10/21/2024   SpO2 99%   BMI 26.85 kg/m    Physical Exam  Vitals reviewed.   Constitutional:       Appearance: She is not ill-appearing.   Abdominal:      General: There is distension.      Tenderness: There is no right CVA tenderness or left CVA tenderness.   Neurological:      Mental Status: She is alert.                  
WDL

## 2024-11-11 ENCOUNTER — HOSPITAL ENCOUNTER (EMERGENCY)
Facility: CLINIC | Age: 44
Discharge: HOME | End: 2024-11-11
Attending: EMERGENCY MEDICINE | Admitting: EMERGENCY MEDICINE
Payer: COMMERCIAL

## 2024-11-11 VITALS
DIASTOLIC BLOOD PRESSURE: 72 MMHG | SYSTOLIC BLOOD PRESSURE: 119 MMHG | BODY MASS INDEX: 25.86 KG/M2 | TEMPERATURE: 97.4 F | RESPIRATION RATE: 18 BRPM | OXYGEN SATURATION: 100 % | WEIGHT: 174.6 LBS | HEIGHT: 69 IN | HEART RATE: 54 BPM

## 2024-11-11 DIAGNOSIS — R33.9 URINARY RETENTION: ICD-10-CM

## 2024-11-11 LAB
CREAT BLD-MCNC: 0.6 MG/DL (ref 0.5–1)
EGFRCR SERPLBLD CKD-EPI 2021: >60 ML/MIN/1.73M2

## 2024-11-11 PROCEDURE — 51798 US URINE CAPACITY MEASURE: CPT | Performed by: EMERGENCY MEDICINE

## 2024-11-11 PROCEDURE — 82565 ASSAY OF CREATININE: CPT

## 2024-11-11 PROCEDURE — 51702 INSERT TEMP BLADDER CATH: CPT | Performed by: EMERGENCY MEDICINE

## 2024-11-11 PROCEDURE — 250N000013 HC RX MED GY IP 250 OP 250 PS 637: Performed by: BEHAVIOR TECHNICIAN

## 2024-11-11 PROCEDURE — 99283 EMERGENCY DEPT VISIT LOW MDM: CPT | Mod: 25 | Performed by: EMERGENCY MEDICINE

## 2024-11-11 RX ORDER — OXYCODONE HYDROCHLORIDE 5 MG/1
5 TABLET ORAL ONCE
Status: COMPLETED | OUTPATIENT
Start: 2024-11-11 | End: 2024-11-11

## 2024-11-11 RX ADMIN — OXYCODONE HYDROCHLORIDE 5 MG: 5 TABLET ORAL at 12:17

## 2024-11-11 ASSESSMENT — COLUMBIA-SUICIDE SEVERITY RATING SCALE - C-SSRS
1. IN THE PAST MONTH, HAVE YOU WISHED YOU WERE DEAD OR WISHED YOU COULD GO TO SLEEP AND NOT WAKE UP?: NO
6. HAVE YOU EVER DONE ANYTHING, STARTED TO DO ANYTHING, OR PREPARED TO DO ANYTHING TO END YOUR LIFE?: NO
2. HAVE YOU ACTUALLY HAD ANY THOUGHTS OF KILLING YOURSELF IN THE PAST MONTH?: NO

## 2024-11-11 ASSESSMENT — ACTIVITIES OF DAILY LIVING (ADL)
ADLS_ACUITY_SCORE: 0
ADLS_ACUITY_SCORE: 0

## 2024-11-11 NOTE — ED TRIAGE NOTES
Pt presents with urinary retention. Pt had a hysterectomy and urethral sling procedure. Reports had a joshi catheter which was removed on Friday and she passed a voiding trial. Now only can dribble urine. Was seen at  last night and was given a self cath kit which was successful. Here for joshi catheter today. Pt reports taking ibuprofen, tylenol and oxy PTA. A & Ox4.      Triage Assessment (Adult)       Row Name 11/11/24 1007          Triage Assessment    Airway WDL WDL        Cardiac WDL    Cardiac WDL WDL        Cognitive/Neuro/Behavioral WDL    Cognitive/Neuro/Behavioral WDL WDL

## 2024-11-11 NOTE — ED PROVIDER NOTES
Emergency Department Attending Supervision Note  11/11/2024  12:16 PM      I evaluated this patient in conjunction with Andrade Block PA      Briefly, the patient presented with abdominal fullness and decreasing ability to urinate since a procedure for ureteral urethral sling.  Surgery was on Wednesday she says since that time she has had decreasing urine output.  The patient said that she did self cath last night      On my exam,   General:  No respiratory distress        Respiratory: Breathing non labored.     Musculoskeletal: No gross deformity    GI: The abdomen is soft    Neurologic: Alert and conversant    Psychiatric: Appropriate       Results: Creatinine is adequate    ED course: Ramos catheter was placed with good output    My.impression is urinary retention secondary to her surgery I was concerned because it has been several days since she has had a strong urine stream I do want to check her creatinine it was adequate otherwise her symptoms have resolved she is able to urinate she will follow-up as an outpatient for removal of the catheter.        Diagnosis    ICD-10-CM    1. Urinary retention  R33.9             Randell Jack MD Farnan, Christopher M, MD  11/11/24 1554

## 2024-11-11 NOTE — ED PROVIDER NOTES
Emergency Department Note      History of Present Illness     Chief Complaint   Urinary Retention      HPI   Heath Waller is a 44 year old female with history of thyroid disease, depression, ADD who presents to the ED for evaluation of urinary retention.  Patient had a total hysterectomy with a urethral sling placed on 11/6/2024.  She developed some urinary retention on postop day 1.  She returned to the clinic and had a Ramos catheter placed.  3 days ago, she returned to the clinic and had her Ramos removed after passing a void trial in the clinic.  After having the Ramos catheter taken out, she has been experiencing ongoing urinary retention and decreased urine output.  She went to urgent care yesterday and was sent home with a self-catheterization kit.  She reports that she was able to self catheterize herself last night.  She states that she has been dribbling urine today.  She reports some discomfort at the surgical site.  She denies any nausea, vomiting, abdominal pain, or flank pain.  She called her OB clinic today but her OB was out of office.     Independent Historian   None    Review of External Notes   Reviewed OB/GYN note from 11/7/2024.  Presented with 24 hours of urinary retention on POD #1 s/p total laparoscopic assisted vaginal hysterectomy and urethral sling placement.    Past Medical History     Medical History and Problem List   Past Medical History:   Diagnosis Date    ADD (attention deficit disorder)     Atypical mole 07/19/2018    Degenerative disc disease, cervical     Depressive disorder     Gastro-oesophageal reflux disease     Myofacial muscle pain     Noninfectious ileitis     Other chronic pain     Sleep apnea     Thyroid disease        Medications   albuterol (PROAIR HFA/PROVENTIL HFA/VENTOLIN HFA) 108 (90 Base) MCG/ACT inhaler  amphetamine-dextroamphetamine (ADDERALL XR) 30 MG 24 hr capsule  amphetamine-dextroamphetamine (ADDERALL XR) 30 MG 24 hr  "capsule  amphetamine-dextroamphetamine (ADDERALL) 10 MG tablet  clobetasol (TEMOVATE) 0.05 % external ointment  cyclobenzaprine (FLEXERIL) 10 MG tablet  ibuprofen (ADVIL,MOTRIN) 200 MG tablet  ondansetron (ZOFRAN) 4 MG tablet  order for DME  oxyCODONE (ROXICODONE) 5 MG tablet  sertraline (ZOLOFT) 100 MG tablet  triamcinolone (KENALOG) 0.1 % external cream        Surgical History   Past Surgical History:   Procedure Laterality Date    APPENDECTOMY  01/01/2000    ARTHROSCOPY KNEE Right     teenager    BREAST SURGERY  03/01/2006    augmentation, revisions 2011 and 2015    FOOT SURGERY Right     semoid bone removed from Fx    GYN SURGERY   2009 and 2014    LEEPS x 2     HYSTERECTOMY, ENDOSCOPIC, VAGINAL TRANSLUMINAL NATURAL ORIFICE APPROACH Bilateral 11/6/2024    Procedure: vaginal natural orifice transluminal endoscopic surgery hysterectomy, possible conversion to total laparoscopic hysterectomy with bilateral salpingectomy and saolyx midurethral sling;  Surgeon: Ramona Lion MD;  Location: [facility] OR    LAPAROSCOPIC CHOLECYSTECTOMY  05/15/2014    Procedure: LAPAROSCOPIC CHOLECYSTECTOMY;  Surgeon: Kd rAthur MD;  Location: [facility] OR    THYROID SURGERY  01/01/2001    nodule     TUBAL LIGATION  10/09/2008       Physical Exam     Patient Vitals for the past 24 hrs:   BP Temp Temp src Pulse Resp SpO2 Height Weight   11/11/24 1055 -- -- -- 62 -- 100 % -- --   11/11/24 1050 124/72 -- -- -- -- -- -- --   11/11/24 1011 131/59 97.4  F (36.3  C) Temporal 65 18 100 % 1.753 m (5' 9\") 79.2 kg (174 lb 9.7 oz)     Physical Exam  Physical Exam:  General: lying comfortably on hospital bed  Head: normocephalic, atraumatic  Eyes: PERRLA, EOMI  Ears: External ears appear normal.   Nose: no signs of bleeding   Throat: moist mucous membranes  Neck: No JVD  CV: regular rate and rhythm  Pulm: lungs clear to ausculation bilaterally, normal respiratory effort, normal chest expansion with breathing   Abdomen: soft, non-tender, " non-distended  MSK: No midline tenderness  Ext: normal range of motion of all extremities. No gross deformities  Skin: warm, dry, no rashes  Neuro: alert and oriented  Psych: Appropriate mood. Cooperative      Diagnostics     Lab Results   Labs Ordered and Resulted from Time of ED Arrival to Time of ED Departure - No data to display    Imaging   No orders to display       EKG   None    Independent Interpretation   None    ED Course      Medications Administered   Medications   oxyCODONE (ROXICODONE) tablet 5 mg (5 mg Oral $Given 11/11/24 1217)       Procedures   Procedures     Discussion of Management   None    ED Course   ED Course as of 11/11/24 1946 Mon Nov 11, 2024   1043 I evaluated patient and obtained history.    1216 Bladder scan: >480 mL.    1232 Reassessed patient. Discussed discharge plans.       Additional Documentation  None    Medical Decision Making / Diagnosis     CMS Diagnoses: None    MIPS       None    [facility]   Heath Waller is a 44 year old female who presents to the ED for evaluation of urinary retention.  Patient had a total hysterectomy and a urethral sling placement on 11/6/2024.  She developed some urinary retention on postop day 1 and had a Ramos catheter placed in the clinic on the day after the procedure.  She had her Ramos removed 3 days ago and has noticed decreased urine output since then.  Yesterday she went to the urgent care and was given a self catheter kit.  She was able to self catheterize herself last night.  She reports decreased urine output and dribbling urine today.  See further HPI details above.  The above workup was undertaken.  Broad differential was considered including urethral stricture, kidney stone, UTI, pyelonephritis.  On exam, patient is well-appearing.  Her vitals are reassuring.  Bladder scan reveals acute urinary retention with>480 mL of urine in the bladder.  Ramos catheter was placed.  Patient had good urine output after Ramos placement.  Given that  patient has not had a normal void in 2 days, I did check her creatinine.  Creatinine is normal.  No signs of acute kidney injury.  I offered to do a urinalysis however patient does not have any urinary symptoms at this time and would like to hold off. I think this is reasonable.  Discussed follow-up with OB/GYN in 2 to 3 days for reassessment.  Patient was understanding and is agreeable to plan of care.  Return precautions were given.      Disposition   The patient was discharged.     Diagnosis     ICD-10-CM    1. Urinary retention  R33.9            Discharge Medications   New Prescriptions    No medications on file         MARY JO Reyes Kausar, PA-C  11/11/24 1957

## 2024-11-13 ENCOUNTER — OFFICE VISIT (OUTPATIENT)
Dept: OBGYN | Facility: CLINIC | Age: 44
End: 2024-11-13
Payer: COMMERCIAL

## 2024-11-13 VITALS — DIASTOLIC BLOOD PRESSURE: 64 MMHG | WEIGHT: 179 LBS | SYSTOLIC BLOOD PRESSURE: 110 MMHG | BODY MASS INDEX: 26.43 KG/M2

## 2024-11-13 DIAGNOSIS — R30.0 DYSURIA: ICD-10-CM

## 2024-11-13 DIAGNOSIS — R33.9 URINARY RETENTION: Primary | ICD-10-CM

## 2024-11-13 PROCEDURE — 99024 POSTOP FOLLOW-UP VISIT: CPT | Performed by: OBSTETRICS & GYNECOLOGY

## 2024-11-13 RX ORDER — PHENAZOPYRIDINE HYDROCHLORIDE 200 MG/1
200 TABLET, FILM COATED ORAL 3 TIMES DAILY PRN
Qty: 30 TABLET | Refills: 1 | Status: SHIPPED | OUTPATIENT
Start: 2024-11-13

## 2024-11-13 NOTE — PROGRESS NOTES
Heath Waller is a 44 year old female is POD #7 s/p Total Laparoscopic Assisted Vaginal Hysterectomy via vaginal Natural Orifice Transluminal Endoscopic Surgery,  Bilateral salpingectomy, Guerra's Culdoplasty, and Mid urethral sling placement c/b urinary retention.     Post-op course:  - POD#0 able to void in PACU  - POD#1 presented with urinary retention since PACU void, drained 1L dark yellow urine in clinic and placed joshi with leg bag  - POD#2 returned to clinic for trial of void, bladder was back filled with 300cc sterile saline and she drained 215cc.   - POD#4 presented to  for retention, was provided a straight cath kit and drained 1700 on her own but was unable to void spontaneously.   - POD#5 presented to ER for placement of joshi catheter. Cr 0.6 (normal).  - POD#7 she returned to clinic today and passed a trial of void. Josih was back filled with 300cc of sterile saline and she voided 400cc straw colored urine into the hat.     Prior to TOV today we discussed options for management of urinary retention including   1) return to the OR immediately to release tension on the sling. In the setting of concurrent hysterectomy with sling placement there is some evidence that waiting 2 weeks for sling release is reasonable due to post-op edema and possibility of transient neurogenic bladder.    - the fact that she was able to void at the TOV on POD#2 is reassuring  2) repeat trial of void today and leave sling in place if successful  3) return to OR and cut or remove sling.     Ultimately she passed her trial of void here and desires to continue to monitor. 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 If this is the case she will cath every 4 hours and return to care tomorrow to plan return to the OR to release tension on the mesh by releasing the sling edges.      30 minutes spent by me on  the date of the encounter doing chart review, review of test results, patient visit, and documentation       Ramona Lion MD

## 2024-11-13 NOTE — NURSING NOTE
"Chief Complaint   Patient presents with    Follow Up     From ER visit on  for urinary retention following VNOTES Hysterectomy and bladder sling on 24. Patient was given another Ramos Catheter and told to follow-up with gyn.       Initial /64   Wt 81.2 kg (179 lb)   LMP 10/21/2024   BMI 26.43 kg/m   Estimated body mass index is 26.43 kg/m  as calculated from the following:    Height as of 24: 1.753 m (5' 9\").    Weight as of this encounter: 81.2 kg (179 lb).  BP completed using cuff size: regular    Questioned patient about current smoking habits.  Pt. currently smokes.  Advised about smoking cessation.          The following HM Due: NONE    Cosmo Foster CMA             "

## 2024-11-14 ENCOUNTER — VIRTUAL VISIT (OUTPATIENT)
Dept: OBGYN | Facility: CLINIC | Age: 44
End: 2024-11-14
Payer: COMMERCIAL

## 2024-11-14 DIAGNOSIS — R33.9 URINARY RETENTION: Primary | ICD-10-CM

## 2024-11-14 NOTE — PROGRESS NOTES
Heath Waller is a 44 year old female who is being evaluated via a billable virtual visit.      What phone number would you like to be contacted at? Virtual visit  How would you like to obtain your AVS? [facility]      Heath Waller is a 44 year old female who presents for virtual visit today for the following health issue(s):  Patient presents with:  Follow Up: For urinary retention following surgery 11/06/2024 for hysterectomy with bladder sling. Patient reports sx's are coming back but not as bad as before, is able to empty her bladder some. When she voided she got 200 mL and when she self cathed she got 200 mL.       Additional information:     Heath has struggled with urinary retention in the 8 days since Total Laparoscopic Assisted Vaginal Hysterectomy via vaginal Natural Orifice Transluminal Endoscopic Surgery, Bilateral salpingectomy, Guerra's Culdoplasty, and Mid urethral sling placement. See note from 11/13 for further details.     Today she has voided 200cc followed by self cath 200cc, and later voided 100cc followed by self cath 100cc. She is able to produce more urine if she shifts around.     This is significant improvement from 1 week ago and I think reasonable to continue to monitor through the weekend. She was advised to schedule voids every 2-3 hours and empty with a cath at least every 8 hours to ensure she is emptying. If the post void residuals are consistently less than 100cc she no longer needs to continue self catheterizing unless she becomes symptomatic again.     20 minutes spent by me on the date of the encounter doing chart review, patient visit, and documentation

## 2024-11-14 NOTE — PATIENT INSTRUCTIONS
Recommend schedule voids every 2-3 hours and empty with a cath at least every 8 hours to ensure complete emptying. If the post void residuals are consistently less than 100cc, you no longer needs to continue self catheterizing unless you become symptomatic again.

## 2024-12-03 ENCOUNTER — OFFICE VISIT (OUTPATIENT)
Dept: UROLOGY | Facility: CLINIC | Age: 44
End: 2024-12-03
Attending: OBSTETRICS & GYNECOLOGY
Payer: COMMERCIAL

## 2024-12-03 VITALS
DIASTOLIC BLOOD PRESSURE: 85 MMHG | HEIGHT: 69 IN | OXYGEN SATURATION: 99 % | WEIGHT: 179 LBS | HEART RATE: 68 BPM | BODY MASS INDEX: 26.51 KG/M2 | RESPIRATION RATE: 16 BRPM | TEMPERATURE: 97.1 F | SYSTOLIC BLOOD PRESSURE: 123 MMHG

## 2024-12-03 DIAGNOSIS — N39.8 VOIDING DYSFUNCTION: ICD-10-CM

## 2024-12-03 DIAGNOSIS — R33.9 URINARY RETENTION: ICD-10-CM

## 2024-12-03 LAB
ALBUMIN UR-MCNC: 10 MG/DL
APPEARANCE UR: ABNORMAL
BACTERIA #/AREA URNS HPF: ABNORMAL /HPF
BILIRUB UR QL STRIP: NEGATIVE
COLOR UR AUTO: ABNORMAL
GLUCOSE UR STRIP-MCNC: NEGATIVE MG/DL
HGB UR QL STRIP: ABNORMAL
KETONES UR STRIP-MCNC: NEGATIVE MG/DL
LEUKOCYTE ESTERASE UR QL STRIP: ABNORMAL
MUCOUS THREADS #/AREA URNS LPF: PRESENT /LPF
NITRATE UR QL: NEGATIVE
PH UR STRIP: 6 [PH] (ref 5–7)
RBC URINE: 8 /HPF
SP GR UR STRIP: 1.02 (ref 1–1.03)
SQUAMOUS EPITHELIAL: 4 /HPF
UROBILINOGEN UR STRIP-MCNC: NORMAL MG/DL
WBC URINE: >182 /HPF

## 2024-12-03 PROCEDURE — 81001 URINALYSIS AUTO W/SCOPE: CPT | Performed by: UROLOGY

## 2024-12-03 PROCEDURE — 99204 OFFICE O/P NEW MOD 45 MIN: CPT | Mod: 25 | Performed by: UROLOGY

## 2024-12-03 PROCEDURE — 52000 CYSTOURETHROSCOPY: CPT | Performed by: UROLOGY

## 2024-12-03 RX ORDER — TOLTERODINE 4 MG/1
4 CAPSULE, EXTENDED RELEASE ORAL DAILY
Qty: 90 CAPSULE | Refills: 0 | Status: SHIPPED | OUTPATIENT
Start: 2024-12-03

## 2024-12-03 ASSESSMENT — PAIN SCALES - GENERAL: PAINLEVEL_OUTOF10: NO PAIN (0)

## 2024-12-03 NOTE — PROGRESS NOTES
Bladder Scan performed. 3 ml maximum residual urine detected after 3 scans. MD informed   Martha Luke MA

## 2024-12-03 NOTE — PROGRESS NOTES
"Heath Waller is a 44 year old female seen in consultation for urinary retention. Consult from Reena Driver.    Pt is an RN    11/6/24  Hyster with sling    11/7/24  Retention, joshi placed  11/8/24  Joshi removed  11/10/24 Urgent care: SIC kit  11/11/24 ED: urinary retention with PVR >480 >> joshi placed  11/13/24 Dr Lion: satisfactory voiding trial  11/14/24 Pt voided 200 ml with 200 ml residual      Now with severe urge and urge incontinence, wears 4-5 heavy pads per day. No ANA (resolved with sling)    Last wknd had severe episode \"with leakage down my pants.\"    Denies dysuria, gross hematuria, frequency. Noc x 2-4.     Denies prior  eval, hx bladder surgery. Did take some pyridium, none x 2 weeks.    Hx 3 vag deliveries.     Severe chronic constipation; does not eat breakfast.     Drinks 4 caf/sugar cherry beverages, one 'energy drink,' 2 [place] per day.    Smokes half PPD x 20 yrs.    Works as an RN    Pt adds that she is scheduled to begin pelvic floor therapy next week.      Reviewed PMH in detail including fibromyalgia, ADDD, myofascial muscle pain, breast augmentatino with revisions, ROSA, GERD, TMJ, HTN, major depression, anxiety attack      Past Medical History:   Diagnosis Date    ADD (attention deficit disorder)     Atypical mole 07/19/2018    Degenerative disc disease, cervical     Depressive disorder     Gastro-oesophageal reflux disease     Myofacial muscle pain     Noninfectious ileitis     IBS with constipation    Other chronic pain     myofacial pain syndrome    Sleep apnea     wears CPAP at night-haven't used it in a few years    Thyroid disease        Past Surgical History:   Procedure Laterality Date    APPENDECTOMY  01/01/2000    ARTHROSCOPY KNEE Right     teenager    BREAST SURGERY  03/01/2006    augmentation, revisions 2011 and 2015    FOOT SURGERY Right     semoid bone removed from Fx    GYN SURGERY   2009 and 2014    LEEPS x 2     HYSTERECTOMY, ENDOSCOPIC, VAGINAL TRANSLUMINAL " NATURAL ORIFICE APPROACH Bilateral 11/6/2024    Procedure: vaginal natural orifice transluminal endoscopic surgery hysterectomy, possible conversion to total laparoscopic hysterectomy with bilateral salpingectomy and saolyx midurethral sling;  Surgeon: Ramona Lion MD;  Location: [facility] OR    LAPAROSCOPIC CHOLECYSTECTOMY  05/15/2014    Procedure: LAPAROSCOPIC CHOLECYSTECTOMY;  Surgeon: Kd Arthur MD;  Location: [facility] OR    THYROID SURGERY  01/01/2001    nodule     TUBAL LIGATION  10/09/2008       Social History     Socioeconomic History    Marital status: Single     Spouse name: Xavier     Number of children: Not on file    Years of education: 16    Highest education level: Not on file   Occupational History    Occupation: nurse   Tobacco Use    Smoking status: Every Day     Current packs/day: 0.00     Types: Cigarettes, Vaping Device     Passive exposure: Current    Smokeless tobacco: Never    Tobacco comments:     e-cig   Vaping Use    Vaping status: Every Day   Substance and Sexual Activity    Alcohol use: Yes     Alcohol/week: 0.0 standard drinks of alcohol     Comment: rarely    Drug use: No     Comment: Denies drug use, but had postive drug tox from 8/24 for oxycodone and meth    Sexual activity: Yes     Partners: Male     Birth control/protection: Female Surgical   Other Topics Concern    Parent/sibling w/ CABG, MI or angioplasty before 65F 55M? Not Asked     Service Yes     Comment:  erna     Blood Transfusions No    Caffeine Concern No     Comment: energy drink 12 o. 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 Lives with spouse and 3 children      Social Drivers of Health     Financial Resource Strain: Low Risk  (8/28/2024)    Financial Resource Strain     Within the past 12 months, have you or your family members you live with been unable to get utilities (heat, electricity) when it was really needed?: No   Food Insecurity: Low Risk  (8/28/2024)    Food Insecurity     Within the past 12 months, did you worry that your food would run out before you got money to buy more?: No     Within the past 12 months, did the food you bought just not last and you didn t have money to get more?: No   Transportation Needs: Low Risk  (8/28/2024)    Transportation Needs     Within the past 12 months, has lack of transportation kept you from medical appointments, getting your medicines, non-medical meetings or appointments, work, or from getting things that you need?: No   Physical Activity: Inactive (8/28/2024)    Exercise Vital Sign     Days of Exercise per Week: 0 days     Minutes of Exercise per Session: 0 min   Stress: Stress Concern Present (8/28/2024)    [employer] [employer] - Occupational Stress Questionnaire     Feeling of Stress : Very much   Social Connections: Unknown (8/28/2024)    Social Connection and Isolation Panel [NHANES]     Frequency of Communication with Friends and Family: Not on file     Frequency of Social Gatherings with Friends and Family: Once a week     Attends Zoroastrian Services: Not on file     Active Member of Clubs or Organizations: Not on file     Attends Club or Organization Meetings: Not on file     Marital Status: Not on file   Interpersonal Safety: Low Risk  (11/6/2024)    Interpersonal Safety     Do you feel physically and emotionally safe where you currently live?: Yes     Within the past 12 months, have you been hit, slapped, kicked or otherwise physically hurt by someone?: No     Within the past 12 months, have you been humiliated or emotionally abused in other ways by  your partner or ex-partner?: No   Housing Stability: Low Risk  (8/28/2024)    Housing Stability     Do you have housing? : Yes     Are you worried about losing your housing?: No       Current Outpatient Medications   Medication Sig Dispense Refill    albuterol (PROAIR HFA/PROVENTIL HFA/VENTOLIN HFA) 108 (90 Base) MCG/ACT inhaler Inhale 1-2 puffs into the lungs every 4 hours as needed for shortness of breath, wheezing or cough 18 g 3    amphetamine-dextroamphetamine (ADDERALL XR) 30 MG 24 hr capsule Take 1 capsule (30 mg) by mouth daily. 30 capsule 0    amphetamine-dextroamphetamine (ADDERALL XR) 30 MG 24 hr capsule Take 1 capsule (30 mg) by mouth daily 30 capsule 0    amphetamine-dextroamphetamine (ADDERALL) 10 MG tablet Take 1 tablet (10 mg) by mouth daily. 30 tablet 0    clobetasol (TEMOVATE) 0.05 % external ointment Apply topically 2 times daily. For use up to two weeks at a time. 30 g 1    cyclobenzaprine (FLEXERIL) 10 MG tablet Take 1 tablet (10 mg) by mouth 3 times daily as needed for muscle spasms. 30 tablet 3    ibuprofen (ADVIL,MOTRIN) 200 MG tablet       ondansetron (ZOFRAN) 4 MG tablet Take 1 tablet (4 mg) by mouth every 6 hours as needed for nausea. 4 tablet 0    order for DME Equipment being ordered: RES MED AIRCURVE 10 ASV      oxyCODONE (ROXICODONE) 5 MG tablet Take 1-2 tablets (5-10 mg) by mouth every 4 hours as needed for moderate to severe pain. 10 tablet 0    phenazopyridine (PYRIDIUM) 200 MG tablet Take 1 tablet (200 mg) by mouth 3 times daily as needed for irritation. 30 tablet 1    sertraline (ZOLOFT) 100 MG tablet Take 1 tablet (100 mg) by mouth daily. 90 tablet 3    triamcinolone (KENALOG) 0.1 % external cream Apply topically 2 times daily Do not use on face 30 g 0       Physical Exam:    GENL: NAD.    ABD: Soft, non-tender, no masses.    EG: Well-estrogenized, no masses.    VAGINA: Well-estrogenized, no masses.    BN HYPERMOBILITY: None.    CYSTOCELE: None.    APICAL PROLAPSE: None.     RECTOCELE: None.    BIMANUAL: No mass or tenderness.      Cysto:    (Informed consent obtained. Pause for cause performed)   Sterile prep.    17 Fr scope inserted through urethra. Systematic examination w 70 degree lens.   PVR: 20 cc   MUCOSA: Normal without lesion   ORIFICES: Normal location and morphology   CAPACITY: 450 cc; no pain with filling   Scope withdrawn without untoward effect.      Valsalva:   No hypermobility, no leakage noted.      (Pt tolerated procedure without difficulty).        Results for orders placed or performed in visit on 12/03/24   Urine Macroscopic with reflex to Microscopic     Status: Abnormal   Result Value Ref Range    Color Urine Light Yellow Colorless, Straw, Light Yellow, Yellow    Appearance Urine Slightly Cloudy (A) Clear    Glucose Urine Negative Negative mg/dL    Bilirubin Urine Negative Negative    Ketones Urine Negative Negative mg/dL    Specific Gravity Urine 1.020 1.003 - 1.035    Blood Urine Trace (A) Negative    pH Urine 6.0 5.0 - 7.0    Protein Albumin Urine 10 (A) Negative mg/dL    Urobilinogen Urine Normal Normal, 2.0 mg/dL    Nitrite Urine Negative Negative    Leukocyte Esterase Urine Large (A) Negative    Bacteria Urine Few (A) None Seen /HPF    RBC Urine 8 (H) <=2 /HPF    WBC Urine >182 (H) <=5 /HPF    Squamous Epithelials Urine 4 (H) <=1 /HPF    Mucus Urine Present (A) None Seen /LPF           IMP:  1. OAB wet 1 month after hyster/sling  2. Fair bit of soda  3. Chronic constipation      PLAN:  Discussed situation with patient in detail.  Trial Detrol LA4  Consider moderation of soda/caffeine/tobacco  Suggest bran-for-breakfast  5. RTC 3 mos to review progress  6. Total time spent in reviewing patient records, discussing history, performing exam, discussing diagnosis, outlining treatment plan and documentation: 60 minutes

## 2024-12-05 ENCOUNTER — PATIENT OUTREACH (OUTPATIENT)
Dept: OBGYN | Facility: CLINIC | Age: 44
End: 2024-12-05
Payer: COMMERCIAL

## 2024-12-17 ENCOUNTER — OFFICE VISIT (OUTPATIENT)
Dept: OBGYN | Facility: CLINIC | Age: 44
End: 2024-12-17
Payer: COMMERCIAL

## 2024-12-17 VITALS — WEIGHT: 173.9 LBS | BODY MASS INDEX: 25.68 KG/M2 | DIASTOLIC BLOOD PRESSURE: 70 MMHG | SYSTOLIC BLOOD PRESSURE: 122 MMHG

## 2024-12-17 DIAGNOSIS — N39.0 URINARY TRACT INFECTION: ICD-10-CM

## 2024-12-17 DIAGNOSIS — Z90.710 S/P HYSTERECTOMY: ICD-10-CM

## 2024-12-17 DIAGNOSIS — R39.9 LOWER URINARY TRACT SYMPTOMS: ICD-10-CM

## 2024-12-17 DIAGNOSIS — Z12.4 CERVICAL CANCER SCREENING: Primary | ICD-10-CM

## 2024-12-17 LAB
BACTERIA #/AREA URNS HPF: ABNORMAL /HPF
RBC #/AREA URNS AUTO: ABNORMAL /HPF
SQUAMOUS #/AREA URNS AUTO: ABNORMAL /LPF
WBC #/AREA URNS AUTO: ABNORMAL /HPF
WBC CLUMPS #/AREA URNS HPF: PRESENT /HPF

## 2024-12-17 PROCEDURE — 81015 MICROSCOPIC EXAM OF URINE: CPT | Performed by: OBSTETRICS & GYNECOLOGY

## 2024-12-17 NOTE — PROGRESS NOTES
Gyn Clinic Postoperative Visit Note  2024    S: Heath Waller is a 44 year old  here for post-operative visit following s/p Total Laparoscopic Assisted Vaginal Hysterectomy via vaginal Natural Orifice Transluminal Endoscopic Surgery,  Bilateral salpingectomy, Guerra's Culdoplasty, and Mid urethral sling placement c/b urinary retention.  She reports feeling well overall. Bladder symptoms are improving, including less overflow incontinence, fewer episodes of nocturia, and returning sensation of need to void.   no fevers/chills.  no abdominal pain.    O:   LMP 10/21/2024   Exam:   : External genitalia normal well-estrogenized, healthy tissue.  No obvious excoriations, lesions, or rashes. Bartholins, urethra, skeins normal.  Normal pink vaginal mucosa.  SSE: surgically absent cervix, scant normal discharge, suture line intact, can visualize just a couple situres  Bimanual: uterus surgically absent. Cuff intact. No adnexal masses or tenderness appreciated.      Labs:   Hemoglobin   Date Value Ref Range Status   2024 13.8 11.7 - 15.7 g/dL Final   2020 12.6 11.7 - 15.7 g/dL Final   ]    Pathology:   ]    A: 44 year old female 6w post op s/p Total Laparoscopic Assisted Vaginal Hysterectomy via vaginal Natural Orifice Transluminal Endoscopic Surgery,  Bilateral salpingectomy, Guerra's Culdoplasty, and Mid urethral sling placement c/b urinary retention. Overall doing well, mild urinary symptoms today, will send UA/UC to rule out infection.    P:   1. Cervical cancer screening (Primary)  07: [place] CELINA I (care everywhere)  08: [place]roy CELINA 3 (care everywhere)  LEEP () confirmed CIN3. Margins negative, negative ECC.  08: ASCUS Pap + HR HPV (care everywhere)  10/29/10: NIL Pap (care everywhere)  04/10/12: NIL Pap (care everywhere)  14: HSIL Pap (care everywhere)  14: Elroy CELINA 3 (care everywhere)  14: LEEP CELINA 3 margins free of dysplasia. (care everywhere)  03/2/15:  NIL Pap, Neg HR HPV (care everywhere)  06/27/16: NIL Pap, Neg HR HPV (care everywhere)  09/25/19: NIL Pap, Neg HR HPV. 8/28/24 Atypical glandular cells (NOS), + HR HPV (neg 16/18). Plan colp with ECC & EMB due by 11/28/24.  9/9/24 [place] ECC - neg, EMB - neg. Plan hysterectomy  11/6/24 Total hysterectomy - negative cervical pathology.     Recommend ongoing vaginal pap smears due to Hx CIN3. Repeat vaginal pap in 1 year then every 3 years if normal    2. S/P hysterectomy  - 2 further weeks of pelvic rest, then ok to resume intercourse    3. Lower urinary tract symptoms  UA/UC today.     Ramona Lion MD   OB/Gyn  12/17/2024

## 2024-12-18 RX ORDER — FLUCONAZOLE 150 MG/1
150 TABLET ORAL ONCE
Qty: 1 TABLET | Refills: 0 | Status: SHIPPED | OUTPATIENT
Start: 2024-12-18 | End: 2024-12-18

## 2024-12-18 RX ORDER — NITROFURANTOIN 25; 75 MG/1; MG/1
100 CAPSULE ORAL 2 TIMES DAILY
Qty: 14 CAPSULE | Refills: 0 | Status: SHIPPED | OUTPATIENT
Start: 2024-12-18

## 2024-12-18 NOTE — RESULT ENCOUNTER NOTE
Please call with + UTI. Please prescribe 100mg Macrobid twice a day for 7 days.     Thank you,     Ramona Lion MD

## 2024-12-19 LAB — BACTERIA UR CULT: ABNORMAL

## 2025-01-30 ENCOUNTER — MYC REFILL (OUTPATIENT)
Dept: FAMILY MEDICINE | Facility: CLINIC | Age: 45
End: 2025-01-30
Payer: COMMERCIAL

## 2025-01-30 DIAGNOSIS — F98.8 ATTENTION DEFICIT DISORDER (ADD) WITHOUT HYPERACTIVITY: ICD-10-CM

## 2025-01-30 RX ORDER — DEXTROAMPHETAMINE SACCHARATE, AMPHETAMINE ASPARTATE MONOHYDRATE, DEXTROAMPHETAMINE SULFATE AND AMPHETAMINE SULFATE 7.5; 7.5; 7.5; 7.5 MG/1; MG/1; MG/1; MG/1
30 CAPSULE, EXTENDED RELEASE ORAL DAILY
Qty: 30 CAPSULE | Refills: 0 | Status: SHIPPED | OUTPATIENT
Start: 2025-01-30

## 2025-01-30 RX ORDER — DEXTROAMPHETAMINE SACCHARATE, AMPHETAMINE ASPARTATE, DEXTROAMPHETAMINE SULFATE AND AMPHETAMINE SULFATE 2.5; 2.5; 2.5; 2.5 MG/1; MG/1; MG/1; MG/1
10 TABLET ORAL DAILY
Qty: 30 TABLET | Refills: 0 | Status: SHIPPED | OUTPATIENT
Start: 2025-01-30

## 2025-02-07 ENCOUNTER — TELEPHONE (OUTPATIENT)
Dept: FAMILY MEDICINE | Facility: CLINIC | Age: 45
End: 2025-02-07

## 2025-02-07 NOTE — TELEPHONE ENCOUNTER
Patient Quality Outreach    Patient is due for the following:   Colon Cancer Screening    Action(s) Taken:   No follow up needed at this time.    Type of outreach:    Sent Historic Futures message.    Questions for provider review:    None           Kelly Dewey MA

## 2025-02-17 ENCOUNTER — OFFICE VISIT (OUTPATIENT)
Dept: FAMILY MEDICINE | Facility: CLINIC | Age: 45
End: 2025-02-17
Payer: COMMERCIAL

## 2025-02-17 VITALS
DIASTOLIC BLOOD PRESSURE: 84 MMHG | TEMPERATURE: 98.4 F | OXYGEN SATURATION: 100 % | RESPIRATION RATE: 16 BRPM | HEIGHT: 69 IN | SYSTOLIC BLOOD PRESSURE: 131 MMHG | BODY MASS INDEX: 24.6 KG/M2 | WEIGHT: 166.1 LBS | HEART RATE: 63 BPM

## 2025-02-17 DIAGNOSIS — G89.29 OTHER CHRONIC PAIN: ICD-10-CM

## 2025-02-17 DIAGNOSIS — Z01.818 PREOP GENERAL PHYSICAL EXAM: Primary | ICD-10-CM

## 2025-02-17 DIAGNOSIS — Z12.11 SPECIAL SCREENING FOR MALIGNANT NEOPLASMS, COLON: ICD-10-CM

## 2025-02-17 DIAGNOSIS — J31.0 CHRONIC RHINITIS: ICD-10-CM

## 2025-02-17 DIAGNOSIS — F33.1 MODERATE EPISODE OF RECURRENT MAJOR DEPRESSIVE DISORDER (H): ICD-10-CM

## 2025-02-17 DIAGNOSIS — N31.9 NEUROGENIC BLADDER: ICD-10-CM

## 2025-02-17 DIAGNOSIS — G47.33 OSA (OBSTRUCTIVE SLEEP APNEA): ICD-10-CM

## 2025-02-17 PROBLEM — M79.645 THUMB PAIN, LEFT: Status: RESOLVED | Noted: 2022-02-23 | Resolved: 2025-02-17

## 2025-02-17 PROBLEM — M79.642 PAIN OF LEFT HAND: Status: RESOLVED | Noted: 2022-02-23 | Resolved: 2025-02-17

## 2025-02-17 ASSESSMENT — PATIENT HEALTH QUESTIONNAIRE - PHQ9
10. IF YOU CHECKED OFF ANY PROBLEMS, HOW DIFFICULT HAVE THESE PROBLEMS MADE IT FOR YOU TO DO YOUR WORK, TAKE CARE OF THINGS AT HOME, OR GET ALONG WITH OTHER PEOPLE: VERY DIFFICULT
SUM OF ALL RESPONSES TO PHQ QUESTIONS 1-9: 14
SUM OF ALL RESPONSES TO PHQ QUESTIONS 1-9: 14

## 2025-02-17 ASSESSMENT — PAIN SCALES - GENERAL: PAINLEVEL_OUTOF10: NO PAIN (0)

## 2025-02-17 NOTE — ASSESSMENT & PLAN NOTE
Fibromyalgia and back pain--managed with prn flexeril and OTC ibuprofen, heat and ice.  Occasional use of lidocaine patches

## 2025-02-17 NOTE — PATIENT INSTRUCTIONS
How to Take Your Medication Before Surgery  Preoperative Medication Instructions   Antiplatelet or Anticoagulation Medication Instructions   - We reviewed the medication list and the patient is not on an antiplatelet or anticoagulation medications.    Additional Medication Instructions   - Psychostimulants: Hold the day of surgery   - SSRIs, SNRIs, TCAs, Antipsychotics: Continue without modification.    - rescue Inhaler: Continue PRN. Bring to hospital on the day of surgery.   - anticholinergics: Continue without modification.    - Topicals: DO NOT TAKE day of surgery.     Stop OTC supplements 14 days before procedure  Patient Education   Preparing for Your Surgery  For Adults  Getting started  In most cases, a nurse will call to review your health history and instructions. They will give you an arrival time based on your scheduled surgery time. Please be ready to share:  Your doctor's clinic name and phone number  Your medical, surgical, and anesthesia history  A list of allergies and sensitivities  A list of medicines, including herbal treatments and over-the-counter drugs  Whether the patient has a legal guardian (ask how to send us the papers in advance)  Note: You may not receive a call if you were seen at our PAC (Preoperative Assessment Center).  Please tell us if you're pregnant--or if there's any chance you might be pregnant. Some surgeries may injure a fetus (unborn baby), so they require a pregnancy test. Surgeries that are safe for a fetus don't always need a test, and you can choose whether to have one.   Preparing for surgery  Within 10 to 30 days of surgery: Have a pre-op exam (sometimes called an H&P, or History and Physical). This can be done at a clinic or pre-operative center.  If you're having a , you may not need this exam. Talk to your care team.  At your pre-op exam, talk to your care team about all medicines you take. (This includes CBD oil and any drugs, such as THC, marijuana, and  other forms of cannabis.) If you need to stop any medicine before surgery, ask when to start taking it again.  This is for your safety. Many medicines and drugs can make you bleed too much during surgery. Some change how well surgery (anesthesia) drugs work.  Call your insurance company to let them know you're having surgery. (If you don't have insurance, call [phone].)  Call your clinic if there's any change in your health. This includes a scrape or scratch near the surgery site, or any signs of a cold (sore throat, runny nose, cough, rash, fever).  Eating and drinking guidelines  For your safety: Unless your surgeon tells you otherwise, follow the guidelines below.  Eat and drink as normal until 8 hours before you arrive for surgery. After that, no food or milk. You can spit out gum when you arrive.  Drink clear liquids until 2 hours before you arrive. These are liquids you can see through, like water, Gatorade, and Propel Water. They also include plain black coffee and tea (no cream or milk).  No alcohol for 24 hours before you arrive. The night before surgery, stop any drinks that contain THC.  If your care team tells you to take medicine on the morning of surgery, it's okay to take it with a sip of water. No other medicines or drugs are allowed (including CBD oil)--follow your care team's instructions.  If you have questions the day of surgery, call your hospital or surgery center.   Preventing infection  Shower or bathe the night before and the morning of surgery. Follow the instructions your clinic gave you. (If no instructions, use regular soap.)  Don't shave or clip hair near your surgery site. We'll remove the hair if needed.  Don't smoke or vape the morning of surgery. No chewing tobacco for 6 hours before you arrive. A nicotine patch is okay. You may spit out nicotine gum when you arrive.  For some surgeries, the surgeon will tell you to fully quit smoking and nicotine.  We will make every effort to  keep you safe from infection. We will:  Clean our hands often with soap and water (or an alcohol-based hand rub).  Clean the skin at your surgery site with a special soap that kills germs.  Give you a special gown to keep you warm. (Cold raises the risk of infection.)  Wear hair covers, masks, gowns, and gloves during surgery.  Give antibiotic medicine, if prescribed. Not all surgeries need this medicine.  What to bring on the day of surgery  Photo ID and insurance card  Copy of your health care directive, if you have one  Glasses and hearing aids (bring cases)  You can't wear contacts during surgery  Inhaler and eye drops, if you use them (tell us about these when you arrive)  CPAP machine or breathing device, if you use them  A few personal items, if spending the night  If you have . . .  A pacemaker, ICD (cardiac defibrillator), or other implant: Bring the ID card.  An implanted stimulator: Bring the remote control.  A legal guardian: Bring a copy of the certified (court-stamped) guardianship papers.  Please remove any jewelry, including body piercings. Leave jewelry and other valuables at home.  If you're going home the day of surgery  You must have a responsible adult drive you home. They should stay with you overnight as well.  If you don't have someone to stay with you, and you aren't safe to go home alone, we may keep you overnight. Insurance often won't pay for this.  After surgery  If it's hard to control your pain or you need more pain medicine, please call your surgeon's office.  Questions?   If you have any questions for your care team, list them here:   ____________________________________________________________________________________________________________________________________________________________________________________________________________________________________________________________  For informational purposes only. Not to replace the advice of your health care provider. Copyright    2003, 2019 [facility]. All rights reserved. Clinically reviewed by Bebeto Chinchilla MD. Bureaux A Partager [id number] - REV 08/24.

## 2025-02-17 NOTE — PROGRESS NOTES
Preoperative Evaluation  [facility]  [address] [address]  Phone: [phone]  Primary Provider: GREGORIO Martinez CNP  Pre-op Performing Provider: GREGORIO Huggins CNP  Feb 17, 2025   {Provider  Link to PREOP SmartSet  REQUIRED to apply standard patient instructions and medication directions to the AVS :168594}  {ROOMER review and update patient entered surgical information if needed :299317}        2/17/2025   Surgical Information   What procedure is being done? colonoscopy   Facility or Hospital where procedure/surgery will be performed: [facility]   Who is doing the procedure / surgery? unknown   Date of surgery / procedure: February 25   Time of surgery / procedure: 1140   Where do you plan to recover after surgery? at home with family     Fax number for surgical facility: Note does not need to be faxed, will be available electronically in Epic.    Assessment & Plan     The proposed surgical procedure is considered LOW risk.      ICD-10-CM    1. Preop general physical exam  Z01.818       2. Special screening for malignant neoplasms, colon  Z12.11       3. Moderate episode of recurrent major depressive disorder (H)  F33.1       4. Other chronic pain  G89.29       5. Chronic rhinitis  J31.0       6. ROSA (obstructive sleep apnea)  G47.33       7. Neurogenic bladder  N31.9              Risks and Recommendations  The patient has the following additional risks and recommendations for perioperative complications:  History of urinary retention:  Post op hysterectomy urinary retention and bladder control issues.  Currently on tolterodine and improving.    Obstructive Sleep Apnea:   Has a history of sleep apnea, but not currently using CPAP.  Monitor for apnea closely    History of increased bleeding after previous breast augmentation.    Negative hematology workup and no bleeding after recent hysterectomy, however Monitor closely.       Preoperative Medication Instructions  Antiplatelet or Anticoagulation Medication Instructions   - We reviewed the medication list and the patient is not on an antiplatelet or anticoagulation medications.    Additional Medication Instructions   - Psychostimulants: Hold the day of surgery   - SSRIs, SNRIs, TCAs, Antipsychotics: Continue without modification.    - rescue Inhaler: Continue PRN. Bring to hospital on the day of surgery.   - anticholinergics: Continue without modification.    - Topicals: DO NOT TAKE day of surgery.  Hold supplements and herbals 14 days prior to procedure    Recommendation  Approval given to proceed with proposed procedure, without further diagnostic evaluation.    Glen Joe is a 44 year old, presenting for the following:  Pre-Op Exam          2/17/2025     2:44 PM   Additional Questions   Roomed by Maya NORIEGA related to upcoming procedure: Routine screening colonoscopy.  No current symptoms.        2/17/2025   Pre-Op Questionnaire   Have you ever had a heart attack or stroke? No   Have you ever had surgery on your heart or blood vessels, such as a stent placement, a coronary artery bypass, or surgery on an artery in your head, neck, heart, or legs? No   Do you have chest pain with activity? No   Do you have a history of heart failure? No   Do you currently have a cold, bronchitis or symptoms of other infection? No   Do you have a cough, shortness of breath, or wheezing? No   Do you or anyone in your family have previous history of blood clots? (!) YES Father with stroke related to embolism from his heart   Do you or does anyone in your family have a serious bleeding problem such as prolonged bleeding following surgeries or cuts? (!) YES Patient has history of difficulty with prolonged bleeding.  Workup has been negative, but has had difficulty with bleeding after breast augmentation   Have you ever had problems with anemia or been told to take iron pills? (!) NO   Have  "you had any abnormal blood loss such as black, tarry or bloody stools, or abnormal vaginal bleeding? No   Have you ever had a blood transfusion? No   Are you willing to have a blood transfusion if it is medically needed before, during, or after your surgery? Yes   Have you or any of your relatives ever had problems with anesthesia? No   Do you have sleep apnea, excessive snoring or daytime drowsiness? (!) YES   Do you have a CPAP machine? (!) NOT currently   Do you have any artifical heart valves or other implanted medical devices like a pacemaker, defibrillator, or continuous glucose monitor? No   Do you have artificial joints? No   Are you allergic to latex? No     Health Care Directive  Patient does not have a Health Care Directive: Discussed advance care planning with patient; however, patient declined at this time.    Preoperative Review of    reviewed - controlled substances as appropriate for medication list  {Review MNPMP for all patients per [facility] MNPMP Profile:370128}    Status of Chronic Conditions:  See problem list for active medical problems.  Problems all longstanding and stable, except as noted/documented.  See ROS for pertinent symptoms related to these conditions.  Chronic pain  Fibromyalgia and back pain--managed with prn flexeril and OTC ibuprofen, heat and ice.  Occasional use of lidocaine patches    Chronic rhinitis  Uses OTC sudafed prn    Moderate episode of recurrent major depressive disorder (H)  Managed with sertraline 100 mg per day for years.  States Overall control is adequate.    ROSA (obstructive sleep apnea)  No use of CPAP.  Feels that she sleeps \"OK\"      Neurogenic bladder  Post hysterectomy 11/6/2024 symptoms.  Continues on tolterodine.  Is improving slowly.    Patient Active Problem List    Diagnosis Date Noted    Neurogenic bladder 02/17/2025     Priority: Medium    Post-traumatic osteoarthritis of right knee 03/26/2019     Priority: Medium    ROSA (obstructive sleep apnea) " 09/29/2017     Priority: Medium    Anxiety attack 12/27/2016     Priority: Medium    Chronic rhinitis 12/20/2016     Priority: Medium    Gastroesophageal reflux disease 11/14/2016     Priority: Medium    History of thyroid disease 11/14/2016     Priority: Medium     Overview:   benign tumor, post partial thyroidectomy      Chronic pain 11/14/2016     Priority: Medium    Attention deficit disorder (ADD) without hyperactivity 11/14/2016     Priority: Medium    Fibromyalgia 11/14/2016     Priority: Medium    Moderate episode of recurrent major depressive disorder (H) 11/14/2016     Priority: Medium    Cervical intraepithelial neoplasia grade III with severe dysplasia 12/03/2008     Priority: Medium     08/21/07: [place] CELINA I (care everywhere)  11/24/08: [place] CELINA 3 (care everywhere)  LEEP (12/08) confirmed CIN3. Margins negative, negative ECC.  02/28/08: [facility] Pap + HR HPV (care everywhere)  10/29/10: NIL Pap (care everywhere)  04/10/12: NIL Pap (care everywhere)  01/20/14: HSIL Pap (care everywhere)  01/30/14: [place] CELINA 3 (care everywhere)  03/12/14: [facility] CELINA 3 margins free of dysplasia. (care everywhere)  03/2/15: NIL Pap, Neg HR HPV (care everywhere)  06/27/16: NIL Pap, Neg HR HPV (care everywhere)  09/25/19: NIL Pap, Neg HR HPV. 8/28/24 Atypical glandular cells (NOS), + HR HPV (neg 16/18). Plan colp with ECC & EMB due by 11/28/24.  9/9/24 [place] ECC - neg, EMB - neg. Plan hysterectomy  11/6/24 Total hysterectomy - negative cervical pathology. Plan vaginal pap in 1 year. If normal, then every 3 years.           Past Medical History:   Diagnosis Date    ADD (attention deficit disorder)     Atypical mole 07/19/2018    Degenerative disc disease, cervical     Depressive disorder     Gastro-oesophageal reflux disease     Myofacial muscle pain     Noninfectious ileitis     IBS with constipation    Other chronic pain     myofacial pain syndrome    Sleep apnea     wears CPAP at night-haven't used it in a few years    Thyroid  disease     TMJ (temporomandibular joint syndrome) 11/14/2016     Past Surgical History:   Procedure Laterality Date    APPENDECTOMY  01/01/2000    ARTHROSCOPY KNEE Right     teenager    BREAST SURGERY  03/01/2006    augmentation, revisions 2011 and 2015    FOOT SURGERY Right     semoid bone removed from Fx    GYN SURGERY   2009 and 2014    LEEPS x 2     HYSTERECTOMY, ENDOSCOPIC, VAGINAL TRANSLUMINAL NATURAL ORIFICE APPROACH Bilateral 11/6/2024    Procedure: vaginal natural orifice transluminal endoscopic surgery hysterectomy, possible conversion to total laparoscopic hysterectomy with bilateral salpingectomy and saolyx midurethral sling;  Surgeon: Ramona Lion MD;  Location: [facility] OR    LAPAROSCOPIC CHOLECYSTECTOMY  05/15/2014    Procedure: LAPAROSCOPIC CHOLECYSTECTOMY;  Surgeon: Kd Arthur MD;  Location: [facility] OR    THYROID SURGERY  01/01/2001    nodule     TUBAL LIGATION  10/09/2008     Current Outpatient Medications   Medication Sig Dispense Refill    albuterol (PROAIR HFA/PROVENTIL HFA/VENTOLIN HFA) 108 (90 Base) MCG/ACT inhaler Inhale 1-2 puffs into the lungs every 4 hours as needed for shortness of breath, wheezing or cough 18 g 3    amphetamine-dextroamphetamine (ADDERALL XR) 30 MG 24 hr capsule Take 1 capsule (30 mg) by mouth daily. 30 capsule 0    amphetamine-dextroamphetamine (ADDERALL) 10 MG tablet Take 1 tablet (10 mg) by mouth daily. 30 tablet 0    bisacodyl (DULCOLAX) 5 MG EC tablet Two days prior to exam take two (2) tablets at 4pm. One day prior to exam take two (2) tablets at 4pm 4 tablet 0    clobetasol (TEMOVATE) 0.05 % external ointment Apply topically 2 times daily. For use up to two weeks at a time. 30 g 1    cyclobenzaprine (FLEXERIL) 10 MG tablet Take 1 tablet (10 mg) by mouth 3 times daily as needed for muscle spasms. 30 tablet 3    ibuprofen (ADVIL,MOTRIN) 200 MG tablet       ondansetron (ZOFRAN) 4 MG tablet Take 1 tablet (4 mg) by mouth every 6 hours as needed for nausea. 4  tablet 0    polyethylene glycol (GOLYTELY) 236 g suspension Two days before procedure at 5PM fill first container with water. Mix and drink an 8 oz glass every 15 minutes until HALF of the container is gone. Place the remainder in the refrigerator. One day before procedure at 5PM drink second half of bowel prep. Drink an 8 oz glass every 15 minutes until it is gone. Day of procedure 6 hours before arrival time fill the 2nd container with water. Mix and drink an 8 oz glass every 15 minutes until HALF of the container is gone. Discard the remaining solution. 8000 mL 0    sertraline (ZOLOFT) 100 MG tablet Take 1 tablet (100 mg) by mouth daily. 90 tablet 3    tolterodine ER (DETROL LA) 4 MG 24 hr capsule Take 1 capsule (4 mg) by mouth daily. 90 capsule 0       Allergies   Allergen Reactions    Codeine      Itchy, hives- tolerated cough syrup okay    Sulfa Antibiotics Hives     Rash      Toradol [Ketorolac] Hives and Itching        Social History     Tobacco Use    Smoking status: Every Day     Current packs/day: 0.00     Types: Cigarettes, Vaping Device     Passive exposure: Current    Smokeless tobacco: Never    Tobacco comments:     e-cig   Substance Use Topics    Alcohol use: Yes     Alcohol/week: 0.0 standard drinks of alcohol     Comment: rarely       History   Drug Use No     Comment: Denies drug use, but had postive drug tox from 8/24 for oxycodone and meth         Review of Systems  CONSTITUTIONAL: NEGATIVE for fever, chills, change in weight  INTEGUMENTARY/SKIN: NEGATIVE for worrisome rashes, moles or lesions  EYES: NEGATIVE for vision changes or irritation  ENT/MOUTH: NEGATIVE for ear, mouth and throat problems  RESP: NEGATIVE for significant cough or SOB  CV: NEGATIVE for chest pain, palpitations or peripheral edema  GI: NEGATIVE for nausea, abdominal pain, heartburn, or change in bowel habits  : NEGATIVE for dysuria, or hematuria.  POSITIVE for urinary retention after hysterectomy  MUSCULOSKELETAL:  "NEGATIVE for significant arthralgias or myalgia  NEURO: NEGATIVE for weakness, dizziness or paresthesias  ENDOCRINE: NEGATIVE for temperature intolerance, skin/hair changes  HEME: POSITIVE for bleeding problems after breast augmentation in 2005, but no bleeding difficulties after hysterectomy 11/6/2024  PSYCHIATRIC: NEGATIVE for changes in mood or affect    Objective    /84 (BP Location: Right arm, Patient Position: Sitting, Cuff Size: Adult Regular)   Pulse 63   Temp 98.4  F (36.9  C) (Oral)   Resp 16   Ht 1.753 m (5' 9\")   Wt 75.3 kg (166 lb 1.6 oz)   LMP 10/21/2024   SpO2 100%   BMI 24.53 kg/m     Estimated body mass index is 24.53 kg/m  as calculated from the following:    Height as of this encounter: 1.753 m (5' 9\").    Weight as of this encounter: 75.3 kg (166 lb 1.6 oz).  Physical Exam  GENERAL: alert and no distress  EYES: Eyes grossly normal to inspection, PERRL and conjunctivae and sclerae normal  HENT: ear canals and TM's normal, nose and mouth without ulcers or lesions  NECK: no adenopathy, no asymmetry, masses, or scars  RESP: lungs clear to auscultation - no rales, rhonchi or wheezes  CV: regular rate and rhythm, normal S1 S2, no S3 or S4, no murmur, click or rub, no peripheral edema  ABDOMEN: soft, nontender, no hepatosplenomegaly, no masses and bowel sounds normal  MS: no gross musculoskeletal defects noted, no edema  SKIN: no suspicious lesions or rashes  NEURO: Normal strength and tone, mentation intact and speech normal  PSYCH: mentation appears normal, affect normal/bright    Recent Labs   Lab Test 11/11/24  1225 11/06/24  0657 08/28/24  0928   HGB  --  13.8 12.6   PLT  --  295 285   NA  --   --  141   POTASSIUM  --   --  4.0   CR 0.6  --  0.71        Diagnostics  No labs were ordered during this visit.   No EKG required for low risk surgery (cataract, skin procedure, breast biopsy, etc).    Revised Cardiac Risk Index (RCRI)  The patient has the following serious cardiovascular " risks for perioperative complications:   - No serious cardiac risks = 0 points     RCRI Interpretation: 0 points: Class I (very low risk - 0.4% complication rate)         Signed Electronically by: GREGORIO Huggins CNP  A copy of this evaluation report is provided to the requesting physician.    {Provider Resources  Preop [facility] Preop Guidelines  Revised Cardiac Risk Index :061366}   {Email feedback regarding this note to [email or web address]   :855189}  Answers submitted by the patient for this visit:  Patient Health Questionnaire (Submitted on 2/17/2025)  If you checked off any problems, how difficult have these problems made it for you to do your work, take care of things at home, or get along with other people?: Very difficult  PHQ9 TOTAL SCORE: 14

## 2025-02-25 ENCOUNTER — ANESTHESIA EVENT (OUTPATIENT)
Dept: GASTROENTEROLOGY | Facility: CLINIC | Age: 45
End: 2025-02-25
Payer: COMMERCIAL

## 2025-02-25 ENCOUNTER — ANESTHESIA (OUTPATIENT)
Dept: GASTROENTEROLOGY | Facility: CLINIC | Age: 45
End: 2025-02-25
Payer: COMMERCIAL

## 2025-02-25 ENCOUNTER — HOSPITAL ENCOUNTER (OUTPATIENT)
Facility: CLINIC | Age: 45
Discharge: HOME | End: 2025-02-25
Attending: COLON & RECTAL SURGERY | Admitting: COLON & RECTAL SURGERY
Payer: COMMERCIAL

## 2025-02-25 VITALS
DIASTOLIC BLOOD PRESSURE: 85 MMHG | OXYGEN SATURATION: 99 % | RESPIRATION RATE: 25 BRPM | HEART RATE: 66 BPM | SYSTOLIC BLOOD PRESSURE: 122 MMHG

## 2025-02-25 LAB — COLONOSCOPY: NORMAL

## 2025-02-25 PROCEDURE — 999N000010 HC STATISTIC ANES STAT CODE-CRNA PER MINUTE: Performed by: COLON & RECTAL SURGERY

## 2025-02-25 PROCEDURE — 250N000011 HC RX IP 250 OP 636: Performed by: NURSE ANESTHETIST, CERTIFIED REGISTERED

## 2025-02-25 PROCEDURE — 370N000017 HC ANESTHESIA TECHNICAL FEE, PER MIN: Performed by: COLON & RECTAL SURGERY

## 2025-02-25 PROCEDURE — 258N000003 HC RX IP 258 OP 636: Performed by: NURSE ANESTHETIST, CERTIFIED REGISTERED

## 2025-02-25 PROCEDURE — G0121 COLON CA SCRN NOT HI RSK IND: HCPCS | Performed by: COLON & RECTAL SURGERY

## 2025-02-25 PROCEDURE — 45378 DIAGNOSTIC COLONOSCOPY: CPT | Performed by: COLON & RECTAL SURGERY

## 2025-02-25 RX ORDER — SODIUM CHLORIDE, SODIUM LACTATE, POTASSIUM CHLORIDE, CALCIUM CHLORIDE 600; 310; 30; 20 MG/100ML; MG/100ML; MG/100ML; MG/100ML
INJECTION, SOLUTION INTRAVENOUS CONTINUOUS PRN
Status: DISCONTINUED | OUTPATIENT
Start: 2025-02-25 | End: 2025-02-25

## 2025-02-25 RX ORDER — ONDANSETRON 2 MG/ML
INJECTION INTRAMUSCULAR; INTRAVENOUS PRN
Status: DISCONTINUED | OUTPATIENT
Start: 2025-02-25 | End: 2025-02-25

## 2025-02-25 RX ORDER — NALOXONE HYDROCHLORIDE 0.4 MG/ML
0.2 INJECTION, SOLUTION INTRAMUSCULAR; INTRAVENOUS; SUBCUTANEOUS
Status: DISCONTINUED | OUTPATIENT
Start: 2025-02-25 | End: 2025-02-25 | Stop reason: HOSPADM

## 2025-02-25 RX ORDER — ONDANSETRON 2 MG/ML
4 INJECTION INTRAMUSCULAR; INTRAVENOUS
Status: DISCONTINUED | OUTPATIENT
Start: 2025-02-25 | End: 2025-02-25 | Stop reason: HOSPADM

## 2025-02-25 RX ORDER — PROPOFOL 10 MG/ML
INJECTION, EMULSION INTRAVENOUS CONTINUOUS PRN
Status: DISCONTINUED | OUTPATIENT
Start: 2025-02-25 | End: 2025-02-25

## 2025-02-25 RX ORDER — PROPOFOL 10 MG/ML
INJECTION, EMULSION INTRAVENOUS PRN
Status: DISCONTINUED | OUTPATIENT
Start: 2025-02-25 | End: 2025-02-25

## 2025-02-25 RX ORDER — LIDOCAINE 40 MG/G
CREAM TOPICAL
Status: DISCONTINUED | OUTPATIENT
Start: 2025-02-25 | End: 2025-02-25 | Stop reason: HOSPADM

## 2025-02-25 RX ORDER — NALOXONE HYDROCHLORIDE 0.4 MG/ML
0.4 INJECTION, SOLUTION INTRAMUSCULAR; INTRAVENOUS; SUBCUTANEOUS
Status: DISCONTINUED | OUTPATIENT
Start: 2025-02-25 | End: 2025-02-25 | Stop reason: HOSPADM

## 2025-02-25 RX ORDER — SODIUM CHLORIDE, SODIUM LACTATE, POTASSIUM CHLORIDE, CALCIUM CHLORIDE 600; 310; 30; 20 MG/100ML; MG/100ML; MG/100ML; MG/100ML
INJECTION, SOLUTION INTRAVENOUS CONTINUOUS
Status: DISCONTINUED | OUTPATIENT
Start: 2025-02-25 | End: 2025-02-25 | Stop reason: HOSPADM

## 2025-02-25 RX ORDER — ONDANSETRON 2 MG/ML
4 INJECTION INTRAMUSCULAR; INTRAVENOUS EVERY 6 HOURS PRN
Status: DISCONTINUED | OUTPATIENT
Start: 2025-02-25 | End: 2025-02-25 | Stop reason: HOSPADM

## 2025-02-25 RX ORDER — ONDANSETRON 4 MG/1
4 TABLET, ORALLY DISINTEGRATING ORAL EVERY 6 HOURS PRN
Status: DISCONTINUED | OUTPATIENT
Start: 2025-02-25 | End: 2025-02-25 | Stop reason: HOSPADM

## 2025-02-25 RX ORDER — FLUMAZENIL 0.1 MG/ML
0.2 INJECTION, SOLUTION INTRAVENOUS
Status: DISCONTINUED | OUTPATIENT
Start: 2025-02-25 | End: 2025-02-25 | Stop reason: HOSPADM

## 2025-02-25 RX ORDER — PROCHLORPERAZINE MALEATE 10 MG
10 TABLET ORAL EVERY 6 HOURS PRN
Status: DISCONTINUED | OUTPATIENT
Start: 2025-02-25 | End: 2025-02-25 | Stop reason: HOSPADM

## 2025-02-25 RX ADMIN — PROPOFOL 30 MG: 10 INJECTION, EMULSION INTRAVENOUS at 14:18

## 2025-02-25 RX ADMIN — ONDANSETRON 4 MG: 2 INJECTION INTRAMUSCULAR; INTRAVENOUS at 14:16

## 2025-02-25 RX ADMIN — SODIUM CHLORIDE, POTASSIUM CHLORIDE, SODIUM LACTATE AND CALCIUM CHLORIDE: 600; 310; 30; 20 INJECTION, SOLUTION INTRAVENOUS at 14:13

## 2025-02-25 RX ADMIN — PROPOFOL 30 MG: 10 INJECTION, EMULSION INTRAVENOUS at 14:14

## 2025-02-25 RX ADMIN — PROPOFOL 150 MCG/KG/MIN: 10 INJECTION, EMULSION INTRAVENOUS at 14:13

## 2025-02-25 ASSESSMENT — ACTIVITIES OF DAILY LIVING (ADL)
ADLS_ACUITY_SCORE: 41

## 2025-02-25 ASSESSMENT — ENCOUNTER SYMPTOMS
SEIZURES: 0
DYSRHYTHMIAS: 0

## 2025-02-25 ASSESSMENT — LIFESTYLE VARIABLES: TOBACCO_USE: 1

## 2025-02-25 NOTE — ANESTHESIA CARE TRANSFER NOTE
Patient: Heath Waller    Procedure: Procedure(s):  Colonoscopy       Diagnosis: Screen for colon cancer [Z12.11]  Diagnosis Additional Information: No value filed.    Anesthesia Type:   No value filed.     Note:    Oropharynx: oropharynx clear of all foreign objects and spontaneously breathing  Level of Consciousness: awake  Oxygen Supplementation: face mask  Level of Supplemental Oxygen (L/min / FiO2): 6  Independent Airway: airway patency satisfactory and stable  Dentition: dentition unchanged  Vital Signs Stable: post-procedure vital signs reviewed and stable  Report to RN Given: handoff report given  Patient transferred to: Phase II  Comments: After procedure in [facility] under monitored anesthesia care (MAC), patient exhibited spontaneous respirations, oxygen via facemask with oxygen saturation maintained greater than 95%, patient brought to [facility] recovery bay for postprocedure recovery, SpO2, NiBP, and EKG monitors and alarms on and functioning, Samara Hugger warmer connected to patient gown, report on patient's clinical status given to recovery-trained endoscopy RN.        Handoff Report: Identifed the Patient, Identified the Reponsible Provider, Reviewed the pertinent medical history, Discussed the surgical course, Reviewed Intra-OP anesthesia mangement and issues during anesthesia, Set expectations for post-procedure period and Allowed opportunity for questions and acknowledgement of understanding  Vitals:  Vitals Value Taken Time   BP     Temp     Pulse     Resp     SpO2         Electronically Signed By: GREGORIO Hudson CRNA  February 25, 2025  2:43 PM

## 2025-02-25 NOTE — ANESTHESIA POSTPROCEDURE EVALUATION
Patient: Heath Waller    Procedure: Procedure(s):  Colonoscopy       Anesthesia Type:  MAC    Note:  Disposition: Outpatient   Postop Pain Control: Uneventful            Sign Out: Well controlled pain   PONV: No   Neuro/Psych: Uneventful            Sign Out: Acceptable/Baseline neuro status   Airway/Respiratory: Uneventful            Sign Out: Acceptable/Baseline resp. status   CV/Hemodynamics: Uneventful            Sign Out: Acceptable CV status; No obvious hypovolemia; No obvious fluid overload   Other NRE: NONE   DID A NON-ROUTINE EVENT OCCUR? No           Last vitals:  Vitals Value Taken Time   /85 02/25/25 1504   Temp     Pulse 54 02/25/25 1500   Resp 25 02/25/25 1504   SpO2 100 % 02/25/25 1500   Vitals shown include unfiled device data.    Electronically Signed By: Fartun Tinoco MD  February 25, 2025  5:20 PM

## 2025-02-25 NOTE — ANESTHESIA PREPROCEDURE EVALUATION
Anesthesia Pre-Procedure Evaluation    Patient: Heath Waller   MRN: [id number] : 1980        Procedure : Procedure(s):  Colonoscopy          Past Medical History:   Diagnosis Date    ADD (attention deficit disorder)     Atypical mole 2018    Degenerative disc disease, cervical     Depressive disorder     Gastro-oesophageal reflux disease     Myofacial muscle pain     Noninfectious ileitis     IBS with constipation    Other chronic pain     myofacial pain syndrome    Sleep apnea     wears CPAP at night-haven't used it in a few years    Thyroid disease     TMJ (temporomandibular joint syndrome) 2016      Past Surgical History:   Procedure Laterality Date    APPENDECTOMY  2000    ARTHROSCOPY KNEE Right     teenager    BREAST SURGERY  2006    augmentation, revisions  and     FOOT SURGERY Right     semoid bone removed from Fx    GYN SURGERY    and     LEEPS x 2     HYSTERECTOMY, ENDOSCOPIC, VAGINAL TRANSLUMINAL NATURAL ORIFICE APPROACH Bilateral 2024    Procedure: vaginal natural orifice transluminal endoscopic surgery hysterectomy, possible conversion to total laparoscopic hysterectomy with bilateral salpingectomy and saolyx midurethral sling;  Surgeon: Ramona Lion MD;  Location: [facility] OR    LAPAROSCOPIC CHOLECYSTECTOMY  05/15/2014    Procedure: LAPAROSCOPIC CHOLECYSTECTOMY;  Surgeon: Kd Arthur MD;  Location: [facility] OR    THYROID SURGERY  2001    nodule     TUBAL LIGATION  10/09/2008      Allergies   Allergen Reactions    Codeine      Itchy, hives- tolerated cough syrup okay    Sulfa Antibiotics Hives     Rash      Toradol [Ketorolac] Hives and Itching      Social History     Tobacco Use    Smoking status: Every Day     Current packs/day: 0.00     Types: Cigarettes, Vaping Device     Passive exposure: Current    Smokeless tobacco: Never    Tobacco comments:     e-cig   Substance Use Topics    Alcohol use: Yes     Alcohol/week: 0.0 standard  drinks of alcohol     Comment: rarely      Wt Readings from Last 1 Encounters:   02/17/25 75.3 kg (166 lb 1.6 oz)        Anesthesia Evaluation            ROS/MED HX  ENT/Pulmonary:     (+) sleep apnea, doesn't use CPAP,         allergic rhinitis,     tobacco use,                     (-) asthma and recent URI   Neurologic: Comment: Neurogenic bladder   (-) no seizures, no CVA and no TIA   Cardiovascular:    (-) arrhythmias   METS/Exercise Tolerance: >4 METS    Hematologic:    (-) history of blood clots   Musculoskeletal:       GI/Hepatic:     (+) GERD,                (-) liver disease   Renal/Genitourinary:    (-) renal disease   Endo:     (+)          thyroid problem,         (-) obesity   Psychiatric/Substance Use:     (+) psychiatric history depression       Infectious Disease:    (-) Recent Fever   Malignancy:       Other:      (+)  , H/O Chronic Pain,         Physical Exam    Airway        Mallampati: II   TM distance: > 3 FB   Neck ROM: full   Mouth opening: > 3 cm    Respiratory Devices and Support         Dental       (+) Minor Abnormalities - some fillings, tiny chips      Cardiovascular          Rhythm and rate: regular and normal     Pulmonary           breath sounds clear to auscultation           OUTSIDE LABS:  CBC:   Lab Results   Component Value Date    WBC 6.7 11/06/2024    WBC 4.9 08/28/2024    HGB 13.8 11/06/2024    HGB 12.6 08/28/2024    HCT 42.3 11/06/2024    HCT 39.6 08/28/2024     11/06/2024     08/28/2024     BMP:   Lab Results   Component Value Date     08/28/2024     06/23/2023    POTASSIUM 4.0 08/28/2024    POTASSIUM 3.7 06/23/2023    CHLORIDE 105 08/28/2024    CHLORIDE 106 06/23/2023    CO2 27 08/28/2024    CO2 22 06/23/2023    BUN 7.8 08/28/2024    BUN 9.0 06/23/2023    CR 0.6 11/11/2024    CR 0.71 08/28/2024    GLC 83 08/28/2024    GLC 92 06/23/2023     COAGS:   Lab Results   Component Value Date    INR 1.04 06/06/2016     POC:   Lab Results   Component Value  Date    HCG Negative 11/06/2024    HCGS Negative 06/06/2016     HEPATIC:   Lab Results   Component Value Date    ALBUMIN 4.4 08/28/2024    PROTTOTAL 7.1 08/28/2024    ALT 8 08/28/2024    AST 19 08/28/2024    ALKPHOS 86 08/28/2024    BILITOTAL 0.4 08/28/2024     OTHER:   Lab Results   Component Value Date    FORREST 8.8 08/28/2024    MAG 2.0 06/30/2016    LIPASE 42 05/19/2014    TSH 1.23 08/28/2024       Anesthesia Plan    ASA Status:  2    NPO Status:  NPO Appropriate    Anesthesia Type: MAC.     - Reason for MAC: straight local not clinically adequate      Maintenance: TIVA.        Consents    Anesthesia Plan(s) and associated risks, benefits, and realistic alternatives discussed. Questions answered and patient/representative(s) expressed understanding.     - Discussed: Risks, Benefits and Alternatives for the PROCEDURE were discussed     - Discussed with:  Patient            Postoperative Care       PONV prophylaxis: Ondansetron (or other 5HT-3) (PRN)     Comments:               Fartun Tinoco MD  Anesthesiology      I have reviewed the pertinent notes and labs in the chart from the past 30 days and (re)examined the patient.  Any updates or changes from those notes are reflected in this note.    Clinically Significant Risk Factors Present on Admission

## 2025-03-12 ENCOUNTER — MYC REFILL (OUTPATIENT)
Dept: FAMILY MEDICINE | Facility: CLINIC | Age: 45
End: 2025-03-12
Payer: COMMERCIAL

## 2025-03-12 ENCOUNTER — MYC REFILL (OUTPATIENT)
Dept: UROLOGY | Facility: CLINIC | Age: 45
End: 2025-03-12
Payer: COMMERCIAL

## 2025-03-12 DIAGNOSIS — F98.8 ATTENTION DEFICIT DISORDER (ADD) WITHOUT HYPERACTIVITY: ICD-10-CM

## 2025-03-12 DIAGNOSIS — N39.8 VOIDING DYSFUNCTION: ICD-10-CM

## 2025-03-12 RX ORDER — DEXTROAMPHETAMINE SACCHARATE, AMPHETAMINE ASPARTATE MONOHYDRATE, DEXTROAMPHETAMINE SULFATE AND AMPHETAMINE SULFATE 7.5; 7.5; 7.5; 7.5 MG/1; MG/1; MG/1; MG/1
30 CAPSULE, EXTENDED RELEASE ORAL DAILY
Qty: 30 CAPSULE | Refills: 0 | Status: SHIPPED | OUTPATIENT
Start: 2025-03-12

## 2025-03-12 RX ORDER — DEXTROAMPHETAMINE SACCHARATE, AMPHETAMINE ASPARTATE, DEXTROAMPHETAMINE SULFATE AND AMPHETAMINE SULFATE 2.5; 2.5; 2.5; 2.5 MG/1; MG/1; MG/1; MG/1
10 TABLET ORAL DAILY
Qty: 30 TABLET | Refills: 0 | Status: SHIPPED | OUTPATIENT
Start: 2025-03-12

## 2025-03-13 RX ORDER — TOLTERODINE 4 MG/1
4 CAPSULE, EXTENDED RELEASE ORAL DAILY
Qty: 90 CAPSULE | Refills: 3 | Status: SHIPPED | OUTPATIENT
Start: 2025-03-13

## 2025-03-13 NOTE — TELEPHONE ENCOUNTER
Patient overdue for her 6 month ADHD follow-up. She will need a follow-up appointment before next refill. Virtual ol. Please help her schedule. Thanks

## 2025-03-13 NOTE — TELEPHONE ENCOUNTER
LVM for patient to call the clinic at [phone] Medication has been refilled but due for a visit before next refill.  Gabby Powers, TC

## 2025-03-13 NOTE — TELEPHONE ENCOUNTER
Patient overdue for her 6 month follow-up on ADHD. Please help her schedule; she will need to follow-up before next refill. Virtual ok

## 2025-03-17 NOTE — TELEPHONE ENCOUNTER
Sent MyChart x2 attempt.    Fartun STEINER, - [facility]  [facility]

## 2025-03-27 ENCOUNTER — VIRTUAL VISIT (OUTPATIENT)
Dept: FAMILY MEDICINE | Facility: CLINIC | Age: 45
End: 2025-03-27
Payer: COMMERCIAL

## 2025-03-27 DIAGNOSIS — M79.7 FIBROMYALGIA: Primary | ICD-10-CM

## 2025-03-27 RX ORDER — LIDOCAINE 50 MG/G
1 PATCH TOPICAL EVERY 24 HOURS
Qty: 10 PATCH | Refills: 0 | Status: SHIPPED | OUTPATIENT
Start: 2025-03-27

## 2025-03-27 RX ORDER — PREDNISONE 20 MG/1
TABLET ORAL
Qty: 12 TABLET | Refills: 0 | Status: SHIPPED | OUTPATIENT
Start: 2025-03-27 | End: 2025-04-08

## 2025-03-27 NOTE — PROGRESS NOTES
Heath is a 44 year old who is being evaluated via a billable video visit.    How would you like to obtain your AVS? MyChart  If the video visit is dropped, the invitation should be resent by: Text to cell phone: [phone]  Will anyone else be joining your video visit? No      Assessment & Plan     (M79.7) Fibromyalgia  (primary encounter diagnosis)  Diagnosed 10 years ago but has had pain for the past 25 years both attributed to fibromyalgia as well as myofacial pain. She has been using conservative therapies for years. Was told in the distant past may need surgery for arthritis but was very young at the time so surgery not recommended given age. Over the past 10 years she has been managing her symptoms with OTC therapies and conservative measures. Lately she has been having more trouble with pain and wanting to reach out to see who she should establish with for ongoing care/management options, as well as current options for pain. Recommend having her continue with OTC therapies as helpful. Prescribed lidocaine patches and prednisone taper course today for acute pain flare, patient is aware this is a temporary treatment for relief of symptoms. She is going to look into dry needling options and provider will also enquire into options within [facility] for conservative therapies/options. She was also given a referral to orthopedics to establish and see if other options indicated for ongoing symptoms. May consider pain clinic referral in the future as well as they might have conservative and alternative therapy options as well.   Plan: lidocaine (LIDODERM) 5 % patch, predniSONE         (DELTASONE) 20 MG tablet, Spine          Referral      Follow up as needed if symptoms worsen or fail to improve.    Subjective   Heath is a 44 year old, presenting for the following health issues:  No chief complaint on file.    History of Present Illness       Back Pain:  She presents for follow up of back pain.  Patient's back pain is a chronic problem.  Location of back pain:  Right lower back, left lower back, right upper back, left upper back, right side of neck, left side of neck, right shoulder, left shoulder, right buttock, right hip and other  Description of back pain: cramping, dull ache, gnawing, sharp, shooting and stabbing  Back pain spreads: right buttocks, right thigh, right knee, right foot, left foot, right shoulder, left shoulder, right side of neck and left side of neck    Since patient first noticed back pain, pain is: gradually worsening  Does back pain interfere with her job:  Yes       She eats 2-3 servings of fruits and vegetables daily.She consumes 4 sweetened beverage(s) daily.She exercises with enough effort to increase her heart rate 20 to 29 minutes per day.  She exercises with enough effort to increase her heart rate 3 or less days per week.   She is taking medications regularly.    For the past 20 years has had issues with back. She was diagnosed with fibromyalgia and myofacial pain. She has been told surgery may be beneficial in the past but not done due to age.        Review of Systems  Constitutional, HEENT, cardiovascular, pulmonary, gi and gu systems are negative, except as otherwise noted.        Objective           Vitals:  No vitals were obtained today due to virtual visit.    Physical Exam   GENERAL: alert and no distress  EYES: Eyes grossly normal to inspection.  No discharge or erythema, or obvious scleral/conjunctival abnormalities.  RESP: No audible wheeze, cough, or visible cyanosis.    SKIN: Visible skin clear. No significant rash, abnormal pigmentation or lesions.  NEURO: Cranial nerves grossly intact.  Mentation and speech appropriate for age.  PSYCH: Appropriate affect, tone, and pace of words        Video-Visit Details    Type of service:  Video Visit     Originating Location (pt. Location): Home  Distant Location (provider location):  Off-site  Platform used for Video Visit:  Thang    Signed Electronically by: Lela Whelan PA-C

## 2025-03-31 ENCOUNTER — PATIENT OUTREACH (OUTPATIENT)
Dept: CARE COORDINATION | Facility: CLINIC | Age: 45
End: 2025-03-31
Payer: COMMERCIAL

## 2025-04-18 ENCOUNTER — MYC REFILL (OUTPATIENT)
Dept: FAMILY MEDICINE | Facility: CLINIC | Age: 45
End: 2025-04-18
Payer: COMMERCIAL

## 2025-04-18 DIAGNOSIS — F98.8 ATTENTION DEFICIT DISORDER (ADD) WITHOUT HYPERACTIVITY: ICD-10-CM

## 2025-04-18 DIAGNOSIS — M79.7 FIBROMYALGIA: ICD-10-CM

## 2025-04-18 NOTE — LETTER
April 21, 2025      Heath Waller  [address]   [place] [address]      Dear Heath,    We recently received a call from your pharmacy requesting a refill of your medication.    A review of your chart indicates that an appointment is required with your provider.  Please call the clinic to schedule your appointment.    We have authorized one refill of your medication to allow time for you to schedule.   If you have a history of diabetes or high cholesterol, please come in fasting for the appointment. Fasting entails nothing to eat or drink 8 hours prior to your appointment; with the exception on water. You may take your medication the day of the appointment.    Thank you,      Reena Driver {PROVIDER CREDENTIALS:[id number]}

## 2025-04-20 RX ORDER — DEXTROAMPHETAMINE SACCHARATE, AMPHETAMINE ASPARTATE MONOHYDRATE, DEXTROAMPHETAMINE SULFATE AND AMPHETAMINE SULFATE 7.5; 7.5; 7.5; 7.5 MG/1; MG/1; MG/1; MG/1
30 CAPSULE, EXTENDED RELEASE ORAL DAILY
Qty: 30 CAPSULE | Refills: 0 | Status: SHIPPED | OUTPATIENT
Start: 2025-04-20

## 2025-04-20 RX ORDER — LIDOCAINE 50 MG/G
1 PATCH TOPICAL EVERY 24 HOURS
Qty: 10 PATCH | Refills: 0 | Status: SHIPPED | OUTPATIENT
Start: 2025-04-20

## 2025-04-20 RX ORDER — DEXTROAMPHETAMINE SACCHARATE, AMPHETAMINE ASPARTATE, DEXTROAMPHETAMINE SULFATE AND AMPHETAMINE SULFATE 2.5; 2.5; 2.5; 2.5 MG/1; MG/1; MG/1; MG/1
10 TABLET ORAL DAILY
Qty: 30 TABLET | Refills: 0 | Status: SHIPPED | OUTPATIENT
Start: 2025-04-20

## 2025-04-30 NOTE — CONFIDENTIAL NOTE
NEUROSURGERY - NEW PREVISIT PLANNING    Referring Provider: Lela Whelan PA-C    OVN 3/27/2025   Reason For Visit: M79.7 (ICD-10-CM) - Fibromyalgia     Neck/lower back       IMAGING STATUS/LOCATION DATE/TYPE   MRI PACS 02/01/2017  Lumbar  [facility]   CT N/A    XRAY PACS 01/11/2017  Cervical  [facility]   NOTES STATUS/LOCATION DATE/TYPE   Other specialist OVN: [facility] / [facility] 04/18/2025   EMG N/A    INJECTION N/A    PHYSICAL THERAPY N/A    SURGERY N/A      Does patient have C2C?  Year last updated Action     YES   [x]   2017   Please update at appointment if outdated more than 5 years       NO     []   N/A   Please complete C2C at appointment

## 2025-05-01 ENCOUNTER — OFFICE VISIT (OUTPATIENT)
Dept: NEUROSURGERY | Facility: CLINIC | Age: 45
End: 2025-05-01
Payer: COMMERCIAL

## 2025-05-01 ENCOUNTER — PRE VISIT (OUTPATIENT)
Dept: NEUROSURGERY | Facility: CLINIC | Age: 45
End: 2025-05-01

## 2025-05-01 VITALS — DIASTOLIC BLOOD PRESSURE: 83 MMHG | HEART RATE: 81 BPM | SYSTOLIC BLOOD PRESSURE: 127 MMHG | OXYGEN SATURATION: 100 %

## 2025-05-01 DIAGNOSIS — M54.16 LUMBAR RADICULOPATHY: ICD-10-CM

## 2025-05-01 DIAGNOSIS — M79.7 FIBROMYALGIA: ICD-10-CM

## 2025-05-01 DIAGNOSIS — M54.6 THORACIC SPINE PAIN: Primary | ICD-10-CM

## 2025-05-01 DIAGNOSIS — M54.12 CERVICAL RADICULOPATHY: ICD-10-CM

## 2025-05-01 DIAGNOSIS — M54.10 RADICULOPATHY WITH LOWER EXTREMITY SYMPTOMS: ICD-10-CM

## 2025-05-01 PROCEDURE — 3074F SYST BP LT 130 MM HG: CPT | Performed by: NURSE PRACTITIONER

## 2025-05-01 PROCEDURE — 3079F DIAST BP 80-89 MM HG: CPT | Performed by: NURSE PRACTITIONER

## 2025-05-01 PROCEDURE — 99204 OFFICE O/P NEW MOD 45 MIN: CPT | Performed by: NURSE PRACTITIONER

## 2025-05-01 PROCEDURE — 1125F AMNT PAIN NOTED PAIN PRSNT: CPT | Performed by: NURSE PRACTITIONER

## 2025-05-01 RX ORDER — GABAPENTIN 300 MG/1
300 CAPSULE ORAL
COMMUNITY
Start: 2025-04-18

## 2025-05-01 RX ORDER — PREDNISONE 20 MG/1
TABLET ORAL
COMMUNITY
Start: 2025-04-18

## 2025-05-01 ASSESSMENT — PAIN SCALES - GENERAL
PAINLEVEL_OUTOF10: MODERATE PAIN (6)
PAINLEVEL_OUTOF10: MODERATE PAIN (5)

## 2025-05-01 NOTE — PROGRESS NOTES
"NEUROSURGERY CLINIC PROGRESS NOTE    DATE OF VISIT: 5/1/2025    HPI: Heath Waller is a pleasant 44 year old female with a past medical history of fibromyalgia, myofacial pain, neurogenic bladder since hysterectomy in 11/2024, ROSA, and anxiety who presents to the clinic today for consultation regarding chronic back pain.     Today, patient reports over 20 years of chronic back pain. Patient reports she had a poor experience with a previous healthcare provider which led her to pause on further care regarding her fibromyalgia and back pain.  Patient is interested in better pain control overall and seeking to better understand which concerns in her back may be attributed to her fibromyalgia versus spine.    Patient reports her chief concern today is her bilateral neck and shoulder pain, which she describes as a constant dull ace over the last 20 years. She reports neck rotation to the left and right often aggravates her symptoms. Patient reports it feels like \"bone on bone\" with rotation of neck side-to-side.  Reports shooting pain down the posterior arm with flareups of neck pain. Denies any numbness, paraesthesia, and/or perceived weakness of the upper extremities. Reports thoracic spine tenderness to palpitation.  Reports history of left ulnar nerve release.  Denies any changes in hand dexterity or concerns with fine motor skills.    Regarding her lower back pain, patient reports this has also been ongoing for approximately 20 years. Reports more recent issues with her lower back, reporting a popping noise and subsequent low back pain when walking recently for which she is finishing days of high-dose prednisone. Reports intermittent numbness/tingling down the posterior legs to the knees, which she reports can occasionally radiate down to the ankles.  Patient unable to correlate these episodes with any specific events or triggers. Denies any numbness or tingling of her feet.  Denies any new bowel or bladder " concerns; reports chronic constipation and neurogenic bladder since hysterectomy in 11/2024. Denies saddle anesthesia. Of note, patient reports she occasionally feels unsteady on her feet with most recent episode yesterday.  She reports a warm sensation begins into her lower back and is followed by a heaviness of the bilateral legs lasting approximately 30 seconds, for which patient feels her legs may give out on her. Patient reports history of traumatic osteoarthritis of the right knee.    Patient reports her neck and back pain is worse with activity.  Reports pain over the years has been somewhat relieved with ibuprofen, flexeril (occasionally), gabapetin, tens unit, heat, ice, dry needling, UV light therapy.  Patient reports she has also tried physical therapy and chiropractic care over the years. None of these modalities have provided any significant long term relief.     OBJECTIVE:  /83 (BP Location: Left arm, Patient Position: Sitting, Cuff Size: Adult Regular)   Pulse 81   LMP 10/21/2024   SpO2 100%     Exam:  Patient appears comfortable, conversational, and in no apparent distress.   CN II-XII grossly intact, alert and appropriate with conversation and following commands.   Gait is non-antalgic. Able to walk on toes and heels without difficulty.   Cervical spine is tender to palpation. Appropriate range of motion of neck, not concerning for lhermitte's phenomenon.   Mid-thoracic spine is tender to palpation.    UE muscle strength  Right  Left    Deltoid  5/5  5/5    Biceps  5/5  5/5    Triceps  5/5  5/5    Hand intrinsics  5/5  5/5    Hand grasp  5/5  5/5    Holden signs  neg  neg      Lumbar spine is non-tender to palpation   Intact sensation throughout lower extremities.  Negative for pain with straight leg raise.     LE muscle strength  Right  Left    Iliopsoas (hip flexion)  5/5  5/5    Quad (knee extension)  5/5  5/5    Hamstring (knee flexion)  5/5  5/5    Gastrocnemius (PF)  5/5  5/5     Tibialis Ant. (DF)  5/5 5/5    EHL  5/5 5/5      Negative for clonus.    ASSESSMENT:  Heath Waller is a pleasant 44 year old female with a past medical history of fibromyalgia, myofacial pain, neurogenic bladder since hysterectomy in 11/2024, ROSA, and anxiety who presents to the clinic today for consultation regarding chronic back pain. Patient reports over 20 years of chronic neck and back pain with intermittent radicular symptoms of upper and lower extremities with flareups of pain.    PLAN:  -MRI Cervical, Thoracic, and Lumbar spine to further evaluate  -Physical therapy referral for back and core strengthening exercises  -Please reach out to our office if symptoms change or worsen  -Will call to review MRI results and next steps  -Advised patient to call our clinic with any questions or concerns. Patient voiced understanding and agreement.      Corinne Fanta MSN, AGACNP-[facility] Neurosurgery  [facility]  Tel [phone]    Past Medical History:   Diagnosis Date    ADD (attention deficit disorder)     Atypical mole 07/19/2018    Degenerative disc disease, cervical     Depressive disorder     Gastro-oesophageal reflux disease     Myofacial muscle pain     Noninfectious ileitis     IBS with constipation    Other chronic pain     myofacial pain syndrome    Sleep apnea     wears CPAP at night-haven't used it in a few years    Thyroid disease     TMJ (temporomandibular joint syndrome) 11/14/2016     Current Outpatient Medications   Medication Sig Dispense Refill    amphetamine-dextroamphetamine (ADDERALL XR) 30 MG 24 hr capsule Take 1 capsule (30 mg) by mouth daily. 30 capsule 0    amphetamine-dextroamphetamine (ADDERALL) 10 MG tablet Take 1 tablet (10 mg) by mouth daily. 30 tablet 0    cyclobenzaprine (FLEXERIL) 10 MG tablet Take 1 tablet (10 mg) by mouth 3 times daily as needed for muscle spasms. 30 tablet 3    gabapentin (NEURONTIN) 300 MG capsule Take 300 mg by mouth.       ibuprofen (ADVIL,MOTRIN) 200 MG tablet       lidocaine (LIDODERM) 5 % patch Place 1 patch over 12 hours onto the skin every 24 hours. To prevent lidocaine toxicity, patient should be patch free for 12 hrs daily. 10 patch 0    ondansetron (ZOFRAN) 4 MG tablet Take 1 tablet (4 mg) by mouth every 6 hours as needed for nausea. 4 tablet 0    sertraline (ZOLOFT) 100 MG tablet Take 1 tablet (100 mg) by mouth daily. 90 tablet 3    tolterodine ER (DETROL LA) 4 MG 24 hr capsule Take 1 capsule (4 mg) by mouth daily. 90 capsule 3    albuterol (PROAIR HFA/PROVENTIL HFA/VENTOLIN HFA) 108 (90 Base) MCG/ACT inhaler Inhale 1-2 puffs into the lungs every 4 hours as needed for shortness of breath, wheezing or cough 18 g 3    bisacodyl (DULCOLAX) 5 MG EC tablet Two days prior to exam take two (2) tablets at 4pm. One day prior to exam take two (2) tablets at 4pm 4 tablet 0    clobetasol (TEMOVATE) 0.05 % external ointment Apply topically 2 times daily. For use up to two weeks at a time. 30 g 1    polyethylene glycol (GOLYTELY) 236 g suspension Two days before procedure at 5PM fill first container with water. Mix and drink an 8 oz glass every 15 minutes until HALF of the container is gone. Place the remainder in the refrigerator. One day before procedure at 5PM drink second half of bowel prep. Drink an 8 oz glass every 15 minutes until it is gone. Day of procedure 6 hours before arrival time fill the 2nd container with water. Mix and drink an 8 oz glass every 15 minutes until HALF of the container is gone. Discard the remaining solution. 8000 mL 0    predniSONE (DELTASONE) 20 MG tablet TAKE 2 TABLETS BY MOUTH DAILY FOR 5 DAYS THEN 1 TABLET DAILY FOR 5 DAYS (Patient not taking: Reported on 5/1/2025)       No current facility-administered medications for this visit.

## 2025-05-01 NOTE — NURSING NOTE
"Heath Waller is a 44 year old female who presents for:  Chief Complaint   Patient presents with    Consult        Initial Vitals:  /83 (BP Location: Left arm, Patient Position: Sitting, Cuff Size: Adult Regular)   Pulse 81   LMP 10/21/2024   SpO2 100%  Estimated body mass index is 24.53 kg/m  as calculated from the following:    Height as of 2/17/25: 5' 9\" (1.753 m).    Weight as of 2/17/25: 166 lb 1.6 oz (75.3 kg).. There is no height or weight on file to calculate BSA. BP completed using cuff size: regular  Moderate Pain (5)    Phill Guillen    "

## 2025-05-01 NOTE — PATIENT INSTRUCTIONS
-MRI Cervical, Thoracic, and Lumbar spine to further evaluate  -Physical therapy referral for back and core strengthen  -Please reach out to our office if symptoms change or worsen  -I will call you with MRI result  -Please call our office with any questions and concerns    Corinne Fanta MSN, AGACNP-BC  [facility] Neurosurgery  [facility]  Tel [phone]

## 2025-05-01 NOTE — LETTER
"5/1/2025      Heath Waller  [address]  [place] [address]      Dear Colleague,    Thank you for referring your patient, Heath Waller, to the [facility]UROLOG[facility]. Please see a copy of my visit note below.    NEUROSURGERY CLINIC PROGRESS NOTE    DATE OF VISIT: 5/1/2025    HPI: Heath Waller is a pleasant 44 year old female with a past medical history of fibromyalgia, myofacial pain, neurogenic bladder since hysterectomy in 11/2024, ROSA, and anxiety who presents to the clinic today for consultation regarding chronic back pain.     Today, patient reports over 20 years of chronic back pain. Patient reports she had a poor experience with a previous healthcare provider which led her to pause on further care regarding her fibromyalgia and back pain.  Patient is interested in better pain control overall and seeking to better understand which concerns in her back may be attributed to her fibromyalgia versus spine.    Patient reports her chief concern today is her bilateral neck and shoulder pain, which she describes as a constant dull ace over the last 20 years. She reports neck rotation to the left and right often aggravates her symptoms. Patient reports it feels like \"bone on bone\" with rotation of neck side-to-side.  Reports shooting pain down the posterior arm with flareups of neck pain. Denies any numbness, paraesthesia, and/or perceived weakness of the upper extremities. Reports thoracic spine tenderness to palpitation.  Reports history of left ulnar nerve release.  Denies any changes in hand dexterity or concerns with fine motor skills.    Regarding her lower back pain, patient reports this has also been ongoing for approximately 20 years. Reports more recent issues with her lower back, reporting a popping noise and subsequent low back pain when walking recently for which she is finishing days of high-dose prednisone. Reports intermittent numbness/tingling down " the posterior legs to the knees, which she reports can occasionally radiate down to the ankles.  Patient unable to correlate these episodes with any specific events or triggers. Denies any numbness or tingling of her feet.  Denies any new bowel or bladder concerns; reports chronic constipation and neurogenic bladder since hysterectomy in 11/2024. Denies saddle anesthesia. Of note, patient reports she occasionally feels unsteady on her feet with most recent episode yesterday.  She reports a warm sensation begins into her lower back and is followed by a heaviness of the bilateral legs lasting approximately 30 seconds, for which patient feels her legs may give out on her. Patient reports history of traumatic osteoarthritis of the right knee.    Patient reports her neck and back pain is worse with activity.  Reports pain over the years has been somewhat relieved with ibuprofen, flexeril (occasionally), gabapetin, tens unit, heat, ice, dry needling, UV light therapy.  Patient reports she has also tried physical therapy and chiropractic care over the years. None of these modalities have provided any significant long term relief.     OBJECTIVE:  /83 (BP Location: Left arm, Patient Position: Sitting, Cuff Size: Adult Regular)   Pulse 81   LMP 10/21/2024   SpO2 100%     Exam:  Patient appears comfortable, conversational, and in no apparent distress.   CN II-XII grossly intact, alert and appropriate with conversation and following commands.   Gait is non-antalgic. Able to walk on toes and heels without difficulty.   Cervical spine is tender to palpation. Appropriate range of motion of neck, not concerning for lhermitte's phenomenon.   Mid-thoracic spine is tender to palpation.    UE muscle strength  Right  Left    Deltoid  5/5  5/5    Biceps  5/5  5/5    Triceps  5/5  5/5    Hand intrinsics  5/5  5/5    Hand grasp  5/5  5/5    Holden signs  neg  neg      Lumbar spine is non-tender to palpation   Intact sensation  throughout lower extremities.  Negative for pain with straight leg raise.     LE muscle strength  Right  Left    Iliopsoas (hip flexion)  5/5  5/5    Quad (knee extension)  5/5  5/5    Hamstring (knee flexion)  5/5  5/5    Gastrocnemius (PF)  5/5  5/5    Tibialis Ant. (DF)  5/5  5/5    EHL  5/5  5/5      Negative for clonus.    ASSESSMENT:  Heath Waller is a pleasant 44 year old female with a past medical history of fibromyalgia, myofacial pain, neurogenic bladder since hysterectomy in 11/2024, ROSA, and anxiety who presents to the clinic today for consultation regarding chronic back pain. Patient reports over 20 years of chronic neck and back pain with intermittent radicular symptoms of upper and lower extremities with flareups of pain.    PLAN:  -MRI Cervical, Thoracic, and Lumbar spine to further evaluate  -Physical therapy referral for back and core strengthening exercises  -Please reach out to our office if symptoms change or worsen  -Will call to review MRI results and next steps  -Advised patient to call our clinic with any questions or concerns. Patient voiced understanding and agreement.      Corinne Fanta MSN, AGACNP-[facility] Neurosurgery  [facility]  Tel [phone]    Past Medical History:   Diagnosis Date     ADD (attention deficit disorder)      Atypical mole 07/19/2018     Degenerative disc disease, cervical      Depressive disorder      Gastro-oesophageal reflux disease      Myofacial muscle pain      Noninfectious ileitis     IBS with constipation     Other chronic pain     myofacial pain syndrome     Sleep apnea     wears CPAP at night-haven't used it in a few years     Thyroid disease      TMJ (temporomandibular joint syndrome) 11/14/2016     Current Outpatient Medications   Medication Sig Dispense Refill     amphetamine-dextroamphetamine (ADDERALL XR) 30 MG 24 hr capsule Take 1 capsule (30 mg) by mouth daily. 30 capsule 0     amphetamine-dextroamphetamine  (ADDERALL) 10 MG tablet Take 1 tablet (10 mg) by mouth daily. 30 tablet 0     cyclobenzaprine (FLEXERIL) 10 MG tablet Take 1 tablet (10 mg) by mouth 3 times daily as needed for muscle spasms. 30 tablet 3     gabapentin (NEURONTIN) 300 MG capsule Take 300 mg by mouth.       ibuprofen (ADVIL,MOTRIN) 200 MG tablet        lidocaine (LIDODERM) 5 % patch Place 1 patch over 12 hours onto the skin every 24 hours. To prevent lidocaine toxicity, patient should be patch free for 12 hrs daily. 10 patch 0     ondansetron (ZOFRAN) 4 MG tablet Take 1 tablet (4 mg) by mouth every 6 hours as needed for nausea. 4 tablet 0     sertraline (ZOLOFT) 100 MG tablet Take 1 tablet (100 mg) by mouth daily. 90 tablet 3     tolterodine ER (DETROL LA) 4 MG 24 hr capsule Take 1 capsule (4 mg) by mouth daily. 90 capsule 3     albuterol (PROAIR HFA/PROVENTIL HFA/VENTOLIN HFA) 108 (90 Base) MCG/ACT inhaler Inhale 1-2 puffs into the lungs every 4 hours as needed for shortness of breath, wheezing or cough 18 g 3     bisacodyl (DULCOLAX) 5 MG EC tablet Two days prior to exam take two (2) tablets at 4pm. One day prior to exam take two (2) tablets at 4pm 4 tablet 0     clobetasol (TEMOVATE) 0.05 % external ointment Apply topically 2 times daily. For use up to two weeks at a time. 30 g 1     polyethylene glycol (GOLYTELY) 236 g suspension Two days before procedure at 5PM fill first container with water. Mix and drink an 8 oz glass every 15 minutes until HALF of the container is gone. Place the remainder in the refrigerator. One day before procedure at 5PM drink second half of bowel prep. Drink an 8 oz glass every 15 minutes until it is gone. Day of procedure 6 hours before arrival time fill the 2nd container with water. Mix and drink an 8 oz glass every 15 minutes until HALF of the container is gone. Discard the remaining solution. 8000 mL 0     predniSONE (DELTASONE) 20 MG tablet TAKE 2 TABLETS BY MOUTH DAILY FOR 5 DAYS THEN 1 TABLET DAILY FOR 5 DAYS  (Patient not taking: Reported on 5/1/2025)       No current facility-administered medications for this visit.         Again, thank you for allowing me to participate in the care of your patient.        Sincerely,        Corinne E. Fanta, APRN CNP    Electronically signed

## 2025-05-13 ENCOUNTER — HOSPITAL ENCOUNTER (OUTPATIENT)
Dept: MRI IMAGING | Facility: CLINIC | Age: 45
Discharge: HOME | End: 2025-05-13
Attending: NURSE PRACTITIONER
Payer: COMMERCIAL

## 2025-05-13 DIAGNOSIS — M54.12 CERVICAL RADICULOPATHY: ICD-10-CM

## 2025-05-13 DIAGNOSIS — M54.10 RADICULOPATHY WITH LOWER EXTREMITY SYMPTOMS: ICD-10-CM

## 2025-05-13 DIAGNOSIS — M54.16 LUMBAR RADICULOPATHY: ICD-10-CM

## 2025-05-13 DIAGNOSIS — M54.6 THORACIC SPINE PAIN: ICD-10-CM

## 2025-05-13 PROCEDURE — 72148 MRI LUMBAR SPINE W/O DYE: CPT

## 2025-05-13 PROCEDURE — 72141 MRI NECK SPINE W/O DYE: CPT

## 2025-05-13 PROCEDURE — 72146 MRI CHEST SPINE W/O DYE: CPT

## 2025-05-14 ENCOUNTER — THERAPY VISIT (OUTPATIENT)
Dept: PHYSICAL THERAPY | Facility: CLINIC | Age: 45
End: 2025-05-14
Attending: NURSE PRACTITIONER
Payer: COMMERCIAL

## 2025-05-14 ENCOUNTER — MYC REFILL (OUTPATIENT)
Dept: FAMILY MEDICINE | Facility: CLINIC | Age: 45
End: 2025-05-14

## 2025-05-14 DIAGNOSIS — M79.7 FIBROMYALGIA: ICD-10-CM

## 2025-05-14 DIAGNOSIS — F98.8 ATTENTION DEFICIT DISORDER (ADD) WITHOUT HYPERACTIVITY: ICD-10-CM

## 2025-05-14 DIAGNOSIS — M54.16 LUMBAR RADICULOPATHY: ICD-10-CM

## 2025-05-14 DIAGNOSIS — M54.12 CERVICAL RADICULOPATHY: ICD-10-CM

## 2025-05-14 DIAGNOSIS — M54.6 THORACIC SPINE PAIN: ICD-10-CM

## 2025-05-14 PROCEDURE — 97161 PT EVAL LOW COMPLEX 20 MIN: CPT | Mod: GP | Performed by: PHYSICAL THERAPIST

## 2025-05-14 PROCEDURE — 97110 THERAPEUTIC EXERCISES: CPT | Mod: GP | Performed by: PHYSICAL THERAPIST

## 2025-05-14 PROCEDURE — 97112 NEUROMUSCULAR REEDUCATION: CPT | Mod: GP | Performed by: PHYSICAL THERAPIST

## 2025-05-14 RX ORDER — DEXTROAMPHETAMINE SACCHARATE, AMPHETAMINE ASPARTATE MONOHYDRATE, DEXTROAMPHETAMINE SULFATE AND AMPHETAMINE SULFATE 7.5; 7.5; 7.5; 7.5 MG/1; MG/1; MG/1; MG/1
30 CAPSULE, EXTENDED RELEASE ORAL DAILY
Qty: 30 CAPSULE | Refills: 0 | OUTPATIENT
Start: 2025-05-14

## 2025-05-14 NOTE — PROGRESS NOTES
PHYSICAL THERAPY EVALUATION  Type of Visit: Evaluation       Fall Risk Screen:  Have you fallen 2 or more times in the past year?: No  Have you fallen and had an injury in the past year?: No    Subjective     Pt c/o chronic neck to LBP x 20+ years. Pain states mostly in spine no radiation into U/E but some into L/E.   Denies injury.   MD order date 5/1/2025. PMH: Fibromyalgia, neurogenic bladder since hysterectomy 11/2024, L ulna nerve release less than 10 years ago.    MRI:  1.  Degenerative changes most pronounced at C5-C6 where a disc osteophyte complex causes mild spinal canal narrowing with moderate to severe left and mild right foraminal narrowing. Correlate for any left C6 symptoms.  2.  At C6-C7, there is low-grade spinal canal and foraminal narrowing.  3.  At C3-C4, there is mild left foraminal narrowing.    1.  Mild to moderate disc degeneration T3-T4 through T7-T8.  2.  At T7-T8, there is a left paracentral disc herniation that abuts and slightly deforms the spinal cord. No spinal cord signal abnormality.  3.  No high-grade central spinal canal stenosis or neural foraminal narrowing.    1.  At L4-L5, a disc bulge abuts the traversing L5 nerves within the lateral recesses. Correlate for any L5 symptoms. Minor foraminal narrowing at this level.  2.  At L5-S1, there is a tiny left paracentral protrusion that causes minor lateral recess narrowing without definite nerve root displacement. Mild left foraminal narrowing.  3.  Minor degenerative changes at L2-L3 and L3-L4 without stenosis.  4.  No definite pars defect, there may be a nondisplaced chronic/developmental defect in the posterior elements on the left at L5 just below the pars interarticularis through the lower facet.            Presenting condition or subjective complaint: back and neck pain, trigger points, fibromya  Date of onset: 05/01/25 (MD order date)    Relevant medical history: Arthritis; Bladder or bowel problems; Depression; Fibromyalgia    Dates & types of surgery: hysterectomy 11\24    Prior diagnostic imaging/testing results: MRI; X-ray     Prior therapy history for the same diagnosis, illness or injury: Yes PT, unsure of date    Prior Level of Function  Transfers: Independent  Ambulation: Independent  ADL: Independent  IADL:     Living Environment  Social support: With family members   Type of home: Apartment/condo   Stairs to enter the home: No       Ramp: No   Stairs inside the home: No       Help at home: None  Equipment owned:       Employment: Yes RN  Hobbies/Interests: sport events, social events    Patient goals for therapy: have a social life    Pain assessment: Pain present     Objective   CERVICAL SPINE EVALUATION  PAIN: Pain Level at Rest: 2/10  Pain Level with Use: 10/10  Pain Location: cervical spine, thoracic spine, and lumbar spine  Pain Quality: Aching, Sharp, Shooting, and Stabbing  Pain Frequency: constant  Pain is Worst: daytime or nighttime  Pain is Exacerbated By: standing, sitting, walking, bending, lifting, sleep  Pain is Relieved By: cold, heat, NSAIDs, rest, stretch, and TENs, mm relaxer, Neurontin, dry needling    Pain Progression: Unchanged  INTEGUMENTARY (edema, incisions): WFL  POSTURE: Standing Posture: Rounded shoulders, Forward head  Sitting Posture: Rounded shoulders, Forward head  GAIT:   Weightbearing Status: WBAT  Assistive Device(s): None  Gait Deviations: Antalgic  BALANCE/PROPRIOCEPTION:   WEIGHTBEARING ALIGNMENT:   ROM:   (Degrees) Left AROM Right AROM    Cervical Flexion Min loss + lower cx    Cervical Extension Min loss +/- lower cx    Cervical Side bend Min loss +/- B Min loss +/- B    Cervical Rotation ERT ERT    Cervical Protrusion     Cervical Retraction Min/mod loss +. Rep sitting increased pain during/after.  Supine pain during, NE after    Thoracic Flexion     Thoracic Extension     Thoracic Rotation Mod loss + Min loss +/-     Left AROM Left PROM Right AROM Right PROM   Shoulder Flexion ERT  ERT     Shoulder Extension       Shoulder Abduction ERT  ERT    Shoulder Adduction       Shoulder IR ERP  ERP    Shoulder ER ERP  ERP    Shoulder Horiz Abduction       Shoulder Horiz Adduction       Pain:   End Feel:   Lx ROM: flex mod loss +, ext mod/max loss +, SB L mod/max loss +, R mod/max loss +    MYOTOMES: U/E grossly 5-/5 B with neck/upper back pain with testing.  Mod/max UT over recruitment L>R L/E grossly 4+/5 with LBP with testing.  Fair core stab mm recruitment but fatigues quickly  DTR S:   CORD SIGNS:   DERMATOMES: WNL  NEURAL TENSION: Lumbar WNL  Cervical WNL  FLEXIBILITY: decreased UT/LS, HS    SPECIAL TESTS:   PALPATION: UT/LS greatest, cx/tx/lx paraspinals  SPINAL SEGMENTAL CONCLUSIONS: NA today      Assessment & Plan   CLINICAL IMPRESSIONS  Medical Diagnosis: Cervical radiculopathy, Thoracic spine pain,, Lumbar radiculopathy,, Fibromyalgia    Treatment Diagnosis:     Impression/Assessment: Patient is a 44 year old female with Neck to LBP with fibromyalgia  complaints.  The following significant findings have been identified: Pain, Decreased ROM/flexibility, Decreased strength, Impaired gait, Impaired muscle performance, Decreased activity tolerance, and Impaired posture. These impairments interfere with their ability to perform self care tasks, work tasks, recreational activities, household chores, driving , household mobility, and community mobility as compared to previous level of function.     Clinical Decision Making (Complexity):  Clinical Presentation: Stable/Uncomplicated  Clinical Presentation Rationale: based on medical and personal factors listed in PT evaluation  Clinical Decision Making (Complexity): Low complexity    PLAN OF CARE  Treatment Interventions:  Modalities: Ultrasound  Interventions: Manual Therapy, Neuromuscular Re-education, Therapeutic Activity, Therapeutic Exercise    Long Term Goals     PT Goal 1  Goal Identifier: Standing  Goal Description: Stand 30 minutes pain  free  Rationale:  (for housekeeping tasks such as vacuuming, bed making, mowing;for personal hygiene;for meal preparation;for safe household ambulation;for safe community ambulation;for return to work duties;)  Target Date: 08/06/25  PT Goal 2  Goal Identifier: Sleep  Goal Description: Be able to sleep 8 hours pain free  Rationale:  (to establish restorative sleep pattern)  Target Date: 08/06/25      Frequency of Treatment: 1x/week to every other week  Duration of Treatment: 12 weeks    Recommended Referrals to Other Professionals:   Education Assessment:   Learner/Method: Patient;Demonstration;Pictures/Video  Education Comments: both print and online    Risks and benefits of evaluation/treatment have been explained.   Patient/Family/caregiver agrees with Plan of Care.     Evaluation Time:     PT Eval, Low Complexity Minutes (23864): 15       Signing Clinician: Jude Bauer PT

## 2025-05-20 ENCOUNTER — MYC MEDICAL ADVICE (OUTPATIENT)
Dept: NEUROSURGERY | Facility: CLINIC | Age: 45
End: 2025-05-20
Payer: COMMERCIAL

## 2025-05-20 NOTE — TELEPHONE ENCOUNTER
Patient reached out via [employer] message inquiring about his MRI results.     LOV with Corinne Fanta MSN, CNP on 5/1/25. She ordered C/T/L MRIs which patient completed on 5/13/25.     Per her notes she will call patient with results.     Updated patient message was sent to Corinne and she should be reaching out to him with results.

## 2025-05-21 NOTE — TELEPHONE ENCOUNTER
Called patient to review MRI results, no answer, LVM. Advised to call back to discuss results and next steps.    Corinne Fanta MSN, AGACNP-BC  [facility] Neurosurgery  Tel [phone]  Text page via [facility] Paging/Directory

## 2025-05-22 NOTE — TELEPHONE ENCOUNTER
[facility] Call Center    Phone Message    May a detailed message be left on voicemail: yes     Reason for Call: Other: Patient is returning call for results. Please call back.      Action Taken: Message routed to:  Other: [facility] Neurosurgery    Travel Screening: Not Applicable

## 2025-05-27 ENCOUNTER — VIRTUAL VISIT (OUTPATIENT)
Dept: FAMILY MEDICINE | Facility: CLINIC | Age: 45
End: 2025-05-27
Payer: COMMERCIAL

## 2025-05-27 DIAGNOSIS — F98.8 ATTENTION DEFICIT DISORDER (ADD) WITHOUT HYPERACTIVITY: ICD-10-CM

## 2025-05-27 DIAGNOSIS — R39.15 URINARY URGENCY: Primary | ICD-10-CM

## 2025-05-27 PROCEDURE — 98006 SYNCH AUDIO-VIDEO EST MOD 30: CPT

## 2025-05-27 PROCEDURE — 96127 BRIEF EMOTIONAL/BEHAV ASSMT: CPT | Mod: 95

## 2025-05-27 RX ORDER — DEXTROAMPHETAMINE SACCHARATE, AMPHETAMINE ASPARTATE, DEXTROAMPHETAMINE SULFATE AND AMPHETAMINE SULFATE 2.5; 2.5; 2.5; 2.5 MG/1; MG/1; MG/1; MG/1
10 TABLET ORAL DAILY
Qty: 30 TABLET | Refills: 0 | Status: SHIPPED | OUTPATIENT
Start: 2025-05-27 | End: 2025-06-26

## 2025-05-27 RX ORDER — DEXTROAMPHETAMINE SACCHARATE, AMPHETAMINE ASPARTATE, DEXTROAMPHETAMINE SULFATE AND AMPHETAMINE SULFATE 2.5; 2.5; 2.5; 2.5 MG/1; MG/1; MG/1; MG/1
10 TABLET ORAL DAILY
Qty: 30 TABLET | Refills: 0 | Status: SHIPPED | OUTPATIENT
Start: 2025-07-26 | End: 2025-08-25

## 2025-05-27 RX ORDER — ESTRADIOL 0.1 MG/G
1 CREAM VAGINAL
Qty: 42.5 G | Refills: 0 | Status: SHIPPED | OUTPATIENT
Start: 2025-05-28

## 2025-05-27 RX ORDER — DEXTROAMPHETAMINE SACCHARATE, AMPHETAMINE ASPARTATE, DEXTROAMPHETAMINE SULFATE AND AMPHETAMINE SULFATE 2.5; 2.5; 2.5; 2.5 MG/1; MG/1; MG/1; MG/1
10 TABLET ORAL DAILY
Qty: 30 TABLET | Refills: 0 | Status: SHIPPED | OUTPATIENT
Start: 2025-06-26 | End: 2025-07-26

## 2025-05-27 RX ORDER — DEXTROAMPHETAMINE SACCHARATE, AMPHETAMINE ASPARTATE MONOHYDRATE, DEXTROAMPHETAMINE SULFATE AND AMPHETAMINE SULFATE 7.5; 7.5; 7.5; 7.5 MG/1; MG/1; MG/1; MG/1
30 CAPSULE, EXTENDED RELEASE ORAL DAILY
Qty: 30 CAPSULE | Refills: 0 | Status: SHIPPED | OUTPATIENT
Start: 2025-06-26 | End: 2025-07-26

## 2025-05-27 RX ORDER — DEXTROAMPHETAMINE SACCHARATE, AMPHETAMINE ASPARTATE MONOHYDRATE, DEXTROAMPHETAMINE SULFATE AND AMPHETAMINE SULFATE 7.5; 7.5; 7.5; 7.5 MG/1; MG/1; MG/1; MG/1
30 CAPSULE, EXTENDED RELEASE ORAL DAILY
Qty: 30 CAPSULE | Refills: 0 | Status: SHIPPED | OUTPATIENT
Start: 2025-07-26 | End: 2025-08-25

## 2025-05-27 RX ORDER — DEXTROAMPHETAMINE SACCHARATE, AMPHETAMINE ASPARTATE MONOHYDRATE, DEXTROAMPHETAMINE SULFATE AND AMPHETAMINE SULFATE 7.5; 7.5; 7.5; 7.5 MG/1; MG/1; MG/1; MG/1
30 CAPSULE, EXTENDED RELEASE ORAL DAILY
Qty: 30 CAPSULE | Refills: 0 | Status: SHIPPED | OUTPATIENT
Start: 2025-05-27 | End: 2025-06-26

## 2025-05-27 ASSESSMENT — ANXIETY QUESTIONNAIRES
6. BECOMING EASILY ANNOYED OR IRRITABLE: NEARLY EVERY DAY
3. WORRYING TOO MUCH ABOUT DIFFERENT THINGS: MORE THAN HALF THE DAYS
IF YOU CHECKED OFF ANY PROBLEMS ON THIS QUESTIONNAIRE, HOW DIFFICULT HAVE THESE PROBLEMS MADE IT FOR YOU TO DO YOUR WORK, TAKE CARE OF THINGS AT HOME, OR GET ALONG WITH OTHER PEOPLE: SOMEWHAT DIFFICULT
GAD7 TOTAL SCORE: 13
GAD7 TOTAL SCORE: 13
7. FEELING AFRAID AS IF SOMETHING AWFUL MIGHT HAPPEN: NOT AT ALL
1. FEELING NERVOUS, ANXIOUS, OR ON EDGE: NEARLY EVERY DAY
2. NOT BEING ABLE TO STOP OR CONTROL WORRYING: MORE THAN HALF THE DAYS
5. BEING SO RESTLESS THAT IT IS HARD TO SIT STILL: NOT AT ALL

## 2025-05-27 ASSESSMENT — PATIENT HEALTH QUESTIONNAIRE - PHQ9
10. IF YOU CHECKED OFF ANY PROBLEMS, HOW DIFFICULT HAVE THESE PROBLEMS MADE IT FOR YOU TO DO YOUR WORK, TAKE CARE OF THINGS AT HOME, OR GET ALONG WITH OTHER PEOPLE: VERY DIFFICULT
SUM OF ALL RESPONSES TO PHQ QUESTIONS 1-9: 16
5. POOR APPETITE OR OVEREATING: NEARLY EVERY DAY
SUM OF ALL RESPONSES TO PHQ QUESTIONS 1-9: 16
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
## 2025-05-28 ENCOUNTER — OFFICE VISIT (OUTPATIENT)
Dept: DERMATOLOGY | Facility: CLINIC | Age: 45
End: 2025-05-28
Payer: COMMERCIAL

## 2025-05-28 DIAGNOSIS — L29.9 PRURITUS: ICD-10-CM

## 2025-05-28 DIAGNOSIS — Z98.890 MOHS DEFECT: ICD-10-CM

## 2025-05-28 DIAGNOSIS — L98.8 MOHS DEFECT: ICD-10-CM

## 2025-05-28 DIAGNOSIS — L30.8 SPONGIOTIC DERMATITIS: Primary | ICD-10-CM

## 2025-05-28 PROCEDURE — 99204 OFFICE O/P NEW MOD 45 MIN: CPT | Performed by: STUDENT IN AN ORGANIZED HEALTH CARE EDUCATION/TRAINING PROGRAM

## 2025-05-28 RX ORDER — CLOBETASOL PROPIONATE 0.5 MG/G
OINTMENT TOPICAL 2 TIMES DAILY
Qty: 60 G | Refills: 3 | Status: SHIPPED | OUTPATIENT
Start: 2025-05-28

## 2025-05-28 NOTE — LETTER
5/28/2025      Heath Waller  [address]  [place] [address]      Dear Colleague,    Thank you for referring your patient, Heath Waller, to the [facility]. Please see a copy of my visit note below.    [facility] Dermatology Note    Encounter Date: May 28, 2025    Dermatology Problem List:    ______________________________________    Impression/Plan:  Heath was seen today for skin check.    Diagnoses and all orders for this visit:  Pruritus  Spongiotic dermatitis  -     clobetasol (TEMOVATE) 0.05 % external ointment; Apply topically 2 times daily.  - dermatitis finger tips  - dyshidrotic on soles  - start clob oint BID PRN   - if not helpping, refer for patch testing    Mohs defect  -     Adult Dermatology  Referral          Follow-up in 1 year.       Staff Involved:  Staff Only    Jeremie Sterling MD   of Dermatology  Department of Dermatology  [facility] [employer] [facility]      CC:   Chief Complaint   Patient presents with     Skin Check     [facility] [place]  2. Has questions about the skin on finger tips       History of Present Illness:  Ms. Heath Waller is a 44 year old female who presents as a new patient.    Pt presents today for concerns about spots on trunk and extremities       Labs:      Physical exam:  Vitals: LMP 10/21/2024   GEN: well developed, well-nourished, in no acute distress, in a pleasant mood.     SKIN: Chun phototype 1  - Full skin, which includes the head/face, both arms, chest, back, abdomen,both legs, genitalia and/or groin buttocks, digits and/or nails, was examined.  - hyperkeratosis and scaling fistal finger tips pointer middle and thumb bilaterally  - scaling and erythematous papules insteps of feet   - No other lesions of concern on areas examined.     Past Medical History:   Past Medical History:   Diagnosis Date     ADD (attention deficit disorder)      Atypical  mole 07/19/2018     Degenerative disc disease, cervical      Depressive disorder      Gastro-oesophageal reflux disease      Myofacial muscle pain      Noninfectious ileitis     IBS with constipation     Other chronic pain     myofacial pain syndrome     Sleep apnea     wears CPAP at night-haven't used it in a few years     Thyroid disease      TMJ (temporomandibular joint syndrome) 11/14/2016     Past Surgical History:   Procedure Laterality Date     APPENDECTOMY  01/01/2000     ARTHROSCOPY KNEE Right     teenager     BREAST SURGERY  03/01/2006    augmentation, revisions 2011 and 2015     COLONOSCOPY N/A 2/25/2025    Procedure: Colonoscopy;  Surgeon: Milagro Quintana MD;  Location:  GI     ENT SURGERY      thyroid lobe removed     FOOT SURGERY Right     semoid bone removed from Fx     GYN SURGERY   2009 and 2014    LEEPS x 2      HYSTERECTOMY, ENDOSCOPIC, VAGINAL TRANSLUMINAL NATURAL ORIFICE APPROACH Bilateral 11/06/2024    Procedure: vaginal natural orifice transluminal endoscopic surgery hysterectomy, possible conversion to total laparoscopic hysterectomy with bilateral salpingectomy and saolyx midurethral sling;  Surgeon: Ramona Lion MD;  Location: [facility] OR     LAPAROSCOPIC CHOLECYSTECTOMY  05/15/2014    Procedure: LAPAROSCOPIC CHOLECYSTECTOMY;  Surgeon: Kd Arthur MD;  Location: [facility] OR     THYROID SURGERY  01/01/2001    nodule      TUBAL LIGATION  10/09/2008       Social History:   reports that she has been smoking cigarettes and vaping device. She has been exposed to tobacco smoke. She has quit using smokeless tobacco. She reports current alcohol use. She reports current drug use. Drug: Marijuana.    Family History:  Family History   Problem Relation Age of Onset     Cerebrovascular Disease Father         2016     Hypertension Father      Lung Cancer Father 60        stage IV     Cancer Father         lung cancer      Cerebrovascular Disease Maternal Grandmother      Liver Cancer Paternal  Grandmother      Colon Cancer Paternal Grandfather      Thyroid Disease Brother      No Known Problems Son      No Known Problems Daughter      No Known Problems Daughter      Skin Cancer Paternal Uncle        Medications:  Current Outpatient Medications   Medication Sig Dispense Refill     albuterol (PROAIR HFA/PROVENTIL HFA/VENTOLIN HFA) 108 (90 Base) MCG/ACT inhaler Inhale 1-2 puffs into the lungs every 4 hours as needed for shortness of breath, wheezing or cough 18 g 3     amphetamine-dextroamphetamine (ADDERALL XR) 30 MG 24 hr capsule Take 1 capsule (30 mg) by mouth daily. 30 capsule 0     [START ON 6/26/2025] amphetamine-dextroamphetamine (ADDERALL XR) 30 MG 24 hr capsule Take 1 capsule (30 mg) by mouth daily. 30 capsule 0     [START ON 7/26/2025] amphetamine-dextroamphetamine (ADDERALL XR) 30 MG 24 hr capsule Take 1 capsule (30 mg) by mouth daily. 30 capsule 0     amphetamine-dextroamphetamine (ADDERALL XR) 30 MG 24 hr capsule Take 1 capsule (30 mg) by mouth daily. 30 capsule 0     amphetamine-dextroamphetamine (ADDERALL) 10 MG tablet Take 1 tablet (10 mg) by mouth daily. 30 tablet 0     [START ON 6/26/2025] amphetamine-dextroamphetamine (ADDERALL) 10 MG tablet Take 1 tablet (10 mg) by mouth daily. 30 tablet 0     [START ON 7/26/2025] amphetamine-dextroamphetamine (ADDERALL) 10 MG tablet Take 1 tablet (10 mg) by mouth daily. 30 tablet 0     amphetamine-dextroamphetamine (ADDERALL) 10 MG tablet Take 1 tablet (10 mg) by mouth daily. 30 tablet 0     clobetasol (TEMOVATE) 0.05 % external ointment Apply topically 2 times daily. 60 g 3     clobetasol (TEMOVATE) 0.05 % external ointment Apply topically 2 times daily. For use up to two weeks at a time. 30 g 1     cyclobenzaprine (FLEXERIL) 10 MG tablet Take 1 tablet (10 mg) by mouth 3 times daily as needed for muscle spasms. 30 tablet 3     estradiol (ESTRACE) 0.1 MG/GM vaginal cream Place 1 g vaginally three times a week. 42.5 g 0     gabapentin (NEURONTIN) 300 MG  capsule Take 300 mg by mouth.       ibuprofen (ADVIL,MOTRIN) 200 MG tablet        lidocaine (LIDODERM) 5 % patch Place 1 patch over 12 hours onto the skin every 24 hours. To prevent lidocaine toxicity, patient should be patch free for 12 hrs daily. 10 patch 0     ondansetron (ZOFRAN) 4 MG tablet Take 1 tablet (4 mg) by mouth every 6 hours as needed for nausea. 4 tablet 0     sertraline (ZOLOFT) 100 MG tablet Take 1 tablet (100 mg) by mouth daily. 90 tablet 3     tolterodine ER (DETROL LA) 4 MG 24 hr capsule Take 1 capsule (4 mg) by mouth daily. 90 capsule 3     Allergies   Allergen Reactions     Codeine      Itchy, hives- tolerated cough syrup okay     Sulfa Antibiotics Hives     Rash       Toradol [Ketorolac] Hives and Itching               Again, thank you for allowing me to participate in the care of your patient.        Sincerely,        Jeremie Sterling MD    Electronically signed

## 2025-05-29 ENCOUNTER — PATIENT OUTREACH (OUTPATIENT)
Dept: CARE COORDINATION | Facility: CLINIC | Age: 45
End: 2025-05-29
Payer: COMMERCIAL

## 2025-05-30 ENCOUNTER — MYC MEDICAL ADVICE (OUTPATIENT)
Dept: NEUROSURGERY | Facility: CLINIC | Age: 45
End: 2025-05-30
Payer: COMMERCIAL

## 2025-06-02 ENCOUNTER — PATIENT OUTREACH (OUTPATIENT)
Dept: CARE COORDINATION | Facility: CLINIC | Age: 45
End: 2025-06-02
Payer: COMMERCIAL

## 2025-06-04 ENCOUNTER — PATIENT OUTREACH (OUTPATIENT)
Dept: CARE COORDINATION | Facility: CLINIC | Age: 45
End: 2025-06-04
Payer: COMMERCIAL

## 2025-06-04 NOTE — TELEPHONE ENCOUNTER
Patient's Spine referral was sent to [facility] via fax at [phone].    Right Fax confirmed delivery at 8:53am on 06/04/2025.

## 2025-06-08 ENCOUNTER — HOSPITAL ENCOUNTER (OUTPATIENT)
Dept: MRI IMAGING | Facility: CLINIC | Age: 45
Discharge: HOME | End: 2025-06-08
Attending: NURSE PRACTITIONER | Admitting: NURSE PRACTITIONER
Payer: COMMERCIAL

## 2025-06-08 DIAGNOSIS — R68.89 FORGETFULNESS: ICD-10-CM

## 2025-06-08 DIAGNOSIS — H53.8 BLURRY VISION: ICD-10-CM

## 2025-06-08 DIAGNOSIS — R51.9 NONINTRACTABLE EPISODIC HEADACHE, UNSPECIFIED HEADACHE TYPE: ICD-10-CM

## 2025-06-08 PROCEDURE — A9585 GADOBUTROL INJECTION: HCPCS | Performed by: NURSE PRACTITIONER

## 2025-06-08 PROCEDURE — 255N000002 HC RX 255 OP 636: Performed by: NURSE PRACTITIONER

## 2025-06-08 PROCEDURE — 70553 MRI BRAIN STEM W/O & W/DYE: CPT

## 2025-06-08 RX ORDER — GADOBUTROL 604.72 MG/ML
7.5 INJECTION INTRAVENOUS ONCE
Status: COMPLETED | OUTPATIENT
Start: 2025-06-08 | End: 2025-06-08

## 2025-06-08 RX ADMIN — GADOBUTROL 7.5 ML: 604.72 INJECTION INTRAVENOUS at 16:20

## 2025-06-12 ENCOUNTER — RESULTS FOLLOW-UP (OUTPATIENT)
Dept: NEUROLOGY | Facility: CLINIC | Age: 45
End: 2025-06-12

## 2025-06-18 ENCOUNTER — MYC REFILL (OUTPATIENT)
Dept: FAMILY MEDICINE | Facility: CLINIC | Age: 45
End: 2025-06-18
Payer: COMMERCIAL

## 2025-06-18 ENCOUNTER — TELEPHONE (OUTPATIENT)
Dept: NEUROLOGY | Facility: CLINIC | Age: 45
End: 2025-06-18
Payer: COMMERCIAL

## 2025-06-18 DIAGNOSIS — M48.02 FORAMINAL STENOSIS OF CERVICAL REGION: ICD-10-CM

## 2025-06-18 DIAGNOSIS — M54.16 LUMBAR RADICULOPATHY: Primary | ICD-10-CM

## 2025-06-18 DIAGNOSIS — M79.7 FIBROMYALGIA: ICD-10-CM

## 2025-06-18 DIAGNOSIS — M51.24 HERNIATION OF INTERVERTEBRAL DISC OF THORACIC REGION: ICD-10-CM

## 2025-06-18 RX ORDER — METHYLPREDNISOLONE 4 MG/1
TABLET ORAL
Qty: 21 TABLET | Refills: 0 | Status: SHIPPED | OUTPATIENT
Start: 2025-06-18

## 2025-06-18 RX ORDER — LIDOCAINE 50 MG/G
1 PATCH TOPICAL EVERY 24 HOURS
Qty: 10 PATCH | Refills: 0 | Status: SHIPPED | OUTPATIENT
Start: 2025-06-18

## 2025-06-18 NOTE — TELEPHONE ENCOUNTER
Called patient to check in on symptoms. Previously documented phone call not found within chart when MRI Brain results called and reviewed with patient on 6/12/2025.     Patient continues to report seizure like episodes. She reports these began approximately a couple years ago with 1 episode every so often, but since, have become more frequent. These generally initiate when she is standing and last 30 seconds to 1 minute. She reports she can feel them coming on and reports feeling hot prior to onset. During these episodes she notes tunnel-like vision and states she can hear but cannot respond. She reports she loses her sense of motor control, noting jerking movements of all her limbs, specifically her legs. She has had 4 episodes in June thus far.    In addition to these episodes she is having progressive and worsening headaches, which are now occurring every day and seem to last all day. She describes them as a dull ache that begin in the top/front of her head and then progress into a deep throb. She reports taking Ibuprofen with minimal relief of symptoms. She's also got some worsening nausea over the last few weeks occurring numerous times now on a daily basis.     Patient also notes her right arm has been going numb from the elbow down into her fingers, mainly pinky and ring finger. She reports off and on episodes of numbness/tingling every day, lasting a couple hours each time. She reports the episodes seem provoked by muscular knots specifically in upper back and shoulders.    Message sent to Neurology team and Dr. Brooks for guidance on next steps and further workup. Will reach out to Dr. Weiss's team to see about moving up appointment currently scheduled for 7/10 for assistance with patient's ongoing back pain. MDP ordered and sent to local pharmacy for now.     Corinne Fanta MSN, AGACNP-[facility] Neurosurgery  Tel [phone]  Text page via [facility] Paging/Directory

## 2025-06-23 ENCOUNTER — TELEPHONE (OUTPATIENT)
Dept: NEUROLOGY | Facility: CLINIC | Age: 45
End: 2025-06-23
Payer: COMMERCIAL

## 2025-06-23 DIAGNOSIS — R56.9 SEIZURE (H): Primary | ICD-10-CM

## 2025-06-23 RX ORDER — LEVETIRACETAM 750 MG/1
750 TABLET ORAL 2 TIMES DAILY
Qty: 60 TABLET | Refills: 1 | Status: SHIPPED | OUTPATIENT
Start: 2025-06-23

## 2025-06-23 NOTE — TELEPHONE ENCOUNTER
Called patient to follow up on previous conversation. Reviewed patient's concerns with Neurology team and Dr. Brooks. Advised to coordinate OP EEG and can begin treatment with Keppra. Discussed with patient amd Keppra 750 mg BID and 2-3 OP Routine EEG ordered. Patient scheduled to see Neurology next week on 7/1/2025. Reviewed seizure precautions with patient and discussed [place] driving law, indicating she should wait 3 months before driving after last episode. All questions answered and patient appreciative of call. Will ensure NSGY follow up is coordinate for follow up on MVNT.

## 2025-06-23 NOTE — CONFIDENTIAL NOTE
NEUROSURGERY - NEW PREVISIT PLANNING    Referring Provider: Corinne Fanta   OVN 06/23/2025   Reason For Visit: glioma/intraaxial tumors, patient had an MRI Brain demonstrating MVNT.     Patient insisted on being seen sooner. Ok'd per nursing.       IMAGING STATUS/LOCATION DATE/TYPE   MRI PACS 06/08/2025  Brain  [facility]   CT PACS 06/06/2016  Head  [facility]   XRAY N/A    NOTES STATUS/LOCATION DATE/TYPE   Other specialist OVN: N/A    EMG N/A    INJECTION Media 02/07/2017, lumbar [facility]I   PHYSICAL THERAPY Encounters 2025, 1 visit   SURGERY N/A      Does patient have C2C?  Year last updated Action     YES   [x]   2017   Please update at appointment if outdated more than 5 years       NO     []   N/A   Please complete C2C at appointment

## 2025-06-24 ENCOUNTER — OFFICE VISIT (OUTPATIENT)
Dept: NEUROSURGERY | Facility: CLINIC | Age: 45
End: 2025-06-24
Payer: COMMERCIAL

## 2025-06-24 ENCOUNTER — PRE VISIT (OUTPATIENT)
Dept: NEUROSURGERY | Facility: CLINIC | Age: 45
End: 2025-06-24

## 2025-06-24 VITALS — SYSTOLIC BLOOD PRESSURE: 135 MMHG | OXYGEN SATURATION: 100 % | DIASTOLIC BLOOD PRESSURE: 84 MMHG | HEART RATE: 59 BPM

## 2025-06-24 DIAGNOSIS — D43.4 NEOPLASM OF UNCERTAIN BEHAVIOR OF BRAIN AND SPINAL CORD (H): Primary | ICD-10-CM

## 2025-06-24 DIAGNOSIS — D43.2 NEOPLASM OF UNCERTAIN BEHAVIOR OF BRAIN AND SPINAL CORD (H): Primary | ICD-10-CM

## 2025-06-24 PROCEDURE — 1125F AMNT PAIN NOTED PAIN PRSNT: CPT | Performed by: NEUROLOGICAL SURGERY

## 2025-06-24 PROCEDURE — 3075F SYST BP GE 130 - 139MM HG: CPT | Performed by: NEUROLOGICAL SURGERY

## 2025-06-24 PROCEDURE — 3079F DIAST BP 80-89 MM HG: CPT | Performed by: NEUROLOGICAL SURGERY

## 2025-06-24 PROCEDURE — 99215 OFFICE O/P EST HI 40 MIN: CPT | Performed by: NEUROLOGICAL SURGERY

## 2025-06-24 PROCEDURE — 99417 PROLNG OP E/M EACH 15 MIN: CPT | Performed by: NEUROLOGICAL SURGERY

## 2025-06-24 ASSESSMENT — PAIN SCALES - GENERAL: PAINLEVEL_OUTOF10: MODERATE PAIN (6)

## 2025-06-24 NOTE — PATIENT INSTRUCTIONS
Patient Next Steps:    Order placed for MRI to be completed in 6 months (December). Central Scheduling will call you to make the appointment. If you do not hear from them within 1-2 business days you can call the number below. Follow-up with Dr. Tracey to review imaging once completed.  Please call [phone] and say  imaging  ([phone]  Please call [phone] to schedule with Dr. Tracey.    Please call us if you have any further questions or concerns.    [facility]   Phone: [phone]  Fax: [phone]

## 2025-06-24 NOTE — NURSING NOTE
"Heath Waller is a 44 year old female who presents for:  Chief Complaint   Patient presents with    Consult     glioma/intraaxial tumors, MRI 6-8-25 in chart Patient didn't want to wait- wanted first available scheduled per CF            Initial Vitals:  /84   Pulse 59   LMP 10/21/2024   SpO2 100%  Estimated body mass index is 24.53 kg/m  as calculated from the following:    Height as of 2/17/25: 5' 9\" (1.753 m).    Weight as of 2/17/25: 166 lb 1.6 oz (75.3 kg).. There is no height or weight on file to calculate BSA. BP completed using cuff size: regular  Moderate Pain (6)    Nursing Comments:     Elly Barnard   "

## 2025-06-24 NOTE — PROGRESS NOTES
NEUROSURGERY CONSULTATION NOTE    Neurosurgery was asked to see this patient by No att. 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     CONSULTATION ASSESSMENT AND PLAN:    Ms. Waller is a 44-year-old right-handed female with significant past medical history of ADD, cervical spondylosis, GERD, myofascial pain, sleep apnea, TM joint syndrome, lumbar spondylosis for more than 20 years presented to the clinic today with her mother for evaluation of incidentally diagnosed left precentral T2 hyperintense lesion suggestive of an BMT during workup for short-term memory issues, word finding difficulty and seizure-like activity.  Patient has generalized symptoms of body ache, chronic low back pain and no evidence of focal neurological deficit.  Patient is scheduled to follow-up with the neurology on 7/1/2025 for evaluation of seizure-like activity.    I explained the MRI brain with and without contrast images to the patient and her mother and showed them the images.  I explained them the presence of T2 hyperintense lesion involving the left precentral gyrus with no evidence of surrounding edema.  I discussed the differential diagnosis including benign vacuolated and nodular lesion such as MVNT, inflammatory lesions, primary intra-axial brain tumor and others.  I discussed the management options including watchful observation with follow-up MRI brain with and without contrast, stereotactic needle biopsy and craniotomy with resection of the lesion.    Given the incidental diagnosis and patient being likely asymptomatic from the lesion, I would recommend watchful observation with follow-up MRI brain with and without contrast with MR spectroscopy at 6 months or so.    Patient can continue to follow-up with the neurologist for  further workup regarding her seizure-like episodes.  She can also continue to follow-up with spine medicine team regarding her history of chronic low back pain.    Patient and her mother agreed with the plan.  All the questions were answered and patient sounded understanding.  She can contact us if there are any further questions or concerns or worsening neurological deficits.I spent more than 60 minutes in this apt, examining the pt, reviewing the scans, reviewing notes from chart, discussing treatment options with risks and benefits and coordinating care. This note was created in part by the use of Dragon voice recognition system. Inadvertent grammatical errors and typographical errors may have occurred due to inherent limitation of voice recognition software.  Reasonable attempts made to avoid errors, but this document may contain an error not identified before finalizing.  Please contact me for any clarification needed.     Juan Luis Cartagena MD      HPI:    Ms. Waller is a 44-year-old right-handed female with significant past medical history of ADD, cervical spondylosis, GERD, myofascial pain, sleep apnea, TM joint syndrome, lumbar spondylosis presented to the clinic today with her mother for evaluation of incidentally diagnosed left precentral T2 hyperintense lesion suggestive of an BMT during workup for short-term memory issues, word finding difficulty and seizure-like activity.    Patient has a long history of low back pain for more than 20 years without preceding trauma.  She reports that her pain is severe enough that she is not able to sleep well.  She also reports pain radiating to bilateral lower extremity along the posterior aspect of thigh up to the knees, right more than the left.  She denies any pain radiating below the knee.  She describes her pain as deep aching in nature, 10 x 10 at its worst aggravated with prolonged walking and activities and relieved with lidocaine, dry needling, ice and heat  application and TENS unit.  Patient has not followed up with physical therapy or spine medicine team yet.    She also reports intermittent word finding difficulty and short-term memory issues for last 6 months or so.  She also reports seizure-like activity for last 1 and half years.  She describes her seizure-like activity as episodes of staring spells and not responding to the stimulus for 30 seconds to a minute or so.  She describes the frequency of her seizure-like activity as twice per month.  She denies any history of loss of consciousness, bladder or bowel incontinence or biting of tongue.  Patient reports worsening of her seizure-like activity with increased smoking of THC which she usually does for her low back pain.    She underwent workup including MRI cervical, thoracic and lumbar spine and MRI brain with and without contrast.  She was incidentally diagnosed to have left precentral multi nodular lesion suggestive of MVNT.    Patient vapes nicotine, consumes alcohol once a week and denies use of other recreational drugs.    She denies any significant cardiac or pulmonary history.  She is not on antiplatelets or anticoagulants.    She denies any history of systemic malignancy.  She reports family history of lung cancer in her dad, paternal grandmother renal cancer and paternal grandfather colon cancer.      Past Medical History:   Diagnosis Date    ADD (attention deficit disorder)     Atypical mole 07/19/2018    Degenerative disc disease, cervical     Depressive disorder     Gastro-oesophageal reflux disease     Myofacial muscle pain     Noninfectious ileitis     IBS with constipation    Other chronic pain     myofacial pain syndrome    Sleep apnea     wears CPAP at night-haven't used it in a few years    Thyroid disease     TMJ (temporomandibular joint syndrome) 11/14/2016       Past Surgical History:   Procedure Laterality Date    APPENDECTOMY  01/01/2000    ARTHROSCOPY KNEE Right     teenager    BREAST  SURGERY  03/01/2006    augmentation, revisions 2011 and 2015    COLONOSCOPY N/A 2/25/2025    Procedure: Colonoscopy;  Surgeon: Milagro Quintana MD;  Location:  GI    ENT SURGERY      thyroid lobe removed    FOOT SURGERY Right     semoid bone removed from Fx    GYN SURGERY   2009 and 2014    LEEPS x 2     HYSTERECTOMY, ENDOSCOPIC, VAGINAL TRANSLUMINAL NATURAL ORIFICE APPROACH Bilateral 11/06/2024    Procedure: vaginal natural orifice transluminal endoscopic surgery hysterectomy, possible conversion to total laparoscopic hysterectomy with bilateral salpingectomy and saolyx midurethral sling;  Surgeon: Ramona Lion MD;  Location: [facility] OR    LAPAROSCOPIC CHOLECYSTECTOMY  05/15/2014    Procedure: LAPAROSCOPIC CHOLECYSTECTOMY;  Surgeon: Kd Arthur MD;  Location: [facility] OR    THYROID SURGERY  01/01/2001    nodule     TUBAL LIGATION  10/09/2008       REVIEW OF SYSTEMS:  Review Of Systems  Skin: negative  Eyes: negative  Ears/Nose/Throat: negative  Respiratory: No shortness of breath, dyspnea on exertion, cough, or hemoptysis  Cardiovascular: negative  Gastrointestinal: negative  Genitourinary: negative  Musculoskeletal: Low back pain  Neurologic: negative  Psychiatric: negative  Hematologic/Lymphatic/Immunologic: negative  Endocrine: negative    MEDICATIONS:    Current Outpatient Medications   Medication Sig Dispense Refill    albuterol (PROAIR HFA/PROVENTIL HFA/VENTOLIN HFA) 108 (90 Base) MCG/ACT inhaler Inhale 1-2 puffs into the lungs every 4 hours as needed for shortness of breath, wheezing or cough 18 g 3    amphetamine-dextroamphetamine (ADDERALL XR) 30 MG 24 hr capsule Take 1 capsule (30 mg) by mouth daily. 30 capsule 0    [START ON 6/26/2025] amphetamine-dextroamphetamine (ADDERALL XR) 30 MG 24 hr capsule Take 1 capsule (30 mg) by mouth daily. 30 capsule 0    [START ON 7/26/2025] amphetamine-dextroamphetamine (ADDERALL XR) 30 MG 24 hr capsule Take 1 capsule (30 mg) by mouth daily. 30 capsule 0     amphetamine-dextroamphetamine (ADDERALL XR) 30 MG 24 hr capsule Take 1 capsule (30 mg) by mouth daily. 30 capsule 0    amphetamine-dextroamphetamine (ADDERALL) 10 MG tablet Take 1 tablet (10 mg) by mouth daily. 30 tablet 0    [START ON 6/26/2025] amphetamine-dextroamphetamine (ADDERALL) 10 MG tablet Take 1 tablet (10 mg) by mouth daily. 30 tablet 0    [START ON 7/26/2025] amphetamine-dextroamphetamine (ADDERALL) 10 MG tablet Take 1 tablet (10 mg) by mouth daily. 30 tablet 0    amphetamine-dextroamphetamine (ADDERALL) 10 MG tablet Take 1 tablet (10 mg) by mouth daily. 30 tablet 0    clobetasol (TEMOVATE) 0.05 % external ointment Apply topically 2 times daily. 60 g 3    clobetasol (TEMOVATE) 0.05 % external ointment Apply topically 2 times daily. For use up to two weeks at a time. 30 g 1    cyclobenzaprine (FLEXERIL) 10 MG tablet Take 1 tablet (10 mg) by mouth 3 times daily as needed for muscle spasms. 30 tablet 3    estradiol (ESTRACE) 0.1 MG/GM vaginal cream Place 1 g vaginally three times a week. 42.5 g 0    gabapentin (NEURONTIN) 300 MG capsule Take 300 mg by mouth.      ibuprofen (ADVIL,MOTRIN) 200 MG tablet       levETIRAcetam (KEPPRA) 750 MG tablet Take 1 tablet (750 mg) by mouth 2 times daily. 60 tablet 1    lidocaine (LIDODERM) 5 % patch Place 1 patch over 12 hours onto the skin every 24 hours. To prevent lidocaine toxicity, patient should be patch free for 12 hrs daily. 10 patch 0    methylPREDNISolone (MEDROL DOSEPAK) 4 MG tablet therapy pack Follow Package Directions 21 tablet 0    ondansetron (ZOFRAN) 4 MG tablet Take 1 tablet (4 mg) by mouth every 6 hours as needed for nausea. 4 tablet 0    sertraline (ZOLOFT) 100 MG tablet Take 1 tablet (100 mg) by mouth daily. 90 tablet 3    tolterodine ER (DETROL LA) 4 MG 24 hr capsule Take 1 capsule (4 mg) by mouth daily. 90 capsule 3         ALLERGIES/SENSITIVITIES:     Allergies   Allergen Reactions    Codeine      Itchy, hives- tolerated cough syrup okay    Sulfa  Antibiotics Hives     Rash      Toradol [Ketorolac] Hives and Itching       PERTINENT SOCIAL HISTORY: Uses vaping device  Social History     Socioeconomic History    Marital status: Single     Spouse name: Xavier     Number of children: None    Years of education: 16    Highest education level: None   Occupational History    Occupation: nurse   Tobacco Use    Smoking status: Every Day     Current packs/day: 0.00     Types: Cigarettes, Vaping Device     Passive exposure: Current    Smokeless tobacco: Former    Tobacco comments:     e-cig   Vaping Use    Vaping status: Every Day   Substance and Sexual Activity    Alcohol use: Yes     Alcohol/week: 0.0 standard drinks of alcohol     Comment: rarely    Drug use: Yes     Types: Marijuana     Comment: Denies drug use, but had postive drug tox from 8/24 for oxycodone and meth    Sexual activity: Yes     Partners: Male     Birth control/protection: Female Surgical   Other Topics Concern     Service Yes     Comment:  erna     Blood Transfusions No    Caffeine Concern No     Comment: energy drink 12 o. 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 Lives with spouse and 3 children      Social Drivers of Health     Financial Resource Strain: Low Risk  (8/28/2024)    Financial Resource Strain     Within the past 12 months, have you or your family members you live with been unable to get utilities (heat, electricity) when it was really needed?: No   Food Insecurity: Low Risk  (8/28/2024)    Food Insecurity     Within the past 12 months, did you worry that your food would run out before you got money to buy more?: No      Within the past 12 months, did the food you bought just not last and you didn t have money to get more?: No   Transportation Needs: Low Risk  (8/28/2024)    Transportation Needs     Within the past 12 months, has lack of transportation kept you from medical appointments, getting your medicines, non-medical meetings or appointments, work, or from getting things that you need?: No   Physical Activity: Inactive (8/28/2024)    Exercise Vital Sign     Days of Exercise per Week: 0 days     Minutes of Exercise per Session: 0 min   Stress: Stress Concern Present (8/28/2024)    [employer] - Occupational Stress Questionnaire     Feeling of Stress : Very much   Social Connections: Unknown (8/28/2024)    Social Connection and Isolation Panel [NHANES]     Frequency of Social Gatherings with Friends and Family: Once a week   Interpersonal Safety: Low Risk  (2/25/2025)    Interpersonal Safety     Do you feel physically and emotionally safe where you currently live?: Yes     Within the past 12 months, have you been hit, slapped, kicked or otherwise physically hurt by someone?: No     Within the past 12 months, have you been humiliated or emotionally abused in other ways by your partner or ex-partner?: No   Housing Stability: Low Risk  (8/28/2024)    Housing Stability     Do you have housing? : Yes     Are you worried about losing your housing?: No         FAMILY HISTORY:  Family History   Problem Relation Age of Onset    Cerebrovascular Disease Father         2016    Hypertension Father     Lung Cancer Father 60        stage IV    Cancer Father         lung cancer     Cerebrovascular Disease Maternal Grandmother     Liver Cancer Paternal Grandmother     Colon Cancer Paternal Grandfather     Thyroid Disease Brother     No Known Problems Son     No Known Problems Daughter     No Known Problems Daughter     Skin Cancer Paternal Uncle         PHYSICAL EXAM:   Constitutional: /84   Pulse 59    LMP 10/21/2024   SpO2 100%      Mental Status: A & O in no acute distress.  Affect is appropriate.  Speech is fluent.  Recent and remote memory are intact.  Attention span and concentration are normal.     Cranial Nerves:   CN1: grossly intact per patient recall.   CN2: No funduscopic exam performed.   CN3,4 & 6: Pupillary light response, lateral and vertical gaze normal.  No nystagmus.  Visual fields are full to confrontation.   CN5: Intact to touch   CN7: No facial weakness, smile, facial symmetry intact.   CN8: Intact to spoken voice.   CN9&10: Gag reflex, uvula midline, palate rises with phonation.   CN11: Shoulder shrug 5/5 intact bilaterally.   CN12: Tongue midline and moves freely from side to side.     Motor: No pronator drift of upper extremity.   Normal bulk and tone all muscle groups of upper and lower extremities.       Delt Bi Tri Hand Flex/  Ext Iliopsoas Quadriceps Tibialis Anterior EHL Gastroc     C5 C6 C7 C8/T1 L2 L3 L4 L5 S1   R 5 5 5 5 5 5 5 5 5   L 5 5 5 5 5 5 5 5 5      Sensory: Sensation intact bilaterally to light touch.     Coordination; finger to nose,  rapid alternating movements smooth and rhythmic.   Romberg intact.   Heel/toe/tandem gait intact.    Normal gait and station.     Reflexes;                       Right              Left  Brachioradialis (C5,6)      2+                 2+  Biceps   (C5,6)                 2+                 2+  Triceps  (C7,8)                 2+                2+  Knee (L3,4)                      2+                2+  Ankle jerk (S1,2)              1+                1+    No hoffmans/ clonus.    IMAGING:  I personally reviewed all radiographic images and agree with the radiology report of T2 hyperintense lesion involving the subcortical white matter along the left precentral gyrus likely representing benign multinodular loculating neuronal tumor without surrounding FLAIR changes.     6/8/2025 MRI brain with and without contrast:   IMPRESSION:  1.  No MRI  evidence for an acute intracranial process.  2.  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 Recommend follow-up MRI with contrast in 6-12 months to ensure   stability.  3.  Right mastoid air cell effusion.    Cc:   Reena Driver

## 2025-06-24 NOTE — LETTER
6/24/2025      Heath Waller  [address]  [facility] [address]      Dear Colleague,    Thank you for referring your patient, Heath Waller, to the [facility]. Please see a copy of my visit note below.    NEUROSURGERY CONSULTATION NOTE    Neurosurgery was asked to see this patient by No att. 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     CONSULTATION ASSESSMENT AND PLAN:    Ms. Waller is a 44-year-old right-handed female with significant past medical history of ADD, cervical spondylosis, GERD, myofascial pain, sleep apnea, TM joint syndrome, lumbar spondylosis for more than 20 years presented to the clinic today with her mother for evaluation of incidentally diagnosed left precentral T2 hyperintense lesion suggestive of an BMT during workup for short-term memory issues, word finding difficulty and seizure-like activity.  Patient has generalized symptoms of body ache, chronic low back pain and no evidence of focal neurological deficit.  Patient is scheduled to follow-up with the neurology on 7/1/2025 for evaluation of seizure-like activity.    I explained the MRI brain with and without contrast images to the patient and her mother and showed them the images.  I explained them the presence of T2 hyperintense lesion involving the left precentral gyrus with no evidence of surrounding edema.  I discussed the differential diagnosis including benign vacuolated and nodular lesion such as MVNT, inflammatory lesions, primary intra-axial brain tumor and others.  I discussed the management options including watchful observation with follow-up MRI brain with and without contrast, stereotactic needle biopsy and craniotomy with resection of the lesion.    Given the  incidental diagnosis and patient being likely asymptomatic from the lesion, I would recommend watchful observation with follow-up MRI brain with and without contrast with MR spectroscopy at 6 months or so.    Patient can continue to follow-up with the neurologist for further workup regarding her seizure-like episodes.  She can also continue to follow-up with spine medicine team regarding her history of chronic low back pain.    Patient and her mother agreed with the plan.  All the questions were answered and patient sounded understanding.  She can contact us if there are any further questions or concerns or worsening neurological deficits.I spent more than 60 minutes in this apt, examining the pt, reviewing the scans, reviewing notes from chart, discussing treatment options with risks and benefits and coordinating care. This note was created in part by the use of Dragon voice recognition system. Inadvertent grammatical errors and typographical errors may have occurred due to inherent limitation of voice recognition software.  Reasonable attempts made to avoid errors, but this document may contain an error not identified before finalizing.  Please contact me for any clarification needed.     Juan Luis Cartagena MD      HPI:    Ms. Waller is a 44-year-old right-handed female with significant past medical history of ADD, cervical spondylosis, GERD, myofascial pain, sleep apnea, TM joint syndrome, lumbar spondylosis presented to the clinic today with her mother for evaluation of incidentally diagnosed left precentral T2 hyperintense lesion suggestive of an BMT during workup for short-term memory issues, word finding difficulty and seizure-like activity.    Patient has a long history of low back pain for more than 20 years without preceding trauma.  She reports that her pain is severe enough that she is not able to sleep well.  She also reports pain radiating to bilateral lower extremity along the posterior aspect of thigh  up to the knees, right more than the left.  She denies any pain radiating below the knee.  She describes her pain as deep aching in nature, 10 x 10 at its worst aggravated with prolonged walking and activities and relieved with lidocaine, dry needling, ice and heat application and TENS unit.  Patient has not followed up with physical therapy or spine medicine team yet.    She also reports intermittent word finding difficulty and short-term memory issues for last 6 months or so.  She also reports seizure-like activity for last 1 and half years.  She describes her seizure-like activity as episodes of staring spells and not responding to the stimulus for 30 seconds to a minute or so.  She describes the frequency of her seizure-like activity as twice per month.  She denies any history of loss of consciousness, bladder or bowel incontinence or biting of tongue.  Patient reports worsening of her seizure-like activity with increased smoking of THC which she usually does for her low back pain.    She underwent workup including MRI cervical, thoracic and lumbar spine and MRI brain with and without contrast.  She was incidentally diagnosed to have left precentral multi nodular lesion suggestive of MVNT.    Patient vapes nicotine, consumes alcohol once a week and denies use of other recreational drugs.    She denies any significant cardiac or pulmonary history.  She is not on antiplatelets or anticoagulants.    She denies any history of systemic malignancy.  She reports family history of lung cancer in her dad, paternal grandmother renal cancer and paternal grandfather colon cancer.      Past Medical History:   Diagnosis Date     ADD (attention deficit disorder)      Atypical mole 07/19/2018     Degenerative disc disease, cervical      Depressive disorder      Gastro-oesophageal reflux disease      Myofacial muscle pain      Noninfectious ileitis     IBS with constipation     Other chronic pain     myofacial pain syndrome      Sleep apnea     wears CPAP at night-haven't used it in a few years     Thyroid disease      TMJ (temporomandibular joint syndrome) 11/14/2016       Past Surgical History:   Procedure Laterality Date     APPENDECTOMY  01/01/2000     ARTHROSCOPY KNEE Right     teenager     BREAST SURGERY  03/01/2006    augmentation, revisions 2011 and 2015     COLONOSCOPY N/A 2/25/2025    Procedure: Colonoscopy;  Surgeon: Milagro Quintana MD;  Location:  GI     ENT SURGERY      thyroid lobe removed     FOOT SURGERY Right     semoid bone removed from Fx     GYN SURGERY   2009 and 2014    LEEPS x 2      HYSTERECTOMY, ENDOSCOPIC, VAGINAL TRANSLUMINAL NATURAL ORIFICE APPROACH Bilateral 11/06/2024    Procedure: vaginal natural orifice transluminal endoscopic surgery hysterectomy, possible conversion to total laparoscopic hysterectomy with bilateral salpingectomy and saolyx midurethral sling;  Surgeon: Ramona Lion MD;  Location: [facility] OR     LAPAROSCOPIC CHOLECYSTECTOMY  05/15/2014    Procedure: LAPAROSCOPIC CHOLECYSTECTOMY;  Surgeon: Kd Arthur MD;  Location: [facility] OR     THYROID SURGERY  01/01/2001    nodule      TUBAL LIGATION  10/09/2008       REVIEW OF SYSTEMS:  Review Of Systems  Skin: negative  Eyes: negative  Ears/Nose/Throat: negative  Respiratory: No shortness of breath, dyspnea on exertion, cough, or hemoptysis  Cardiovascular: negative  Gastrointestinal: negative  Genitourinary: negative  Musculoskeletal: Low back pain  Neurologic: negative  Psychiatric: negative  Hematologic/Lymphatic/Immunologic: negative  Endocrine: negative    MEDICATIONS:    Current Outpatient Medications   Medication Sig Dispense Refill     albuterol (PROAIR HFA/PROVENTIL HFA/VENTOLIN HFA) 108 (90 Base) MCG/ACT inhaler Inhale 1-2 puffs into the lungs every 4 hours as needed for shortness of breath, wheezing or cough 18 g 3     amphetamine-dextroamphetamine (ADDERALL XR) 30 MG 24 hr capsule Take 1 capsule (30 mg) by mouth daily. 30 capsule  0     [START ON 6/26/2025] amphetamine-dextroamphetamine (ADDERALL XR) 30 MG 24 hr capsule Take 1 capsule (30 mg) by mouth daily. 30 capsule 0     [START ON 7/26/2025] amphetamine-dextroamphetamine (ADDERALL XR) 30 MG 24 hr capsule Take 1 capsule (30 mg) by mouth daily. 30 capsule 0     amphetamine-dextroamphetamine (ADDERALL XR) 30 MG 24 hr capsule Take 1 capsule (30 mg) by mouth daily. 30 capsule 0     amphetamine-dextroamphetamine (ADDERALL) 10 MG tablet Take 1 tablet (10 mg) by mouth daily. 30 tablet 0     [START ON 6/26/2025] amphetamine-dextroamphetamine (ADDERALL) 10 MG tablet Take 1 tablet (10 mg) by mouth daily. 30 tablet 0     [START ON 7/26/2025] amphetamine-dextroamphetamine (ADDERALL) 10 MG tablet Take 1 tablet (10 mg) by mouth daily. 30 tablet 0     amphetamine-dextroamphetamine (ADDERALL) 10 MG tablet Take 1 tablet (10 mg) by mouth daily. 30 tablet 0     clobetasol (TEMOVATE) 0.05 % external ointment Apply topically 2 times daily. 60 g 3     clobetasol (TEMOVATE) 0.05 % external ointment Apply topically 2 times daily. For use up to two weeks at a time. 30 g 1     cyclobenzaprine (FLEXERIL) 10 MG tablet Take 1 tablet (10 mg) by mouth 3 times daily as needed for muscle spasms. 30 tablet 3     estradiol (ESTRACE) 0.1 MG/GM vaginal cream Place 1 g vaginally three times a week. 42.5 g 0     gabapentin (NEURONTIN) 300 MG capsule Take 300 mg by mouth.       ibuprofen (ADVIL,MOTRIN) 200 MG tablet        levETIRAcetam (KEPPRA) 750 MG tablet Take 1 tablet (750 mg) by mouth 2 times daily. 60 tablet 1     lidocaine (LIDODERM) 5 % patch Place 1 patch over 12 hours onto the skin every 24 hours. To prevent lidocaine toxicity, patient should be patch free for 12 hrs daily. 10 patch 0     methylPREDNISolone (MEDROL DOSEPAK) 4 MG tablet therapy pack Follow Package Directions 21 tablet 0     ondansetron (ZOFRAN) 4 MG tablet Take 1 tablet (4 mg) by mouth every 6 hours as needed for nausea. 4 tablet 0     sertraline  (ZOLOFT) 100 MG tablet Take 1 tablet (100 mg) by mouth daily. 90 tablet 3     tolterodine ER (DETROL LA) 4 MG 24 hr capsule Take 1 capsule (4 mg) by mouth daily. 90 capsule 3         ALLERGIES/SENSITIVITIES:     Allergies   Allergen Reactions     Codeine      Itchy, hives- tolerated cough syrup okay     Sulfa Antibiotics Hives     Rash       Toradol [Ketorolac] Hives and Itching       PERTINENT SOCIAL HISTORY: Uses vaping device  Social History     Socioeconomic History     Marital status: Single     Spouse name: Xavier      Number of children: None     Years of education: 16     Highest education level: None   Occupational History     Occupation: nurse   Tobacco Use     Smoking status: Every Day     Current packs/day: 0.00     Types: Cigarettes, Vaping Device     Passive exposure: Current     Smokeless tobacco: Former     Tobacco comments:     e-cig   Vaping Use     Vaping status: Every Day   Substance and Sexual Activity     Alcohol use: Yes     Alcohol/week: 0.0 standard drinks of alcohol     Comment: rarely     Drug use: Yes     Types: Marijuana     Comment: Denies drug use, but had postive drug tox from 8/24 for oxycodone and meth     Sexual activity: Yes     Partners: Male     Birth control/protection: Female Surgical   Other Topics Concern      Service Yes     Comment:  erna      Blood Transfusions No     Caffeine Concern No     Comment: energy drink 12 o. 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 Lives with spouse and 3 children      Social Drivers of Health     Financial Resource Strain: Low Risk  (8/28/2024)    Financial Resource  Strain      Within the past 12 months, have you or your family members you live with been unable to get utilities (heat, electricity) when it was really needed?: No   Food Insecurity: Low Risk  (8/28/2024)    Food Insecurity      Within the past 12 months, did you worry that your food would run out before you got money to buy more?: No      Within the past 12 months, did the food you bought just not last and you didn t have money to get more?: No   Transportation Needs: Low Risk  (8/28/2024)    Transportation Needs      Within the past 12 months, has lack of transportation kept you from medical appointments, getting your medicines, non-medical meetings or appointments, work, or from getting things that you need?: No   Physical Activity: Inactive (8/28/2024)    Exercise Vital Sign      Days of Exercise per Week: 0 days      Minutes of Exercise per Session: 0 min   Stress: Stress Concern Present (8/28/2024)    [employer] - Occupational Stress Questionnaire      Feeling of Stress : Very much   Social Connections: Unknown (8/28/2024)    Social Connection and Isolation Panel [NHANES]      Frequency of Social Gatherings with Friends and Family: Once a week   Interpersonal Safety: Low Risk  (2/25/2025)    Interpersonal Safety      Do you feel physically and emotionally safe where you currently live?: Yes      Within the past 12 months, have you been hit, slapped, kicked or otherwise physically hurt by someone?: No      Within the past 12 months, have you been humiliated or emotionally abused in other ways by your partner or ex-partner?: No   Housing Stability: Low Risk  (8/28/2024)    Housing Stability      Do you have housing? : Yes      Are you worried about losing your housing?: No         FAMILY HISTORY:  Family History   Problem Relation Age of Onset     Cerebrovascular Disease Father         2016     Hypertension Father      Lung Cancer Father 60        stage IV     Cancer Father          lung cancer      Cerebrovascular Disease Maternal Grandmother      Liver Cancer Paternal Grandmother      Colon Cancer Paternal Grandfather      Thyroid Disease Brother      No Known Problems Son      No Known Problems Daughter      No Known Problems Daughter      Skin Cancer Paternal Uncle         PHYSICAL EXAM:   Constitutional: /84   Pulse 59   LMP 10/21/2024   SpO2 100%      Mental Status: A & O in no acute distress.  Affect is appropriate.  Speech is fluent.  Recent and remote memory are intact.  Attention span and concentration are normal.     Cranial Nerves:   CN1: grossly intact per patient recall.   CN2: No funduscopic exam performed.   CN3,4 & 6: Pupillary light response, lateral and vertical gaze normal.  No nystagmus.  Visual fields are full to confrontation.   CN5: Intact to touch   CN7: No facial weakness, smile, facial symmetry intact.   CN8: Intact to spoken voice.   CN9&10: Gag reflex, uvula midline, palate rises with phonation.   CN11: Shoulder shrug 5/5 intact bilaterally.   CN12: Tongue midline and moves freely from side to side.     Motor: No pronator drift of upper extremity.   Normal bulk and tone all muscle groups of upper and lower extremities.       Delt Bi Tri Hand Flex/  Ext Iliopsoas Quadriceps Tibialis Anterior EHL Gastroc     C5 C6 C7 C8/T1 L2 L3 L4 L5 S1   R 5 5 5 5 5 5 5 5 5   L 5 5 5 5 5 5 5 5 5      Sensory: Sensation intact bilaterally to light touch.     Coordination; finger to nose,  rapid alternating movements smooth and rhythmic.   Romberg intact.   Heel/toe/tandem gait intact.    Normal gait and station.     Reflexes;                       Right              Left  Brachioradialis (C5,6)      2+                 2+  Biceps   (C5,6)                 2+                 2+  Triceps  (C7,8)                 2+                2+  Knee (L3,4)                      2+                2+  Ankle jerk (S1,2)              1+                1+    No hoffmans/ clonus.    IMAGING:  I  personally reviewed all radiographic images and agree with the radiology report of T2 hyperintense lesion involving the subcortical white matter along the left precentral gyrus likely representing benign multinodular loculating neuronal tumor without surrounding FLAIR changes.     6/8/2025 MRI brain with and without contrast:   IMPRESSION:  1.  No MRI evidence for an acute intracranial process.  2.  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 Recommend follow-up MRI with contrast in 6-12 months to ensure   stability.  3.  Right mastoid air cell effusion.    Cc:   Reena Driver              Again, thank you for allowing me to participate in the care of your patient.        Sincerely,        Juan Luis Cartagena MD    Electronically signed

## 2025-07-01 ENCOUNTER — OFFICE VISIT (OUTPATIENT)
Dept: NEUROLOGY | Facility: CLINIC | Age: 45
End: 2025-07-01
Payer: COMMERCIAL

## 2025-07-01 VITALS
BODY MASS INDEX: 23.98 KG/M2 | WEIGHT: 162.4 LBS | HEART RATE: 63 BPM | SYSTOLIC BLOOD PRESSURE: 145 MMHG | DIASTOLIC BLOOD PRESSURE: 77 MMHG | OXYGEN SATURATION: 98 %

## 2025-07-01 DIAGNOSIS — R68.89 FORGETFULNESS: ICD-10-CM

## 2025-07-01 DIAGNOSIS — R56.9 SEIZURE (H): Primary | ICD-10-CM

## 2025-07-01 DIAGNOSIS — H53.8 BLURRY VISION: ICD-10-CM

## 2025-07-01 DIAGNOSIS — R51.9 NONINTRACTABLE EPISODIC HEADACHE, UNSPECIFIED HEADACHE TYPE: ICD-10-CM

## 2025-07-01 PROCEDURE — 1125F AMNT PAIN NOTED PAIN PRSNT: CPT | Performed by: STUDENT IN AN ORGANIZED HEALTH CARE EDUCATION/TRAINING PROGRAM

## 2025-07-01 PROCEDURE — 99205 OFFICE O/P NEW HI 60 MIN: CPT | Performed by: STUDENT IN AN ORGANIZED HEALTH CARE EDUCATION/TRAINING PROGRAM

## 2025-07-01 PROCEDURE — G2211 COMPLEX E/M VISIT ADD ON: HCPCS | Performed by: STUDENT IN AN ORGANIZED HEALTH CARE EDUCATION/TRAINING PROGRAM

## 2025-07-01 PROCEDURE — 3078F DIAST BP <80 MM HG: CPT | Performed by: STUDENT IN AN ORGANIZED HEALTH CARE EDUCATION/TRAINING PROGRAM

## 2025-07-01 PROCEDURE — 3077F SYST BP >= 140 MM HG: CPT | Performed by: STUDENT IN AN ORGANIZED HEALTH CARE EDUCATION/TRAINING PROGRAM

## 2025-07-01 RX ORDER — LEVETIRACETAM 750 MG/1
750 TABLET ORAL 2 TIMES DAILY
Qty: 180 TABLET | Refills: 1 | Status: SHIPPED | OUTPATIENT
Start: 2025-07-01

## 2025-07-01 ASSESSMENT — PAIN SCALES - GENERAL: PAINLEVEL_OUTOF10: SEVERE PAIN (7)

## 2025-07-01 NOTE — NURSING NOTE
"Heath Waller is a 44 year old female who presents for:  Chief Complaint   Patient presents with    referral     Blurry vision, Forgetfulness, Ref by FANTA, CORINNE         Initial Vitals:  BP (!) 145/77   Pulse 63   Wt 73.7 kg (162 lb 6.4 oz)   LMP 10/21/2024   SpO2 98%   BMI 23.98 kg/m   Estimated body mass index is 23.98 kg/m  as calculated from the following:    Height as of 2/17/25: 1.753 m (5' 9\").    Weight as of this encounter: 73.7 kg (162 lb 6.4 oz).. Body surface area is 1.89 meters squared. BP completed using cuff size: luis daniel Ponce    "

## 2025-07-01 NOTE — LETTER
7/1/2025      Heath Waller  [address]  [facility] [address]      Dear Colleague,    Thank you for referring your patient, Heath Waller, to the [facility]. Please see a copy of my visit note below.    [facility]  Section of General Neurology  New Patient Visit      Heath Waller MRN# [id number]   Age: 44 year old YOB: 1980              Assessment and Plan:   Assessment:  Heath Waller is a pleasant 44 year old female who presents today for evaluation of spells of altered cognition, memory loss.  Work up revealed a probable benign tumor as outlined below.  I agree with the plan to monitor this over time.  Essentially what we discussed is that these events are probable seizures and if so likely from her abnormal MRI.  She does have risk factors for PNES in mood disturbance, fibromyalgia though if she has a favorable response to keppra I don't think it necessarily represents this.  Discussed if remains ambiguous could get more EEG data.  The most likely outcome in my opinion is she has had seizures from this abnormal MRI and that fixing this hopefully could improve other symptoms as well.  Though we discussed memory loss can be confounded by chronic pain, mood issues and poor sleep of which she has all of these variables. OT and neuropsychological testing would be future options in this regard as well.       Plan:  Continue Keppra 750 mg BID  [place] driving laws/seizure precautions reviewed  Agree with MRI in 6 months to track abnormal MRI  Return to neurology clinic in ~6 months to review data and see how she is doing        David White MD   of Neurology   [facility]      History of Presenting Symptoms:   Heath Waller is a 44 year old female who presents today for evaluation of spells of altered cognition, memory loss.     Pain was getting worse to updated  scans      Word finding difficulties for over a year  About a year ago--feel coming on--tingly, feel weird  Could hear people talking but couldn't respond  Last summer another event, reviewed  Lasted about 30 seconds     Last one a few weeks ago.  Zoning out.    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      CONSULTATION ASSESSMENT AND PLAN:    Ms. Waller is a 44-year-old right-handed female with significant past medical history of ADD, cervical spondylosis, GERD, myofascial pain, sleep apnea, TM joint syndrome, lumbar spondylosis for more than 20 years presented to the clinic today with her mother for evaluation of incidentally diagnosed left precentral T2 hyperintense lesion suggestive of an BMT during workup for short-term memory issues, word finding difficulty and seizure-like activity.  Patient has generalized symptoms of body ache, chronic low back pain and no evidence of focal neurological deficit.  Patient is scheduled to follow-up with the neurology on 7/1/2025 for evaluation of seizure-like activity.     I explained the MRI brain with and without contrast images to the patient and her mother and showed them the images.  I explained them the presence of T2 hyperintense lesion involving the left precentral gyrus with no evidence of surrounding edema.  I discussed the differential diagnosis including benign vacuolated and nodular lesion such as MVNT, inflammatory lesions, primary intra-axial brain tumor and others.  I discussed the management options including watchful observation with follow-up MRI brain with and  without contrast, stereotactic needle biopsy and craniotomy with resection of the lesion.     Given the incidental diagnosis and patient being likely asymptomatic from the lesion, I would recommend watchful observation with follow-up MRI brain with and without contrast with MR spectroscopy at 6 months or so.     Patient can continue to follow-up with the neurologist for further workup regarding her seizure-like episodes.  She can also continue to follow-up with spine medicine team regarding her history of chronic low back pain.     Patient and her mother agreed with the plan.  All the questions were answered and patient sounded understanding.  She can contact us if there are any further questions or concerns or worsening neurological deficits.I spent more than 60 minutes in this apt, examining the pt, reviewing the scans, reviewing notes from chart, discussing treatment options with risks and benefits and coordinating care. This note was created in part by the use of Dragon voice recognition system. Inadvertent grammatical errors and typographical errors may have occurred due to inherent limitation of voice recognition software.  Reasonable attempts made to avoid errors, but this document may contain an error not identified before finalizing.  Please contact me for any clarification needed.      Juan Luis Cartagena MD      Another Previous NSGY appointment reviewed     ASSESSMENT:  Heath Waller is a pleasant 44 year old female with a past medical history of fibromyalgia, myofacial pain, neurogenic bladder since hysterectomy in 11/2024, ROSA, and anxiety who presents to the clinic today for consultation regarding chronic back pain. Patient reports over 20 years of chronic neck and back pain with intermittent radicular symptoms of upper and lower extremities with flareups of pain.     PLAN:  -MRI Cervical, Thoracic, and Lumbar spine to further evaluate  -Physical therapy referral for back and core strengthening  exercises  -Please reach out to our office if symptoms change or worsen  -Will call to review MRI results and next steps  -Advised patient to call our clinic with any questions or concerns. Patient voiced understanding and agreement.       Corinne Fanta MSN, AGACNP-[facility] Neurosurgery  [facility]  Tel [phone]    Past Medical History:     Patient Active Problem List   Diagnosis     Cervical intraepithelial neoplasia grade III with severe dysplasia     Gastroesophageal reflux disease     History of thyroid disease     Chronic pain     Attention deficit disorder (ADD) without hyperactivity     Fibromyalgia     Chronic rhinitis     Anxiety attack     ROSA (obstructive sleep apnea)     Moderate episode of recurrent major depressive disorder (H)     Post-traumatic osteoarthritis of right knee     Neurogenic bladder     Thoracic spine pain     Cervical radiculopathy     Lumbar radiculopathy     Past Medical History:   Diagnosis Date     ADD (attention deficit disorder)      Atypical mole 07/19/2018     Degenerative disc disease, cervical      Depressive disorder      Gastro-oesophageal reflux disease      Myofacial muscle pain      Noninfectious ileitis     IBS with constipation     Other chronic pain     myofacial pain syndrome     Sleep apnea     wears CPAP at night-haven't used it in a few years     Thyroid disease      TMJ (temporomandibular joint syndrome) 11/14/2016        Past Surgical History:     Past Surgical History:   Procedure Laterality Date     APPENDECTOMY  01/01/2000     ARTHROSCOPY KNEE Right     teenager     BREAST SURGERY  03/01/2006    augmentation, revisions 2011 and 2015     COLONOSCOPY N/A 2/25/2025    Procedure: Colonoscopy;  Surgeon: Milagro Quintana MD;  Location:  GI     ENT SURGERY      thyroid lobe removed     FOOT SURGERY Right     semoid bone removed from Fx     GYN SURGERY   2009 and 2014    LEEPS x 2      HYSTERECTOMY, ENDOSCOPIC, VAGINAL  TRANSLUMINAL NATURAL ORIFICE APPROACH Bilateral 11/06/2024    Procedure: vaginal natural orifice transluminal endoscopic surgery hysterectomy, possible conversion to total laparoscopic hysterectomy with bilateral salpingectomy and saolyx midurethral sling;  Surgeon: Ramona Lion MD;  Location: [facility] OR     LAPAROSCOPIC CHOLECYSTECTOMY  05/15/2014    Procedure: LAPAROSCOPIC CHOLECYSTECTOMY;  Surgeon: Kd Arthur MD;  Location: [facility] OR     THYROID SURGERY  01/01/2001    nodule      TUBAL LIGATION  10/09/2008        Social History:     Social History     Tobacco Use     Smoking status: Every Day     Current packs/day: 0.00     Types: Cigarettes, Vaping Device     Passive exposure: Current     Smokeless tobacco: Former     Tobacco comments:     e-cig   Vaping Use     Vaping status: Every Day   Substance Use Topics     Alcohol use: Yes     Alcohol/week: 0.0 standard drinks of alcohol     Comment: rarely     Drug use: Yes     Types: Marijuana     Comment: Denies drug use, but had postive drug tox from 8/24 for oxycodone and meth        Family History:     Family History   Problem Relation Age of Onset     Cerebrovascular Disease Father         2016     Hypertension Father      Lung Cancer Father 60        stage IV     Cancer Father         lung cancer      Cerebrovascular Disease Maternal Grandmother      Liver Cancer Paternal Grandmother      Colon Cancer Paternal Grandfather      Thyroid Disease Brother      No Known Problems Son      No Known Problems Daughter      No Known Problems Daughter      Skin Cancer Paternal Uncle         Medications:     Current Outpatient Medications   Medication Sig Dispense Refill     albuterol (PROAIR HFA/PROVENTIL HFA/VENTOLIN HFA) 108 (90 Base) MCG/ACT inhaler Inhale 1-2 puffs into the lungs every 4 hours as needed for shortness of breath, wheezing or cough 18 g 3     amphetamine-dextroamphetamine (ADDERALL XR) 30 MG 24 hr capsule Take 1 capsule (30 mg) by mouth daily. 30  capsule 0     [START ON 7/26/2025] amphetamine-dextroamphetamine (ADDERALL XR) 30 MG 24 hr capsule Take 1 capsule (30 mg) by mouth daily. 30 capsule 0     amphetamine-dextroamphetamine (ADDERALL XR) 30 MG 24 hr capsule Take 1 capsule (30 mg) by mouth daily. 30 capsule 0     amphetamine-dextroamphetamine (ADDERALL) 10 MG tablet Take 1 tablet (10 mg) by mouth daily. 30 tablet 0     [START ON 7/26/2025] amphetamine-dextroamphetamine (ADDERALL) 10 MG tablet Take 1 tablet (10 mg) by mouth daily. 30 tablet 0     amphetamine-dextroamphetamine (ADDERALL) 10 MG tablet Take 1 tablet (10 mg) by mouth daily. 30 tablet 0     clobetasol (TEMOVATE) 0.05 % external ointment Apply topically 2 times daily. 60 g 3     clobetasol (TEMOVATE) 0.05 % external ointment Apply topically 2 times daily. For use up to two weeks at a time. 30 g 1     cyclobenzaprine (FLEXERIL) 10 MG tablet Take 1 tablet (10 mg) by mouth 3 times daily as needed for muscle spasms. 30 tablet 3     estradiol (ESTRACE) 0.1 MG/GM vaginal cream Place 1 g vaginally three times a week. 42.5 g 0     gabapentin (NEURONTIN) 300 MG capsule Take 300 mg by mouth.       ibuprofen (ADVIL,MOTRIN) 200 MG tablet        levETIRAcetam (KEPPRA) 750 MG tablet Take 1 tablet (750 mg) by mouth 2 times daily. 60 tablet 1     lidocaine (LIDODERM) 5 % patch Place 1 patch over 12 hours onto the skin every 24 hours. To prevent lidocaine toxicity, patient should be patch free for 12 hrs daily. 10 patch 0     methylPREDNISolone (MEDROL DOSEPAK) 4 MG tablet therapy pack Follow Package Directions 21 tablet 0     ondansetron (ZOFRAN) 4 MG tablet Take 1 tablet (4 mg) by mouth every 6 hours as needed for nausea. 4 tablet 0     sertraline (ZOLOFT) 100 MG tablet Take 1 tablet (100 mg) by mouth daily. 90 tablet 3     tolterodine ER (DETROL LA) 4 MG 24 hr capsule Take 1 capsule (4 mg) by mouth daily. 90 capsule 3     No current facility-administered medications for this visit.        Allergies:      Allergies   Allergen Reactions     Codeine      Itchy, hives- tolerated cough syrup okay     Sulfa Antibiotics Hives     Rash       Toradol [Ketorolac] Hives and Itching        Review of Systems:   As noted above     Physical Exam:   Vitals: BP (!) 145/77   Pulse 63   Wt 73.7 kg (162 lb 6.4 oz)   LMP 10/21/2024   SpO2 98%   BMI 23.98 kg/m       Neuro:   General Appearance: No apparent distress, well-nourished, well-groomed, pleasant     Mental Status: Alert and oriented to person, place, and time. Speech fluent and comprehension intact. No dysarthria.    Cranial Nerves:   II: Visual fields: normal  III: Pupils: 3 mm, equal, round, reactive to light   III,IV,VI: Extraocular Movements: intact   V: Facial sensation: intact to light touch  VII: Facial strength: intact without asymmetry  VIII: Hearing: intact grossly       Motor Exam:   5/5 Diffusely    No drift is present. No abnormal movements. Tone is normal throughout.    Sensory: intact to light touch, vibration, and pinprick throughout     Coordination: no dysmetria with finger-to-nose bilaterally    Reflexes: biceps, triceps, brachioradialis, patellar, and ankle jerks 2+ and symmetric.          Data: Pertinent prior to visit   Imaging:  Narrative & Impression   EXAM: MR BRAIN W/O and W CONTRAST  LOCATION: [facility]  DATE: 6/8/2025     INDICATION: Forgetfulness, headaches, vision changes  COMPARISON: Head CT dated 06/06/2016.  CONTRAST: 7.5mL Gadavist intravenous  TECHNIQUE: Routine multiplanar multisequence head MRI without and with intravenous contrast.     FINDINGS:  INTRACRANIAL CONTENTS: No acute or subacute infarct. No acute hemorrhage or extra-axial fluid collections. 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 Normal ventricles and sulci. Normal position of the cerebellar   tonsils. No pathologic contrast enhancement.     SELLA: No abnormality accounting for technique.     OSSEOUS STRUCTURES/SOFT TISSUES: Normal marrow signal. The major intracranial vascular flow voids are maintained.      ORBITS: No abnormality accounting for technique.      SINUSES/MASTOIDS: No paranasal sinus mucosal disease. No middle ear or mastoid effusion.                                                                       IMPRESSION:  1.  No MRI evidence for an acute intracranial process.  2.  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 Recommend follow-up MRI with contrast in 6-12 months to ensure   stability.  3.  Right mastoid air cell effusion.                     The total time of this encounter today amounted to 62 minutes. This time included time spent with the patient, prep work, ordering tests, and performing post visit documentation.    The longitudinal plan of care for probable seizures was addressed during this visit. Due to the added complexity in care, I will continue to support Ms Waller in the subsequent management of this condition(s) and with the ongoing continuity of care of this condition(s).      Again, thank you for allowing me to participate in the care of your patient.        Sincerely,        Jan White MD    Electronically signed

## 2025-07-01 NOTE — PROGRESS NOTES
[facility]/[place]  Section of General Neurology  New Patient Visit      Heath Waller MRN# [id number]   Age: 44 year old YOB: 1980              Assessment and Plan:   Assessment:  Heath Waller is a pleasant 44 year old female who presents today for evaluation of spells of altered cognition, memory loss.  Work up revealed a probable benign tumor as outlined below.  I agree with the plan to monitor this over time.  Essentially what we discussed is that these events are probable seizures and if so likely from her abnormal MRI.  She does have risk factors for PNES in mood disturbance, fibromyalgia though if she has a favorable response to keppra I don't think it necessarily represents this.  Discussed if remains ambiguous could get more EEG data.  The most likely outcome in my opinion is she has had seizures from this abnormal MRI and that fixing this hopefully could improve other symptoms as well.  Though we discussed memory loss can be confounded by chronic pain, mood issues and poor sleep of which she has all of these variables. OT and neuropsychological testing would be future options in this regard as well.       Plan:  Continue Keppra 750 mg BID  [place] driving laws/seizure precautions reviewed  Agree with MRI in 6 months to track abnormal MRI  Return to neurology clinic in ~6 months to review data and see how she is doing        David White MD   of Neurology   [facility]      History of Presenting Symptoms:   Heath Waller is a 44 year old female who presents today for evaluation of spells of altered cognition, memory loss.     Pain was getting worse to updated scans      Word finding difficulties for over a year  About a year ago--feel coming on--tingly, feel weird  Could hear people talking but couldn't respond  Last summer another event, reviewed  Lasted about 30 seconds     Last one a few weeks ago.  Zoning out.    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      CONSULTATION ASSESSMENT AND PLAN:    Ms. Waller is a 44-year-old right-handed female with significant past medical history of ADD, cervical spondylosis, GERD, myofascial pain, sleep apnea, TM joint syndrome, lumbar spondylosis for more than 20 years presented to the clinic today with her mother for evaluation of incidentally diagnosed left precentral T2 hyperintense lesion suggestive of an BMT during workup for short-term memory issues, word finding difficulty and seizure-like activity.  Patient has generalized symptoms of body ache, chronic low back pain and no evidence of focal neurological deficit.  Patient is scheduled to follow-up with the neurology on 7/1/2025 for evaluation of seizure-like activity.     I explained the MRI brain with and without contrast images to the patient and her mother and showed them the images.  I explained them the presence of T2 hyperintense lesion involving the left precentral gyrus with no evidence of surrounding edema.  I discussed the differential diagnosis including benign vacuolated and nodular lesion such as MVNT, inflammatory lesions, primary intra-axial brain tumor and others.  I discussed the management options including watchful observation with follow-up MRI brain with and without contrast, stereotactic needle biopsy and craniotomy with resection of the lesion.     Given the incidental diagnosis and patient being likely asymptomatic from the lesion, I would recommend watchful observation with follow-up MRI brain with and without contrast with MR  spectroscopy at 6 months or so.     Patient can continue to follow-up with the neurologist for further workup regarding her seizure-like episodes.  She can also continue to follow-up with spine medicine team regarding her history of chronic low back pain.     Patient and her mother agreed with the plan.  All the questions were answered and patient sounded understanding.  She can contact us if there are any further questions or concerns or worsening neurological deficits.I spent more than 60 minutes in this apt, examining the pt, reviewing the scans, reviewing notes from chart, discussing treatment options with risks and benefits and coordinating care. This note was created in part by the use of Dragon voice recognition system. Inadvertent grammatical errors and typographical errors may have occurred due to inherent limitation of voice recognition software.  Reasonable attempts made to avoid errors, but this document may contain an error not identified before finalizing.  Please contact me for any clarification needed.      Juan Luis Cartagena MD      Another Previous NSGY appointment reviewed     ASSESSMENT:  Heath Waller is a pleasant 44 year old female with a past medical history of fibromyalgia, myofacial pain, neurogenic bladder since hysterectomy in 11/2024, ROSA, and anxiety who presents to the clinic today for consultation regarding chronic back pain. Patient reports over 20 years of chronic neck and back pain with intermittent radicular symptoms of upper and lower extremities with flareups of pain.     PLAN:  -MRI Cervical, Thoracic, and Lumbar spine to further evaluate  -Physical therapy referral for back and core strengthening exercises  -Please reach out to our office if symptoms change or worsen  -Will call to review MRI results and next steps  -Advised patient to call our clinic with any questions or concerns. Patient voiced understanding and agreement.       Corinne Fanta MSN, AGACNP-[facility]  Neurosurgery  [facility]  Tel [phone]    Past Medical History:     Patient Active Problem List   Diagnosis    Cervical intraepithelial neoplasia grade III with severe dysplasia    Gastroesophageal reflux disease    History of thyroid disease    Chronic pain    Attention deficit disorder (ADD) without hyperactivity    Fibromyalgia    Chronic rhinitis    Anxiety attack    ROSA (obstructive sleep apnea)    Moderate episode of recurrent major depressive disorder (H)    Post-traumatic osteoarthritis of right knee    Neurogenic bladder    Thoracic spine pain    Cervical radiculopathy    Lumbar radiculopathy     Past Medical History:   Diagnosis Date    ADD (attention deficit disorder)     Atypical mole 07/19/2018    Degenerative disc disease, cervical     Depressive disorder     Gastro-oesophageal reflux disease     Myofacial muscle pain     Noninfectious ileitis     IBS with constipation    Other chronic pain     myofacial pain syndrome    Sleep apnea     wears CPAP at night-haven't used it in a few years    Thyroid disease     TMJ (temporomandibular joint syndrome) 11/14/2016        Past Surgical History:     Past Surgical History:   Procedure Laterality Date    APPENDECTOMY  01/01/2000    ARTHROSCOPY KNEE Right     teenager    BREAST SURGERY  03/01/2006    augmentation, revisions 2011 and 2015    COLONOSCOPY N/A 2/25/2025    Procedure: Colonoscopy;  Surgeon: Milagro Quintana MD;  Location:  GI    ENT SURGERY      thyroid lobe removed    FOOT SURGERY Right     semoid bone removed from Fx    GYN SURGERY   2009 and 2014    LEEPS x 2     HYSTERECTOMY, ENDOSCOPIC, VAGINAL TRANSLUMINAL NATURAL ORIFICE APPROACH Bilateral 11/06/2024    Procedure: vaginal natural orifice transluminal endoscopic surgery hysterectomy, possible conversion to total laparoscopic hysterectomy with bilateral salpingectomy and saolyx midurethral sling;  Surgeon: Ramona Lion MD;  Location: [facility] OR    LAPAROSCOPIC  CHOLECYSTECTOMY  05/15/2014    Procedure: LAPAROSCOPIC CHOLECYSTECTOMY;  Surgeon: Kd Arthur MD;  Location: [facility] OR    THYROID SURGERY  01/01/2001    nodule     TUBAL LIGATION  10/09/2008        Social History:     Social History     Tobacco Use    Smoking status: Every Day     Current packs/day: 0.00     Types: Cigarettes, Vaping Device     Passive exposure: Current    Smokeless tobacco: Former    Tobacco comments:     e-cig   Vaping Use    Vaping status: Every Day   Substance Use Topics    Alcohol use: Yes     Alcohol/week: 0.0 standard drinks of alcohol     Comment: rarely    Drug use: Yes     Types: Marijuana     Comment: Denies drug use, but had postive drug tox from 8/24 for oxycodone and meth        Family History:     Family History   Problem Relation Age of Onset    Cerebrovascular Disease Father         2016    Hypertension Father     Lung Cancer Father 60        stage IV    Cancer Father         lung cancer     Cerebrovascular Disease Maternal Grandmother     Liver Cancer Paternal Grandmother     Colon Cancer Paternal Grandfather     Thyroid Disease Brother     No Known Problems Son     No Known Problems Daughter     No Known Problems Daughter     Skin Cancer Paternal Uncle         Medications:     Current Outpatient Medications   Medication Sig Dispense Refill    albuterol (PROAIR HFA/PROVENTIL HFA/VENTOLIN HFA) 108 (90 Base) MCG/ACT inhaler Inhale 1-2 puffs into the lungs every 4 hours as needed for shortness of breath, wheezing or cough 18 g 3    amphetamine-dextroamphetamine (ADDERALL XR) 30 MG 24 hr capsule Take 1 capsule (30 mg) by mouth daily. 30 capsule 0    [START ON 7/26/2025] amphetamine-dextroamphetamine (ADDERALL XR) 30 MG 24 hr capsule Take 1 capsule (30 mg) by mouth daily. 30 capsule 0    amphetamine-dextroamphetamine (ADDERALL XR) 30 MG 24 hr capsule Take 1 capsule (30 mg) by mouth daily. 30 capsule 0    amphetamine-dextroamphetamine (ADDERALL) 10 MG tablet Take 1 tablet (10 mg)  by mouth daily. 30 tablet 0    [START ON 7/26/2025] amphetamine-dextroamphetamine (ADDERALL) 10 MG tablet Take 1 tablet (10 mg) by mouth daily. 30 tablet 0    amphetamine-dextroamphetamine (ADDERALL) 10 MG tablet Take 1 tablet (10 mg) by mouth daily. 30 tablet 0    clobetasol (TEMOVATE) 0.05 % external ointment Apply topically 2 times daily. 60 g 3    clobetasol (TEMOVATE) 0.05 % external ointment Apply topically 2 times daily. For use up to two weeks at a time. 30 g 1    cyclobenzaprine (FLEXERIL) 10 MG tablet Take 1 tablet (10 mg) by mouth 3 times daily as needed for muscle spasms. 30 tablet 3    estradiol (ESTRACE) 0.1 MG/GM vaginal cream Place 1 g vaginally three times a week. 42.5 g 0    gabapentin (NEURONTIN) 300 MG capsule Take 300 mg by mouth.      ibuprofen (ADVIL,MOTRIN) 200 MG tablet       levETIRAcetam (KEPPRA) 750 MG tablet Take 1 tablet (750 mg) by mouth 2 times daily. 60 tablet 1    lidocaine (LIDODERM) 5 % patch Place 1 patch over 12 hours onto the skin every 24 hours. To prevent lidocaine toxicity, patient should be patch free for 12 hrs daily. 10 patch 0    methylPREDNISolone (MEDROL DOSEPAK) 4 MG tablet therapy pack Follow Package Directions 21 tablet 0    ondansetron (ZOFRAN) 4 MG tablet Take 1 tablet (4 mg) by mouth every 6 hours as needed for nausea. 4 tablet 0    sertraline (ZOLOFT) 100 MG tablet Take 1 tablet (100 mg) by mouth daily. 90 tablet 3    tolterodine ER (DETROL LA) 4 MG 24 hr capsule Take 1 capsule (4 mg) by mouth daily. 90 capsule 3     No current facility-administered medications for this visit.        Allergies:     Allergies   Allergen Reactions    Codeine      Itchy, hives- tolerated cough syrup okay    Sulfa Antibiotics Hives     Rash      Toradol [Ketorolac] Hives and Itching        Review of Systems:   As noted above     Physical Exam:   Vitals: BP (!) 145/77   Pulse 63   Wt 73.7 kg (162 lb 6.4 oz)   LMP 10/21/2024   SpO2 98%   BMI 23.98 kg/m       Neuro:   General  Appearance: No apparent distress, well-nourished, well-groomed, pleasant     Mental Status: Alert and oriented to person, place, and time. Speech fluent and comprehension intact. No dysarthria.    Cranial Nerves:   II: Visual fields: normal  III: Pupils: 3 mm, equal, round, reactive to light   III,IV,VI: Extraocular Movements: intact   V: Facial sensation: intact to light touch  VII: Facial strength: intact without asymmetry  VIII: Hearing: intact grossly       Motor Exam:   5/5 Diffusely    No drift is present. No abnormal movements. Tone is normal throughout.    Sensory: intact to light touch, vibration, and pinprick throughout     Coordination: no dysmetria with finger-to-nose bilaterally    Reflexes: biceps, triceps, brachioradialis, patellar, and ankle jerks 2+ and symmetric.          Data: Pertinent prior to visit   Imaging:  Narrative & Impression   EXAM: MR BRAIN W/O and W CONTRAST  LOCATION: [facility]  DATE: 6/8/2025     INDICATION: Forgetfulness, headaches, vision changes  COMPARISON: Head CT dated 06/06/2016.  CONTRAST: 7.5mL Gadavist intravenous  TECHNIQUE: Routine multiplanar multisequence head MRI without and with intravenous contrast.     FINDINGS:  INTRACRANIAL CONTENTS: No acute or subacute infarct. No acute hemorrhage or extra-axial fluid collections. 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 Normal ventricles and sulci. Normal position of the cerebellar   tonsils. No pathologic contrast enhancement.     SELLA: No abnormality accounting for technique.     OSSEOUS STRUCTURES/SOFT TISSUES: Normal marrow signal. The major intracranial vascular flow voids are maintained.      ORBITS: No  abnormality accounting for technique.      SINUSES/MASTOIDS: No paranasal sinus mucosal disease. No middle ear or mastoid effusion.                                                                       IMPRESSION:  1.  No MRI evidence for an acute intracranial process.  2.  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 Recommend follow-up MRI with contrast in 6-12 months to ensure   stability.  3.  Right mastoid air cell effusion.                     The total time of this encounter today amounted to 62 minutes. This time included time spent with the patient, prep work, ordering tests, and performing post visit documentation.    The longitudinal plan of care for probable seizures was addressed during this visit. Due to the added complexity in care, I will continue to support Ms Waller in the subsequent management of this condition(s) and with the ongoing continuity of care of this condition(s).

## 2025-07-01 NOTE — PATIENT INSTRUCTIONS
Future ideas  OT  Neuropsychological testing  EEG if further break through     For now  Presume seizure    Discussed [place] driving laws   3 months no driving from last event  No bathing alone, high on ladders

## 2025-07-08 DIAGNOSIS — M79.7 FIBROMYALGIA: ICD-10-CM

## 2025-07-08 RX ORDER — LIDOCAINE 50 MG/G
PATCH TOPICAL
Qty: 10 PATCH | Refills: 0 | Status: SHIPPED | OUTPATIENT
Start: 2025-07-08

## 2025-07-10 ENCOUNTER — OFFICE VISIT (OUTPATIENT)
Dept: PHYSICAL MEDICINE AND REHAB | Facility: CLINIC | Age: 45
End: 2025-07-10
Attending: NURSE PRACTITIONER
Payer: COMMERCIAL

## 2025-07-10 VITALS
HEART RATE: 61 BPM | HEIGHT: 69 IN | SYSTOLIC BLOOD PRESSURE: 132 MMHG | DIASTOLIC BLOOD PRESSURE: 60 MMHG | WEIGHT: 159 LBS | BODY MASS INDEX: 23.55 KG/M2

## 2025-07-10 DIAGNOSIS — M25.50 POLYARTHRALGIA: ICD-10-CM

## 2025-07-10 DIAGNOSIS — M79.18 MYOFASCIAL PAIN: ICD-10-CM

## 2025-07-10 DIAGNOSIS — M54.6 THORACIC SPINE PAIN: ICD-10-CM

## 2025-07-10 DIAGNOSIS — H70.91 MASTOIDITIS OF RIGHT SIDE: ICD-10-CM

## 2025-07-10 DIAGNOSIS — M35.3 POLYMYALGIA: Primary | ICD-10-CM

## 2025-07-10 DIAGNOSIS — M54.50 LUMBAR SPINE PAIN: ICD-10-CM

## 2025-07-10 DIAGNOSIS — M47.816 LUMBAR FACET ARTHROPATHY: ICD-10-CM

## 2025-07-10 DIAGNOSIS — M48.02 FORAMINAL STENOSIS OF CERVICAL REGION: ICD-10-CM

## 2025-07-10 DIAGNOSIS — M54.2 CERVICAL SPINE PAIN: ICD-10-CM

## 2025-07-10 DIAGNOSIS — M51.24 HERNIATION OF INTERVERTEBRAL DISC OF THORACIC REGION: ICD-10-CM

## 2025-07-10 RX ORDER — MELOXICAM 15 MG/1
7.5-15 TABLET ORAL DAILY PRN
Qty: 30 TABLET | Refills: 1 | Status: SHIPPED | OUTPATIENT
Start: 2025-07-10

## 2025-07-10 ASSESSMENT — PAIN SCALES - GENERAL: PAINLEVEL_OUTOF10: SEVERE PAIN (9)

## 2025-07-10 NOTE — PROGRESS NOTES
Assessment/Plan:      Heath was seen today for neck pain and back pain.    Diagnoses and all orders for this visit:    Polymyalgia  -     Anti Nuclear Adeola IgG by IFA with Reflex; Future  -     Cyclic Citrullinated Peptide Antibody IgG; Future  -     CK total; Future  -     CRP inflammation; Future  -     Erythrocyte sedimentation rate auto; Future  -     Lyme Disease Total Antibodies with Reflex to Confirmation; Future  -     Rheumatoid factor; Future  -     SSA Ro CASSIE Antibody IgG; Future  -     SSB La CASSIE Antibody IgG; Future  -     TSH with free T4 reflex; Future  -     Tissue transglutaminase adeola IgA and IgG; Future  -     Physical Therapy  Referral; Future  -     meloxicam (MOBIC) 15 MG tablet; Take 0.5-1 tablets (7.5-15 mg) by mouth daily as needed for pain.    Polyarthralgia  -     Physical Therapy  Referral; Future    Lumbar spine pain  -     Physical Therapy  Referral; Future    Lumbar facet arthropathy  -     Physical Therapy  Referral; Future    Cervical spine pain  -     Physical Therapy  Referral; Future    Foraminal stenosis of cervical region  -     Spine  Referral  -     Physical Therapy  Referral; Future    Thoracic spine pain  -     Physical Therapy  Referral; Future    Herniation of intervertebral disc of thoracic region  -     Spine  Referral  -     Physical Therapy  Referral; Future    Myofascial pain  -     Lyme Disease Total Antibodies with Reflex to Confirmation; Future  -     Physical Therapy  Referral; Future  -     meloxicam (MOBIC) 15 MG tablet; Take 0.5-1 tablets (7.5-15 mg) by mouth daily as needed for pain.    Mastoiditis of right side  -     Physical Therapy  Referral; Future  -     Adult ENT  Referral; Future         Assessment: Pleasant 44 year old female With history of depression, thyroid disorder, ADD, fibromyalgia, sleep apnea, left paracentral gyrus brain mass  followed by neurosurgery with:    1.  Chronic cervical thoracic lumbar spine widespread myofascial pain consistent with fibromyalgia polymyalgias and arthralgias.  She does have underlying cervical degenerative disc disease facet arthropathy in the mid cervical spine without any high-grade central stenosis does have some foraminal stenosis.  She has a small disc herniation in the thoracic spine as well as facet arthropathy in the lumbar spine which may contribute to her localized pain.    2.  Lumbar degenerative disc disease and facet arthropathy most significant L5-S1 L4-5 likely contributing to low back pain.    3.  Cervical spine pain with degenerative disease facet arthropathy C5-6 C6-7 as well as facet arthropathy in the left at C3-4.    4.  Small central disc herniation thoracic spine T7-8 without spinal cord compression.    5.  Right mastoid air cell effusion on MRI of the head and neck.      Discussion:    1.  We discussed the diagnosis and treatment options.  We discussed her overall widespread myofascial pain/polymyalgias and arthralgias as well as localized degenerative changes in the cervical and lumbar spine and thoracic spine.  We discussed treatment such as physical therapy further labs medications and potential injections in the future.    2.  Recommend labs as above to evaluate for underlying rheumatologic condition contributing to her polymyalgias and arthralgias.    3.  Start physical therapy for cervical thoracic lumbar strengthening stabilization.    4.  Trial meloxicam as needed for pain in place of ibuprofen.    5.  I would like her to see ENT to discuss the right mastoid air cell effusion noted on MRI.    6.  Follow-up 4 to 6 weeks.    It was our pleasure caring for your patient today, if there any questions or concerns please do not hesitate to contact us.    Over 60 minutes were spent on the date of the encounter performing chart review, patient visit and documentation in addition to any  procedure.    Subjective:   Patient ID: Heath Waller is a 44 year old female.    History of Present Illness: Patient presents at the request of Corinne Fanta CNP for an evaluation of cervical thoracic lumbar spine pain.  Patient's pain is somewhat complicated.  She has had pain for approximately 20 years plus diffusely started with low back pain without any injury and slowly progressed to thoracic pain cervical spine pain and now has cervical thoracic lumbar pain with pain in the arms and legs.  She discussed that she was diagnosed with fibromyalgia in the past by rheumatology through [facility].  Reports trying gabapentin and Lyrica in the past.  Has had trigger point injections as well as she feels multiple trigger points and as she treats those other areas flareup.  She has widespread pain worse with any prolonged sitting standing, walking, driving, turning her head.  Better with heat and ice.  She has some morning stiffness but typically improves after taking a shower.  She has widespread pain in the arms and legs but no numbness or tingling.  She now uses THC some cyclobenzaprine ibuprofen.  She tries not to take too many medications.  Cyclobenzaprine causes significant drowsiness in the morning.  Her pain is a 10/10 at worst 8/10 today 5/10 at best.  She has not had any recent physical therapy or chiropractic.  During this workup she was found to have tumor on brain MRI and is following with neurosurgery.  She is having difficult time with work.  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  She had a 4-hour meeting the other day and was very difficult for her as changing positions seems to be the only thing that helps.    Labs: November 2024 CBC showed normal WBC 6.7, hemoglobin 13.8, platelets 295, CMP from August 2024 showed sodium 141 potassium 4.0, calcium 8.8, glucose 83 AST 19 and ALT 84, TSH 1.3 at that time    Imaging:   MRI report and images were personally reviewed and  "discussed with the patient.  A plastic model was utilized during the discussion.  MRI of the cervical thoracic and lumbar spine images personally reviewed.  Degenerative changes cervical spine with mild disc height loss C5-6 and C6/7.  Moderate-severe left foraminal stenosis C5-6.  There is facet arthropathy throughout the cervical spine.  Most significant facet arthropathy on the left at C3-4 and C4-5.  Right mastoid air cell effusion when I review the images.    MRI thoracic spine shows mild to moderate degenerative changes of the discs with a small left paracentral disc herniation T7-8 with no spinal cord compression.    Lumbar MRI shows mild to moderate disc at loss L4-5 mild facet arthropathy mild lateral recess stenosis bilaterally.  L5-S1 small left paracentral disc protrusion with left lateral recess stenosis no nerve compression.  Moderate facet arthropathy mild left foraminal stenosis.  Contact of the left S1 nerve.         Review of Systems: She complains of weight loss, headache, changes in vision with dry eyes, heart palpitation and swelling in the legs abdominal pain constipation nausea vomiting reflux, bladder control issues joint pain muscle pain, muscle fatigue, \"sciatica \"poor balance, dizziness, seizures, excessive bleeding, excessive bruising, poor sleep anxiety depression no suicidal thoughts, denies fever, weight gain, hoarseness, eye pain, chest pain, shortness of breath, cough, wheeze, enlarged lymph nodes, diarrhea, painful urination, skin itching, falls.  Remainder of 12 point review systems negative unless listed above.      Current Outpatient Medications   Medication Sig Dispense Refill    meloxicam (MOBIC) 15 MG tablet Take 0.5-1 tablets (7.5-15 mg) by mouth daily as needed for pain. 30 tablet 1    albuterol (PROAIR HFA/PROVENTIL HFA/VENTOLIN HFA) 108 (90 Base) MCG/ACT inhaler Inhale 1-2 puffs into the lungs every 4 hours as needed for shortness of breath, wheezing or cough 18 g 3    " amphetamine-dextroamphetamine (ADDERALL XR) 30 MG 24 hr capsule Take 1 capsule (30 mg) by mouth daily. 30 capsule 0    [START ON 7/26/2025] amphetamine-dextroamphetamine (ADDERALL XR) 30 MG 24 hr capsule Take 1 capsule (30 mg) by mouth daily. 30 capsule 0    amphetamine-dextroamphetamine (ADDERALL XR) 30 MG 24 hr capsule Take 1 capsule (30 mg) by mouth daily. 30 capsule 0    amphetamine-dextroamphetamine (ADDERALL) 10 MG tablet Take 1 tablet (10 mg) by mouth daily. 30 tablet 0    [START ON 7/26/2025] amphetamine-dextroamphetamine (ADDERALL) 10 MG tablet Take 1 tablet (10 mg) by mouth daily. 30 tablet 0    amphetamine-dextroamphetamine (ADDERALL) 10 MG tablet Take 1 tablet (10 mg) by mouth daily. 30 tablet 0    clobetasol (TEMOVATE) 0.05 % external ointment Apply topically 2 times daily. 60 g 3    clobetasol (TEMOVATE) 0.05 % external ointment Apply topically 2 times daily. For use up to two weeks at a time. 30 g 1    cyclobenzaprine (FLEXERIL) 10 MG tablet Take 1 tablet (10 mg) by mouth 3 times daily as needed for muscle spasms. 30 tablet 3    estradiol (ESTRACE) 0.1 MG/GM vaginal cream Place 1 g vaginally three times a week. 42.5 g 0    gabapentin (NEURONTIN) 300 MG capsule Take 300 mg by mouth.      ibuprofen (ADVIL,MOTRIN) 200 MG tablet       levETIRAcetam (KEPPRA) 750 MG tablet Take 1 tablet (750 mg) by mouth 2 times daily. 180 tablet 1    levETIRAcetam (KEPPRA) 750 MG tablet Take 1 tablet (750 mg) by mouth 2 times daily. 60 tablet 1    lidocaine (LIDODERM) 5 % patch 1 PATCH ON SKIN EVERY 24 HR - REMOVE AFTER 12 HR. TO PREVENT LIDOCAINE TOXICITY BE PATCH FREE X12 HR 10 patch 0    methylPREDNISolone (MEDROL DOSEPAK) 4 MG tablet therapy pack Follow Package Directions 21 tablet 0    ondansetron (ZOFRAN) 4 MG tablet Take 1 tablet (4 mg) by mouth every 6 hours as needed for nausea. 4 tablet 0    sertraline (ZOLOFT) 100 MG tablet Take 1 tablet (100 mg) by mouth daily. 90 tablet 3    tolterodine ER (DETROL LA) 4 MG 24  hr capsule Take 1 capsule (4 mg) by mouth daily. 90 capsule 3     No current facility-administered medications for this visit.       Past Medical History:   Diagnosis Date    ADD (attention deficit disorder)     Atypical mole 07/19/2018    Degenerative disc disease, cervical     Depressive disorder     Fibromyalgia     Gastro-oesophageal reflux disease     Myofacial muscle pain     Noninfectious ileitis     IBS with constipation    Other chronic pain     myofacial pain syndrome    Sleep apnea     wears CPAP at night-haven't used it in a few years    Thyroid disease     TMJ (temporomandibular joint syndrome) 11/14/2016       Family History   Problem Relation Age of Onset    Cerebrovascular Disease Father         2016    Hypertension Father     Lung Cancer Father 60        stage IV    Cancer Father         lung cancer     Cerebrovascular Disease Maternal Grandmother     Liver Cancer Paternal Grandmother     Colon Cancer Paternal Grandfather     Thyroid Disease Brother     No Known Problems Son     No Known Problems Daughter     No Known Problems Daughter     Skin Cancer Paternal Uncle          Social History     Socioeconomic History    Marital status: Single     Spouse name: Xavier     Number of children: None    Years of education: 16    Highest education level: None   Occupational History    Occupation: nurse   Tobacco Use    Smoking status: Every Day     Current packs/day: 0.00     Types: Cigarettes, Vaping Device     Passive exposure: Current    Smokeless tobacco: Former    Tobacco comments:     e-cig   Vaping Use    Vaping status: Every Day   Substance and Sexual Activity    Alcohol use: Yes     Alcohol/week: 0.0 standard drinks of alcohol     Comment: rarely    Drug use: Yes     Types: Marijuana     Comment: Denies drug use, but had postive drug tox from 8/24 for oxycodone and meth    Sexual activity: Yes     Partners: Male     Birth control/protection: Female Surgical   Other Topics Concern      Service Yes     Comment:  erna     Blood Transfusions No    Caffeine Concern No     Comment: energy drink 12 o. 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 Lives with spouse and 3 children      Social Drivers of Health     Financial Resource Strain: Low Risk  (8/28/2024)    Financial Resource Strain     Within the past 12 months, have you or your family members you live with been unable to get utilities (heat, electricity) when it was really needed?: No   Food Insecurity: Low Risk  (8/28/2024)    Food Insecurity     Within the past 12 months, did you worry that your food would run out before you got money to buy more?: No     Within the past 12 months, did the food you bought just not last and you didn t have money to get more?: No   Transportation Needs: Low Risk  (8/28/2024)    Transportation Needs     Within the past 12 months, has lack of transportation kept you from medical appointments, getting your medicines, non-medical meetings or appointments, work, or from getting things that you need?: No   Physical Activity: Inactive (8/28/2024)    Exercise Vital Sign     Days of Exercise per Week: 0 days     Minutes of Exercise per Session: 0 min   Stress: Stress Concern Present (8/28/2024)    [place] [employer] - Occupational Stress Questionnaire     Feeling of Stress : Very much   Social Connections: Unknown (8/28/2024)    Social Connection and Isolation Panel [NHANES]     Frequency of Social Gatherings with Friends and Family: Once a week   Interpersonal Safety: Low Risk  (2/25/2025)    Interpersonal Safety     Do you feel physically and  emotionally safe where you currently live?: Yes     Within the past 12 months, have you been hit, slapped, kicked or otherwise physically hurt by someone?: No     Within the past 12 months, have you been humiliated or emotionally abused in other ways by your partner or ex-partner?: No   Housing Stability: Low Risk  (8/28/2024)    Housing Stability     Do you have housing? : Yes     Are you worried about losing your housing?: No     Social history: .  Works as a nurse.  Uses tobacco with a vape, drinks alcohol and uses THC.    The following portions of the patient's history were reviewed and updated as appropriate: allergies, current medications, past family history, past medical history, past social history, past surgical history and problem list.    Oswestry (NAVDEEP) Questionnaire        7/10/2025     1:16 PM   OSWESTRY DISABILITY INDEX   Count 10    Sum 31    Oswestry Score (%) 62 %        Patient-reported       Neck Disability Index:      7/10/2025     1:18 PM   Neck Disability Index (  Frank H. and Norma BRICE. 1991. All rights reserved.; used with permission)   SECTION 1 - PAIN INTENSITY 4   SECTION 2 - PERSONAL CARE 3   SECTION 3 - LIFTING 3   SECTION 4 - READING 4   SECTION 5 - HEADACHES 5   SECTION 6 - CONCENTRATION 4   SECTION 7 - WORK 4   SECTION 8 - DRIVING 4   SECTION 9 - SLEEPING 4   SECTION 10 - RECREATION 4   Count 10    Sum 39    Raw Score: /50 39    Neck Disability Index Score: (%) 78 %        Patient-reported          PHQ-2 Score:         2/24/2021     1:38 PM 9/25/2019     2:29 PM   PHQ-2 ( 1999 [employer])   Q1: Little interest or pleasure in doing things 3 1    Q2: Feeling down, depressed or hopeless 3 1    PHQ-2 Score 6 2   PHQ-2 Total Score (12-17 Years)- Positive if 3 or more points; Administer PHQ-A if positive 6 2   Q1: Little interest or pleasure in doing things Nearly every day Several days    Q2: Feeling down, depressed or hopeless Nearly every day Several days    PHQ-2 Score 6 2         "Proxy-reported                  Objective:   Physical Exam:    /60   Pulse 61   Ht 5' 9\" (1.753 m)   Wt 159 lb (72.1 kg)   LMP 10/21/2024   BMI 23.48 kg/m    Body mass index is 23.48 kg/m .      General:  Well-appearing female in no acute distress.  Pleasant,   cooperative, and interactive throughout the examination and interview.  CV: No lower extremity edema.  Lymphatics: No cervical lymphadenopathy palpated.  Eyes: sclera clear.  Skin: No rashes or lesions seen.  Respirations unlabored.  MSK: Gait is nonantalgic.  Able to toe walk without difficulty.  Some mild difficulty with heel stand on the right.  Negative Romberg.  Spine: normal AP curves of the C, T, and L spine.  Mildly reduced cervical range of motion rotation bilaterally and lumbar flexion very to floor testing.  Palpation: Tenderness to palpation over upper trapezius, parascapular muscles, mildly over the lumbar paraspinals gluteal tissues no tenderness over the cervical paraspinals.  Extremities: Full range of motion of the shoulders and abduction, elbows, and wrists with no effusions or tenderness to palpation.  Negative arm drop, empty can, and Speed's test bilaterally.   Full range of motion of the hips, knees, and ankles from a seated position with no effusions or tenderness to palpation.  Has what appears to be Raynaud's phenomenon in the lower extremity.    Neurologic exam: Mental status: Patient is alert and oriented with normal affect.  Attention, knowledge, memory, and language are intact.  Normal coordination throughout the examination.  Reflexes are 2+ and symmetric biceps, triceps, brachioradialis, patellar, and Achilles with down-going toes and mildly positive Cathy's bilaterally.  Sensation is intact to light touch throughout the upper and lower extremities bilaterally.  Manual muscle testing reveals 5 out of 5 strength in the shoulder abductors, elbow   flexors/extensors, wrist extensors, interosseous, and finger flexors; " 5 out of 5   in the hip flexors, knee flexors/extensors, ankle plantar flexors, ankle   dorsiflexors, and EHL.  Normal muscle bulk and tone.  Negative   Spurling's test bilaterally.  Negative seated  straight leg raise bilaterally.

## 2025-07-10 NOTE — PATIENT INSTRUCTIONS
Blood test ordered today  A physical therapy order was provided for you today.  You will be contacted by physical therapy.  If nobody contacts you within 3 to 5 days, please contact the clinic at [phone].  It will be very important for you to do your physical therapy exercises on a regular basis to decrease your pain and prevent future pain flares.   Meloxicam (which is an anti-inflammatory) medication is prescribed today. Take 1 tablet a day as needed for pain.This medication should be taken with food and water to prevent any stomach upset. Do not take ibuprofen/Advil/Motrin/Aleve  while you take Meloxicam. Please call if you have any side effects.   See ENT to discuss your mastoid air cell effusion

## 2025-07-10 NOTE — LETTER
7/10/2025      Heath Waller  [id number]  [facility] [address]      Dear Colleague,    Thank you for referring your patient, Heath Waller, to the [facility]. Please see a copy of my visit note below.    Assessment/Plan:      Heath was seen today for neck pain and back pain.    Diagnoses and all orders for this visit:    Polymyalgia  -     Anti Nuclear Adeola IgG by IFA with Reflex; Future  -     Cyclic Citrullinated Peptide Antibody IgG; Future  -     CK total; Future  -     CRP inflammation; Future  -     Erythrocyte sedimentation rate auto; Future  -     Lyme Disease Total Antibodies with Reflex to Confirmation; Future  -     Rheumatoid factor; Future  -     SSA Ro CASSIE Antibody IgG; Future  -     SSB La CASSIE Antibody IgG; Future  -     TSH with free T4 reflex; Future  -     Tissue transglutaminase adeola IgA and IgG; Future  -     Physical Therapy  Referral; Future  -     meloxicam (MOBIC) 15 MG tablet; Take 0.5-1 tablets (7.5-15 mg) by mouth daily as needed for pain.    Polyarthralgia  -     Physical Therapy  Referral; Future    Lumbar spine pain  -     Physical Therapy  Referral; Future    Lumbar facet arthropathy  -     Physical Therapy  Referral; Future    Cervical spine pain  -     Physical Therapy  Referral; Future    Foraminal stenosis of cervical region  -     Spine  Referral  -     Physical Therapy  Referral; Future    Thoracic spine pain  -     Physical Therapy  Referral; Future    Herniation of intervertebral disc of thoracic region  -     Spine  Referral  -     Physical Therapy  Referral; Future    Myofascial pain  -     Lyme Disease Total Antibodies with Reflex to Confirmation; Future  -     Physical Therapy  Referral; Future  -     meloxicam (MOBIC) 15 MG tablet; Take 0.5-1 tablets (7.5-15 mg) by mouth daily as needed for pain.    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  Chronic cervical thoracic lumbar spine widespread myofascial pain consistent with fibromyalgia polymyalgias and arthralgias.  She does have underlying cervical degenerative disc disease facet arthropathy in the mid cervical spine without any high-grade central stenosis does have some foraminal stenosis.  She has a small disc herniation in the thoracic spine as well as facet arthropathy in the lumbar spine which may contribute to her localized pain.    2.  Lumbar degenerative disc disease and facet arthropathy most significant L5-S1 L4-5 likely contributing to low back pain.    3.  Cervical spine pain with degenerative disease facet arthropathy C5-6 C6-7 as well as facet arthropathy in the left at C3-4.    4.  Small central disc herniation thoracic spine T7-8 without spinal cord compression.    5.  Right mastoid air cell effusion on MRI of the head and neck.      Discussion:    1.  We discussed the diagnosis and treatment options.  We discussed her overall widespread myofascial pain/polymyalgias and arthralgias as well as localized degenerative changes in the cervical and lumbar spine and thoracic spine.  We discussed treatment such as physical therapy further labs medications and potential injections in the future.    2.  Recommend labs as above to evaluate for underlying rheumatologic condition contributing to her polymyalgias and arthralgias.    3.  Start physical therapy for cervical thoracic lumbar strengthening stabilization.    4.  Trial meloxicam as needed for pain in place of ibuprofen.    5.  I would like her to see ENT to discuss the right mastoid air cell effusion noted on MRI.    6.  Follow-up 4 to 6  weeks.    It was our pleasure caring for your patient today, if there any questions or concerns please do not hesitate to contact us.    Over 60 minutes were spent on the date of the encounter performing chart review, patient visit and documentation in addition to any procedure.    Subjective:   Patient ID: Heath Waller is a 44 year old female.    History of Present Illness: Patient presents at the request of Corinne Fanta CNP for an evaluation of cervical thoracic lumbar spine pain.  Patient's pain is somewhat complicated.  She has had pain for approximately 20 years plus diffusely started with low back pain without any injury and slowly progressed to thoracic pain cervical spine pain and now has cervical thoracic lumbar pain with pain in the arms and legs.  She discussed that she was diagnosed with fibromyalgia in the past by rheumatology through [facility].  Reports trying gabapentin and Lyrica in the past.  Has had trigger point injections as well as she feels multiple trigger points and as she treats those other areas flareup.  She has widespread pain worse with any prolonged sitting standing, walking, driving, turning her head.  Better with heat and ice.  She has some morning stiffness but typically improves after taking a shower.  She has widespread pain in the arms and legs but no numbness or tingling.  She now uses THC some cyclobenzaprine ibuprofen.  She tries not to take too many medications.  Cyclobenzaprine causes significant drowsiness in the morning.  Her pain is a 10/10 at worst 8/10 today 5/10 at best.  She has not had any recent physical therapy or chiropractic.  During this workup she was found to have tumor on brain MRI and is following with neurosurgery.  She is having difficult time with work.  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  She had a 4-hour meeting the other day and was very difficult for her as changing positions seems to be the only thing that  "helps.    Labs: November 2024 CBC showed normal WBC 6.7, hemoglobin 13.8, platelets 295, CMP from August 2024 showed sodium 141 potassium 4.0, calcium 8.8, glucose 83 AST 19 and ALT 84, TSH 1.3 at that time    Imaging:   MRI report and images were personally reviewed and discussed with the patient.  A plastic model was utilized during the discussion.  MRI of the cervical thoracic and lumbar spine images personally reviewed.  Degenerative changes cervical spine with mild disc height loss C5-6 and C6/7.  Moderate-severe left foraminal stenosis C5-6.  There is facet arthropathy throughout the cervical spine.  Most significant facet arthropathy on the left at C3-4 and C4-5.  Right mastoid air cell effusion when I review the images.    MRI thoracic spine shows mild to moderate degenerative changes of the discs with a small left paracentral disc herniation T7-8 with no spinal cord compression.    Lumbar MRI shows mild to moderate disc at loss L4-5 mild facet arthropathy mild lateral recess stenosis bilaterally.  L5-S1 small left paracentral disc protrusion with left lateral recess stenosis no nerve compression.  Moderate facet arthropathy mild left foraminal stenosis.  Contact of the left S1 nerve.         Review of Systems: She complains of weight loss, headache, changes in vision with dry eyes, heart palpitation and swelling in the legs abdominal pain constipation nausea vomiting reflux, bladder control issues joint pain muscle pain, muscle fatigue, \"sciatica \"poor balance, dizziness, seizures, excessive bleeding, excessive bruising, poor sleep anxiety depression no suicidal thoughts, denies fever, weight gain, hoarseness, eye pain, chest pain, shortness of breath, cough, wheeze, enlarged lymph nodes, diarrhea, painful urination, skin itching, falls.  Remainder of 12 point review systems negative unless listed above.      Current Outpatient Medications   Medication Sig Dispense Refill     meloxicam (MOBIC) 15 MG tablet " Take 0.5-1 tablets (7.5-15 mg) by mouth daily as needed for pain. 30 tablet 1     albuterol (PROAIR HFA/PROVENTIL HFA/VENTOLIN HFA) 108 (90 Base) MCG/ACT inhaler Inhale 1-2 puffs into the lungs every 4 hours as needed for shortness of breath, wheezing or cough 18 g 3     amphetamine-dextroamphetamine (ADDERALL XR) 30 MG 24 hr capsule Take 1 capsule (30 mg) by mouth daily. 30 capsule 0     [START ON 7/26/2025] amphetamine-dextroamphetamine (ADDERALL XR) 30 MG 24 hr capsule Take 1 capsule (30 mg) by mouth daily. 30 capsule 0     amphetamine-dextroamphetamine (ADDERALL XR) 30 MG 24 hr capsule Take 1 capsule (30 mg) by mouth daily. 30 capsule 0     amphetamine-dextroamphetamine (ADDERALL) 10 MG tablet Take 1 tablet (10 mg) by mouth daily. 30 tablet 0     [START ON 7/26/2025] amphetamine-dextroamphetamine (ADDERALL) 10 MG tablet Take 1 tablet (10 mg) by mouth daily. 30 tablet 0     amphetamine-dextroamphetamine (ADDERALL) 10 MG tablet Take 1 tablet (10 mg) by mouth daily. 30 tablet 0     clobetasol (TEMOVATE) 0.05 % external ointment Apply topically 2 times daily. 60 g 3     clobetasol (TEMOVATE) 0.05 % external ointment Apply topically 2 times daily. For use up to two weeks at a time. 30 g 1     cyclobenzaprine (FLEXERIL) 10 MG tablet Take 1 tablet (10 mg) by mouth 3 times daily as needed for muscle spasms. 30 tablet 3     estradiol (ESTRACE) 0.1 MG/GM vaginal cream Place 1 g vaginally three times a week. 42.5 g 0     gabapentin (NEURONTIN) 300 MG capsule Take 300 mg by mouth.       ibuprofen (ADVIL,MOTRIN) 200 MG tablet        levETIRAcetam (KEPPRA) 750 MG tablet Take 1 tablet (750 mg) by mouth 2 times daily. 180 tablet 1     levETIRAcetam (KEPPRA) 750 MG tablet Take 1 tablet (750 mg) by mouth 2 times daily. 60 tablet 1     lidocaine (LIDODERM) 5 % patch 1 PATCH ON SKIN EVERY 24 HR - REMOVE AFTER 12 HR. TO PREVENT LIDOCAINE TOXICITY BE PATCH FREE X12 HR 10 patch 0     methylPREDNISolone (MEDROL DOSEPAK) 4 MG tablet  therapy pack Follow Package Directions 21 tablet 0     ondansetron (ZOFRAN) 4 MG tablet Take 1 tablet (4 mg) by mouth every 6 hours as needed for nausea. 4 tablet 0     sertraline (ZOLOFT) 100 MG tablet Take 1 tablet (100 mg) by mouth daily. 90 tablet 3     tolterodine ER (DETROL LA) 4 MG 24 hr capsule Take 1 capsule (4 mg) by mouth daily. 90 capsule 3     No current facility-administered medications for this visit.       Past Medical History:   Diagnosis Date     ADD (attention deficit disorder)      Atypical mole 07/19/2018     Degenerative disc disease, cervical      Depressive disorder      Fibromyalgia      Gastro-oesophageal reflux disease      Myofacial muscle pain      Noninfectious ileitis     IBS with constipation     Other chronic pain     myofacial pain syndrome     Sleep apnea     wears CPAP at night-haven't used it in a few years     Thyroid disease      TMJ (temporomandibular joint syndrome) 11/14/2016       Family History   Problem Relation Age of Onset     Cerebrovascular Disease Father         2016     Hypertension Father      Lung Cancer Father 60        stage IV     Cancer Father         lung cancer      Cerebrovascular Disease Maternal Grandmother      Liver Cancer Paternal Grandmother      Colon Cancer Paternal Grandfather      Thyroid Disease Brother      No Known Problems Son      No Known Problems Daughter      No Known Problems Daughter      Skin Cancer Paternal Uncle          Social History     Socioeconomic History     Marital status: Single     Spouse name: Xavier      Number of children: None     Years of education: 16     Highest education level: None   Occupational History     Occupation: nurse   Tobacco Use     Smoking status: Every Day     Current packs/day: 0.00     Types: Cigarettes, Vaping Device     Passive exposure: Current     Smokeless tobacco: Former     Tobacco comments:     e-cig   Vaping Use     Vaping status: Every Day   Substance and Sexual Activity     Alcohol use:  Yes     Alcohol/week: 0.0 standard drinks of alcohol     Comment: rarely     Drug use: Yes     Types: Marijuana     Comment: Denies drug use, but had postive drug tox from 8/24 for oxycodone and meth     Sexual activity: Yes     Partners: Male     Birth control/protection: Female Surgical   Other Topics Concern      Service Yes     Comment:  erna      Blood Transfusions No     Caffeine Concern No     Comment: energy drink 12 o. 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 Lives with spouse and 3 children      Social Drivers of Health     Financial Resource Strain: Low Risk  (8/28/2024)    Financial Resource Strain      Within the past 12 months, have you or your family members you live with been unable to get utilities (heat, electricity) when it was really needed?: No   Food Insecurity: Low Risk  (8/28/2024)    Food Insecurity      Within the past 12 months, did you worry that your food would run out before you got money to buy more?: No      Within the past 12 months, did the food you bought just not last and you didn t have money to get more?: No   Transportation Needs: Low Risk  (8/28/2024)    Transportation Needs      Within the past 12 months, has lack of transportation kept you from medical appointments, getting your medicines, non-medical meetings or appointments, work, or from getting things that you need?: No   Physical Activity: Inactive (8/28/2024)    Exercise Vital Sign      Days of Exercise per Week: 0 days      Minutes of Exercise per Session: 0 min   Stress: Stress Concern Present (8/28/2024)    [employer] [place] [employer] - Occupational Stress Questionnaire      Feeling of Stress : Very much   Social Connections: Unknown (8/28/2024)    Social Connection and Isolation Panel [NHANES]      Frequency of Social Gatherings with Friends and Family: Once a week   Interpersonal Safety: Low Risk  (2/25/2025)    Interpersonal Safety      Do you feel physically and emotionally safe where you currently live?: Yes      Within the past 12 months, have you been hit, slapped, kicked or otherwise physically hurt by someone?: No      Within the past 12 months, have you been humiliated or emotionally abused in other ways by your partner or ex-partner?: No   Housing Stability: Low Risk  (8/28/2024)    Housing Stability      Do you have housing? : Yes      Are you worried about losing your housing?: No     Social history: .  Works as a nurse.  Uses tobacco with a vape, drinks alcohol and uses THC.    The following portions of the patient's history were reviewed and updated as appropriate: allergies, current medications, past family history, past medical history, past social history, past surgical history and problem list.    Oswestry (NAVDEEP) Questionnaire        7/10/2025     1:16 PM   OSWESTRY DISABILITY INDEX   Count 10    Sum 31    Oswestry Score (%) 62 %        Patient-reported       Neck Disability Index:      7/10/2025     1:18 PM   Neck Disability Index (  Frank H. and Norma C. 1991. All rights reserved.; used with permission)   SECTION 1 - PAIN INTENSITY 4   SECTION 2 - PERSONAL CARE 3   SECTION 3 - LIFTING 3   SECTION 4 - READING 4   SECTION 5 - HEADACHES 5   SECTION 6 - CONCENTRATION 4   SECTION 7 - WORK 4   SECTION 8 - DRIVING 4   SECTION 9 - SLEEPING 4   SECTION 10 - RECREATION 4   Count 10    Sum 39    Raw Score: /50 39    Neck Disability Index Score: (%) 78 %        Patient-reported          PHQ-2 Score:         2/24/2021     1:38 PM 9/25/2019     2:29 PM   PHQ-2 ( 1999 [employer])   Q1: Little interest or pleasure in doing things  "3 1    Q2: Feeling down, depressed or hopeless 3 1    PHQ-2 Score 6 2   PHQ-2 Total Score (12-17 Years)- Positive if 3 or more points; Administer PHQ-A if positive 6 2   Q1: Little interest or pleasure in doing things Nearly every day Several days    Q2: Feeling down, depressed or hopeless Nearly every day Several days    PHQ-2 Score 6 2        Proxy-reported                  Objective:   Physical Exam:    /60   Pulse 61   Ht 5' 9\" (1.753 m)   Wt 159 lb (72.1 kg)   LMP 10/21/2024   BMI 23.48 kg/m    Body mass index is 23.48 kg/m .      General:  Well-appearing female in no acute distress.  Pleasant,   cooperative, and interactive throughout the examination and interview.  CV: No lower extremity edema.  Lymphatics: No cervical lymphadenopathy palpated.  Eyes: sclera clear.  Skin: No rashes or lesions seen.  Respirations unlabored.  MSK: Gait is nonantalgic.  Able to toe walk without difficulty.  Some mild difficulty with heel stand on the right.  Negative Romberg.  Spine: normal AP curves of the C, T, and L spine.  Mildly reduced cervical range of motion rotation bilaterally and lumbar flexion very to floor testing.  Palpation: Tenderness to palpation over upper trapezius, parascapular muscles, mildly over the lumbar paraspinals gluteal tissues no tenderness over the cervical paraspinals.  Extremities: Full range of motion of the shoulders and abduction, elbows, and wrists with no effusions or tenderness to palpation.  Negative arm drop, empty can, and Speed's test bilaterally.   Full range of motion of the hips, knees, and ankles from a seated position with no effusions or tenderness to palpation.  Has what appears to be Raynaud's phenomenon in the lower extremity.    Neurologic exam: Mental status: Patient is alert and oriented with normal affect.  Attention, knowledge, memory, and language are intact.  Normal coordination throughout the examination.  Reflexes are 2+ and symmetric biceps, triceps, " brachioradialis, patellar, and Achilles with down-going toes and mildly positive Cathy's bilaterally.  Sensation is intact to light touch throughout the upper and lower extremities bilaterally.  Manual muscle testing reveals 5 out of 5 strength in the shoulder abductors, elbow   flexors/extensors, wrist extensors, interosseous, and finger flexors; 5 out of 5   in the hip flexors, knee flexors/extensors, ankle plantar flexors, ankle   dorsiflexors, and EHL.  Normal muscle bulk and tone.  Negative   Spurling's test bilaterally.  Negative seated  straight leg raise bilaterally.            Again, thank you for allowing me to participate in the care of your patient.        Sincerely,        Jayson Funes, DO    Electronically signed

## 2025-07-14 ENCOUNTER — LAB (OUTPATIENT)
Dept: LAB | Facility: CLINIC | Age: 45
End: 2025-07-14
Payer: COMMERCIAL

## 2025-07-14 ENCOUNTER — PATIENT OUTREACH (OUTPATIENT)
Dept: CARE COORDINATION | Facility: CLINIC | Age: 45
End: 2025-07-14

## 2025-07-14 DIAGNOSIS — M35.3 POLYMYALGIA: ICD-10-CM

## 2025-07-14 DIAGNOSIS — M79.18 MYOFASCIAL PAIN: ICD-10-CM

## 2025-07-14 LAB — ERYTHROCYTE [SEDIMENTATION RATE] IN BLOOD BY WESTERGREN METHOD: 9 MM/HR (ref 0–20)

## 2025-07-14 PROCEDURE — 86364 TISS TRNSGLTMNASE EA IG CLAS: CPT

## 2025-07-14 PROCEDURE — 86235 NUCLEAR ANTIGEN ANTIBODY: CPT

## 2025-07-14 PROCEDURE — 86200 CCP ANTIBODY: CPT

## 2025-07-14 PROCEDURE — 85652 RBC SED RATE AUTOMATED: CPT

## 2025-07-14 PROCEDURE — 86431 RHEUMATOID FACTOR QUANT: CPT

## 2025-07-14 PROCEDURE — 86618 LYME DISEASE ANTIBODY: CPT

## 2025-07-14 PROCEDURE — 86038 ANTINUCLEAR ANTIBODIES: CPT

## 2025-07-14 PROCEDURE — 36415 COLL VENOUS BLD VENIPUNCTURE: CPT

## 2025-07-14 PROCEDURE — 82550 ASSAY OF CK (CPK): CPT

## 2025-07-14 PROCEDURE — 84443 ASSAY THYROID STIM HORMONE: CPT

## 2025-07-14 PROCEDURE — 86140 C-REACTIVE PROTEIN: CPT

## 2025-07-14 PROCEDURE — 86039 ANTINUCLEAR ANTIBODIES (ANA): CPT

## 2025-07-15 LAB
ANA PAT SER IF-IMP: ABNORMAL
ANA SER QL IF: ABNORMAL
ANA TITR SER IF: ABNORMAL {TITER}
B BURGDOR IGG+IGM SER QL: 0.24
CK SERPL-CCNC: 45 U/L (ref 26–192)
CRP SERPL-MCNC: <3 MG/L
RHEUMATOID FACT SERPL-ACNC: <10 IU/ML
TSH SERPL DL<=0.005 MIU/L-ACNC: 2.01 UIU/ML (ref 0.3–4.2)

## 2025-07-16 LAB
CCP AB SER IA-ACNC: 0.7 U/ML
ENA SS-A AB SER IA-ACNC: 3.7 U/ML
ENA SS-A AB SER IA-ACNC: NEGATIVE
ENA SS-B IGG SER IA-ACNC: <0.6 U/ML
ENA SS-B IGG SER IA-ACNC: NEGATIVE
TTG IGA SER-ACNC: <0.2 U/ML
TTG IGG SER-ACNC: <0.6 U/ML

## 2025-07-29 ENCOUNTER — PATIENT OUTREACH (OUTPATIENT)
Dept: CARE COORDINATION | Facility: CLINIC | Age: 45
End: 2025-07-29
Payer: COMMERCIAL

## 2025-08-05 ENCOUNTER — THERAPY VISIT (OUTPATIENT)
Dept: PHYSICAL THERAPY | Facility: CLINIC | Age: 45
End: 2025-08-05
Attending: PHYSICAL MEDICINE & REHABILITATION
Payer: COMMERCIAL

## 2025-08-05 DIAGNOSIS — G89.29 CHRONIC LOW BACK PAIN: Primary | ICD-10-CM

## 2025-08-05 DIAGNOSIS — M54.50 LUMBAR SPINE PAIN: ICD-10-CM

## 2025-08-05 DIAGNOSIS — M51.24 HERNIATION OF INTERVERTEBRAL DISC OF THORACIC REGION: ICD-10-CM

## 2025-08-05 DIAGNOSIS — M35.3 POLYMYALGIA: ICD-10-CM

## 2025-08-05 DIAGNOSIS — M54.2 CERVICAL SPINE PAIN: ICD-10-CM

## 2025-08-05 DIAGNOSIS — M54.2 NECK PAIN: ICD-10-CM

## 2025-08-05 DIAGNOSIS — M47.816 LUMBAR FACET ARTHROPATHY: ICD-10-CM

## 2025-08-05 DIAGNOSIS — M48.02 FORAMINAL STENOSIS OF CERVICAL REGION: ICD-10-CM

## 2025-08-05 DIAGNOSIS — M79.18 MYOFASCIAL PAIN: ICD-10-CM

## 2025-08-05 DIAGNOSIS — H70.91 MASTOIDITIS OF RIGHT SIDE: ICD-10-CM

## 2025-08-05 DIAGNOSIS — M25.50 POLYARTHRALGIA: ICD-10-CM

## 2025-08-05 DIAGNOSIS — M54.50 CHRONIC LOW BACK PAIN: Primary | ICD-10-CM

## 2025-08-05 DIAGNOSIS — M54.6 THORACIC SPINE PAIN: ICD-10-CM

## 2025-08-05 PROCEDURE — 97110 THERAPEUTIC EXERCISES: CPT | Mod: GP | Performed by: PHYSICAL THERAPIST

## 2025-08-05 PROCEDURE — 97161 PT EVAL LOW COMPLEX 20 MIN: CPT | Mod: GP | Performed by: PHYSICAL THERAPIST

## 2025-08-05 PROCEDURE — 97112 NEUROMUSCULAR REEDUCATION: CPT | Mod: GP | Performed by: PHYSICAL THERAPIST

## 2025-08-14 ENCOUNTER — OFFICE VISIT (OUTPATIENT)
Dept: PHYSICAL MEDICINE AND REHAB | Facility: CLINIC | Age: 45
End: 2025-08-14
Payer: COMMERCIAL

## 2025-08-14 VITALS — SYSTOLIC BLOOD PRESSURE: 142 MMHG | HEART RATE: 65 BPM | DIASTOLIC BLOOD PRESSURE: 66 MMHG

## 2025-08-14 DIAGNOSIS — G89.4 CHRONIC PAIN SYNDROME: ICD-10-CM

## 2025-08-14 DIAGNOSIS — M35.3 POLYMYALGIA: ICD-10-CM

## 2025-08-14 DIAGNOSIS — M47.816 LUMBAR FACET ARTHROPATHY: ICD-10-CM

## 2025-08-14 DIAGNOSIS — M25.50 POLYARTHRALGIA: ICD-10-CM

## 2025-08-14 DIAGNOSIS — M79.18 MYOFASCIAL PAIN: ICD-10-CM

## 2025-08-14 DIAGNOSIS — M54.50 LUMBAR SPINE PAIN: Primary | ICD-10-CM

## 2025-08-14 DIAGNOSIS — M54.2 CERVICAL SPINE PAIN: ICD-10-CM

## 2025-08-14 RX ORDER — NABUMETONE 500 MG/1
500-1000 TABLET, FILM COATED ORAL 2 TIMES DAILY PRN
Qty: 90 TABLET | Refills: 1 | Status: SHIPPED | OUTPATIENT
Start: 2025-08-14

## 2025-08-14 ASSESSMENT — PAIN SCALES - GENERAL: PAINLEVEL_OUTOF10: SEVERE PAIN (8)

## 2025-08-16 ENCOUNTER — TELEPHONE (OUTPATIENT)
Dept: PALLIATIVE MEDICINE | Facility: CLINIC | Age: 45
End: 2025-08-16
Payer: COMMERCIAL

## 2025-08-19 ENCOUNTER — THERAPY VISIT (OUTPATIENT)
Dept: PHYSICAL THERAPY | Facility: CLINIC | Age: 45
End: 2025-08-19
Payer: COMMERCIAL

## 2025-08-19 DIAGNOSIS — G89.29 CHRONIC LOW BACK PAIN: Primary | ICD-10-CM

## 2025-08-19 DIAGNOSIS — M54.50 CHRONIC LOW BACK PAIN: Primary | ICD-10-CM

## 2025-08-19 DIAGNOSIS — M54.2 NECK PAIN: ICD-10-CM

## 2025-08-19 PROCEDURE — 97110 THERAPEUTIC EXERCISES: CPT | Mod: GP | Performed by: PHYSICAL THERAPIST

## 2025-08-19 PROCEDURE — 97112 NEUROMUSCULAR REEDUCATION: CPT | Mod: GP | Performed by: PHYSICAL THERAPIST

## 2025-08-26 ENCOUNTER — THERAPY VISIT (OUTPATIENT)
Dept: PHYSICAL THERAPY | Facility: CLINIC | Age: 45
End: 2025-08-26
Payer: COMMERCIAL

## 2025-08-26 DIAGNOSIS — M54.50 CHRONIC LOW BACK PAIN: Primary | ICD-10-CM

## 2025-08-26 DIAGNOSIS — G89.29 CHRONIC LOW BACK PAIN: Primary | ICD-10-CM

## 2025-08-26 DIAGNOSIS — M54.2 NECK PAIN: ICD-10-CM

## 2025-08-26 PROCEDURE — 97110 THERAPEUTIC EXERCISES: CPT | Mod: GP | Performed by: PHYSICAL THERAPIST

## 2025-08-26 PROCEDURE — 97112 NEUROMUSCULAR REEDUCATION: CPT | Mod: GP | Performed by: PHYSICAL THERAPIST

## 2025-08-27 ENCOUNTER — MYC REFILL (OUTPATIENT)
Dept: FAMILY MEDICINE | Facility: CLINIC | Age: 45
End: 2025-08-27
Payer: COMMERCIAL

## 2025-08-27 DIAGNOSIS — M79.7 FIBROMYALGIA: ICD-10-CM

## 2025-08-27 DIAGNOSIS — F98.8 ATTENTION DEFICIT DISORDER (ADD) WITHOUT HYPERACTIVITY: ICD-10-CM

## 2025-08-27 RX ORDER — DEXTROAMPHETAMINE SACCHARATE, AMPHETAMINE ASPARTATE MONOHYDRATE, DEXTROAMPHETAMINE SULFATE AND AMPHETAMINE SULFATE 7.5; 7.5; 7.5; 7.5 MG/1; MG/1; MG/1; MG/1
30 CAPSULE, EXTENDED RELEASE ORAL DAILY
Qty: 30 CAPSULE | Refills: 0 | Status: SHIPPED | OUTPATIENT
Start: 2025-09-26 | End: 2025-10-26

## 2025-08-27 RX ORDER — DEXTROAMPHETAMINE SACCHARATE, AMPHETAMINE ASPARTATE MONOHYDRATE, DEXTROAMPHETAMINE SULFATE AND AMPHETAMINE SULFATE 7.5; 7.5; 7.5; 7.5 MG/1; MG/1; MG/1; MG/1
30 CAPSULE, EXTENDED RELEASE ORAL DAILY
Qty: 30 CAPSULE | Refills: 0 | Status: SHIPPED | OUTPATIENT
Start: 2025-08-27 | End: 2025-09-26

## 2025-08-27 RX ORDER — DEXTROAMPHETAMINE SACCHARATE, AMPHETAMINE ASPARTATE MONOHYDRATE, DEXTROAMPHETAMINE SULFATE AND AMPHETAMINE SULFATE 7.5; 7.5; 7.5; 7.5 MG/1; MG/1; MG/1; MG/1
30 CAPSULE, EXTENDED RELEASE ORAL DAILY
Qty: 30 CAPSULE | Refills: 0 | Status: CANCELLED | OUTPATIENT
Start: 2025-08-27

## 2025-08-27 RX ORDER — DEXTROAMPHETAMINE SACCHARATE, AMPHETAMINE ASPARTATE MONOHYDRATE, DEXTROAMPHETAMINE SULFATE AND AMPHETAMINE SULFATE 7.5; 7.5; 7.5; 7.5 MG/1; MG/1; MG/1; MG/1
30 CAPSULE, EXTENDED RELEASE ORAL DAILY
Qty: 30 CAPSULE | Refills: 0 | Status: SHIPPED | OUTPATIENT
Start: 2025-10-26 | End: 2025-11-25

## 2025-08-27 RX ORDER — DEXTROAMPHETAMINE SACCHARATE, AMPHETAMINE ASPARTATE, DEXTROAMPHETAMINE SULFATE AND AMPHETAMINE SULFATE 2.5; 2.5; 2.5; 2.5 MG/1; MG/1; MG/1; MG/1
10 TABLET ORAL DAILY
Qty: 30 TABLET | Refills: 0 | Status: SHIPPED | OUTPATIENT
Start: 2025-10-26 | End: 2025-11-25

## 2025-08-27 RX ORDER — DEXTROAMPHETAMINE SACCHARATE, AMPHETAMINE ASPARTATE, DEXTROAMPHETAMINE SULFATE AND AMPHETAMINE SULFATE 2.5; 2.5; 2.5; 2.5 MG/1; MG/1; MG/1; MG/1
10 TABLET ORAL DAILY
Qty: 30 TABLET | Refills: 0 | Status: CANCELLED | OUTPATIENT
Start: 2025-08-27

## 2025-08-27 RX ORDER — DEXTROAMPHETAMINE SACCHARATE, AMPHETAMINE ASPARTATE, DEXTROAMPHETAMINE SULFATE AND AMPHETAMINE SULFATE 2.5; 2.5; 2.5; 2.5 MG/1; MG/1; MG/1; MG/1
10 TABLET ORAL DAILY
Qty: 30 TABLET | Refills: 0 | Status: SHIPPED | OUTPATIENT
Start: 2025-09-26 | End: 2025-10-26

## 2025-08-27 RX ORDER — LIDOCAINE 50 MG/G
PATCH TOPICAL
Qty: 10 PATCH | Refills: 0 | Status: SHIPPED | OUTPATIENT
Start: 2025-08-27

## 2025-08-27 RX ORDER — DEXTROAMPHETAMINE SACCHARATE, AMPHETAMINE ASPARTATE, DEXTROAMPHETAMINE SULFATE AND AMPHETAMINE SULFATE 2.5; 2.5; 2.5; 2.5 MG/1; MG/1; MG/1; MG/1
10 TABLET ORAL DAILY
Qty: 30 TABLET | Refills: 0 | Status: SHIPPED | OUTPATIENT
Start: 2025-08-27 | End: 2025-09-26

## 2025-09-02 ENCOUNTER — THERAPY VISIT (OUTPATIENT)
Dept: PHYSICAL THERAPY | Facility: CLINIC | Age: 45
End: 2025-09-02
Payer: COMMERCIAL

## 2025-09-02 DIAGNOSIS — M54.50 CHRONIC LOW BACK PAIN: Primary | ICD-10-CM

## 2025-09-02 DIAGNOSIS — M54.2 NECK PAIN: ICD-10-CM

## 2025-09-02 DIAGNOSIS — G89.29 CHRONIC LOW BACK PAIN: Primary | ICD-10-CM

## 2025-09-02 PROCEDURE — 97112 NEUROMUSCULAR REEDUCATION: CPT | Mod: GP | Performed by: PHYSICAL THERAPIST

## 2025-09-02 PROCEDURE — 97110 THERAPEUTIC EXERCISES: CPT | Mod: GP | Performed by: PHYSICAL THERAPIST

## 2025-09-04 ENCOUNTER — OFFICE VISIT (OUTPATIENT)
Dept: PHYSICAL MEDICINE AND REHAB | Facility: CLINIC | Age: 45
End: 2025-09-04
Payer: COMMERCIAL

## 2025-09-04 VITALS — SYSTOLIC BLOOD PRESSURE: 138 MMHG | HEART RATE: 61 BPM | DIASTOLIC BLOOD PRESSURE: 74 MMHG

## 2025-09-04 DIAGNOSIS — M79.18 MYOFASCIAL PAIN: ICD-10-CM

## 2025-09-04 DIAGNOSIS — M35.3 POLYMYALGIA: Primary | ICD-10-CM

## 2025-09-04 DIAGNOSIS — G89.4 CHRONIC PAIN SYNDROME: ICD-10-CM

## 2025-09-04 DIAGNOSIS — M25.50 POLYARTHRALGIA: ICD-10-CM

## 2025-09-04 RX ORDER — BACLOFEN 10 MG/1
5-10 TABLET ORAL 2 TIMES DAILY PRN
Qty: 60 TABLET | Refills: 1 | Status: SHIPPED | OUTPATIENT
Start: 2025-09-04

## 2025-09-04 ASSESSMENT — PAIN SCALES - GENERAL: PAINLEVEL_OUTOF10: SEVERE PAIN (8)

## (undated) DEVICE — SUTURE VICRYL+ 0 CT-1 18" DYED VIO VCP740D

## (undated) DEVICE — TUBING IRRIG CYSTO/BLADDER SET 81" LF 2C4040

## (undated) DEVICE — PROTECTOR ARM ONE-STEP TRENDELENBURG 40418 (COI)

## (undated) DEVICE — GLOVE PROTEXIS W/NEU-THERA 5.5  2D73TE55

## (undated) DEVICE — LINEN TOWEL PACK X5 5464

## (undated) DEVICE — SPONGE LAP 18X18" X8435

## (undated) DEVICE — PREP CHLORAPREP 26ML TINTED ORANGE  260815

## (undated) DEVICE — SOL WATER IRRIG 3000ML BAG 2B7117

## (undated) DEVICE — GLOVE BIOGEL PI MICRO INDICATOR UNDERGLOVE SZ 6.0 48960

## (undated) DEVICE — LINEN HALF SHEET 5512

## (undated) DEVICE — CATH TRAY FOLEY SURESTEP 16FR DRAIN BAG STATOCK A899916

## (undated) DEVICE — SUCTION CANISTER MEDIVAC LINER 3000ML W/LID 65651-530

## (undated) DEVICE — Device

## (undated) DEVICE — BAG CLEAR TRASH 1.3M 39X33" P4040C

## (undated) DEVICE — PAD CHUX UNDERPAD 30X36" P3036C

## (undated) DEVICE — PACK LAP HYST RIDGES

## (undated) DEVICE — GOWN XLG DISP 9545

## (undated) DEVICE — LINEN POUCH DBL 5427

## (undated) DEVICE — TUBING SUCTION 12"X1/4" N612

## (undated) DEVICE — NDL SPINAL 20GA 3.5" 405182

## (undated) DEVICE — SU VICRYL+ 0 27IN CT-2 VLT VCP334H

## (undated) DEVICE — ESU LIGASURE LAPAROSCOPIC BLUNT TIP SEALER 5MMX37CM LF1837

## (undated) DEVICE — BLADE KNIFE SURG 10 371110

## (undated) DEVICE — LINEN FULL SHEET 5511

## (undated) DEVICE — SPONGE LAP 04X18" 1525

## (undated) DEVICE — SUCTION TIP YANKAUER W/O VENT K86

## (undated) DEVICE — SOL NACL 0.9% INJ 250ML BAG 2B1322Q

## (undated) DEVICE — SU VICRYL 2-0 CT-2 27" J333H

## (undated) DEVICE — SPONGE LAP 4X18" X8415

## (undated) DEVICE — SYSTEM PSTN 29X20X1IN PNK PD LF NS 40580 (COI)

## (undated) DEVICE — PREP DYNA-HEX 4% CHG SCRUB 4OZ BOTTLE MDS098710

## (undated) RX ORDER — IBUPROFEN 200 MG
TABLET ORAL
Status: DISPENSED
Start: 2024-11-06

## (undated) RX ORDER — FENTANYL CITRATE-0.9 % NACL/PF 10 MCG/ML
PLASTIC BAG, INJECTION (ML) INTRAVENOUS
Status: DISPENSED
Start: 2024-11-06

## (undated) RX ORDER — FENTANYL CITRATE 50 UG/ML
INJECTION, SOLUTION INTRAMUSCULAR; INTRAVENOUS
Status: DISPENSED
Start: 2024-11-06

## (undated) RX ORDER — DEXAMETHASONE SODIUM PHOSPHATE 4 MG/ML
INJECTION, SOLUTION INTRA-ARTICULAR; INTRALESIONAL; INTRAMUSCULAR; INTRAVENOUS; SOFT TISSUE
Status: DISPENSED
Start: 2024-11-06

## (undated) RX ORDER — LIDOCAINE HYDROCHLORIDE 10 MG/ML
INJECTION, SOLUTION EPIDURAL; INFILTRATION; INTRACAUDAL; PERINEURAL
Status: DISPENSED
Start: 2024-11-06

## (undated) RX ORDER — IBUPROFEN 600 MG/1
TABLET, FILM COATED ORAL
Status: DISPENSED
Start: 2024-11-06

## (undated) RX ORDER — PROPOFOL 10 MG/ML
INJECTION, EMULSION INTRAVENOUS
Status: DISPENSED
Start: 2024-11-06

## (undated) RX ORDER — VASOPRESSIN 20 U/ML
INJECTION PARENTERAL
Status: DISPENSED
Start: 2024-11-06

## (undated) RX ORDER — METRONIDAZOLE 500 MG/100ML
INJECTION, SOLUTION INTRAVENOUS
Status: DISPENSED
Start: 2024-11-06

## (undated) RX ORDER — BUPIVACAINE HYDROCHLORIDE AND EPINEPHRINE 5; 5 MG/ML; UG/ML
INJECTION, SOLUTION EPIDURAL; INTRACAUDAL; PERINEURAL
Status: DISPENSED
Start: 2024-11-06

## (undated) RX ORDER — GLYCOPYRROLATE 0.2 MG/ML
INJECTION, SOLUTION INTRAMUSCULAR; INTRAVENOUS
Status: DISPENSED
Start: 2024-11-06

## (undated) RX ORDER — ACETAMINOPHEN 325 MG/1
TABLET ORAL
Status: DISPENSED
Start: 2024-11-06

## (undated) RX ORDER — CEFAZOLIN SODIUM/WATER 2 G/20 ML
SYRINGE (ML) INTRAVENOUS
Status: DISPENSED
Start: 2024-11-06

## (undated) RX ORDER — OXYCODONE HYDROCHLORIDE 5 MG/1
TABLET ORAL
Status: DISPENSED
Start: 2024-11-06